# Patient Record
Sex: FEMALE | Race: WHITE | NOT HISPANIC OR LATINO | Employment: OTHER | URBAN - METROPOLITAN AREA
[De-identification: names, ages, dates, MRNs, and addresses within clinical notes are randomized per-mention and may not be internally consistent; named-entity substitution may affect disease eponyms.]

---

## 2018-03-26 ENCOUNTER — TRANSCRIBE ORDERS (OUTPATIENT)
Dept: DIABETES SERVICES | Facility: HOSPITAL | Age: 68
End: 2018-03-26

## 2018-03-26 DIAGNOSIS — E11.9 DIABETES MELLITUS WITHOUT COMPLICATION (HCC): Primary | ICD-10-CM

## 2018-04-03 ENCOUNTER — HOSPITAL ENCOUNTER (OUTPATIENT)
Dept: DIABETES SERVICES | Facility: HOSPITAL | Age: 68
Setting detail: RECURRING SERIES
Discharge: HOME | End: 2018-04-03

## 2018-04-03 PROCEDURE — G0109 DIAB MANAGE TRN IND/GROUP: HCPCS

## 2018-04-10 ENCOUNTER — HOSPITAL ENCOUNTER (OUTPATIENT)
Dept: DIABETES SERVICES | Facility: HOSPITAL | Age: 68
Setting detail: RECURRING SERIES
Discharge: HOME | End: 2018-04-10

## 2018-04-10 PROCEDURE — G0109 DIAB MANAGE TRN IND/GROUP: HCPCS

## 2018-04-17 ENCOUNTER — HOSPITAL ENCOUNTER (OUTPATIENT)
Dept: DIABETES SERVICES | Facility: HOSPITAL | Age: 68
Setting detail: RECURRING SERIES
Discharge: HOME | End: 2018-04-17

## 2018-04-17 PROCEDURE — G0109 DIAB MANAGE TRN IND/GROUP: HCPCS

## 2018-09-07 ENCOUNTER — PREP FOR SURGERY (OUTPATIENT)
Dept: OTHER | Facility: HOSPITAL | Age: 68
End: 2018-09-07

## 2018-09-07 DIAGNOSIS — Z80.0 FAMILY HISTORY OF COLON CANCER: Primary | ICD-10-CM

## 2018-09-07 DIAGNOSIS — D50.9 IRON DEFICIENCY ANEMIA, UNSPECIFIED IRON DEFICIENCY ANEMIA TYPE: ICD-10-CM

## 2018-09-10 PROBLEM — Z80.0 FAMILY HISTORY OF COLON CANCER: Status: ACTIVE | Noted: 2018-09-10

## 2018-09-10 PROBLEM — D50.9 IRON DEFICIENCY ANEMIA: Status: ACTIVE | Noted: 2018-09-10

## 2018-09-11 NOTE — H&P
Patient  Name ONEYDA BRANHAM (68yo, F) ID# [id number] Appt. Date/Time 2018 10:30AM   1950 [address]  Provider DENISSE DE JESUS MD  Insurance   Med Primary: [employer] (MEDICARE REPLACEMENT/ADVANTAGE - PPO)  Insurance # : [id number]  Prescription: DSTPSDIR - Member is eligible. details  Chief Complaint  EGD, colonoscopy  Patient's Care Team  Primary Care Provider: JUNI CORTEZ MD: [address], [place] [address], Ph [phone], Fax [phone] NPI: [id number]    Medications  Accu-Chek SmartView Test Strips  18   filled [facility]  alogliptin 12.5 mg tablet  18   filled [facility]  alogliptin 25 mg-pioglitazone 15 mg tablet  17   filled [facility]  estradiol-norethindrone acet 1 mg-0.5 mg tablet  17   filled [facility]  Fluzone High-Dose 7312-7644 (PF) 180 mcg/0.5 mL intramuscular syringe  17   filled [facility]  hydroCHLOROthiazide 12.5 mg tablet  18   filled [facility]  Januvia 50 mg tablet  08/15/18   filled [facility]  metFORMIN 1,000 mg tablet  08/15/18   filled [facility]  omeprazole 20 mg capsule,delayed release  08/15/18   filled [facility]  pioglitazone 45 mg tablet  18   filled [facility]  ramipril 10 mg capsule  08/15/18   filled [facility]  simvastatin 40 mg tablet  08/15/18   filled [facility]    Family History  Reviewed Family History  Brother - Malignant tumor of colon (onset age: 60)  Sister - Polyp of colon (onset age: 50)  Social History  Reviewed Social History  General Surgery  Alcohol intake: Occasional  Occupation: Retired  Smoking Status: Former smoker  Surgical History  Reviewed Surgical History  Colonoscopy - 2014  Past Medical History  Reviewed Past Medical History  Diabetes: Y  High Cholesterol: Y  Hypertension: Y  Reflux/GERD: Y  HPI  Patient referred by Juni Cortez MD comes in  to discuss iron deficiency anemia and a family history of colon cancer in her brother diagnosed in his 50s. Her sister also had colon polyps diagnosed in her 60s. Patient personally has not had a colonoscopy in 4 years and that one reported as was grossly normal. Patient denies any bleeding or black stool, weight loss, abdominal pain or subjective complaints of syncope, dizziness, lightheadedness or fatigue.  ROS  Patient reports GERD. She reports no fever and no night sweats. She reports no excessive bleeding. She reports not currently smoking.  Physical Exam  Patient is a 67-year-old female.    Constitutional: General Appearance: healthy-appearing.    Psychiatric: Orientation: to time, place, and person.    Skin: Inspection and palpation: no rash.    Eyes: Pupils: PERRLA.    Neck: Neck: supple.    Lungs: Auscultation: breath sounds normal.    Cardiovascular: Heart Auscultation: RRR.    Abdomen: Bowel Sounds: normal. Inspection and Palpation: no masses or tenderness (no guarding, no rebound) and non-distended.    Musculoskeletal:: Joints, Bones, and Muscles: normal movement of all extremities. Extremities: no edema.    Neurologic: Gait and Station: normal gait. Sensation: grossly intact.  Assessment / Plan  Patient presents with unexplained iron deficiency anemia (Hgb 10, Iron 30) and a family history of colon cancer in her brother. I recommended both upper and lower endoscopy since it is been at least 4 years since she has been checked. Both procedures were discussed including the associated risks and benefits. We will get those scheduled.    1. Iron deficiency anemia  D50.9: Iron deficiency anemia, unspecified  ESOPHAGOGASTRODUODENOSCOPY (SURG)  UPPER GI ENDOSCOPY: BEFORE YOUR PROCEDURE  COLONOSCOPY (SURG)    2. Family history of cancer of colon  Z80.0: Family history of malignant neoplasm of digestive organs

## 2018-09-12 ENCOUNTER — ANESTHESIA EVENT (OUTPATIENT)
Dept: GASTROENTEROLOGY | Facility: HOSPITAL | Age: 68
End: 2018-09-12

## 2018-09-12 ENCOUNTER — ANESTHESIA (OUTPATIENT)
Dept: GASTROENTEROLOGY | Facility: HOSPITAL | Age: 68
End: 2018-09-12

## 2018-09-12 ENCOUNTER — HOSPITAL ENCOUNTER (OUTPATIENT)
Facility: HOSPITAL | Age: 68
Setting detail: HOSPITAL OUTPATIENT SURGERY
Discharge: HOME | End: 2018-09-12
Attending: SURGERY | Admitting: SURGERY

## 2018-09-12 VITALS
OXYGEN SATURATION: 97 % | HEART RATE: 69 BPM | SYSTOLIC BLOOD PRESSURE: 164 MMHG | DIASTOLIC BLOOD PRESSURE: 91 MMHG | HEIGHT: 64 IN | RESPIRATION RATE: 12 BRPM | BODY MASS INDEX: 19.63 KG/M2 | TEMPERATURE: 97.9 F | WEIGHT: 115 LBS

## 2018-09-12 DIAGNOSIS — Z80.0 FAMILY HISTORY OF COLON CANCER: ICD-10-CM

## 2018-09-12 DIAGNOSIS — D50.9 IRON DEFICIENCY ANEMIA, UNSPECIFIED IRON DEFICIENCY ANEMIA TYPE: ICD-10-CM

## 2018-09-12 LAB — GLUCOSE BLDC GLUCOMTR-MCNC: 112 MG/DL (ref 70–130)

## 2018-09-12 PROCEDURE — 88305 TISSUE EXAM BY PATHOLOGIST: CPT | Performed by: SURGERY

## 2018-09-12 PROCEDURE — 88312 SPECIAL STAINS GROUP 1: CPT | Performed by: SURGERY

## 2018-09-12 PROCEDURE — 25010000002 PROPOFOL 10 MG/ML EMULSION: Performed by: ANESTHESIOLOGY

## 2018-09-12 PROCEDURE — 88342 IMHCHEM/IMCYTCHM 1ST ANTB: CPT | Performed by: SURGERY

## 2018-09-12 PROCEDURE — 82962 GLUCOSE BLOOD TEST: CPT

## 2018-09-12 RX ORDER — RAMIPRIL 10 MG/1
10 CAPSULE ORAL 2 TIMES DAILY
Status: ON HOLD | COMMUNITY
End: 2022-07-13 | Stop reason: SDUPTHER

## 2018-09-12 RX ORDER — ESTRADIOL 0.5 MG/1
0.5 TABLET ORAL DAILY
COMMUNITY
End: 2020-06-11 | Stop reason: DRUGHIGH

## 2018-09-12 RX ORDER — PROPOFOL 10 MG/ML
VIAL (ML) INTRAVENOUS AS NEEDED
Status: DISCONTINUED | OUTPATIENT
Start: 2018-09-12 | End: 2018-09-12 | Stop reason: SURG

## 2018-09-12 RX ORDER — SODIUM CHLORIDE, SODIUM LACTATE, POTASSIUM CHLORIDE, CALCIUM CHLORIDE 600; 310; 30; 20 MG/100ML; MG/100ML; MG/100ML; MG/100ML
1000 INJECTION, SOLUTION INTRAVENOUS CONTINUOUS
Status: DISCONTINUED | OUTPATIENT
Start: 2018-09-12 | End: 2018-09-12 | Stop reason: HOSPADM

## 2018-09-12 RX ORDER — PROPOFOL 10 MG/ML
VIAL (ML) INTRAVENOUS CONTINUOUS PRN
Status: DISCONTINUED | OUTPATIENT
Start: 2018-09-12 | End: 2018-09-12 | Stop reason: SURG

## 2018-09-12 RX ORDER — LIDOCAINE HYDROCHLORIDE 10 MG/ML
0.5 INJECTION, SOLUTION INFILTRATION; PERINEURAL ONCE AS NEEDED
Status: DISCONTINUED | OUTPATIENT
Start: 2018-09-12 | End: 2018-09-12 | Stop reason: HOSPADM

## 2018-09-12 RX ORDER — SODIUM CHLORIDE 0.9 % (FLUSH) 0.9 %
3 SYRINGE (ML) INJECTION AS NEEDED
Status: DISCONTINUED | OUTPATIENT
Start: 2018-09-12 | End: 2018-09-12 | Stop reason: HOSPADM

## 2018-09-12 RX ORDER — SIMVASTATIN 40 MG
40 TABLET ORAL NIGHTLY
COMMUNITY
End: 2022-07-13 | Stop reason: HOSPADM

## 2018-09-12 RX ORDER — OMEPRAZOLE 20 MG/1
20 CAPSULE, DELAYED RELEASE ORAL DAILY
COMMUNITY
End: 2018-09-19 | Stop reason: DRUGHIGH

## 2018-09-12 RX ORDER — HYDROCHLOROTHIAZIDE 12.5 MG/1
12.5 CAPSULE, GELATIN COATED ORAL DAILY
COMMUNITY

## 2018-09-12 RX ORDER — LIDOCAINE HYDROCHLORIDE 20 MG/ML
INJECTION, SOLUTION INFILTRATION; PERINEURAL AS NEEDED
Status: DISCONTINUED | OUTPATIENT
Start: 2018-09-12 | End: 2018-09-12 | Stop reason: SURG

## 2018-09-12 RX ADMIN — SODIUM CHLORIDE, POTASSIUM CHLORIDE, SODIUM LACTATE AND CALCIUM CHLORIDE 1000 ML: 600; 310; 30; 20 INJECTION, SOLUTION INTRAVENOUS at 13:33

## 2018-09-12 RX ADMIN — PROPOFOL 150 MG: 10 INJECTION, EMULSION INTRAVENOUS at 13:41

## 2018-09-12 RX ADMIN — PROPOFOL 140 MCG/KG/MIN: 10 INJECTION, EMULSION INTRAVENOUS at 13:41

## 2018-09-12 RX ADMIN — LIDOCAINE HYDROCHLORIDE 50 MG: 20 INJECTION, SOLUTION INFILTRATION; PERINEURAL at 13:41

## 2018-09-12 NOTE — ANESTHESIA POSTPROCEDURE EVALUATION
Patient: Maryellen Crump    Procedure Summary     Date:  09/12/18 Room / Location:  [facility] ENDOSCOPY 8 /  [facility]    Anesthesia Start:  1337 Anesthesia Stop:  1409    Procedures:       COLONOSCOPY TO CECUM (N/A )      ESOPHAGOGASTRODUODENOSCOPY WITH BIOPSIES (N/A Esophagus) Diagnosis:       Family history of colon cancer      Iron deficiency anemia, unspecified iron deficiency anemia type      (Family history of colon cancer [Z80.0])      (Iron deficiency anemia, unspecified iron deficiency anemia type [D50.9])    Surgeon:  Josh Muñoz MD Provider:  Tanmay Butt MD    Anesthesia Type:  MAC ASA Status:  2          Anesthesia Type: MAC  Last vitals  BP   126/76 (09/12/18 1412)   Temp   36.5 °C (97.7 °F) (09/12/18 1333)   Pulse   66 (09/12/18 1412)   Resp   12 (09/12/18 1333)     SpO2   97 % (09/12/18 1412)     Post Anesthesia Care and Evaluation    Patient location during evaluation: bedside  Patient participation: complete - patient participated  Level of consciousness: awake and alert  Pain score: 0  Pain management: adequate  Airway patency: patent  Anesthetic complications: No anesthetic complications  PONV Status: none  Cardiovascular status: acceptable  Respiratory status: acceptable  Hydration status: acceptable  Post Neuraxial Block status: Motor and sensory function returned to baseline

## 2018-09-12 NOTE — ANESTHESIA PREPROCEDURE EVALUATION
Anesthesia Evaluation     Patient summary reviewed                Airway   Mallampati: II  TM distance: >3 FB  Dental - normal exam     Pulmonary     breath sounds clear to auscultation  Cardiovascular   Exercise tolerance: good (4-7 METS)    Rhythm: regular  Rate: normal        Neuro/Psych  GI/Hepatic/Renal/Endo      Musculoskeletal     Abdominal    Substance History      OB/GYN          Other                        Anesthesia Plan    ASA 2     MAC     intravenous induction   Anesthetic plan, all risks, benefits, and alternatives have been provided, discussed and informed consent has been obtained with: patient.

## 2018-09-12 NOTE — OP NOTE
September 12, 2018    Maryellen Crump  [id number]    PROCEDURE: Esophagogastroduodenoscopy with biopsy and colonoscopy to cecum.    PREOPERATIVE DIAGNOSIS:  Family history of colon cancer [Z80.0]  Iron deficiency anemia, unspecified iron deficiency anemia type [D50.9]    POSTOPERATIVE DIAGNOSIS: Mild antral gastritis with scattered gastric arteriovenous malformations, mild distal esophagitis, normal colon to cecum.    SURGEON:  Josh Muñoz M.D.    ASSISTANT:  None.    ANESTHESIA:  MAC    ESTIMATED BLOOD LOSS:  Minimal    SPECIMENS:    Order Name Source Comment Collection Info Order Time   TISSUE PATHOLOGY EXAM Small Intestine, Duodenum  Collected By: Josh Muñoz MD 9/12/2018  1:45 PM       INDICATIONS:  Patient is a healthy appearing 67-year-old young lady 4 years out from her last colonoscopy who has iron deficiency anemia.  I was asked to see and evaluate her for the same.  Questioning revealed a family history of colon cancer or polyps I recommended upper and lower endoscopy.  She now presents..    PROCEDURE:    Patient was brought into the endoscopy room and confirmed. She was positioned in the left lateral decubitus position.  After administration of adequate IV sedation by anesthesia, an upper endoscope was inserted the posterior pharynx and advanced through the upper GI tract to the third portion of the duodenum. The scope was then pulled back into the stomach and retroflexed.  Picture documentation was performed.  Biopsies were taken. Findings included mild distal esophagitis, mild distal gastritis and scattered gastric AVMs without signs of bleeding.  Then, patient was turned around and repositioned for second portion of the procedure.  A digital rectal exam was performed and a colonoscope was inserted and advanced all the way to the cecum.  The scope was then removed with a slow reevaluation on the way out and the procedure was completed. Findings included a normal colon to cecum. Procedure was  then concluded and my recommendations will depend on final biopsy results and ultimate findings.  Patient will be sent home today with instructions to call for those results.      Josh Muñoz M.D.

## 2018-09-14 LAB
CYTO UR: NORMAL
LAB AP CASE REPORT: NORMAL
PATH REPORT.ADDENDUM SPEC: NORMAL
PATH REPORT.FINAL DX SPEC: NORMAL
PATH REPORT.GROSS SPEC: NORMAL

## 2018-09-19 RX ORDER — OMEPRAZOLE 40 MG/1
40 CAPSULE, DELAYED RELEASE ORAL DAILY
Qty: 90 CAPSULE | Refills: 3 | Status: SHIPPED | OUTPATIENT
Start: 2018-09-19 | End: 2019-09-19

## 2020-06-11 ENCOUNTER — OFFICE VISIT (OUTPATIENT)
Dept: GASTROENTEROLOGY | Facility: CLINIC | Age: 70
End: 2020-06-11

## 2020-06-11 VITALS — HEIGHT: 64 IN | BODY MASS INDEX: 20.67 KG/M2 | TEMPERATURE: 98.2 F | WEIGHT: 121.1 LBS

## 2020-06-11 DIAGNOSIS — R19.7 DIARRHEA OF PRESUMED INFECTIOUS ORIGIN: Primary | ICD-10-CM

## 2020-06-11 PROCEDURE — 99203 OFFICE O/P NEW LOW 30 MIN: CPT | Performed by: INTERNAL MEDICINE

## 2020-06-11 RX ORDER — ESTRADIOL 1 MG/1
1 TABLET ORAL DAILY
Status: ON HOLD | COMMUNITY
Start: 2020-05-14 | End: 2022-07-02

## 2020-06-11 NOTE — PROGRESS NOTES
Answers for HPI/ROS submitted by the patient on 6/4/2020   What is the primary reason for your visit?: Other  Please describe your symptoms.: Constant diarrhea  Have you had these symptoms before?: No  How long have you been having these symptoms?: Greater than 2 weeks  Please list any medications you are currently taking for this condition.: None  Please describe any probable cause for these symptoms. : Don't know  Chief Complaint   Patient presents with   • Diarrhea     Maryellen Crump is a 69 y.o. female who presents with a history of diarrhea  HPI     Patient 69-year-old female with history diabetes, hyperlipidemia and hypertension with family history of colon cancer status post negative colonoscopy in September 2018.  Patient reports approximately 2 months ago developed new onset watery diarrhea.  Patient's diarrhea was on and off presents to family doctor.  Dr. Cortez sent patient for stool testing which was negative now referred to GI.  Patient denies nausea vomiting no melena or bright red blood per rectum.  Patient denies weight loss no fever chills no blood or mucus seen.  Patient reports the last week or 2 has noted the stools more formed though thin appearing is they are no longer watery.  Patient still with significant amount of gas and flatulence noted with this change.    Past Medical History:   Diagnosis Date   • Diabetes mellitus (CMS/HCC)    • Hyperlipidemia    • Hypertension        Current Outpatient Medications:   •  estradiol (ESTRACE) 1 MG tablet, Take 1 mg by mouth Daily., Disp: , Rfl:   •  hydrochlorothiazide (MICROZIDE) 12.5 MG capsule, Take 12.5 mg by mouth Daily., Disp: , Rfl:   •  metFORMIN (GLUCOPHAGE) 1000 MG tablet, Take 1,000 mg by mouth 2 (Two) Times a Day With Meals., Disp: , Rfl:   •  Progesterone Micronized (PROGESTERONE PO), Take 2.5 mg by mouth Daily., Disp: , Rfl:   •  ramipril (ALTACE) 10 MG capsule, Take 10 mg by mouth 2 (Two) Times a Day., Disp: , Rfl:   •  simvastatin  (ZOCOR) 40 MG tablet, Take 40 mg by mouth Every Night., Disp: , Rfl:   •  SITagliptin (JANUVIA) 25 MG tablet, Take 100 mg by mouth Daily., Disp: , Rfl:   Allergies   Allergen Reactions   • Penicillins Hives     Social History     Socioeconomic History   • Marital status:      Spouse name: Not on file   • Number of children: Not on file   • Years of education: Not on file   • Highest education level: Not on file   Tobacco Use   • Smoking status: Never Smoker   • Smokeless tobacco: Never Used   Substance and Sexual Activity   • Alcohol use: Yes     Alcohol/week: 4.0 standard drinks     Types: 4 Glasses of wine per week   • Drug use: No   • Sexual activity: Defer     Family History   Problem Relation Age of Onset   • Stroke Father    • Colon polyps Sister    • Cancer Brother    • Colon cancer Brother      Review of Systems   Constitutional: Negative.    HENT: Negative.    Eyes: Negative.    Respiratory: Negative.    Cardiovascular: Negative.    Gastrointestinal: Positive for diarrhea. Negative for abdominal distention, abdominal pain, anal bleeding, blood in stool, constipation, nausea, rectal pain and vomiting.   Endocrine: Negative.    Musculoskeletal: Negative.    Skin: Negative.    Allergic/Immunologic: Negative.    Hematological: Negative.      Vitals:    06/11/20 1129   Temp: 98.2 °F (36.8 °C)     Physical Exam   Constitutional: She is oriented to person, place, and time. She appears well-developed and well-nourished.   HENT:   Head: Normocephalic and atraumatic.   Eyes: Pupils are equal, round, and reactive to light. No scleral icterus.   Neck: Normal range of motion.   Cardiovascular: Normal rate, regular rhythm and normal heart sounds. Exam reveals no gallop and no friction rub.   No murmur heard.  Pulmonary/Chest: Effort normal and breath sounds normal. She has no wheezes. She has no rales.   Abdominal: Soft. Bowel sounds are normal. She exhibits no shifting dullness, no distension, no pulsatile liver,  no fluid wave, no abdominal bruit, no ascites, no pulsatile midline mass and no mass. There is no hepatosplenomegaly. There is no tenderness. There is no rigidity and no guarding. No hernia.   Musculoskeletal: Normal range of motion. She exhibits no edema.   Lymphadenopathy:     She has no cervical adenopathy.   Neurological: She is alert and oriented to person, place, and time. No cranial nerve deficit.   Skin: Skin is warm and dry. No rash noted.   Psychiatric: She has a normal mood and affect. Her behavior is normal. Thought content normal.   Nursing note and vitals reviewed.    Diagnoses and all orders for this visit:    Diarrhea of presumed infectious origin    Other orders  -     estradiol (ESTRACE) 1 MG tablet; Take 1 mg by mouth Daily.    Patient 69-year-old female with history of anemia in the past, hypertension, hyperlipidemia and diabetes presenting with 2 months diarrhea.  Patient noted to have negative stool test for bacteria.  Patient reports labs were all normal with no anemia and normal liver enzymes.  Patient reports that while the diarrhea started watery now has more formed but thin stool.  Patient stools not quite back to baseline but is also having significant gas.  For now we will treat excess gas is likely bacterial imbalance related to viral gastroenteritis.  Will treat with Xifaxan 550 twice daily for 8 days, samples given.  We will also encourage patient to begin probiotics daily for 3 months with rotating sources, brand names.  We will follow clinical response no indication for endoscopic evaluation at this time.

## 2022-06-29 ENCOUNTER — APPOINTMENT (OUTPATIENT)
Dept: CT IMAGING | Facility: HOSPITAL | Age: 72
End: 2022-06-29

## 2022-06-29 ENCOUNTER — APPOINTMENT (OUTPATIENT)
Dept: MRI IMAGING | Facility: HOSPITAL | Age: 72
End: 2022-06-29

## 2022-06-29 ENCOUNTER — HOSPITAL ENCOUNTER (INPATIENT)
Facility: HOSPITAL | Age: 72
LOS: 3 days | Discharge: NURSING, REHAB OR LONG-TERM CARE | End: 2022-07-02
Attending: EMERGENCY MEDICINE | Admitting: HOSPITALIST

## 2022-06-29 DIAGNOSIS — I63.9 ACUTE CVA (CEREBROVASCULAR ACCIDENT): Primary | ICD-10-CM

## 2022-06-29 PROBLEM — E83.52 HYPERCALCEMIA: Status: ACTIVE | Noted: 2022-06-29

## 2022-06-29 LAB
ALBUMIN SERPL-MCNC: 4.1 G/DL (ref 3.5–5.2)
ALBUMIN/GLOB SERPL: 1.4 G/DL
ALP SERPL-CCNC: 92 U/L (ref 39–117)
ALT SERPL W P-5'-P-CCNC: 13 U/L (ref 1–33)
ANION GAP SERPL CALCULATED.3IONS-SCNC: 16.9 MMOL/L (ref 5–15)
AST SERPL-CCNC: 15 U/L (ref 1–32)
BASOPHILS # BLD AUTO: 0.06 10*3/MM3 (ref 0–0.2)
BASOPHILS NFR BLD AUTO: 0.8 % (ref 0–1.5)
BILIRUB SERPL-MCNC: 0.6 MG/DL (ref 0–1.2)
BUN SERPL-MCNC: 16 MG/DL (ref 8–23)
BUN/CREAT SERPL: 13.2 (ref 7–25)
CALCIUM SPEC-SCNC: 10.9 MG/DL (ref 8.6–10.5)
CHLORIDE SERPL-SCNC: 100 MMOL/L (ref 98–107)
CO2 SERPL-SCNC: 21.1 MMOL/L (ref 22–29)
CREAT SERPL-MCNC: 1.21 MG/DL (ref 0.57–1)
DEPRECATED RDW RBC AUTO: 40.2 FL (ref 37–54)
EGFRCR SERPLBLD CKD-EPI 2021: 47.7 ML/MIN/1.73
EOSINOPHIL # BLD AUTO: 0.08 10*3/MM3 (ref 0–0.4)
EOSINOPHIL NFR BLD AUTO: 1 % (ref 0.3–6.2)
ERYTHROCYTE [DISTWIDTH] IN BLOOD BY AUTOMATED COUNT: 11.8 % (ref 12.3–15.4)
GLOBULIN UR ELPH-MCNC: 2.9 GM/DL
GLUCOSE SERPL-MCNC: 113 MG/DL (ref 65–99)
HCT VFR BLD AUTO: 35.9 % (ref 34–46.6)
HGB BLD-MCNC: 11.8 G/DL (ref 12–15.9)
IMM GRANULOCYTES # BLD AUTO: 0.03 10*3/MM3 (ref 0–0.05)
IMM GRANULOCYTES NFR BLD AUTO: 0.4 % (ref 0–0.5)
LYMPHOCYTES # BLD AUTO: 2.03 10*3/MM3 (ref 0.7–3.1)
LYMPHOCYTES NFR BLD AUTO: 26.3 % (ref 19.6–45.3)
MCH RBC QN AUTO: 31.2 PG (ref 26.6–33)
MCHC RBC AUTO-ENTMCNC: 32.9 G/DL (ref 31.5–35.7)
MCV RBC AUTO: 95 FL (ref 79–97)
MONOCYTES # BLD AUTO: 0.43 10*3/MM3 (ref 0.1–0.9)
MONOCYTES NFR BLD AUTO: 5.6 % (ref 5–12)
NEUTROPHILS NFR BLD AUTO: 5.08 10*3/MM3 (ref 1.7–7)
NEUTROPHILS NFR BLD AUTO: 65.9 % (ref 42.7–76)
NRBC BLD AUTO-RTO: 0 /100 WBC (ref 0–0.2)
PLATELET # BLD AUTO: 303 10*3/MM3 (ref 140–450)
PMV BLD AUTO: 9.6 FL (ref 6–12)
POTASSIUM SERPL-SCNC: 4.1 MMOL/L (ref 3.5–5.2)
PROT SERPL-MCNC: 7 G/DL (ref 6–8.5)
QT INTERVAL: 382 MS
RBC # BLD AUTO: 3.78 10*6/MM3 (ref 3.77–5.28)
SARS-COV-2 RNA RESP QL NAA+PROBE: NOT DETECTED
SODIUM SERPL-SCNC: 138 MMOL/L (ref 136–145)
TROPONIN T SERPL-MCNC: <0.01 NG/ML (ref 0–0.03)
WBC NRBC COR # BLD: 7.71 10*3/MM3 (ref 3.4–10.8)

## 2022-06-29 PROCEDURE — U0003 INFECTIOUS AGENT DETECTION BY NUCLEIC ACID (DNA OR RNA); SEVERE ACUTE RESPIRATORY SYNDROME CORONAVIRUS 2 (SARS-COV-2) (CORONAVIRUS DISEASE [COVID-19]), AMPLIFIED PROBE TECHNIQUE, MAKING USE OF HIGH THROUGHPUT TECHNOLOGIES AS DESCRIBED BY CMS-2020-01-R: HCPCS | Performed by: EMERGENCY MEDICINE

## 2022-06-29 PROCEDURE — 70551 MRI BRAIN STEM W/O DYE: CPT

## 2022-06-29 PROCEDURE — 0 IOPAMIDOL PER 1 ML: Performed by: EMERGENCY MEDICINE

## 2022-06-29 PROCEDURE — 99284 EMERGENCY DEPT VISIT MOD MDM: CPT

## 2022-06-29 PROCEDURE — 80053 COMPREHEN METABOLIC PANEL: CPT | Performed by: EMERGENCY MEDICINE

## 2022-06-29 PROCEDURE — 84484 ASSAY OF TROPONIN QUANT: CPT | Performed by: EMERGENCY MEDICINE

## 2022-06-29 PROCEDURE — 99285 EMERGENCY DEPT VISIT HI MDM: CPT

## 2022-06-29 PROCEDURE — 72146 MRI CHEST SPINE W/O DYE: CPT

## 2022-06-29 PROCEDURE — 70496 CT ANGIOGRAPHY HEAD: CPT

## 2022-06-29 PROCEDURE — 93005 ELECTROCARDIOGRAM TRACING: CPT | Performed by: EMERGENCY MEDICINE

## 2022-06-29 PROCEDURE — 70498 CT ANGIOGRAPHY NECK: CPT

## 2022-06-29 PROCEDURE — 85025 COMPLETE CBC W/AUTO DIFF WBC: CPT | Performed by: EMERGENCY MEDICINE

## 2022-06-29 PROCEDURE — 93010 ELECTROCARDIOGRAM REPORT: CPT | Performed by: INTERNAL MEDICINE

## 2022-06-29 RX ORDER — SODIUM CHLORIDE 9 MG/ML
75 INJECTION, SOLUTION INTRAVENOUS CONTINUOUS
Status: DISCONTINUED | OUTPATIENT
Start: 2022-06-29 | End: 2022-06-30

## 2022-06-29 RX ORDER — ASPIRIN 325 MG
325 TABLET ORAL DAILY
Status: DISCONTINUED | OUTPATIENT
Start: 2022-06-30 | End: 2022-06-29

## 2022-06-29 RX ORDER — ONDANSETRON 2 MG/ML
4 INJECTION INTRAMUSCULAR; INTRAVENOUS EVERY 6 HOURS PRN
Status: DISCONTINUED | OUTPATIENT
Start: 2022-06-29 | End: 2022-07-02 | Stop reason: HOSPADM

## 2022-06-29 RX ORDER — ATORVASTATIN CALCIUM 80 MG/1
80 TABLET, FILM COATED ORAL NIGHTLY
Status: DISCONTINUED | OUTPATIENT
Start: 2022-06-29 | End: 2022-07-02 | Stop reason: HOSPADM

## 2022-06-29 RX ORDER — ACETAMINOPHEN 325 MG/1
650 TABLET ORAL EVERY 4 HOURS PRN
Status: DISCONTINUED | OUTPATIENT
Start: 2022-06-29 | End: 2022-07-02 | Stop reason: HOSPADM

## 2022-06-29 RX ORDER — ASPIRIN 81 MG/1
81 TABLET ORAL DAILY
Status: DISCONTINUED | OUTPATIENT
Start: 2022-06-30 | End: 2022-07-02 | Stop reason: HOSPADM

## 2022-06-29 RX ORDER — BISACODYL 10 MG
10 SUPPOSITORY, RECTAL RECTAL DAILY PRN
Status: DISCONTINUED | OUTPATIENT
Start: 2022-06-29 | End: 2022-07-02 | Stop reason: HOSPADM

## 2022-06-29 RX ORDER — ASPIRIN 300 MG/1
300 SUPPOSITORY RECTAL DAILY
Status: DISCONTINUED | OUTPATIENT
Start: 2022-06-30 | End: 2022-06-29

## 2022-06-29 RX ORDER — ASPIRIN 81 MG/1
81 TABLET, CHEWABLE ORAL ONCE
Status: DISCONTINUED | OUTPATIENT
Start: 2022-06-29 | End: 2022-06-29

## 2022-06-29 RX ORDER — ASPIRIN 325 MG
325 TABLET ORAL ONCE
Status: DISCONTINUED | OUTPATIENT
Start: 2022-06-29 | End: 2022-06-29

## 2022-06-29 RX ORDER — CLOPIDOGREL BISULFATE 75 MG/1
300 TABLET ORAL ONCE
Status: DISCONTINUED | OUTPATIENT
Start: 2022-06-29 | End: 2022-07-02 | Stop reason: HOSPADM

## 2022-06-29 RX ORDER — ACETAMINOPHEN 650 MG/1
650 SUPPOSITORY RECTAL EVERY 4 HOURS PRN
Status: DISCONTINUED | OUTPATIENT
Start: 2022-06-29 | End: 2022-07-02 | Stop reason: HOSPADM

## 2022-06-29 RX ORDER — CLOPIDOGREL BISULFATE 75 MG/1
75 TABLET ORAL DAILY
Status: DISCONTINUED | OUTPATIENT
Start: 2022-06-30 | End: 2022-07-02 | Stop reason: HOSPADM

## 2022-06-29 RX ADMIN — IOPAMIDOL 95 ML: 755 INJECTION, SOLUTION INTRAVENOUS at 17:45

## 2022-06-30 ENCOUNTER — APPOINTMENT (OUTPATIENT)
Dept: CARDIOLOGY | Facility: HOSPITAL | Age: 72
End: 2022-06-30

## 2022-06-30 PROBLEM — I63.81 ACUTE LACUNAR STROKE (HCC): Status: ACTIVE | Noted: 2022-06-30

## 2022-06-30 PROBLEM — E83.52 HYPERCALCEMIA: Status: RESOLVED | Noted: 2022-06-29 | Resolved: 2022-06-30

## 2022-06-30 LAB
ALBUMIN SERPL-MCNC: 3.6 G/DL (ref 3.5–5.2)
ALBUMIN/GLOB SERPL: 1.6 G/DL
ALP SERPL-CCNC: 72 U/L (ref 39–117)
ALT SERPL W P-5'-P-CCNC: 11 U/L (ref 1–33)
ANION GAP SERPL CALCULATED.3IONS-SCNC: 12 MMOL/L (ref 5–15)
AORTIC DIMENSIONLESS INDEX: 0.8 (DI)
AST SERPL-CCNC: 16 U/L (ref 1–32)
BH CV ECHO MEAS - ACS: 1.48 CM
BH CV ECHO MEAS - AO MAX PG: 8.4 MMHG
BH CV ECHO MEAS - AO MEAN PG: 4.2 MMHG
BH CV ECHO MEAS - AO V2 MAX: 144.6 CM/SEC
BH CV ECHO MEAS - AO V2 VTI: 30.4 CM
BH CV ECHO MEAS - AVA(I,D): 1.84 CM2
BH CV ECHO MEAS - EDV(CUBED): 44.6 ML
BH CV ECHO MEAS - EDV(MOD-SP2): 81 ML
BH CV ECHO MEAS - EDV(MOD-SP4): 72 ML
BH CV ECHO MEAS - EF(MOD-BP): 59.2 %
BH CV ECHO MEAS - EF(MOD-SP2): 61.7 %
BH CV ECHO MEAS - EF(MOD-SP4): 58.3 %
BH CV ECHO MEAS - ESV(CUBED): 15.3 ML
BH CV ECHO MEAS - ESV(MOD-SP2): 31 ML
BH CV ECHO MEAS - ESV(MOD-SP4): 30 ML
BH CV ECHO MEAS - FS: 29.9 %
BH CV ECHO MEAS - IVS/LVPW: 1.06 CM
BH CV ECHO MEAS - IVSD: 1.07 CM
BH CV ECHO MEAS - LAT PEAK E' VEL: 8.3 CM/SEC
BH CV ECHO MEAS - LV DIASTOLIC VOL/BSA (35-75): 45.8 CM2
BH CV ECHO MEAS - LV MASS(C)D: 112.3 GRAMS
BH CV ECHO MEAS - LV MAX PG: 4.5 MMHG
BH CV ECHO MEAS - LV MEAN PG: 2.5 MMHG
BH CV ECHO MEAS - LV SYSTOLIC VOL/BSA (12-30): 19.1 CM2
BH CV ECHO MEAS - LV V1 MAX: 106.6 CM/SEC
BH CV ECHO MEAS - LV V1 VTI: 24.1 CM
BH CV ECHO MEAS - LVIDD: 3.5 CM
BH CV ECHO MEAS - LVIDS: 2.48 CM
BH CV ECHO MEAS - LVOT AREA: 2.31 CM2
BH CV ECHO MEAS - LVOT DIAM: 1.72 CM
BH CV ECHO MEAS - LVPWD: 1.02 CM
BH CV ECHO MEAS - MED PEAK E' VEL: 6.1 CM/SEC
BH CV ECHO MEAS - MV A DUR: 0.12 SEC
BH CV ECHO MEAS - MV A MAX VEL: 121 CM/SEC
BH CV ECHO MEAS - MV DEC SLOPE: 276.6 CM/SEC2
BH CV ECHO MEAS - MV DEC TIME: 0.29 MSEC
BH CV ECHO MEAS - MV E MAX VEL: 83.2 CM/SEC
BH CV ECHO MEAS - MV E/A: 0.69
BH CV ECHO MEAS - MV MAX PG: 7.7 MMHG
BH CV ECHO MEAS - MV MEAN PG: 2.7 MMHG
BH CV ECHO MEAS - MV P1/2T: 99.9 MSEC
BH CV ECHO MEAS - MV V2 VTI: 28.1 CM
BH CV ECHO MEAS - MVA(P1/2T): 2.2 CM2
BH CV ECHO MEAS - MVA(VTI): 1.99 CM2
BH CV ECHO MEAS - PA ACC TIME: 0.1 SEC
BH CV ECHO MEAS - PA PR(ACCEL): 32.7 MMHG
BH CV ECHO MEAS - PA V2 MAX: 124.9 CM/SEC
BH CV ECHO MEAS - PULM A REVS DUR: 0.11 SEC
BH CV ECHO MEAS - PULM A REVS VEL: 45.4 CM/SEC
BH CV ECHO MEAS - PULM DIAS VEL: 33.9 CM/SEC
BH CV ECHO MEAS - PULM S/D: 1.65
BH CV ECHO MEAS - PULM SYS VEL: 55.8 CM/SEC
BH CV ECHO MEAS - RAP SYSTOLE: 3 MMHG
BH CV ECHO MEAS - RV MAX PG: 1.24 MMHG
BH CV ECHO MEAS - RV V1 MAX: 55.7 CM/SEC
BH CV ECHO MEAS - RV V1 VTI: 12.2 CM
BH CV ECHO MEAS - RVSP: 25.1 MMHG
BH CV ECHO MEAS - SI(MOD-SP2): 31.8 ML/M2
BH CV ECHO MEAS - SI(MOD-SP4): 26.7 ML/M2
BH CV ECHO MEAS - SV(LVOT): 55.8 ML
BH CV ECHO MEAS - SV(MOD-SP2): 50 ML
BH CV ECHO MEAS - SV(MOD-SP4): 42 ML
BH CV ECHO MEAS - TAPSE (>1.6): 1.5 CM
BH CV ECHO MEAS - TR MAX PG: 22.1 MMHG
BH CV ECHO MEAS - TR MAX VEL: 234.8 CM/SEC
BH CV ECHO MEASUREMENTS AVERAGE E/E' RATIO: 11.56
BH CV XLRA - RV BASE: 2.6 CM
BH CV XLRA - RV LENGTH: 6.2 CM
BH CV XLRA - RV MID: 1.88 CM
BH CV XLRA - TDI S': 11.7 CM/SEC
BILIRUB SERPL-MCNC: 0.5 MG/DL (ref 0–1.2)
BUN SERPL-MCNC: 14 MG/DL (ref 8–23)
BUN/CREAT SERPL: 12.6 (ref 7–25)
CALCIUM SPEC-SCNC: 9.7 MG/DL (ref 8.6–10.5)
CHLORIDE SERPL-SCNC: 101 MMOL/L (ref 98–107)
CHOLEST SERPL-MCNC: 130 MG/DL (ref 0–200)
CO2 SERPL-SCNC: 25 MMOL/L (ref 22–29)
CREAT SERPL-MCNC: 1.11 MG/DL (ref 0.57–1)
DEPRECATED RDW RBC AUTO: 41.9 FL (ref 37–54)
EGFRCR SERPLBLD CKD-EPI 2021: 52.9 ML/MIN/1.73
ERYTHROCYTE [DISTWIDTH] IN BLOOD BY AUTOMATED COUNT: 12.1 % (ref 12.3–15.4)
GLOBULIN UR ELPH-MCNC: 2.3 GM/DL
GLUCOSE BLDC GLUCOMTR-MCNC: 118 MG/DL (ref 70–130)
GLUCOSE BLDC GLUCOMTR-MCNC: 202 MG/DL (ref 70–130)
GLUCOSE SERPL-MCNC: 108 MG/DL (ref 65–99)
HBA1C MFR BLD: 6.9 % (ref 4.8–5.6)
HCT VFR BLD AUTO: 32.6 % (ref 34–46.6)
HDLC SERPL-MCNC: 54 MG/DL (ref 40–60)
HGB BLD-MCNC: 10.6 G/DL (ref 12–15.9)
LDLC SERPL CALC-MCNC: 51 MG/DL (ref 0–100)
LDLC/HDLC SERPL: 0.86 {RATIO}
LEFT ATRIUM VOLUME INDEX: 34.5 ML/M2
MAXIMAL PREDICTED HEART RATE: 148 BPM
MCH RBC QN AUTO: 31.3 PG (ref 26.6–33)
MCHC RBC AUTO-ENTMCNC: 32.5 G/DL (ref 31.5–35.7)
MCV RBC AUTO: 96.2 FL (ref 79–97)
PLATELET # BLD AUTO: 255 10*3/MM3 (ref 140–450)
PMV BLD AUTO: 10 FL (ref 6–12)
POTASSIUM SERPL-SCNC: 3.8 MMOL/L (ref 3.5–5.2)
PROT SERPL-MCNC: 5.9 G/DL (ref 6–8.5)
RBC # BLD AUTO: 3.39 10*6/MM3 (ref 3.77–5.28)
SINUS: 2.43 CM
SODIUM SERPL-SCNC: 138 MMOL/L (ref 136–145)
STRESS TARGET HR: 126 BPM
T4 FREE SERPL-MCNC: 1.4 NG/DL (ref 0.93–1.7)
TRIGL SERPL-MCNC: 148 MG/DL (ref 0–150)
TSH SERPL DL<=0.05 MIU/L-ACNC: 4.63 UIU/ML (ref 0.27–4.2)
VLDLC SERPL-MCNC: 25 MG/DL (ref 5–40)
WBC NRBC COR # BLD: 6.67 10*3/MM3 (ref 3.4–10.8)

## 2022-06-30 PROCEDURE — 97166 OT EVAL MOD COMPLEX 45 MIN: CPT | Performed by: OCCUPATIONAL THERAPIST

## 2022-06-30 PROCEDURE — 97112 NEUROMUSCULAR REEDUCATION: CPT | Performed by: OCCUPATIONAL THERAPIST

## 2022-06-30 PROCEDURE — 93306 TTE W/DOPPLER COMPLETE: CPT

## 2022-06-30 PROCEDURE — 84439 ASSAY OF FREE THYROXINE: CPT | Performed by: STUDENT IN AN ORGANIZED HEALTH CARE EDUCATION/TRAINING PROGRAM

## 2022-06-30 PROCEDURE — 36415 COLL VENOUS BLD VENIPUNCTURE: CPT

## 2022-06-30 PROCEDURE — 84443 ASSAY THYROID STIM HORMONE: CPT

## 2022-06-30 PROCEDURE — 85027 COMPLETE CBC AUTOMATED: CPT

## 2022-06-30 PROCEDURE — 97162 PT EVAL MOD COMPLEX 30 MIN: CPT

## 2022-06-30 PROCEDURE — 93306 TTE W/DOPPLER COMPLETE: CPT | Performed by: INTERNAL MEDICINE

## 2022-06-30 PROCEDURE — 80053 COMPREHEN METABOLIC PANEL: CPT

## 2022-06-30 PROCEDURE — 80061 LIPID PANEL: CPT

## 2022-06-30 PROCEDURE — 83036 HEMOGLOBIN GLYCOSYLATED A1C: CPT

## 2022-06-30 PROCEDURE — 99223 1ST HOSP IP/OBS HIGH 75: CPT | Performed by: PSYCHIATRY & NEUROLOGY

## 2022-06-30 PROCEDURE — 82962 GLUCOSE BLOOD TEST: CPT

## 2022-06-30 PROCEDURE — 97116 GAIT TRAINING THERAPY: CPT

## 2022-06-30 RX ORDER — NICOTINE POLACRILEX 4 MG
15 LOZENGE BUCCAL
Status: DISCONTINUED | OUTPATIENT
Start: 2022-06-30 | End: 2022-07-02 | Stop reason: HOSPADM

## 2022-06-30 RX ORDER — RAMIPRIL 10 MG/1
10 CAPSULE ORAL DAILY
Status: DISCONTINUED | OUTPATIENT
Start: 2022-06-30 | End: 2022-07-02 | Stop reason: HOSPADM

## 2022-06-30 RX ORDER — RAMIPRIL 10 MG/1
10 CAPSULE ORAL 2 TIMES DAILY
Status: DISCONTINUED | OUTPATIENT
Start: 2022-06-30 | End: 2022-06-30

## 2022-06-30 RX ORDER — INSULIN LISPRO 100 [IU]/ML
0-7 INJECTION, SOLUTION INTRAVENOUS; SUBCUTANEOUS
Status: DISCONTINUED | OUTPATIENT
Start: 2022-07-01 | End: 2022-07-02 | Stop reason: HOSPADM

## 2022-06-30 RX ORDER — DEXTROSE MONOHYDRATE 25 G/50ML
25 INJECTION, SOLUTION INTRAVENOUS
Status: DISCONTINUED | OUTPATIENT
Start: 2022-06-30 | End: 2022-07-02 | Stop reason: HOSPADM

## 2022-06-30 RX ADMIN — ASPIRIN 81 MG: 81 TABLET, COATED ORAL at 08:20

## 2022-06-30 RX ADMIN — ATORVASTATIN CALCIUM 80 MG: 80 TABLET, FILM COATED ORAL at 21:12

## 2022-06-30 RX ADMIN — SODIUM CHLORIDE 75 ML/HR: 9 INJECTION, SOLUTION INTRAVENOUS at 02:30

## 2022-06-30 RX ADMIN — ACETAMINOPHEN 650 MG: 325 TABLET, FILM COATED ORAL at 21:12

## 2022-06-30 RX ADMIN — CLOPIDOGREL 75 MG: 75 TABLET, FILM COATED ORAL at 08:17

## 2022-07-01 PROBLEM — E53.8 VITAMIN B12 DEFICIENCY: Status: ACTIVE | Noted: 2022-07-01

## 2022-07-01 LAB
ANION GAP SERPL CALCULATED.3IONS-SCNC: 12 MMOL/L (ref 5–15)
BUN SERPL-MCNC: 15 MG/DL (ref 8–23)
BUN/CREAT SERPL: 14 (ref 7–25)
CALCIUM SPEC-SCNC: 9.8 MG/DL (ref 8.6–10.5)
CHLORIDE SERPL-SCNC: 103 MMOL/L (ref 98–107)
CO2 SERPL-SCNC: 24 MMOL/L (ref 22–29)
CREAT SERPL-MCNC: 1.07 MG/DL (ref 0.57–1)
DEPRECATED RDW RBC AUTO: 39.5 FL (ref 37–54)
EGFRCR SERPLBLD CKD-EPI 2021: 55.3 ML/MIN/1.73
ERYTHROCYTE [DISTWIDTH] IN BLOOD BY AUTOMATED COUNT: 11.7 % (ref 12.3–15.4)
FERRITIN SERPL-MCNC: 29.1 NG/ML (ref 13–150)
FOLATE SERPL-MCNC: 11.8 NG/ML (ref 4.78–24.2)
GLUCOSE BLDC GLUCOMTR-MCNC: 127 MG/DL (ref 70–130)
GLUCOSE BLDC GLUCOMTR-MCNC: 144 MG/DL (ref 70–130)
GLUCOSE BLDC GLUCOMTR-MCNC: 219 MG/DL (ref 70–130)
GLUCOSE BLDC GLUCOMTR-MCNC: 320 MG/DL (ref 70–130)
GLUCOSE SERPL-MCNC: 142 MG/DL (ref 65–99)
HCT VFR BLD AUTO: 31.3 % (ref 34–46.6)
HGB BLD-MCNC: 10.4 G/DL (ref 12–15.9)
IRON 24H UR-MRATE: 58 MCG/DL (ref 37–145)
IRON SATN MFR SERPL: 14 % (ref 20–50)
MCH RBC QN AUTO: 31.3 PG (ref 26.6–33)
MCHC RBC AUTO-ENTMCNC: 33.2 G/DL (ref 31.5–35.7)
MCV RBC AUTO: 94.3 FL (ref 79–97)
PLATELET # BLD AUTO: 256 10*3/MM3 (ref 140–450)
PMV BLD AUTO: 9.7 FL (ref 6–12)
POTASSIUM SERPL-SCNC: 4 MMOL/L (ref 3.5–5.2)
RBC # BLD AUTO: 3.32 10*6/MM3 (ref 3.77–5.28)
SODIUM SERPL-SCNC: 139 MMOL/L (ref 136–145)
TIBC SERPL-MCNC: 407 MCG/DL (ref 298–536)
TRANSFERRIN SERPL-MCNC: 273 MG/DL (ref 200–360)
VIT B12 BLD-MCNC: 204 PG/ML (ref 211–946)
WBC NRBC COR # BLD: 6.09 10*3/MM3 (ref 3.4–10.8)

## 2022-07-01 PROCEDURE — 83540 ASSAY OF IRON: CPT | Performed by: STUDENT IN AN ORGANIZED HEALTH CARE EDUCATION/TRAINING PROGRAM

## 2022-07-01 PROCEDURE — 25010000002 ONDANSETRON PER 1 MG

## 2022-07-01 PROCEDURE — 85027 COMPLETE CBC AUTOMATED: CPT | Performed by: STUDENT IN AN ORGANIZED HEALTH CARE EDUCATION/TRAINING PROGRAM

## 2022-07-01 PROCEDURE — 63710000001 INSULIN LISPRO (HUMAN) PER 5 UNITS: Performed by: STUDENT IN AN ORGANIZED HEALTH CARE EDUCATION/TRAINING PROGRAM

## 2022-07-01 PROCEDURE — 97116 GAIT TRAINING THERAPY: CPT

## 2022-07-01 PROCEDURE — 25010000002 CYANOCOBALAMIN PER 1000 MCG: Performed by: STUDENT IN AN ORGANIZED HEALTH CARE EDUCATION/TRAINING PROGRAM

## 2022-07-01 PROCEDURE — 97112 NEUROMUSCULAR REEDUCATION: CPT | Performed by: OCCUPATIONAL THERAPIST

## 2022-07-01 PROCEDURE — 80048 BASIC METABOLIC PNL TOTAL CA: CPT | Performed by: STUDENT IN AN ORGANIZED HEALTH CARE EDUCATION/TRAINING PROGRAM

## 2022-07-01 PROCEDURE — 97530 THERAPEUTIC ACTIVITIES: CPT

## 2022-07-01 PROCEDURE — 82746 ASSAY OF FOLIC ACID SERUM: CPT | Performed by: STUDENT IN AN ORGANIZED HEALTH CARE EDUCATION/TRAINING PROGRAM

## 2022-07-01 PROCEDURE — 82607 VITAMIN B-12: CPT | Performed by: STUDENT IN AN ORGANIZED HEALTH CARE EDUCATION/TRAINING PROGRAM

## 2022-07-01 PROCEDURE — 82962 GLUCOSE BLOOD TEST: CPT

## 2022-07-01 PROCEDURE — 84466 ASSAY OF TRANSFERRIN: CPT | Performed by: STUDENT IN AN ORGANIZED HEALTH CARE EDUCATION/TRAINING PROGRAM

## 2022-07-01 PROCEDURE — 82728 ASSAY OF FERRITIN: CPT | Performed by: STUDENT IN AN ORGANIZED HEALTH CARE EDUCATION/TRAINING PROGRAM

## 2022-07-01 PROCEDURE — 99233 SBSQ HOSP IP/OBS HIGH 50: CPT | Performed by: NURSE PRACTITIONER

## 2022-07-01 PROCEDURE — 25010000002 KETOROLAC TROMETHAMINE PER 15 MG: Performed by: HOSPITALIST

## 2022-07-01 PROCEDURE — 97535 SELF CARE MNGMENT TRAINING: CPT | Performed by: OCCUPATIONAL THERAPIST

## 2022-07-01 RX ORDER — CYANOCOBALAMIN 1000 UG/ML
1000 INJECTION, SOLUTION INTRAMUSCULAR; SUBCUTANEOUS ONCE
Status: COMPLETED | OUTPATIENT
Start: 2022-07-01 | End: 2022-07-01

## 2022-07-01 RX ORDER — CHOLECALCIFEROL (VITAMIN D3) 125 MCG
1000 CAPSULE ORAL DAILY
Status: DISCONTINUED | OUTPATIENT
Start: 2022-07-02 | End: 2022-07-02 | Stop reason: HOSPADM

## 2022-07-01 RX ORDER — KETOROLAC TROMETHAMINE 30 MG/ML
15 INJECTION, SOLUTION INTRAMUSCULAR; INTRAVENOUS ONCE AS NEEDED
Status: COMPLETED | OUTPATIENT
Start: 2022-07-01 | End: 2022-07-01

## 2022-07-01 RX ORDER — OXYCODONE HYDROCHLORIDE AND ACETAMINOPHEN 5; 325 MG/1; MG/1
1 TABLET ORAL ONCE
Status: COMPLETED | OUTPATIENT
Start: 2022-07-01 | End: 2022-07-01

## 2022-07-01 RX ORDER — FERROUS SULFATE 325(65) MG
325 TABLET ORAL EVERY OTHER DAY
Status: DISCONTINUED | OUTPATIENT
Start: 2022-07-01 | End: 2022-07-02 | Stop reason: HOSPADM

## 2022-07-01 RX ADMIN — CLOPIDOGREL 75 MG: 75 TABLET, FILM COATED ORAL at 08:12

## 2022-07-01 RX ADMIN — KETOROLAC TROMETHAMINE 15 MG: 30 INJECTION, SOLUTION INTRAMUSCULAR; INTRAVENOUS at 07:05

## 2022-07-01 RX ADMIN — INSULIN LISPRO 3 UNITS: 100 INJECTION, SOLUTION INTRAVENOUS; SUBCUTANEOUS at 18:05

## 2022-07-01 RX ADMIN — ATORVASTATIN CALCIUM 80 MG: 80 TABLET, FILM COATED ORAL at 20:27

## 2022-07-01 RX ADMIN — ONDANSETRON 4 MG: 2 INJECTION INTRAMUSCULAR; INTRAVENOUS at 06:19

## 2022-07-01 RX ADMIN — RAMIPRIL 10 MG: 10 CAPSULE ORAL at 08:12

## 2022-07-01 RX ADMIN — INSULIN GLARGINE-YFGN 8 UNITS: 100 INJECTION, SOLUTION SUBCUTANEOUS at 09:58

## 2022-07-01 RX ADMIN — FERROUS SULFATE TAB 325 MG (65 MG ELEMENTAL FE) 325 MG: 325 (65 FE) TAB at 09:59

## 2022-07-01 RX ADMIN — INSULIN LISPRO 5 UNITS: 100 INJECTION, SOLUTION INTRAVENOUS; SUBCUTANEOUS at 07:05

## 2022-07-01 RX ADMIN — CYANOCOBALAMIN 1000 MCG: 1000 INJECTION, SOLUTION INTRAMUSCULAR; SUBCUTANEOUS at 09:58

## 2022-07-01 RX ADMIN — OXYCODONE AND ACETAMINOPHEN 1 TABLET: 5; 325 TABLET ORAL at 09:59

## 2022-07-01 RX ADMIN — ASPIRIN 81 MG: 81 TABLET, COATED ORAL at 08:12

## 2022-07-01 NOTE — PROGRESS NOTES
[facility]  Discussed with Dr. Espinoza. Accepted for Acute Rehab. Precert initiated.   BP noted this am 177/97. Will need it to be better controlled for pt to participate with 3 hrs tx/ day (noted permissive HTN right now per hospitalist and home meds being held)  CCP informed    Mari Lovelace RN  Acute Rehab Admission Nurse

## 2022-07-01 NOTE — PLAN OF CARE
Goal Outcome Evaluation:           Progress: improving   Blood sugars elevated today. Long acting insulin added to regimen. No other acute changes this shift. Will CTM.

## 2022-07-01 NOTE — CASE MANAGEMENT/SOCIAL WORK
Continued Stay Note  [facility]     Patient Name: Maryellen Crump  MRN: [id number]  Today's Date: 7/1/2022    Admit Date: 6/29/2022     Discharge Plan     Row Name 07/01/22 1159       Plan    Plan [facility] pending [facility] Pre-cert    Patient/Family in Agreement with Plan yes    Plan Comments Spoke with Mari/[facility] acute rehab, Dr. Espinoza has accepted pt. Mari will start pre-cert with [facility], in hopes to obtain prior to holiday weekend. She will notify CCP once obtained. que XIAO/CCP               Discharge Codes    No documentation.               Expected Discharge Date and Time     Expected Discharge Date Expected Discharge Time    Jul 4, 2022             Christie Estrada RN

## 2022-07-01 NOTE — PROGRESS NOTES
"DOS: 2022  NAME: Maryellen Crump   : 1950  PCP: Juni oCrtez MD  Chief Complaint   Patient presents with   • Numbness   • Stroke     Right sided     Stroke    Subjective: Patient worked with PT and OT this morning and feels her strength is improving, she was no longer dragging her right leg.  She did have a headache overnight which did not resolve with Tylenol but has since resolved with Percocet.  This was bifrontal and throbbing.  Patient's daughter is at the bedside. No vision change, difficulty speaking, or unilateral numbness. Pt seen in follow up today, however the problem is new to the examiner.      Objective:  Vital signs: /97 (BP Location: Right arm, Patient Position: Lying)   Pulse 72   Temp 98.7 °F (37.1 °C) (Oral)   Resp 20   Ht 160 cm (63\")   Wt 51.3 kg (113 lb)   SpO2 97%   BMI 20.02 kg/m²       General appearance: Well developed, well nourished, well groomed, alert and cooperative.   HEENT: Normocephalic.   Neck and spine: Normal range of motion. Normal alignment. No mass or tenderness.    Cardiac: Regular rate and rhythm. No murmurs.   Peripheral Vasculature: Radial pulses are equal and symmetric.  Chest Exam: Clear to auscultation bilaterally, no wheezes, no rhonchi.  Extremities: Normal, no edema.   Skin: No rashes or birthmarks.     Higher integrative function: Oriented to time, place, person, intact recent and remote memory, attention span, concentration and language. Spontaneous speech, fund of vocabulary are normal.   CN II: Normal visual fields.   CN III IV VI: Extraocular movements are full without nystagmus. Pupils are equal, round, and reactive to light.   CN V: Normal facial sensation.  CN VII: Facial movements are symmetric, no weakness.   CN VIII: Auditory acuity is normal.   CN IX & X: Symmetric palatal movement.   CN XI: Sternocleidomastoid and trapezius are normal. No weakness.   CN XII: The tongue is midline. No atrophy or fasciculations.   Motor: " RUE/RLE 4+/5 with RUE drift. LUE/LLE 5/5. No fasciculations, rigidity, spasticity or abnormal movements.   Sensation: Normal light touch.  Station and gait: Deferred.  Muscle stretch reflexes: Reflexes are normal and symmetric in the upper and lower extremities.   Plantar reflexes are flexor bilaterally.   Coordination: RUE dysmetria, normal on the LUE. Heel to shin normal.     Scheduled Meds:aspirin, 81 mg, Oral, Daily  atorvastatin, 80 mg, Oral, Nightly  clopidogrel, 300 mg, Oral, Once  clopidogrel, 75 mg, Oral, Daily  ferrous sulfate, 325 mg, Oral, Every Other Day  insulin glargine, 8 Units, Subcutaneous, Daily  insulin lispro, 0-7 Units, Subcutaneous, TID AC  ramipril, 10 mg, Oral, Daily  [START ON 7/2/2022] vitamin B-12, 1,000 mcg, Oral, Daily      Continuous Infusions:   PRN Meds:.•  acetaminophen **OR** acetaminophen  •  bisacodyl  •  dextrose  •  dextrose  •  glucagon (human recombinant)  •  ondansetron    Laboratory results:  Lab Results   Component Value Date    GLUCOSE 142 (H) 07/01/2022    CALCIUM 9.8 07/01/2022     07/01/2022    K 4.0 07/01/2022    CO2 24.0 07/01/2022     07/01/2022    BUN 15 07/01/2022    CREATININE 1.07 (H) 07/01/2022    BCR 14.0 07/01/2022    ANIONGAP 12.0 07/01/2022     Lab Results   Component Value Date    WBC 6.09 07/01/2022    HGB 10.4 (L) 07/01/2022    HCT 31.3 (L) 07/01/2022    MCV 94.3 07/01/2022     07/01/2022     Lab Results   Component Value Date    CHOL 130 06/30/2022     Lab Results   Component Value Date    HDL 54 06/30/2022     Lab Results   Component Value Date    LDL 51 06/30/2022     Lab Results   Component Value Date    TRIG 148 06/30/2022         Lab 06/30/22  0453   HEMOGLOBIN A1C 6.90*      Review and interpretation of imaging: MR brain images viewed by me,  shows small left subcortical stroke.  Adult transthoracic echo complete    Result Date: 6/30/2022  · Calculated left ventricular EF = 59.2% Estimated left ventricular EF was in agreement  with the calculated left ventricular EF. Left ventricular systolic function is normal. · Left ventricular diastolic function was normal. · Saline test results are positive with valsalva manuever for right to left atrial level shunt suggesting presence of a small PFO. · No valvular masses noted on transthoracic echocardiogram.      CT Angiogram Neck    Result Date: 6/30/2022  CT ANGIOGRAM NECK AND HEAD WITH CONTRAST  HISTORY: Right-sided weakness.  COMPARISON: None.  FINDINGS: The brain and ventricles are symmetrical. There is age-appropriate atrophy. Mild-to-moderate vascular calcification is noted. No focal area of decreased attenuation to suggest acute infarction is identified.  A CT angiogram of the neck and head was then performed. Multiplanar, as well as 3-dimensional reconstructions were generated.  The great vessels are arranged in a classic configuration. There is 0% stenosis involving the carotid bifurcations. Vascular calcification involving the carotid siphons are noted without high-grade stenosis. The proximal aspects of the anterior and middle cerebral arteries appear unremarkable.  Both vertebral arteries were opacified. The left vertebral artery is larger than that of the right. The basilar artery and the proximal aspects of the posterior cerebral arteries appear unremarkable. A standard postcontrast CT examination of the brain showed no evidence of abnormal enhancement.  There is moderate-to-severe loss of disc height at C5-C6.      No evidence of acute infarction or of intracranial hemorrhage. Further evaluation for infarction could be performed with MRI examination of the brain. There is 0% stenosis involving the carotid bifurcations. There is no evidence of proximal intracranial arterial high-grade stenosis or of aneurysm.   Radiation dose reduction techniques were utilized, including automated exposure control and exposure modulation based on body size.  This report was finalized on 6/30/2022  5:44 PM by Dr. Marek Schulte M.D.      MRI Brain Without Contrast    Result Date: 6/29/2022  BRAIN MRI WITHOUT CONTRAST  HISTORY: Stroke, follow up  COMPARISON: 06/29/2022.  FINDINGS:  Multiplanar images of the head were obtained without gadolinium. There is an area of restricted diffusion which is noted within the left frontoparietal coronal radiata, measuring up to 1.1 cm in size. An additional tiny focus is seen just superior to it within the subcortical white matter. This measures approximately 4 mm in size. No additional areas of restricted diffusion are seen. There is diffuse atrophy. There is periventricular and deep white matter microangiopathic change. Degree of ventricular dilatation may be somewhat out of proportion to the degree of atrophy, and correlation with any evidence of normal pressure hydrocephalus is recommended. There is no midline shift or mass effect. Intracranial flow voids appear intact. No abnormality is seen on gradient echo imaging There is mucosal thickening noted within the ethmoid sinuses.      1. Foci of restricted diffusion are noted within the left frontoparietal corona radiata and subcortical white matter.  No additional areas of acute infarction are identified.  This report was finalized on 6/29/2022 11:06 PM by Dr. Shaila Tilley M.D.      MRI Thoracic Spine Without Contrast    Result Date: 6/30/2022  THORACIC SPINE MRI WITHOUT GADOLINIUM  HISTORY: Ataxia, thoracic trauma; I63.9-Cerebral infarction, unspecified  COMPARISON:  None.  FINDINGS:  Multiplanar images of the thoracic spine obtained without gadolinium.  Axial images obtained from T1-L1.  No acute fracture or subluxation of the thoracic spine is seen. Marrow signal appears unremarkable. No cord signal abnormalities are seen. Conus terminates at L1. There is some intervertebral disc space narrowing noted at multiple levels. There is no evidence of canal stenosis or neural foraminal narrowing at any level.      1. As  above.  This report was finalized on 6/30/2022 1:12 AM by Dr. Shaila Tilley M.D.      CT Angiogram Head    Result Date: 6/30/2022  CT ANGIOGRAM NECK AND HEAD WITH CONTRAST  HISTORY: Right-sided weakness.  COMPARISON: None.  FINDINGS: The brain and ventricles are symmetrical. There is age-appropriate atrophy. Mild-to-moderate vascular calcification is noted. No focal area of decreased attenuation to suggest acute infarction is identified.  A CT angiogram of the neck and head was then performed. Multiplanar, as well as 3-dimensional reconstructions were generated.  The great vessels are arranged in a classic configuration. There is 0% stenosis involving the carotid bifurcations. Vascular calcification involving the carotid siphons are noted without high-grade stenosis. The proximal aspects of the anterior and middle cerebral arteries appear unremarkable.  Both vertebral arteries were opacified. The left vertebral artery is larger than that of the right. The basilar artery and the proximal aspects of the posterior cerebral arteries appear unremarkable. A standard postcontrast CT examination of the brain showed no evidence of abnormal enhancement.  There is moderate-to-severe loss of disc height at C5-C6.      No evidence of acute infarction or of intracranial hemorrhage. Further evaluation for infarction could be performed with MRI examination of the brain. There is 0% stenosis involving the carotid bifurcations. There is no evidence of proximal intracranial arterial high-grade stenosis or of aneurysm.   Radiation dose reduction techniques were utilized, including automated exposure control and exposure modulation based on body size.  This report was finalized on 6/30/2022 5:44 PM by Dr. Marek Schulte M.D.        Impression:  71-year-old right-handed female, non-smoker, with HTN, HLD, and DM, on Zocor 40 mg daily but no antiplatelet prior to arrival, who presented 6/29 with right-sided weakness.  On 6/28 the  patient noted some mild right hand weakness.  On 6/29 she woke up with worsening right-sided weakness for which she came to the hospital her symptoms have worsened since admitted to the hospital however she has improved overnight with holding antihypertensives and bed rest.  She was taking oral estrogen daily.    Work-up:  MRI brain 6/29: Foci of restricted diffusion in the left frontoparietal corona radiata and subcortical white matter.  MRI thoracic spine without contrast 6/29: Unremarkable.  No acute fracture or subluxation.  No abnormal cord signal.  2D echo 6/29: EF 59.2%, normal left atrial size and volume, small PFO noted with right to left atrial level shunt.  No valvular source of stroke.  Labs: B12 204, rgxign96.8, TSH 4.63, LDL 51, hemoglobin A1c 6.90%    Diagnoses:  Left corona radiata stroke, lacunar, most likely secondary to small vessel disease  HTN  DM2  HLD  B12 deficiency     Plan:  Asa 81 mg and plavix 75 mg daily started (after 300 mg load 6/30). Continue DAPT x3 months then stop plavix and continue low dose only.   Statin changed to lipitor 80 mg daily, goal LDL <70  Glucose goal less than 140  B12 replaced IM x1, now on PO, PCP should follow up in 3-6 mo. Metformin can decrease levels  Stop oral estrogen due to increased risk of stroke  Neurochecks  Graudally increase activity as tolerated. Can started to gradually lower BP monitoring for worsening stroke symptoms.   Stroke Education  IRVIN/SCDs  PT/OT/ST- likely BAR at d/c  D/W Dr Ibarra today. Will follow.

## 2022-07-01 NOTE — PLAN OF CARE
Goal Outcome Evaluation:  Plan of Care Reviewed With: patient        Progress: improving  Outcome Evaluation: pt worked w OT in tx session. pt completed sup to sit w SBA, sat EOB SBA. pt completed ROM ex R UE EOB AROM and AAROM. completed opposition act w R digits and improved compared to yesterday. Pt completed grooming skills w SBA. pt completed sit to stand w CGA and walked to chair w CGA w OT A her on her R side for support w walking and no device used. pt ed on rehab, foam for utensils and foam block. OT to bring pt back foam for utensils and foam block this pm for pt. ed pt to completed R UE ex over the weekend and into Monday w the holiday.  OT wore all PPE, washed hands before/after.

## 2022-07-01 NOTE — THERAPY TREATMENT NOTE
Patient Name: Maryellen Crump  : 1950    MRN: [id number]                              Today's Date: 2022       Admit Date: 2022    Visit Dx:     ICD-10-CM ICD-9-CM   1. Acute CVA (cerebrovascular accident) (HCC)  I63.9 434.91     Patient Active Problem List   Diagnosis   • Family history of colon cancer   • Iron deficiency anemia   • Diarrhea of presumed infectious origin   • Type 2 diabetes mellitus with kidney complication, without long-term current use of insulin (HCC)   • Hypertension   • Hyperlipidemia   • Stroke-like symptoms   • Acute CVA (cerebrovascular accident) (HCC)   • Acute lacunar stroke (HCC)     Past Medical History:   Diagnosis Date   • Diabetes mellitus (HCC)    • Hyperlipidemia    • Hypertension      Past Surgical History:   Procedure Laterality Date   • COLONOSCOPY N/A 2018    Procedure: COLONOSCOPY TO CECUM;  Surgeon: Josh Muñoz MD;  Location: [facility] ENDOSCOPY;  Service: General   • DILATION AND CURETTAGE, DIAGNOSTIC / THERAPEUTIC     • ENDOSCOPY N/A 2018    Procedure: ESOPHAGOGASTRODUODENOSCOPY WITH BIOPSIES;  Surgeon: Josh Muñoz MD;  Location: [facility] ENDOSCOPY;  Service: General   • ROTATOR CUFF REPAIR Right       General Information     Row Name 22 1032          Physical Therapy Time and Intention    Document Type therapy note (daily note)  -CF     Mode of Treatment physical therapy;individual therapy  -     Row Name 22 103          General Information    Patient Profile Reviewed yes  -CF     Existing Precautions/Restrictions fall  -CF     Row Name 22 1032          Cognition    Orientation Status (Cognition) oriented x 4  -CF     Row Name 22 1032          Safety Issues, Functional Mobility    Impairments Affecting Function (Mobility) balance;endurance/activity tolerance;coordination;strength;motor control  -CF           User Key  (r) = Recorded By, (t) = Taken By, (c) = Cosigned By    Initials Name Provider Type    CF Milly  TABITHA Peterson Physical Therapist               Mobility     Row Name 07/01/22 1032          Bed Mobility    Bed Mobility sit-supine  -CF     Sit-Supine Hawkins (Bed Mobility) standby assist  -CF     Assistive Device (Bed Mobility) head of bed elevated  -CF     Row Name 07/01/22 1032          Bed-Chair Transfer    Bed-Chair Hawkins (Transfers) minimum assist (75% patient effort);contact guard  -CF     Row Name 07/01/22 1032          Sit-Stand Transfer    Sit-Stand Hawkins (Transfers) contact guard;verbal cues  -CF     Assistive Device (Sit-Stand Transfers) walker, front-wheeled  -CF     Row Name 07/01/22 1032          Gait/Stairs (Locomotion)    Hawkins Level (Gait) contact guard;minimum assist (75% patient effort)  -CF     Assistive Device (Gait) walker, front-wheeled;other (see comments)  then HHA  -CF     Distance in Feet (Gait) 80' with walker then 50' with HHA on R  -CF     Deviations/Abnormal Patterns (Gait) ruby decreased;gait speed decreased  -CF     Comment, (Gait/Stairs) No knee buckling noted today, able to  walker better today with occasional cues to reposition RUE on walker. Cues for R heel strike. Pt more unsteady and cautious when mobilizing with HHA vs walker  -CF           User Key  (r) = Recorded By, (t) = Taken By, (c) = Cosigned By    Initials Name Provider Type    CF Kristen Atkins PT Physical Therapist               Obj/Interventions     Row Name 07/01/22 1034          Balance    Static Standing Balance contact guard  -CF     Dynamic Standing Balance minimal assist  -CF     Position/Device Used, Standing Balance walker, front-wheeled  -CF           User Key  (r) = Recorded By, (t) = Taken By, (c) = Cosigned By    Initials Name Provider Type    CF Kristen Atkins PT Physical Therapist               Goals/Plan    No documentation.                Clinical Impression     Row Name 07/01/22 1035          Pain    Pretreatment Pain Rating 0/10 - no pain  -CF      Posttreatment Pain Rating 0/10 - no pain  -CF     Row Name 07/01/22 1035          Plan of Care Review    Plan of Care Reviewed With patient  -CF     Outcome Evaluation Pt participated with PT this morning. Pt demo improved strength in RLE and more stability when ambulating with a walker. Trialed HHA for a portion of gait and pt demonstrated more unsteadiness and R sided deficits. Encouraged use of bathroom with nursing staff to increase mobility and to engage RUE as much as possible with tasks. She did have difficuty putting on her mask using R hand. PT continues to recommend acute rehab at d/c to address deficits.  -CF     Row Name 07/01/22 1035          Vital Signs    O2 Delivery Pre Treatment room air  -CF     Row Name 07/01/22 1035          Positioning and Restraints    Pre-Treatment Position sitting in chair/recliner  -CF     Post Treatment Position bed  -CF     In Bed notified nsg;call light within reach;encouraged to call for assist;exit alarm on;fowlers;with family/caregiver  -CF           User Key  (r) = Recorded By, (t) = Taken By, (c) = Cosigned By    Initials Name Provider Type    CF Kristen Atkins, PT Physical Therapist               Outcome Measures     Row Name 07/01/22 1037          How much help from another person do you currently need...    Turning from your back to your side while in flat bed without using bedrails? 3  -CF     Moving from lying on back to sitting on the side of a flat bed without bedrails? 3  -CF     Moving to and from a bed to a chair (including a wheelchair)? 3  -CF     Standing up from a chair using your arms (e.g., wheelchair, bedside chair)? 3  -CF     Climbing 3-5 steps with a railing? 2  -CF     To walk in hospital room? 3  -CF     AM-PAC 6 Clicks Score (PT) 17  -CF     Highest level of mobility 5 --> Static standing  -CF     Row Name 07/01/22 1037 07/01/22 0953       Functional Assessment    Outcome Measure Options AM-PAC 6 Clicks Basic Mobility (PT)  -CF AM-PAC 6  Clicks Daily Activity (OT)  -          User Key  (r) = Recorded By, (t) = Taken By, (c) = Cosigned By    Initials Name Provider Type    Miranda Robledo, OTR Occupational Therapist     Kristen Atkins, PT Physical Therapist                             Physical Therapy Education                 Title: PT OT SLP Therapies (Done)     Topic: Physical Therapy (Done)     Point: Mobility training (Done)     Learning Progress Summary           Patient Acceptance, E, VU by  at 7/1/2022 1037    Acceptance, E,TB,D, VU,NR by CB at 6/30/2022 1028                   Point: Home exercise program (Done)     Learning Progress Summary           Patient Acceptance, E, VU by  at 7/1/2022 1037    Acceptance, E,TB,D, VU,NR by CB at 6/30/2022 1028                   Point: Body mechanics (Done)     Learning Progress Summary           Patient Acceptance, E, VU by  at 7/1/2022 1037    Acceptance, E,TB,D, VU,NR by CB at 6/30/2022 1028                   Point: Precautions (Done)     Learning Progress Summary           Patient Acceptance, E, VU by  at 7/1/2022 1037    Acceptance, E,TB,D, VU,NR by CB at 6/30/2022 1028                               User Key     Initials Effective Dates Name Provider Type Discipline     06/16/21 -  Kristen Atkins, TABITHA Physical Therapist PT     10/22/21 -  Brittany Akhtar PT Physical Therapist PT              PT Recommendation and Plan     Plan of Care Reviewed With: patient  Outcome Evaluation: Pt participated with PT this morning. Pt demo improved strength in RLE and more stability when ambulating with a walker. Trialed HHA for a portion of gait and pt demonstrated more unsteadiness and R sided deficits. Encouraged use of bathroom with nursing staff to increase mobility and to engage RUE as much as possible with tasks. She did have difficuty putting on her mask using R hand. PT continues to recommend acute rehab at d/c to address deficits.     Time Calculation:    PT Charges     Row  Name 07/01/22 1031             Time Calculation    Start Time 0940  -CF      Stop Time 1003  -CF      Time Calculation (min) 23 min  -CF      PT Received On 07/01/22  -CF      PT - Next Appointment 07/02/22  -CF            User Key  (r) = Recorded By, (t) = Taken By, (c) = Cosigned By    Initials Name Provider Type    CF Kristen Atkins, PT Physical Therapist              Therapy Charges for Today     Code Description Service Date Service Provider Modifiers Qty    [id number] HC GAIT TRAINING EA 15 MIN 7/1/2022 Kristen Atkins, PT GP 1    [id number]  PT THERAPEUTIC ACT EA 15 MIN 7/1/2022 Kristen Atkins, PT GP 1          PT G-Codes  Outcome Measure Options: AM-PAC 6 Clicks Basic Mobility (PT)  AM-PAC 6 Clicks Score (PT): 17  AM-PAC 6 Clicks Score (OT): 15  Modified Nicolasa Scale: 4 - Moderately severe disability.  Unable to walk without assistance, and unable to attend to own bodily needs without assistance.    Kristen Atkins, TABITHA  7/1/2022

## 2022-07-01 NOTE — THERAPY TREATMENT NOTE
Patient Name: Maryellen Crump  : 1950    MRN: [id number]                              Today's Date: 2022       Admit Date: 2022    Visit Dx:     ICD-10-CM ICD-9-CM   1. Acute CVA (cerebrovascular accident) (HCC)  I63.9 434.91     Patient Active Problem List   Diagnosis   • Family history of colon cancer   • Iron deficiency anemia   • Diarrhea of presumed infectious origin   • Type 2 diabetes mellitus with kidney complication, without long-term current use of insulin (HCC)   • Hypertension   • Hyperlipidemia   • Stroke-like symptoms   • Acute CVA (cerebrovascular accident) (HCC)   • Acute lacunar stroke (HCC)     Past Medical History:   Diagnosis Date   • Diabetes mellitus (HCC)    • Hyperlipidemia    • Hypertension      Past Surgical History:   Procedure Laterality Date   • COLONOSCOPY N/A 2018    Procedure: COLONOSCOPY TO CECUM;  Surgeon: Josh Muñoz MD;  [place] [facility] [place];  Service: General   • DILATION AND CURETTAGE, DIAGNOSTIC / THERAPEUTIC     • ENDOSCOPY N/A 2018    Procedure: ESOPHAGOGASTRODUODENOSCOPY WITH BIOPSIES;  Surgeon: Josh Muñoz MD;  [place] [facility];  Service: General   • ROTATOR CUFF REPAIR Right       General Information     Row Name 22 0947          OT Time and Intention    Document Type therapy note (daily note)  -     Mode of Treatment individual therapy;occupational therapy  -     Row Name 2247          General Information    Existing Precautions/Restrictions fall  -     Row Name 2247          Cognition    Orientation Status (Cognition) oriented x 4  -     Row Name 2247          Safety Issues, Functional Mobility    Impairments Affecting Function (Mobility) balance;endurance/activity tolerance;grasp;motor control;strength;range of motion (ROM);coordination  -           User Key  (r) = Recorded By, (t) = Taken By, (c) = Cosigned By    Initials Name Provider Type    Miranda Robledo, OTR  Occupational Therapist                 Mobility/ADL's     Row Name 07/01/22 0947          Bed Mobility    Supine-Sit [place] (Bed Mobility) set up;standby assist  -     Row Name 07/01/22 0947          Transfers    Transfers sit-stand transfer;stand-sit transfer;bed-chair transfer  -     Bed-Chair [place] (Transfers) set up;minimum assist (75% patient effort)  -     Sit-Stand [place] (Transfers) set up;contact guard  -     Stand-Sit [place] (Transfers) set up;contact guard  -     Row Name 07/01/22 0947          Bed-Chair Transfer    Comment, (Bed-Chair Transfer) no device used, OT on pt L side A w R UE and providing support. pt walked slow and no LOB, no buckling.  -     Row Name 07/01/22 0947          Functional Mobility    Functional Mobility- Ind. Level contact guard assist;minimum assist (75% patient effort)  -     Functional Mobility- Comment around bed to chair w OT on pt R side supporting R UE to A in walking. no device used.  -     Row Name 07/01/22 0947          Grooming Assessment/Training    [place] Level (Grooming) grooming skills;wash face, hands;oral care regimen;set up;standby assist  -     Position (Grooming) edge of bed sitting  -           User Key  (r) = Recorded By, (t) = Taken By, (c) = Cosigned By    Initials Name Provider Type     Miranda Rahman, OTR Occupational Therapist               Obj/Interventions     Row Name 07/01/22 0949          Shoulder (Therapeutic Exercise)    Shoulder AAROM (Therapeutic Exercise) right;flexion;extension;aBduction;aDduction;sitting;10 repetitions  -     Row Name 07/01/22 0949          Elbow/Forearm (Therapeutic Exercise)    Elbow/Forearm (Therapeutic Exercise) AROM (active range of motion)  -     Elbow/Forearm AROM (Therapeutic Exercise) right;extension;supination;pronation;flexion;10 repetitions;sitting  -     Row Name 07/01/22 0949          Hand (Therapeutic Exercise)    Hand AROM/AAROM (Therapeutic  Exercise) right;AROM (active range of motion);finger flexion;finger extension;10 repetitions  -     Row Name 07/01/22 0949          Motor Skills    Coordination --  practiced opposition and can touch pinky to thumb today  -     Row Name 07/01/22 0949          Balance    Static Sitting Balance set-up;standby assist  -     Dynamic Sitting Balance set-up;standby assist  -     Static Standing Balance set-up;contact guard  -     Balance Interventions sitting;standing;supported;static;sit to stand;occupation based/functional task  -           User Key  (r) = Recorded By, (t) = Taken By, (c) = Cosigned By    Initials Name Provider Type     Miranda Rahman, OTR Occupational Therapist               Goals/Plan    No documentation.                Clinical Impression     Row Name 07/01/22 0950          Pain Assessment    Pretreatment Pain Rating 0/10 - no pain  -     Posttreatment Pain Rating 0/10 - no pain  -     Row Name 07/01/22 0950          Plan of Care Review    Plan of Care Reviewed With patient  -     Progress improving  -     Outcome Evaluation pt worked w OT in tx session. pt completed sup to sit w SBA, sat EOB SBA. pt completed ROM ex R UE EOB AROM and AAROM. completed opposition act w R digits and improved compared to yesterday. Pt completed grooming skills w SBA. pt completed sit to stand w CGA and walked to chair w CGA w OT A her on her R side for support w walking and no device used. pt ed on rehab, foam for utensils and foam block. OT to bring pt back foam for utensils and foam block this pm for pt. ed pt to completed R UE ex over the weekend and into Monday w the holiday.  -     Row Name 07/01/22 0950          Positioning and Restraints    Pre-Treatment Position in bed  -     Post Treatment Position chair  -KP     In Chair notified nsg;reclined;call light within reach;encouraged to call for assist;exit alarm on  -KP           User Key  (r) = Recorded By, (t) = Taken By, (c) =  Cosigned By    Initials Name Provider Type    Miranda Robledo OTR Occupational Therapist               Outcome Measures     Row Name 07/01/22 0953          How much help from another is currently needed...    Putting on and taking off regular lower body clothing? 2  -KP     Bathing (including washing, rinsing, and drying) 2  -KP     Toileting (which includes using toilet bed pan or urinal) 2  -KP     Putting on and taking off regular upper body clothing 3  -KP     Taking care of personal grooming (such as brushing teeth) 3  -KP     Eating meals 3  -KP     AM-PAC 6 Clicks Score (OT) 15  -KP     Row Name 07/01/22 0953          Functional Assessment    Outcome Measure Options AM-PAC 6 Clicks Daily Activity (OT)  -           User Key  (r) = Recorded By, (t) = Taken By, (c) = Cosigned By    Initials Name Provider Type    Miranda Robledo OTR Occupational Therapist                Occupational Therapy Education                 Title: PT OT SLP Therapies (Done)     Topic: Occupational Therapy (Done)     Point: ADL training (Done)     Description:   Instruct learner(s) on proper safety adaptation and remediation techniques during self care or transfers.   Instruct in proper use of assistive devices.              Learning Progress Summary           Patient Acceptance, E,TB,D, DU,VU by  at 6/30/2022 1341    Comment: ed pt on role of OT. benefit of therapy, POC w. OT. ed on safety w. ADL and tsf. pt demo w CGA and SBA for grooming skills.   Family Acceptance, E,TB,D, DU,VU by  at 6/30/2022 1341    Comment: ed pt on role of OT. benefit of therapy, POC w. OT. ed on safety w. ADL and tsf. pt demo w CGA and SBA for grooming skills.                   Point: Home exercise program (Done)     Description:   Instruct learner(s) on appropriate technique for monitoring, assisting and/or progressing therapeutic exercises/activities.              Learning Progress Summary           Patient Acceptance, E,TB,D,  DU,VU by  at 6/30/2022 1341    Comment: ed pt on role of OT. benefit of therapy, POC w. OT. ed on safety w. ADL and tsf. pt demo w CGA and SBA for grooming skills.   Family Acceptance, E,TB,D, DU,VU by  at 6/30/2022 1341    Comment: ed pt on role of OT. benefit of therapy, POC w. OT. ed on safety w. ADL and tsf. pt demo w CGA and SBA for grooming skills.                   Point: Precautions (Done)     Description:   Instruct learner(s) on prescribed precautions during self-care and functional transfers.              Learning Progress Summary           Patient Acceptance, E,TB,D, DU,VU by  at 6/30/2022 1341    Comment: ed pt on role of OT. benefit of therapy, POC w. OT. ed on safety w. ADL and tsf. pt demo w CGA and SBA for grooming skills.   Family Acceptance, E,TB,D, DU,VU by  at 6/30/2022 1341    Comment: ed pt on role of OT. benefit of therapy, POC w. OT. ed on safety w. ADL and tsf. pt demo w CGA and SBA for grooming skills.                   Point: Body mechanics (Done)     Description:   Instruct learner(s) on proper positioning and spine alignment during self-care, functional mobility activities and/or exercises.              Learning Progress Summary           Patient Acceptance, E,TB,D, DU,VU by  at 6/30/2022 1341    Comment: ed pt on role of OT. benefit of therapy, POC w. OT. ed on safety w. ADL and tsf. pt demo w CGA and SBA for grooming skills.   Family Acceptance, E,TB,D, DU,VU by  at 6/30/2022 1341    Comment: ed pt on role of OT. benefit of therapy, POC w. OT. ed on safety w. ADL and tsf. pt demo w CGA and SBA for grooming skills.                               User Key     Initials Effective Dates Name Provider Type Discipline     06/16/21 -  Miranda Rahman, OTR Occupational Therapist OT              OT Recommendation and Plan  Planned Therapy Interventions (OT): activity tolerance training, functional balance retraining, occupation/activity based interventions, strengthening  exercise, ROM/therapeutic exercise, transfer/mobility retraining, patient/caregiver education/training, neuromuscular control/coordination retraining, BADL retraining  Therapy Frequency (OT): 5 times/wk  Plan of Care Review  Plan of Care Reviewed With: patient  Progress: improving  Outcome Evaluation: pt worked w OT in tx session. pt completed sup to sit w SBA, sat EOB SBA. pt completed ROM ex R UE EOB AROM and AAROM. completed opposition act w R digits and improved compared to yesterday. Pt completed grooming skills w SBA. pt completed sit to stand w CGA and walked to chair w CGA w OT A her on her R side for support w walking and no device used. pt ed on rehab, foam for utensils and foam block. OT to bring pt back foam for utensils and foam block this pm for pt. ed pt to completed R UE ex over the weekend and into Monday w the holiday.     Time Calculation:    Time Calculation- OT     Row Name 07/01/22 0954             Time Calculation- OT    OT Start Time 0817  -      OT Stop Time 0844  -      OT Time Calculation (min) 27 min  -      Total Timed Code Minutes- OT 27 minute(s)  -      OT Received On 07/01/22  -      OT - Next Appointment 07/05/22  -              Timed Charges    59403 -  OT Neuromuscular Reeducation Minutes 19  -KP      61492 - OT Self Care/Mgmt Minutes 8  -              Total Minutes    Timed Charges Total Minutes 27  -       Total Minutes 27  -            User Key  (r) = Recorded By, (t) = Taken By, (c) = Cosigned By    Initials Name Provider Type     Miranda Rahman OTR Occupational Therapist              Therapy Charges for Today     Code Description Service Date Service Provider Modifiers Qty    50938244283 HC OT NEUROMUSC RE EDUCATION EA 15 MIN 6/30/2022 Miranda Rahman OTR GO 1    40769675501 HC OT EVAL MOD COMPLEXITY 3 6/30/2022 Miranda Rahman OTR GO 1    86347506617 HC OT NEUROMUSC RE EDUCATION EA 15 MIN 7/1/2022 Miranda Rahman OTR GO 1     [id number]  OT SELF CARE/MGMT/TRAIN EA 15 MIN 7/1/2022 Miranda Rahman, OTR GO 1               Miranda Rahman, OTMANNY  7/1/2022

## 2022-07-01 NOTE — PLAN OF CARE
Goal Outcome Evaluation:  Plan of Care Reviewed With: patient           Outcome Evaluation: Pt participated with PT this morning. Pt demo improved strength in RLE and more stability when ambulating with a walker. Trialed HHA for a portion of gait and pt demonstrated more unsteadiness and R sided deficits. Encouraged use of bathroom with nursing staff to increase mobility and to engage RUE as much as possible with tasks. She did have difficuty putting on her mask using R hand. PT continues to recommend acute rehab at d/c to address deficits.

## 2022-07-01 NOTE — PROGRESS NOTES
Name: Maryellen Crump ADMIT: 2022   : 1950  PCP: Juni Cortez MD    MRN: [id number] LOS: 2 days   AGE/SEX: 71 y.o. female  ROOM: [facility]     Subjective   Subjective   Patient seen this morning.  No acute events overnight.  No new neurological symptoms.  Patient feels like her right upper and lower extremity strength is improving.  Also, headache, received Tylenol however no improvement.  Ordered one-time Percocet and headache resolved.  No fevers, chills, nausea, vomiting.    Review of Systems   as above  Objective   Objective   Vital Signs  Temp:  [97.9 °F (36.6 °C)-98.7 °F (37.1 °C)] 97.9 °F (36.6 °C)  Heart Rate:  [56-86] 56  Resp:  [20] 20  BP: (153-177)/() 169/54  SpO2:  [95 %-97 %] 96 %  on   ;   Device (Oxygen Therapy): room air  Body mass index is 20.02 kg/m².  Physical Exam    General: Alert,no acute distress  HEENT: Normocephalic, atraumatic  CV: Regular rate and rhythm, no murmurs rubs or gallops  Lungs: Clear to auscultation bilaterally, no crackles or wheezes  Abdomen: Soft, nontender, nondistended  Extremities: No significant peripheral edema , no cyanosis   Neuro: Alert, oriented x4, right upper and lower extremity weakness, normal left upper and lower extremity    Results Review     I reviewed the patient's new clinical results.  Results from last 7 days   Lab Units 22  04522  0453 06/29/22  1603   WBC 10*3/mm3 6.09 6.67 7.71   HEMOGLOBIN g/dL 10.4* 10.6* 11.8*   PLATELETS 10*3/mm3 256 255 303     Results from last 7 days   Lab Units 22  0454 22  0453 22  1603   SODIUM mmol/L 139 138 138   POTASSIUM mmol/L 4.0 3.8 4.1   CHLORIDE mmol/L 103 101 100   CO2 mmol/L 24.0 25.0 21.1*   BUN mg/dL 15 14 16   CREATININE mg/dL 1.07* 1.11* 1.21*   GLUCOSE mg/dL 142* 108* 113*   Estimated Creatinine Clearance: 38.5 mL/min (A) (by C-G formula based on SCr of 1.07 mg/dL (H)).  Results from last 7 days   Lab Units 22  0453 22  1603   ALBUMIN g/dL  3.60 4.10   BILIRUBIN mg/dL 0.5 0.6   ALK PHOS U/L 72 92   AST (SGOT) U/L 16 15   ALT (SGPT) U/L 11 13     Results from last 7 days   Lab Units 07/01/22  0454 06/30/22  0453 06/29/22  1603   CALCIUM mg/dL 9.8 9.7 10.9*   ALBUMIN g/dL  --  3.60 4.10       COVID19   Date Value Ref Range Status   06/29/2022 Not Detected Not Detected - Ref. Range Final     Hemoglobin A1C   Date/Time Value Ref Range Status   06/30/2022 0453 6.90 (H) 4.80 - 5.60 % Final     Glucose   Date/Time Value Ref Range Status   07/01/2022 1105 127 70 - 130 mg/dL Final     Comment:     Meter: [id number] : [id number] Andres aPz PCA   07/01/2022 0617 320 (H) 70 - 130 mg/dL Final     Comment:     Meter: [id number] : [id number] Alexis Ayoub NA   06/30/2022 1204 202 (H) 70 - 130 mg/dL Final     Comment:     Meter: [id number] : [id number] Lawrence Holguin NA   06/30/2022 0623 118 70 - 130 mg/dL Final     Comment:     Meter: [id number] : [id number] Lizet Anderson NA       CT Angiogram Head, CT Angiogram Neck  Narrative: CT ANGIOGRAM NECK AND HEAD WITH CONTRAST     HISTORY: Right-sided weakness.     COMPARISON: None.     FINDINGS: The brain and ventricles are symmetrical. There is  age-appropriate atrophy. Mild-to-moderate vascular calcification is  noted. No focal area of decreased attenuation to suggest acute  infarction is identified.     A CT angiogram of the neck and head was then performed. Multiplanar, as  well as 3-dimensional reconstructions were generated.     The great vessels are arranged in a classic configuration. There is 0%  stenosis involving the carotid bifurcations. Vascular calcification  involving the carotid siphons are noted without high-grade stenosis. The  proximal aspects of the anterior and middle cerebral arteries appear  unremarkable.     Both vertebral arteries were opacified. The left vertebral artery is  larger than that of the right. The basilar artery and the proximal  aspects of the posterior cerebral  arteries appear unremarkable. A  standard postcontrast CT examination of the brain showed no evidence of  abnormal enhancement.     There is moderate-to-severe loss of disc height at C5-C6.     Impression: No evidence of acute infarction or of intracranial  hemorrhage. Further evaluation for infarction could be performed with  MRI examination of the brain. There is 0% stenosis involving the carotid  bifurcations. There is no evidence of proximal intracranial arterial  high-grade stenosis or of aneurysm.         Radiation dose reduction techniques were utilized, including automated  exposure control and exposure modulation based on body size.     This report was finalized on 6/30/2022 5:44 PM by Dr. Marek Schulte M.D.     Adult transthoracic echo complete  · Calculated left ventricular EF = 59.2% Estimated left ventricular EF was   in agreement with the calculated left ventricular EF. Left ventricular   systolic function is normal.  · Left ventricular diastolic function was normal.  · Saline test results are positive with valsalva manuever for right to   left atrial level shunt suggesting presence of a small PFO.  · No valvular masses noted on transthoracic echocardiogram.     MRI Thoracic Spine Without Contrast  Narrative: THORACIC SPINE MRI WITHOUT GADOLINIUM     HISTORY: Ataxia, thoracic trauma; I63.9-Cerebral infarction, unspecified     COMPARISON:  None.     FINDINGS:  Multiplanar images of the thoracic spine obtained without  gadolinium.  Axial images obtained from T1-L1.     No acute fracture or subluxation of the thoracic spine is seen. Marrow  signal appears unremarkable. No cord signal abnormalities are seen.  Conus terminates at L1. There is some intervertebral disc space  narrowing noted at multiple levels. There is no evidence of canal  stenosis or neural foraminal narrowing at any level.     Impression: 1. As above.     This report was finalized on 6/30/2022 1:12 AM by Dr. Shaila Tilley M.D.        Scheduled Medications  aspirin, 81 mg, Oral, Daily  atorvastatin, 80 mg, Oral, Nightly  clopidogrel, 300 mg, Oral, Once  clopidogrel, 75 mg, Oral, Daily  ferrous sulfate, 325 mg, Oral, Every Other Day  insulin glargine, 8 Units, Subcutaneous, Daily  insulin lispro, 0-7 Units, Subcutaneous, TID AC  ramipril, 10 mg, Oral, Daily  [START ON 7/2/2022] vitamin B-12, 1,000 mcg, Oral, Daily    Infusions   Diet  Diet Regular; Cardiac, Consistent Carbohydrate, Low Fat       Assessment/Plan     Active Hospital Problems    Diagnosis  POA   • **Stroke-like symptoms [R29.90]  Yes   • Vitamin B12 deficiency [E53.8]  Unknown   • Acute lacunar stroke (HCC) [I63.81]  Unknown   • Acute CVA (cerebrovascular accident) (HCC) [I63.9]  Yes   • Type 2 diabetes mellitus with kidney complication, without long-term current use of insulin (HCC) [E11.29]  Yes   • Hypertension [I10]  Yes   • Hyperlipidemia [E78.5]  Yes   • Iron deficiency anemia [D50.9]  Yes      Resolved Hospital Problems    Diagnosis Date Resolved POA   • Hypercalcemia [E83.52] 06/30/2022 Yes       Ms. Crump is a 71 y.o. female with a history of hypertension, hyperlipidemia and diabetes mellitus that presents to [facility] complaining of right arm and leg weakness.  MRI showed stroke in the left subcortical region and corona radiata.    Acute lacunar stroke: MRI showed stroke in the left subcortical region and corona radiata.  Associated symptoms include right upper and lower extremity weakness.  Neurology consulted.  Likely small vessel disease.  Continue aspirin, Plavix, and statin.  Home oral estrogen will have to be stopped due to increased risk of stroke.      Essential hypertension: Blood pressure 160s-180s, holding home BP meds to allow permissive hypertension in the setting of ischemic stroke.  At home patient is on ramipril 10 mg twice daily and HCTZ 12.5 mg daily.  Monitor BP.  -Started on ramipril 10 mg daily 07/01, gradually restarting home blood  pressure meds.    DM type II: Hemoglobin A1c 6.9.  Continue sign scale insulin.  Hypoglycemic protocol.  Added Lantus 8 units daily 07/01.      Abnormal thyroid levels: TSH slightly elevated 4.6, free T4 normal.  Will need repeat levels in 6 weeks to monitor.    Anemia: Iron studies continue iron deficiency.  Started on p.o. ferrous sulfate.  Will need outpatient work-up for iron deficiency anemia.  Patient thinks that she is due for colonoscopy 2.  Follow-up with PCP.    Vitamin B12 deficiency: Vitamin B 12 low at 204, ordered one-time IV B12 followed by daily p.o. supplement.  Needs repeat labs in about 3 months to monitor response.  Metformin can cause vitamin B12 deficiency.  If vitamin B12 still low we will oral supplements metformin will need to be discontinued.      · SCDs for DVT prophylaxis.  · Full code.  · Discussed with patient  Anticipate discharge acute rehab.  Referral pending.  Medically stable to be discharged to rehab whenever bed available.        I wore protective equipment throughout this patient encounter including a face mask, gloves and protective eyewear.  Hand hygiene was performed before donning protective equipment and after removal when leaving the room.      Dictated utilizing Dragon dictation        Steve Ricci MD  [facility]  07/01/22  15:33 EDT

## 2022-07-02 ENCOUNTER — HOSPITAL ENCOUNTER (INPATIENT)
Facility: HOSPITAL | Age: 72
LOS: 11 days | Discharge: HOME | End: 2022-07-13
Attending: PHYSICAL MEDICINE & REHABILITATION | Admitting: PHYSICAL MEDICINE & REHABILITATION

## 2022-07-02 VITALS
TEMPERATURE: 97.9 F | SYSTOLIC BLOOD PRESSURE: 171 MMHG | HEART RATE: 86 BPM | OXYGEN SATURATION: 97 % | DIASTOLIC BLOOD PRESSURE: 87 MMHG | WEIGHT: 113 LBS | RESPIRATION RATE: 18 BRPM | BODY MASS INDEX: 20.02 KG/M2 | HEIGHT: 63 IN

## 2022-07-02 DIAGNOSIS — Z74.09 IMPAIRED FUNCTIONAL MOBILITY, BALANCE, GAIT, AND ENDURANCE: Primary | ICD-10-CM

## 2022-07-02 DIAGNOSIS — I63.9 CEREBROVASCULAR ACCIDENT (CVA), UNSPECIFIED MECHANISM: ICD-10-CM

## 2022-07-02 LAB
ANION GAP SERPL CALCULATED.3IONS-SCNC: 10 MMOL/L (ref 5–15)
BUN SERPL-MCNC: 15 MG/DL (ref 8–23)
BUN/CREAT SERPL: 14.7 (ref 7–25)
CALCIUM SPEC-SCNC: 9.3 MG/DL (ref 8.6–10.5)
CHLORIDE SERPL-SCNC: 106 MMOL/L (ref 98–107)
CO2 SERPL-SCNC: 24 MMOL/L (ref 22–29)
CREAT SERPL-MCNC: 1.02 MG/DL (ref 0.57–1)
DEPRECATED RDW RBC AUTO: 40 FL (ref 37–54)
EGFRCR SERPLBLD CKD-EPI 2021: 58.6 ML/MIN/1.73
ERYTHROCYTE [DISTWIDTH] IN BLOOD BY AUTOMATED COUNT: 11.8 % (ref 12.3–15.4)
GLUCOSE BLDC GLUCOMTR-MCNC: 136 MG/DL (ref 70–130)
GLUCOSE BLDC GLUCOMTR-MCNC: 139 MG/DL (ref 70–130)
GLUCOSE BLDC GLUCOMTR-MCNC: 143 MG/DL (ref 70–130)
GLUCOSE BLDC GLUCOMTR-MCNC: 206 MG/DL (ref 70–130)
GLUCOSE SERPL-MCNC: 136 MG/DL (ref 65–99)
HCT VFR BLD AUTO: 32.3 % (ref 34–46.6)
HGB BLD-MCNC: 10.7 G/DL (ref 12–15.9)
MCH RBC QN AUTO: 31.4 PG (ref 26.6–33)
MCHC RBC AUTO-ENTMCNC: 33.1 G/DL (ref 31.5–35.7)
MCV RBC AUTO: 94.7 FL (ref 79–97)
PLATELET # BLD AUTO: 259 10*3/MM3 (ref 140–450)
PMV BLD AUTO: 9.7 FL (ref 6–12)
POTASSIUM SERPL-SCNC: 4.2 MMOL/L (ref 3.5–5.2)
RBC # BLD AUTO: 3.41 10*6/MM3 (ref 3.77–5.28)
SARS-COV-2 ORF1AB RESP QL NAA+PROBE: NOT DETECTED
SODIUM SERPL-SCNC: 140 MMOL/L (ref 136–145)
WBC NRBC COR # BLD: 8.96 10*3/MM3 (ref 3.4–10.8)

## 2022-07-02 PROCEDURE — 99233 SBSQ HOSP IP/OBS HIGH 50: CPT | Performed by: NURSE PRACTITIONER

## 2022-07-02 PROCEDURE — 80048 BASIC METABOLIC PNL TOTAL CA: CPT | Performed by: STUDENT IN AN ORGANIZED HEALTH CARE EDUCATION/TRAINING PROGRAM

## 2022-07-02 PROCEDURE — U0004 COV-19 TEST NON-CDC HGH THRU: HCPCS | Performed by: STUDENT IN AN ORGANIZED HEALTH CARE EDUCATION/TRAINING PROGRAM

## 2022-07-02 PROCEDURE — 85027 COMPLETE CBC AUTOMATED: CPT | Performed by: STUDENT IN AN ORGANIZED HEALTH CARE EDUCATION/TRAINING PROGRAM

## 2022-07-02 PROCEDURE — 63710000001 INSULIN LISPRO (HUMAN) PER 5 UNITS: Performed by: STUDENT IN AN ORGANIZED HEALTH CARE EDUCATION/TRAINING PROGRAM

## 2022-07-02 PROCEDURE — 97530 THERAPEUTIC ACTIVITIES: CPT

## 2022-07-02 PROCEDURE — 82962 GLUCOSE BLOOD TEST: CPT

## 2022-07-02 RX ORDER — CHOLECALCIFEROL (VITAMIN D3) 125 MCG
1000 CAPSULE ORAL DAILY
Status: DISCONTINUED | OUTPATIENT
Start: 2022-07-03 | End: 2022-07-13 | Stop reason: HOSPADM

## 2022-07-02 RX ORDER — CLOPIDOGREL BISULFATE 75 MG/1
75 TABLET ORAL DAILY
Status: DISCONTINUED | OUTPATIENT
Start: 2022-07-03 | End: 2022-07-13 | Stop reason: HOSPADM

## 2022-07-02 RX ORDER — HYDROCHLOROTHIAZIDE 12.5 MG/1
12.5 CAPSULE, GELATIN COATED ORAL DAILY
Status: DISCONTINUED | OUTPATIENT
Start: 2022-07-02 | End: 2022-07-02 | Stop reason: RX

## 2022-07-02 RX ORDER — BISACODYL 10 MG
10 SUPPOSITORY, RECTAL RECTAL DAILY PRN
Status: CANCELLED | OUTPATIENT
Start: 2022-07-02

## 2022-07-02 RX ORDER — HYDRALAZINE HYDROCHLORIDE 25 MG/1
25 TABLET, FILM COATED ORAL EVERY 6 HOURS PRN
Status: DISCONTINUED | OUTPATIENT
Start: 2022-07-02 | End: 2022-07-13

## 2022-07-02 RX ORDER — BISACODYL 10 MG
10 SUPPOSITORY, RECTAL RECTAL DAILY PRN
Status: DISCONTINUED | OUTPATIENT
Start: 2022-07-02 | End: 2022-07-13

## 2022-07-02 RX ORDER — CLOPIDOGREL BISULFATE 75 MG/1
75 TABLET ORAL DAILY
Status: CANCELLED | OUTPATIENT
Start: 2022-07-03

## 2022-07-02 RX ORDER — ONDANSETRON 4 MG/1
4 TABLET, FILM COATED ORAL EVERY 6 HOURS PRN
Status: DISCONTINUED | OUTPATIENT
Start: 2022-07-02 | End: 2022-07-13

## 2022-07-02 RX ORDER — CHOLECALCIFEROL (VITAMIN D3) 125 MCG
1000 CAPSULE ORAL DAILY
Status: CANCELLED | OUTPATIENT
Start: 2022-07-03

## 2022-07-02 RX ORDER — NICOTINE POLACRILEX 4 MG
15 LOZENGE BUCCAL
Status: DISCONTINUED | OUTPATIENT
Start: 2022-07-02 | End: 2022-07-13 | Stop reason: HOSPADM

## 2022-07-02 RX ORDER — ACETAMINOPHEN 650 MG/1
650 SUPPOSITORY RECTAL EVERY 4 HOURS PRN
Status: CANCELLED | OUTPATIENT
Start: 2022-07-02

## 2022-07-02 RX ORDER — FERROUS SULFATE 325(65) MG
325 TABLET ORAL EVERY OTHER DAY
Status: CANCELLED | OUTPATIENT
Start: 2022-07-03

## 2022-07-02 RX ORDER — HYDROCHLOROTHIAZIDE 25 MG/1
12.5 TABLET ORAL DAILY
Status: DISCONTINUED | OUTPATIENT
Start: 2022-07-03 | End: 2022-07-13 | Stop reason: HOSPADM

## 2022-07-02 RX ORDER — ASPIRIN 81 MG/1
81 TABLET ORAL DAILY
Status: DISCONTINUED | OUTPATIENT
Start: 2022-07-03 | End: 2022-07-13 | Stop reason: HOSPADM

## 2022-07-02 RX ORDER — OMEPRAZOLE 20 MG/1
20 CAPSULE, DELAYED RELEASE ORAL DAILY
COMMUNITY
End: 2022-07-13 | Stop reason: HOSPADM

## 2022-07-02 RX ORDER — HYDROCHLOROTHIAZIDE 12.5 MG/1
12.5 TABLET ORAL DAILY
Status: DISCONTINUED | OUTPATIENT
Start: 2022-07-02 | End: 2022-07-02 | Stop reason: HOSPADM

## 2022-07-02 RX ORDER — ATORVASTATIN CALCIUM 80 MG/1
80 TABLET, FILM COATED ORAL NIGHTLY
Status: DISCONTINUED | OUTPATIENT
Start: 2022-07-02 | End: 2022-07-13 | Stop reason: HOSPADM

## 2022-07-02 RX ORDER — ATORVASTATIN CALCIUM 80 MG/1
80 TABLET, FILM COATED ORAL NIGHTLY
Status: CANCELLED | OUTPATIENT
Start: 2022-07-02

## 2022-07-02 RX ORDER — NICOTINE POLACRILEX 4 MG
15 LOZENGE BUCCAL
Status: CANCELLED | OUTPATIENT
Start: 2022-07-02

## 2022-07-02 RX ORDER — ACETAMINOPHEN 325 MG/1
650 TABLET ORAL EVERY 4 HOURS PRN
Status: CANCELLED | OUTPATIENT
Start: 2022-07-02

## 2022-07-02 RX ORDER — HYDROCHLOROTHIAZIDE 12.5 MG/1
12.5 CAPSULE, GELATIN COATED ORAL DAILY
Status: CANCELLED | OUTPATIENT
Start: 2022-07-02 | Stop reason: RX

## 2022-07-02 RX ORDER — HYDROCHLOROTHIAZIDE 25 MG/1
12.5 TABLET ORAL DAILY
Status: CANCELLED | OUTPATIENT
Start: 2022-07-02

## 2022-07-02 RX ORDER — HYDROCHLOROTHIAZIDE 25 MG/1
12.5 TABLET ORAL DAILY
Status: DISCONTINUED | OUTPATIENT
Start: 2022-07-02 | End: 2022-07-02

## 2022-07-02 RX ORDER — RAMIPRIL 10 MG/1
10 CAPSULE ORAL DAILY
Status: CANCELLED | OUTPATIENT
Start: 2022-07-03

## 2022-07-02 RX ORDER — ASPIRIN 81 MG/1
81 TABLET ORAL DAILY
Status: CANCELLED | OUTPATIENT
Start: 2022-07-03

## 2022-07-02 RX ORDER — ONDANSETRON 2 MG/ML
4 INJECTION INTRAMUSCULAR; INTRAVENOUS EVERY 6 HOURS PRN
Status: CANCELLED | OUTPATIENT
Start: 2022-07-02

## 2022-07-02 RX ORDER — DEXTROSE MONOHYDRATE 25 G/50ML
25 INJECTION, SOLUTION INTRAVENOUS
Status: CANCELLED | OUTPATIENT
Start: 2022-07-02

## 2022-07-02 RX ORDER — RAMIPRIL 10 MG/1
10 CAPSULE ORAL DAILY
Status: DISCONTINUED | OUTPATIENT
Start: 2022-07-03 | End: 2022-07-13 | Stop reason: HOSPADM

## 2022-07-02 RX ORDER — ACETAMINOPHEN 650 MG/1
650 SUPPOSITORY RECTAL EVERY 4 HOURS PRN
Status: DISCONTINUED | OUTPATIENT
Start: 2022-07-02 | End: 2022-07-13

## 2022-07-02 RX ORDER — INSULIN LISPRO 100 [IU]/ML
0-7 INJECTION, SOLUTION INTRAVENOUS; SUBCUTANEOUS
Status: DISCONTINUED | OUTPATIENT
Start: 2022-07-03 | End: 2022-07-06

## 2022-07-02 RX ORDER — INSULIN LISPRO 100 [IU]/ML
0-7 INJECTION, SOLUTION INTRAVENOUS; SUBCUTANEOUS
Status: CANCELLED | OUTPATIENT
Start: 2022-07-02

## 2022-07-02 RX ORDER — ACETAMINOPHEN 325 MG/1
650 TABLET ORAL EVERY 4 HOURS PRN
Status: DISCONTINUED | OUTPATIENT
Start: 2022-07-02 | End: 2022-07-13

## 2022-07-02 RX ORDER — FERROUS SULFATE 325(65) MG
325 TABLET ORAL EVERY OTHER DAY
Status: DISCONTINUED | OUTPATIENT
Start: 2022-07-03 | End: 2022-07-13 | Stop reason: HOSPADM

## 2022-07-02 RX ORDER — HYDROCHLOROTHIAZIDE 12.5 MG/1
12.5 CAPSULE, GELATIN COATED ORAL DAILY
Status: DISCONTINUED | OUTPATIENT
Start: 2022-07-02 | End: 2022-07-02

## 2022-07-02 RX ORDER — DEXTROSE MONOHYDRATE 25 G/50ML
25 INJECTION, SOLUTION INTRAVENOUS
Status: DISCONTINUED | OUTPATIENT
Start: 2022-07-02 | End: 2022-07-13 | Stop reason: HOSPADM

## 2022-07-02 RX ADMIN — INSULIN LISPRO 3 UNITS: 100 INJECTION, SOLUTION INTRAVENOUS; SUBCUTANEOUS at 11:38

## 2022-07-02 RX ADMIN — CLOPIDOGREL 75 MG: 75 TABLET, FILM COATED ORAL at 08:41

## 2022-07-02 RX ADMIN — RAMIPRIL 10 MG: 10 CAPSULE ORAL at 08:41

## 2022-07-02 RX ADMIN — ATORVASTATIN CALCIUM 80 MG: 80 TABLET, FILM COATED ORAL at 20:15

## 2022-07-02 RX ADMIN — ASPIRIN 81 MG: 81 TABLET, COATED ORAL at 08:41

## 2022-07-02 RX ADMIN — Medication 1000 MCG: at 08:41

## 2022-07-02 RX ADMIN — INSULIN GLARGINE-YFGN 8 UNITS: 100 INJECTION, SOLUTION SUBCUTANEOUS at 08:41

## 2022-07-02 RX ADMIN — HYDROCHLOROTHIAZIDE 12.5 MG: 12.5 TABLET ORAL at 14:05

## 2022-07-02 NOTE — PLAN OF CARE
Goal Outcome Evaluation:           Progress: improving   No acute changes this shift. Ready to discharge to rehab this shift. Will CTM.

## 2022-07-02 NOTE — PLAN OF CARE
Goal Outcome Evaluation:            Pt ready for d/c to rehab soon. UP to eob with sba using rail and hob elevated. STS to rwx with cga. Pt amb 110'with cga with obvious RLE weakness: impaired DF, knee unstable during stance, decreased step length. Fall risk. Pt performed several ex sitting: seated laq with df, ankle circles cw and ccw prior to amb.

## 2022-07-02 NOTE — DISCHARGE SUMMARY
Patient Name: Maryellen Crump  : 1950  MRN: [id number]    Date of Admission: 2022  Date of Discharge:  2022  Primary Care Physician: Juni Cortez MD      Chief Complaint:   Numbness and Stroke (Right sided)      Discharge Diagnoses     Active Hospital Problems    Diagnosis  POA   • **Stroke-like symptoms [R29.90]  Yes   • Vitamin B12 deficiency [E53.8]  Unknown   • Acute lacunar stroke (HCC) [I63.81]  Unknown   • Acute CVA (cerebrovascular accident) (HCC) [I63.9]  Yes   • Type 2 diabetes mellitus with kidney complication, without long-term current use of insulin (HCC) [E11.29]  Yes   • Hypertension [I10]  Yes   • Hyperlipidemia [E78.5]  Yes   • Iron deficiency anemia [D50.9]  Yes      Resolved Hospital Problems    Diagnosis Date Resolved POA   • Hypercalcemia [E83.52] 2022 Yes        Hospital Course     Ms. Crump is a 71 y.o. female with a history of hypertension, hyperlipidemia and diabetes mellitus that presents to [facility] complaining of right arm and leg weakness.  MRI showed stroke in the left subcortical region and corona radiata.     Acute lacunar stroke: MRI showed stroke in the left subcortical region and corona radiata.  Associated symptoms include right upper and lower extremity weakness.  Neurology consulted.    Stroke likely secondary to small vessel disease.  Continue aspirin, Plavix, and statin.  Follow-up with outpatient neurology.  Home oral estrogen discontinued due to increased risk of stroke.        Essential hypertension.  At home patient is on ramipril 10 mg twice daily and HCTZ 12.5 mg daily.  Monitor BP.  -Initially allowed permissive hypertension due to stroke, gradually restarting home blood pressure meds.  -Started on ramipril 10 mg daily ,   -Restarted home HCTZ 12.5 milligrams daily on .  -If BP remains elevated can increase home ramipril to 10 mg twice daily     DM type II: Hemoglobin A1c 6.9.    Holding home p.o. meds.   Continue Lantus 10 units daily and sliding scale insulin.      Abnormal thyroid levels: TSH slightly elevated 4.6, free T4 normal.  Will need repeat levels in 6 weeks to monitor.     Anemia: Iron studies continue iron deficiency.  Started on p.o. ferrous sulfate.  Will need outpatient work-up for iron deficiency anemia.  Patient thinks that she is due for colonoscopy .  Follow-up with PCP.     Vitamin B12 deficiency: Vitamin B 12 low at 204, ordered one-time IV B12 followed by daily p.o. supplement.  Needs repeat labs in about 3 months to monitor response.  Metformin can cause vitamin B12 deficiency.  If vitamin B12 still low we will oral supplements metformin will need to be discontinued.    Day of Discharge     Subjective:  Patient seen this morning.  No acute events overnight.  Continues to feel improvement in right upper and lower extremity.  No new weakness.    Physical Exam:  Temp:  [97.9 °F (36.6 °C)-98.6 °F (37 °C)] 98 °F (36.7 °C)  Heart Rate:  [56-75] 71  Resp:  [18-20] 18  BP: (155-169)/(54-93) 166/81  Body mass index is 20.02 kg/m².  Physical Exam  neral: Alert,no acute distress  HEENT: Normocephalic, atraumatic  CV: Regular rate and rhythm, no murmurs rubs or gallops  Lungs: Clear to auscultation bilaterally, no crackles or wheezes  Abdomen: Soft, nontender, nondistended  Extremities: No significant peripheral edema , no cyanosis   Neuro: Alert, oriented x4, right upper and lower extremity weakness, normal left upper and lower extremity  Consultants     Consult Orders (all) (From admission, onward)     Start     Ordered    06/30/22 1037  Inpatient Rehab Admission Consult  Once        Provider:  (Not yet assigned)    06/30/22 1036    06/29/22 1847  Notify Stroke Coordinator  Once        Provider:  (Not yet assigned)    06/29/22 1848 06/29/22 1847  Inpatient Rehab Admission Consult  Once        Provider:  (Not yet assigned)    06/29/22 1848 06/29/22 1847  Consult to Case Management    Once        Provider:  (Not yet assigned)    06/29/22 1848 06/29/22 1847  Consult to Diabetes Educator  Once,   Status:  Canceled        Provider:  (Not yet assigned)    06/29/22 1848 06/29/22 1847  Inpatient Neurology Consult Stroke  Once        Specialty:  Neurology  Provider:  (Not yet assigned)    06/29/22 1848 06/29/22 1825  [facility] (on-call MD unless specified) Details  Once        Specialty:  Hospitalist  Provider:  (Not yet assigned)    06/29/22 1824              Procedures     * Surgery not found *      Imaging Results (All)     Procedure Component Value Units Date/Time    CT Angiogram Head [[id number]] Collected: 06/29/22 1823     Updated: 06/30/22 1747    Narrative:      CT ANGIOGRAM NECK AND HEAD WITH CONTRAST     HISTORY: Right-sided weakness.     COMPARISON: None.     FINDINGS: The brain and ventricles are symmetrical. There is  age-appropriate atrophy. Mild-to-moderate vascular calcification is  noted. No focal area of decreased attenuation to suggest acute  infarction is identified.     A CT angiogram of the neck and head was then performed. Multiplanar, as  well as 3-dimensional reconstructions were generated.     The great vessels are arranged in a classic configuration. There is 0%  stenosis involving the carotid bifurcations. Vascular calcification  involving the carotid siphons are noted without high-grade stenosis. The  proximal aspects of the anterior and middle cerebral arteries appear  unremarkable.     Both vertebral arteries were opacified. The left vertebral artery is  larger than that of the right. The basilar artery and the proximal  aspects of the posterior cerebral arteries appear unremarkable. A  standard postcontrast CT examination of the brain showed no evidence of  abnormal enhancement.     There is moderate-to-severe loss of disc height at C5-C6.       Impression:      No evidence of acute infarction or of intracranial  hemorrhage. Further evaluation for infarction could be  performed with  MRI examination of the brain. There is 0% stenosis involving the carotid  bifurcations. There is no evidence of proximal intracranial arterial  high-grade stenosis or of aneurysm.         Radiation dose reduction techniques were utilized, including automated  exposure control and exposure modulation based on body size.     This report was finalized on 6/30/2022 5:44 PM by Dr. Marek Schulte M.D.       CT Angiogram Neck [[id number]] Collected: 06/29/22 1823     Updated: 06/30/22 1747    Narrative:      CT ANGIOGRAM NECK AND HEAD WITH CONTRAST     HISTORY: Right-sided weakness.     COMPARISON: None.     FINDINGS: The brain and ventricles are symmetrical. There is  age-appropriate atrophy. Mild-to-moderate vascular calcification is  noted. No focal area of decreased attenuation to suggest acute  infarction is identified.     A CT angiogram of the neck and head was then performed. Multiplanar, as  well as 3-dimensional reconstructions were generated.     The great vessels are arranged in a classic configuration. There is 0%  stenosis involving the carotid bifurcations. Vascular calcification  involving the carotid siphons are noted without high-grade stenosis. The  proximal aspects of the anterior and middle cerebral arteries appear  unremarkable.     Both vertebral arteries were opacified. The left vertebral artery is  larger than that of the right. The basilar artery and the proximal  aspects of the posterior cerebral arteries appear unremarkable. A  standard postcontrast CT examination of the brain showed no evidence of  abnormal enhancement.     There is moderate-to-severe loss of disc height at C5-C6.       Impression:      No evidence of acute infarction or of intracranial  hemorrhage. Further evaluation for infarction could be performed with  MRI examination of the brain. There is 0% stenosis involving the carotid  bifurcations. There is no evidence of proximal intracranial arterial  high-grade  stenosis or of aneurysm.         Radiation dose reduction techniques were utilized, including automated  exposure control and exposure modulation based on body size.     This report was finalized on 6/30/2022 5:44 PM by Dr. Marek Schulte M.D.       MRI Thoracic Spine Without Contrast [[id number]] Collected: 06/30/22 0106     Updated: 06/30/22 0115    Narrative:      THORACIC SPINE MRI WITHOUT GADOLINIUM     HISTORY: Ataxia, thoracic trauma; I63.9-Cerebral infarction, unspecified     COMPARISON:  None.     FINDINGS:  Multiplanar images of the thoracic spine obtained without  gadolinium.  Axial images obtained from T1-L1.     No acute fracture or subluxation of the thoracic spine is seen. Marrow  signal appears unremarkable. No cord signal abnormalities are seen.  Conus terminates at L1. There is some intervertebral disc space  narrowing noted at multiple levels. There is no evidence of canal  stenosis or neural foraminal narrowing at any level.       Impression:      1. As above.     This report was finalized on 6/30/2022 1:12 AM by Dr. Shaila Tilley M.D.       MRI Brain Without Contrast [[id number]] Collected: 06/29/22 2300     Updated: 06/29/22 2309    Narrative:      BRAIN MRI WITHOUT CONTRAST     HISTORY: Stroke, follow up     COMPARISON: 06/29/2022.     FINDINGS:  Multiplanar images of the head were obtained without  gadolinium. There is an area of restricted diffusion which is noted  within the left frontoparietal coronal radiata, measuring up to 1.1 cm  in size. An additional tiny focus is seen just superior to it within the  subcortical white matter. This measures approximately 4 mm in size. No  additional areas of restricted diffusion are seen. There is diffuse  atrophy. There is periventricular and deep white matter microangiopathic  change. Degree of ventricular dilatation may be somewhat out of  proportion to the degree of atrophy, and correlation with any evidence  of normal pressure hydrocephalus  is recommended. There is no midline  shift or mass effect. Intracranial flow voids appear intact. No  abnormality is seen on gradient echo imaging There is mucosal thickening  noted within the ethmoid sinuses.       Impression:      1. Foci of restricted diffusion are noted within the left frontoparietal  corona radiata and subcortical white matter.  No additional areas of  acute infarction are identified.     This report was finalized on 6/29/2022 11:06 PM by Dr. Shaila Tilley M.D.             Results for orders placed during the hospital encounter of 06/29/22    Adult transthoracic echo complete    Interpretation Summary  · Calculated left ventricular EF = 59.2% Estimated left ventricular EF was in agreement with the calculated left ventricular EF. Left ventricular systolic function is normal.  · Left ventricular diastolic function was normal.  · Saline test results are positive with valsalva manuever for right to left atrial level shunt suggesting presence of a small PFO.  · No valvular masses noted on transthoracic echocardiogram.    Pertinent Labs     Results from last 7 days   Lab Units 07/02/22  0613 07/01/22  0454 06/30/22  0453 06/29/22  1603   WBC 10*3/mm3 8.96 6.09 6.67 7.71   HEMOGLOBIN g/dL 10.7* 10.4* 10.6* 11.8*   PLATELETS 10*3/mm3 259 256 255 303     Results from last 7 days   Lab Units 07/02/22  0613 07/01/22  0454 06/30/22  0453 06/29/22  1603   SODIUM mmol/L 140 139 138 138   POTASSIUM mmol/L 4.2 4.0 3.8 4.1   CHLORIDE mmol/L 106 103 101 100   CO2 mmol/L 24.0 24.0 25.0 21.1*   BUN mg/dL 15 15 14 16   CREATININE mg/dL 1.02* 1.07* 1.11* 1.21*   GLUCOSE mg/dL 136* 142* 108* 113*   Estimated Creatinine Clearance: 40.4 mL/min (A) (by C-G formula based on SCr of 1.02 mg/dL (H)).  Results from last 7 days   Lab Units 06/30/22  0453 06/29/22  1603   ALBUMIN g/dL 3.60 4.10   BILIRUBIN mg/dL 0.5 0.6   ALK PHOS U/L 72 92   AST (SGOT) U/L 16 15   ALT (SGPT) U/L 11 13     Results from last 7 days   Lab  "Units 07/02/22  0613 07/01/22  0454 06/30/22  0453 06/29/22  1603   CALCIUM mg/dL 9.3 9.8 9.7 10.9*   ALBUMIN g/dL  --   --  3.60 4.10       Results from last 7 days   Lab Units 06/29/22  1603   TROPONIN T ng/mL <0.010       Results from last 7 days   Lab Units 06/30/22  0453   CHOLESTEROL mg/dL 130   TRIGLYCERIDES mg/dL 148   HDL CHOL mg/dL 54   LDL CHOL mg/dL 51         Results from last 7 days   Lab Units 06/29/22  1922   COVID19  Not Detected       Test Results Pending at Discharge       Discharge Details            Inter-facility transfer medications (From admission, onward)        aspirin EC tablet 81 mg  Daily              atorvastatin (LIPITOR) tablet 80 mg  Nightly              clopidogrel (PLAVIX) tablet 75 mg  Daily              ferrous sulfate tablet 325 mg  Every Other Day              hydroCHLOROthiazide (MICROZIDE) capsule 12.5 mg  Daily,   Status:  Canceled              insulin glargine (LANTUS, SEMGLEE) injection 10 Units  Daily              insulin lispro (ADMELOG) injection 0-7 Units  (Correction Insulin - Low Dose (Total Insulin Dose Less Than 40 units/day, Lean Patients, Elderly Patients or Renal Patients))  3 Times Daily Before Meals              ramipril (ALTACE) capsule 10 mg  Daily              vitamin B-12 (CYANOCOBALAMIN) tablet 1,000 mcg  Daily              acetaminophen (TYLENOL) tablet 650 mg  (Pain/Fever)  Every 4 Hours PRN        \"Or\" Linked Group Details         acetaminophen (TYLENOL) suppository 650 mg  (Pain/Fever)  Every 4 Hours PRN        \"Or\" Linked Group Details         bisacodyl (DULCOLAX) suppository 10 mg  Daily PRN              dextrose (D50W) (25 g/50 mL) IV injection 25 g  Every 15 Minutes PRN              dextrose (GLUTOSE) oral gel 15 g  Every 15 Minutes PRN              glucagon (human recombinant) (GLUCAGEN DIAGNOSTIC) injection 1 mg  Every 15 Minutes PRN              ondansetron (ZOFRAN) injection 4 mg  Every 6 Hours PRN              hydroCHLOROthiazide " (HYDRODIURIL) tablet 12.5 mg  Daily                    Allergies   Allergen Reactions   • Penicillins Hives       Discharge Disposition:        Discharge Diet:  Diet Order   Procedures   • Diet Regular; Cardiac, Consistent Carbohydrate, Low Fat       Discharge Activity:       CODE STATUS:    Code Status and Medical Interventions:   Ordered at: 06/29/22 3418     Level Of Support Discussed With:    Patient     Code Status (Patient has no pulse and is not breathing):    CPR (Attempt to Resuscitate)     Medical Interventions (Patient has pulse or is breathing):    Full       No future appointments.   Follow-up Information     Juni Cortez MD. Schedule an appointment as soon as possible for a visit in 1 week(s).    Specialty: Family Medicine  Contact information:  [address] [facility] [address] [phone]             Nohemy Frey APRN. Schedule an appointment as soon as possible for a visit in 2 month(s).    Specialties: Nurse Practitioner, Neurology  Contact information:  [phone]  [address] [address] [phone]                         Time Spent on Discharge:  Greater than 30 minutes      Steve Ricci MD  [facility]  07/02/22  11:28 EDT

## 2022-07-02 NOTE — PROGRESS NOTES
DOS: 2022  NAME: Maryellen Crump   : 1950  PCP: Juni Cortez MD    Chief Complaint   Patient presents with   • Numbness   • Stroke     Right sided        Stroke    Subjective: Pt seen in follow up, however the problem is new to me.  Patient sitting up in her recliner.  States she has been walking around with no recurrence of symptoms or worsening of symptoms.  Denies any new weakness, numbness, speech or visual disturbances, or headaches.   at bedside.    Objective:  Vital signs:      Vitals:    22 1339 22 1946 22 2350 22   BP: 169/54 158/93 155/86 166/81   BP Location: Right arm Right arm Right arm Right arm   Patient Position: Lying Lying Lying Lying   Pulse: 56 70  Comment: 70 75 71   Resp:    Temp: 97.9 °F (36.6 °C) 98.6 °F (37 °C) 97.9 °F (36.6 °C) 98 °F (36.7 °C)   TempSrc: Oral Oral Oral Oral   SpO2: 96% 96% 97% 95%   Weight:       Height:           Current Facility-Administered Medications:   •  acetaminophen (TYLENOL) tablet 650 mg, 650 mg, Oral, Q4H PRN, 650 mg at 22 **OR** acetaminophen (TYLENOL) suppository 650 mg, 650 mg, Rectal, Q4H PRN, Kirill Keith APRN  •  aspirin EC tablet 81 mg, 81 mg, Oral, Daily, Tanmay Devries MD, 81 mg at 22 0841  •  atorvastatin (LIPITOR) tablet 80 mg, 80 mg, Oral, Nightly, Kirill Keith APRN, 80 mg at 22  •  bisacodyl (DULCOLAX) suppository 10 mg, 10 mg, Rectal, Daily PRN, Kirill Keith APRN  •  clopidogrel (PLAVIX) tablet 300 mg, 300 mg, Oral, Once, Tyson Bal MD  •  clopidogrel (PLAVIX) tablet 75 mg, 75 mg, Oral, Daily, Tanmay Devries MD, 75 mg at 22 0841  •  dextrose (D50W) (25 g/50 mL) IV injection 25 g, 25 g, Intravenous, Q15 Min PRN, Steve Ricci MD  •  dextrose (GLUTOSE) oral gel 15 g, 15 g, Oral, Q15 Min PRN, Steve Ricci MD  •  ferrous sulfate tablet 325 mg, 325 mg, Oral, Every Other Day, Steve Ricci MD, 325 mg at  07/01/22 0959  •  glucagon (human recombinant) (GLUCAGEN DIAGNOSTIC) injection 1 mg, 1 mg, Intramuscular, Q15 Min PRN, Steve Ricci MD  •  hydroCHLOROthiazide (HYDRODIURIL) tablet 12.5 mg, 12.5 mg, Oral, Daily, Steve Ricci MD  •  [START ON 7/3/2022] insulin glargine (LANTUS, SEMGLEE) injection 10 Units, 10 Units, Subcutaneous, Daily, Steve Ricci MD  •  insulin lispro (ADMELOG) injection 0-7 Units, 0-7 Units, Subcutaneous, TID AC, Steve Ricci MD, 3 Units at 07/02/22 1138  •  ondansetron (ZOFRAN) injection 4 mg, 4 mg, Intravenous, Q6H PRN, Kirill Keith, APRN, 4 mg at 07/01/22 0619  •  ramipril (ALTACE) capsule 10 mg, 10 mg, Oral, Daily, Steve Ricci MD, 10 mg at 07/02/22 0841  •  vitamin B-12 (CYANOCOBALAMIN) tablet 1,000 mcg, 1,000 mcg, Oral, Daily, Steve Rcici MD, 1,000 mcg at 07/02/22 0841    PRN meds  •  acetaminophen **OR** acetaminophen  •  bisacodyl  •  dextrose  •  dextrose  •  glucagon (human recombinant)  •  ondansetron    No current facility-administered medications on file prior to encounter.     Current Outpatient Medications on File Prior to Encounter   Medication Sig   • hydrochlorothiazide (MICROZIDE) 12.5 MG capsule Take 12.5 mg by mouth Daily.   • metFORMIN (GLUCOPHAGE) 1000 MG tablet Take 1,000 mg by mouth 2 (Two) Times a Day With Meals.   • Progesterone Micronized (PROGESTERONE PO) Take 2.5 mg by mouth Daily.   • ramipril (ALTACE) 10 MG capsule Take 10 mg by mouth 2 (Two) Times a Day.   • simvastatin (ZOCOR) 40 MG tablet Take 40 mg by mouth Every Night.   • SITagliptin (JANUVIA) 25 MG tablet Take 100 mg by mouth Daily.   • [DISCONTINUED] estradiol (ESTRACE) 1 MG tablet Take 1 mg by mouth Daily.   • omeprazole (priLOSEC) 20 MG capsule Take 20 mg by mouth Daily.       General appearance: Elderly female, NAD, alert and cooperative, well groomed  HEENT: Normocephalic, atraumatic, no masses or tenderness  Resp: Even and unlabored  Extremities: No  obvious edema  Skin: warm, dry    Neurological:   MS: oriented to self, place, month, date, year, able to correctly identify wall clock time, recent/remote memory intact, normal attention/concentration, language intact, no neglect, normal fund of knowledge  CN: visual acuity grossly normal, visual fields full, EOMI, facial sensation equal, no facial droop, hearing symmetric, palate elevates symmetrically, shoulder shrug equal, tongue midline  Motor: 4/5 RUE/RLE, 5/5 LUE/LLE, 3/5 with right hand , unable to open fingers on right hand completely  Sensory: light touch sensation intact in all 4 ext.  Coordination: Dysmetria with finger-to-nose testing on the right  Gait and station: Deferred  Rapid alternating movements:  Decreased finger to thumb tapping on the right    Laboratory results:  Lab Results   Component Value Date    TSH 4.630 (H) 06/30/2022     Lab Results   Component Value Date    HGBA1C 6.90 (H) 06/30/2022     Lab Results   Component Value Date    YAFOOIGE86 204 (L) 07/01/2022     Lab Results   Component Value Date    CHOL 130 06/30/2022     Lab Results   Component Value Date    TRIG 148 06/30/2022     Lab Results   Component Value Date    HDL 54 06/30/2022     Lab Results   Component Value Date    LDL 51 06/30/2022     Lab Results   Component Value Date    WBC 8.96 07/02/2022    HGB 10.7 (L) 07/02/2022    HCT 32.3 (L) 07/02/2022    MCV 94.7 07/02/2022     07/02/2022     Lab Results   Component Value Date    GLUCOSE 136 (H) 07/02/2022    BUN 15 07/02/2022    CREATININE 1.02 (H) 07/02/2022    BCR 14.7 07/02/2022    K 4.2 07/02/2022    CO2 24.0 07/02/2022    CALCIUM 9.3 07/02/2022    ALBUMIN 3.60 06/30/2022    AST 16 06/30/2022    ALT 11 06/30/2022     No results found for: PTT  No results found for: INR, PROTIME  Brief Urine Lab Results     None          Review and interpretation of imaging:  CT Angiogram Neck    Result Date: 6/30/2022  No evidence of acute infarction or of intracranial  hemorrhage. Further evaluation for infarction could be performed with MRI examination of the brain. There is 0% stenosis involving the carotid bifurcations. There is no evidence of proximal intracranial arterial high-grade stenosis or of aneurysm.   Radiation dose reduction techniques were utilized, including automated exposure control and exposure modulation based on body size.  This report was finalized on 6/30/2022 5:44 PM by Dr. Marek Schulte M.D.      MRI Brain Without Contrast    Result Date: 6/29/2022  1. Foci of restricted diffusion are noted within the left frontoparietal corona radiata and subcortical white matter.  No additional areas of acute infarction are identified.  This report was finalized on 6/29/2022 11:06 PM by Dr. Shaila Tilley M.D.      MRI Thoracic Spine Without Contrast    Result Date: 6/30/2022  1. As above.  This report was finalized on 6/30/2022 1:12 AM by Dr. Shaila Tilley M.D.      CT Angiogram Head    Result Date: 6/30/2022  No evidence of acute infarction or of intracranial hemorrhage. Further evaluation for infarction could be performed with MRI examination of the brain. There is 0% stenosis involving the carotid bifurcations. There is no evidence of proximal intracranial arterial high-grade stenosis or of aneurysm.   Radiation dose reduction techniques were utilized, including automated exposure control and exposure modulation based on body size.  This report was finalized on 6/30/2022 5:44 PM by Dr. Marek Schulte M.D.      Results for orders placed during the hospital encounter of 06/29/22    Adult transthoracic echo complete    Interpretation Summary  · Calculated left ventricular EF = 59.2% Estimated left ventricular EF was in agreement with the calculated left ventricular EF. Left ventricular systolic function is normal.  · Left ventricular diastolic function was normal.  · Saline test results are positive with valsalva manuever for right to left atrial level shunt  suggesting presence of a small PFO.  · No valvular masses noted on transthoracic echocardiogram.        Impression/Assessment:  This is a 71-year-old female with past medical history of hypertension, hyperlipidemia, diabetes who presented to the hospital on 6/29/2022 with complaints of right-sided weakness and numbness upon awakening.  She had stated that around 10 PM the night before she noticed that she was having trouble holding her wineglass in her right hand.    BP on arrival 163/83, HR 75.  EKG with predominantly NSR.  Temp 98.5.  .  Creatinine 1.21.  She underwent a CTA head and neck that revealed no evidence of high-grade stenosis or aneurysm.  MRI brain without obtained and did reveal an acute left frontoparietal corona radiata and subcortical white matter infarct.  There is also diffuse atrophy, moderate small vessel disease, and ventricular dilatation somewhat out of proportion to the degree of atrophy.    Symptoms were improving however right-sided weakness worsened the night of admission and she was made bed rest and permissive hypertension was ordered.    Diagnosis:  Acute left frontoparietal infarcts suspect related to small vessel disease  Hypertension  B12 deficiency  Type 2 diabetes  Dyslipidemia  Ventriculomegaly  Small PFO    Additional work up to date:  2D Echo: Normal LA size and volume, small PFO, saline test positive Valsalva maneuver from right to left atrial level shunt, EF 59.2%, no evidence of LV thrombus or mass, no evidence of AV or MV stenosis or regurg, no evidence of AV mass present.  MRI thoracic spine without: No acute fracture or subluxation.  No abnormal cord signal.  Labs: A1c 6.90, TSH 1.630, free T4 1.40, LDL 51, B12 204, folate 11.8    Plan:  Did well overnight, right-sided weakness has improved.  Continue with plans of DAPT ASA 81 mg and Plavix 75 mg x 3 months and then ASA 81 mg monotherapy; she was on no antiplatelet therapy PTA  Continue Lipitor 80 mg, LDL 51;  recommend continuing high-dose statins x3 months and then repeat lipid panel.  Continue B12 replacement with cyanocobalamin 1000 mcg oral daily, recommend she repeat this in 1 month with PCP.  Goal B12 level >500.  Of note, metformin can decrease levels.  Recommend no estrogen replacement ever due to increased risk of stroke.  PFO felt to be unrelated to stroke per Dr. Ibarra, f/u with Cardiology in outpatient setting.  Monitor for s/s of NPH, can consider LP in outpatient settings if symptoms appear.  Neurochecks per stroke protocol  Gradually normalize BP, goal BP long-term <130/80  Stroke Education  IRVIN/SCDs  PT/OT/ST  I will arrange follow-up with me in 3 months for stroke follow-up.  Therapies as written. CCP for discharge planning. Call RRT for any acute neurological changes. We will sign off, will see again per request.    Case discussed with patient,  at bedside, and Dr. Daley, and he agrees with plan above.   ANAIS Saenz

## 2022-07-02 NOTE — PROGRESS NOTES
[facility]  Insurance precert obtained. Bed available today pending medical stability. Please complete discharge - readmit option in Epic when discharging to rehab.  *Addendum - patient will also require a negative covid prior to admission to rehab.    Norma Dasilva RN  Rehab admissions Coordinator  [phone]

## 2022-07-02 NOTE — PLAN OF CARE
Goal Outcome Evaluation:  Plan of Care Reviewed With: patient        Progress: improving  Outcome Evaluation: VSS, NIH 3, no c/o of pain or nausea, standby assist to BR, plan to D/C to [facility], will CTM

## 2022-07-02 NOTE — THERAPY TREATMENT NOTE
Patient Name: Maryellen Crump  : 1950    MRN: [id number]                              Today's Date: 2022       Admit Date: 2022    Visit Dx:     ICD-10-CM ICD-9-CM   1. Acute CVA (cerebrovascular accident) (HCC)  I63.9 434.91     Patient Active Problem List   Diagnosis   • Family history of colon cancer   • Iron deficiency anemia   • Diarrhea of presumed infectious origin   • Type 2 diabetes mellitus with kidney complication, without long-term current use of insulin (HCC)   • Hypertension   • Hyperlipidemia   • Stroke-like symptoms   • Acute CVA (cerebrovascular accident) (HCC)   • Acute lacunar stroke (HCC)   • Vitamin B12 deficiency     Past Medical History:   Diagnosis Date   • Diabetes mellitus (HCC)    • Hyperlipidemia    • Hypertension      Past Surgical History:   Procedure Laterality Date   • COLONOSCOPY N/A 2018    Procedure: COLONOSCOPY TO CECUM;  Surgeon: Josh Muñoz MD;  Location: [facility] ENDOSCOPY;  Service: General   • DILATION AND CURETTAGE, DIAGNOSTIC / THERAPEUTIC     • ENDOSCOPY N/A 2018    Procedure: ESOPHAGOGASTRODUODENOSCOPY WITH BIOPSIES;  Surgeon: Josh Muñoz MD;  Location: [facility] ENDOSCOPY;  Service: General   • ROTATOR CUFF REPAIR Right       General Information     Row Name 22 1545          Physical Therapy Time and Intention    Document Type therapy note (daily note)  -SV     Mode of Treatment individual therapy;physical therapy  -     Row Name 22 1545          General Information    Patient Profile Reviewed yes  -SV           User Key  (r) = Recorded By, (t) = Taken By, (c) = Cosigned By    Initials Name Provider Type    SV Meggan Alcazar, PT Physical Therapist               Mobility     Row Name 22 1559          Bed Mobility    Supine-Sit [place] (Bed Mobility) standby assist  -SV     Sit-Supine [place] (Bed Mobility) standby assist  -SV     Assistive Device (Bed Mobility) bed rails;head of bed elevated  -SV     Row  Name 07/02/22 1559          Sit-Stand Transfer    Sit-Stand [place] (Transfers) contact guard  -SV     Assistive Device (Sit-Stand Transfers) walker, front-wheeled  -SV     Comment, (Sit-Stand Transfer) right hand  weak on rwx  -SV     Row Name 07/02/22 1559          Gait/Stairs (Locomotion)    [place] Level (Gait) contact guard  -SV     Assistive Device (Gait) walker, front-wheeled  -SV     Distance in Feet (Gait) 110' , decreased right step length, right knee unstable in stance,  weak on right, vc /demo for safe turning right , obvious fatigue RLE last 25' : impaired DF  -SV           User Key  (r) = Recorded By, (t) = Taken By, (c) = Cosigned By    Initials Name Provider Type    Meggan Olguin, PT Physical Therapist               Obj/Interventions     Row Name 07/02/22 1601          Motor Skills    Therapeutic Exercise --  seated posterior leg stretch: laq with DF 3 resp 15h, ankle circles cw and ccw 5 reps each  -SV           User Key  (r) = Recorded By, (t) = Taken By, (c) = Cosigned By    Initials Name Provider Type    Meggan Olguin, PT Physical Therapist               Goals/Plan    No documentation.                Clinical Impression     Row Name 07/02/22 1602          Pain    Pretreatment Pain Rating 0/10 - no pain  -SV     Posttreatment Pain Rating 0/10 - no pain  -SV     Row Name 07/02/22 1602          Vital Signs    O2 Delivery Pre Treatment room air  -SV     O2 Delivery Intra Treatment room air  -SV     Post SpO2 (%) 95  -SV     O2 Delivery Post Treatment room air  -SV     Pre Patient Position Supine  -SV     Intra Patient Position Standing  -SV     Post Patient Position Supine  -SV     Row Name 07/02/22 1602          Positioning and Restraints    Pre-Treatment Position in bed  -SV     Post Treatment Position bed  -SV     In Bed fowlers;call light within reach;encouraged to call for assist;exit alarm on;with family/caregiver  -SV           User Key  (r) = Recorded By, (t) =  Taken By, (c) = Cosigned By    Initials Name Provider Type    Meggan Olguin, PT Physical Therapist               Outcome Measures     Row Name 07/02/22 1602          How much help from another person do you currently need...    Turning from your back to your side while in flat bed without using bedrails? 4  -SV     Moving from lying on back to sitting on the side of a flat bed without bedrails? 4  -SV     Moving to and from a bed to a chair (including a wheelchair)? 3  -SV     Standing up from a chair using your arms (e.g., wheelchair, bedside chair)? 3  -SV     Climbing 3-5 steps with a railing? 2  -SV     To walk in hospital room? 3  -SV     AM-PAC 6 Clicks Score (PT) 19  -SV     Highest level of mobility 6 --> Walked 10 steps or more  -SV     Row Name 07/02/22 1602          Functional Assessment    Outcome Measure Options AM-PAC 6 Clicks Basic Mobility (PT)  -SV           User Key  (r) = Recorded By, (t) = Taken By, (c) = Cosigned By    Initials Name Provider Type    Meggan Olguin, PT Physical Therapist                             Physical Therapy Education                 Title: PT OT SLP Therapies (Resolved)     Topic: Physical Therapy (Resolved)     Point: Mobility training (Resolved)     Learning Progress Summary           Patient Acceptance, E,TB,D, VU,DU,NR by  at 7/1/2022 2322    Acceptance, E, VU by CF at 7/1/2022 1037    Acceptance, E,TB,D, VU,NR by CB at 6/30/2022 1028                   Point: Home exercise program (Resolved)     Learning Progress Summary           Patient Acceptance, E,TB,D, VU,DU,NR by  at 7/1/2022 2322    Acceptance, E, VU by CF at 7/1/2022 1037    Acceptance, E,TB,D, VU,NR by CB at 6/30/2022 1028                   Point: Body mechanics (Resolved)     Learning Progress Summary           Patient Acceptance, E,TB,D, VU,DU,NR by  at 7/1/2022 2322    Acceptance, E, VU by CF at 7/1/2022 1037    Acceptance, E,TB,D, VU,NR by CB at 6/30/2022 1028                   Point:  Precautions (Resolved)     Learning Progress Summary           Patient Acceptance, E,TB,D, VU,DU,NR by  at 7/1/2022 2322    Acceptance, E, VU by  at 7/1/2022 1037    Acceptance, E,TB,D, VU,NR by  at 6/30/2022 1028                               User Key     Initials Effective Dates Name Provider Type Discipline     06/16/21 -  Blanca Milian, RN Registered Nurse Nurse     06/16/21 -  Kristen Atkins, PT Physical Therapist PT    CB 10/22/21 -  Brittany Akhtar, PT Physical Therapist PT              PT Recommendation and Plan           Time Calculation:    PT Charges     Row Name 07/02/22 1605             Time Calculation    Start Time 1537  -SV      Stop Time 1550  -SV      Time Calculation (min) 13 min  -SV      PT Received On 07/02/22  -      PT - Next Appointment 07/03/22  -            User Key  (r) = Recorded By, (t) = Taken By, (c) = Cosigned By    Initials Name Provider Type    SV Meggan Alcazar, PT Physical Therapist              Therapy Charges for Today     Code Description Service Date Service Provider Modifiers Qty    [id number]  PT THERAPEUTIC ACT EA 15 MIN 7/2/2022 Meggan Alcazar, PT GP 1          PT G-Codes  Outcome Measure Options: AM-PAC 6 Clicks Basic Mobility (PT)  AM-PAC 6 Clicks Score (PT): 19  AM-PAC 6 Clicks Score (OT): 15  Modified Garrett Scale: 4 - Moderately severe disability.  Unable to walk without assistance, and unable to attend to own bodily needs without assistance.    Meggan Alcazar, PT  7/2/2022

## 2022-07-03 LAB
GLUCOSE BLDC GLUCOMTR-MCNC: 111 MG/DL (ref 70–130)
GLUCOSE BLDC GLUCOMTR-MCNC: 131 MG/DL (ref 70–130)
GLUCOSE BLDC GLUCOMTR-MCNC: 178 MG/DL (ref 70–130)
GLUCOSE BLDC GLUCOMTR-MCNC: 201 MG/DL (ref 70–130)

## 2022-07-03 PROCEDURE — 97162 PT EVAL MOD COMPLEX 30 MIN: CPT

## 2022-07-03 PROCEDURE — 63710000001 INSULIN LISPRO (HUMAN) PER 5 UNITS: Performed by: STUDENT IN AN ORGANIZED HEALTH CARE EDUCATION/TRAINING PROGRAM

## 2022-07-03 PROCEDURE — 82962 GLUCOSE BLOOD TEST: CPT

## 2022-07-03 RX ORDER — CALCIUM CARBONATE 200(500)MG
2 TABLET,CHEWABLE ORAL 3 TIMES DAILY PRN
Status: DISCONTINUED | OUTPATIENT
Start: 2022-07-03 | End: 2022-07-13

## 2022-07-03 RX ADMIN — RAMIPRIL 10 MG: 10 CAPSULE ORAL at 08:07

## 2022-07-03 RX ADMIN — ATORVASTATIN CALCIUM 80 MG: 80 TABLET, FILM COATED ORAL at 20:05

## 2022-07-03 RX ADMIN — ANTACID TABLETS 1 TABLET: 500 TABLET, CHEWABLE ORAL at 20:05

## 2022-07-03 RX ADMIN — RAMIPRIL 10 MG: 10 CAPSULE ORAL at 08:08

## 2022-07-03 RX ADMIN — CLOPIDOGREL 75 MG: 75 TABLET, FILM COATED ORAL at 08:07

## 2022-07-03 RX ADMIN — INSULIN LISPRO 2 UNITS: 100 INJECTION, SOLUTION INTRAVENOUS; SUBCUTANEOUS at 16:38

## 2022-07-03 RX ADMIN — HYDROCHLOROTHIAZIDE 12.5 MG: 25 TABLET ORAL at 08:08

## 2022-07-03 RX ADMIN — ASPIRIN 81 MG: 81 TABLET, COATED ORAL at 08:07

## 2022-07-03 RX ADMIN — INSULIN LISPRO 3 UNITS: 100 INJECTION, SOLUTION INTRAVENOUS; SUBCUTANEOUS at 11:29

## 2022-07-03 RX ADMIN — Medication 1000 MCG: at 08:08

## 2022-07-03 RX ADMIN — INSULIN GLARGINE-YFGN 10 UNITS: 100 INJECTION, SOLUTION SUBCUTANEOUS at 07:55

## 2022-07-03 RX ADMIN — FERROUS SULFATE TAB 325 MG (65 MG ELEMENTAL FE) 325 MG: 325 (65 FE) TAB at 08:08

## 2022-07-03 NOTE — PROGRESS NOTES
Inpatient Rehabilitation Plan of Care Note    Plan of Care  Updated Problems/Interventions  Field    Signed by: Tara Sanders, PT

## 2022-07-03 NOTE — PLAN OF CARE
Problem: Rehabilitation (IRF) Plan of Care  Goal: Plan of Care Review  7/3/2022 0556 by Chelsy Brody RN  Outcome: Ongoing, Progressing  Flowsheets (Taken 7/3/2022 0556)  Progress: improving  Plan of Care Reviewed With: patient  Outcome Evaluation: Patient slept well tonight. New to rehab. A&Ox4, pleasant and cooperative. Assist x1 to transfer. R hemiparesis.  visited. No c.o any pain. Continent of B&B. Admission completed.

## 2022-07-03 NOTE — PROGRESS NOTES
SECTION GG    Mobility Performance:     Roll Left and Right: [place] provides verbal cues and/or touching/steadying  and/or contact guard assistance as patient completes activity. Assistance may be  provided throughout the activity or intermittently.   Sit to Lying: [place] provides verbal cues and/or touching/steadying and/or  contact guard assistance as patient completes activity. Assistance may be  provided throughout the activity or intermittently.   Lying to Sitting on Side of Bed: [place] provides verbal cues and/or  touching/steadying and/or contact guard assistance as patient completes  activity. Assistance may be provided throughout the activity or intermittently.   Sit to Stand: [place] does less than half the effort. [place] lifts, holds or  supports trunk or limbs but provides less than half the effort.   Chair/Bed to Chair Transfer: [place] does less than half the effort. [place]  lifts, holds or supports trunk or limbs but provides less than half the effort.   Car Transfer: Not attempted due to environmental limitations   Walk 10 Feet:   [place] does less than half the effort. [place] lifts, holds or  supports trunk or limbs but provides less than half the effort.  Walk 50 Feet with 2 Turns:   [place] does less than half the effort. [place]  lifts, holds or supports trunk or limbs but provides less than half the effort.  Walk 150 Feet:   Not attempted due to medical or safety concerns.  Walking 10 Feet on Uneven Surfaces:   Not attempted due to environmental  limitations  1 Step Over Curb or Up/Down Stair:   Not attempted due to environmental  limitations  Picking up an Object:   Not attempted due to environmental limitations  Uses Wheelchair/Scooter: No    Mobility Discharge Goals:   Walk Discharge Goals:   Walk 150 Feet: [place] provides verbal cues or touching/steadying assistance as  patient completes activity.    Signed by: Tara Sanders, PT

## 2022-07-03 NOTE — PROGRESS NOTES
Inpatient Rehabilitation Plan of Care Note    Plan of Care  Care Plan Reviewed - No updates at this time.    Psychosocial    Performed Intervention(s)  Offer support  Encourage to express any concerns      Sphincter Control    Performed Intervention(s)  Monitor I&O  Encourage fluids      Safety    Performed Intervention(s)  Falls/safety precaution  Bed/Chair alarms  Call light/personal items within easy reach      Body Systems    Performed Intervention(s)  Monitor labs; hypo/hyperglycemia    Signed by: Elisabet Ross RN

## 2022-07-03 NOTE — PLAN OF CARE
Goal Outcome Evaluation:  Plan of Care Reviewed With: patient        Progress: improving  Outcome Evaluation: AAOx4. Cooperative with all care. NIH of 2 at this time. Ataxia in RUE, adaptive utensils used at mealtime. Up assist of 1, wheelchair. Ambulated with PT on walker today. No safety concerns noted. Used call light appropriately. VS stable. No skin concerns noted.

## 2022-07-03 NOTE — THERAPY TREATMENT NOTE
Inpatient Rehabilitation - Physical Therapy Treatment Note       [facility]     Patient Name: Maryellen Crump  : 1950  MRN: [id number]    Today's Date: 7/3/2022                    Admit Date: 2022      Visit Dx:     ICD-10-CM ICD-9-CM   1. Impaired functional mobility, balance, gait, and endurance  Z74.09 V49.89       Patient Active Problem List   Diagnosis   • Family history of colon cancer   • Iron deficiency anemia   • Diarrhea of presumed infectious origin   • Type 2 diabetes mellitus with kidney complication, without long-term current use of insulin (HCC)   • Hypertension   • Hyperlipidemia   • Stroke-like symptoms   • Acute CVA (cerebrovascular accident) (HCC)   • Acute lacunar stroke (HCC)   • Vitamin B12 deficiency   • CVA (cerebral vascular accident) (HCC)       Past Medical History:   Diagnosis Date   • Diabetes mellitus (HCC)    • Hyperlipidemia    • Hypertension        Past Surgical History:   Procedure Laterality Date   • COLONOSCOPY N/A 2018    Procedure: COLONOSCOPY TO CECUM;  Surgeon: Josh Muñoz MD;  Location: [facility] ENDOSCOPY;  Service: General   • DILATION AND CURETTAGE, DIAGNOSTIC / THERAPEUTIC     • ENDOSCOPY N/A 2018    Procedure: ESOPHAGOGASTRODUODENOSCOPY WITH BIOPSIES;  Surgeon: Josh Muñoz MD;  Location: [facility] ENDOSCOPY;  Service: General   • ROTATOR CUFF REPAIR Right        PT ASSESSMENT (last 12 hours)     IRF PT Evaluation and Treatment     Row Name 22 0945          PT Time and Intention    Document Type initial evaluation  -     Mode of Treatment physical therapy  -     Patient/Family/Caregiver Comments/Observations Pt up in room in w/c.  Anxious to begin PT.  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  Has walk and works out in gym.  -     Row Name 22 0973          General Information    Patient Profile Reviewed yes  -      General Observations of Patient Pt pleasant and agreeable.  Hoping to return home without an AD.  -     Existing Precautions/Restrictions fall  -     Row Name 07/03/22 0945          Previous Level of Function/Home Environm    Bed Mobility, Premorbid Functional Level independent  -     Transfers, Premorbid Functional Level independent  -     Household Ambulation, Premorbid Functional Level independent  -     Stairs, Premorbid Functional Level independent  -     Community Ambulation, Premorbid Functional Level independent  -     Activity/Exercise/Self-Care Comment Active lifestyle.  Retired, good family support  -     Row Name 07/03/22 0945          Living Environment    Current Living Arrangements home  -     Home Accessibility stairs to enter home  Has other levels in home but B/B on main level.  -     People in Home spouse  -     Primary Care Provided by self  -     Row Name 07/03/22 0945          Home Main Entrance    Number of Stairs, Main Entrance two  stoop and then second step into home.  -     Stair Railings, Main Entrance none  -     Row Name 07/03/22 0945          Home Use of Assistive/Adaptive Equipment    Equipment Currently Used at Home none  -     Row Name 07/03/22 0945          Pain Assessment    Pretreatment Pain Rating 0/10 - no pain  -     Posttreatment Pain Rating 0/10 - no pain  -     Row Name 07/03/22 0945          Cognition/Psychosocial    Affect/Mental Status (Cognition) WNL  -     Orientation Status (Cognition) oriented x 4  -     Follows Commands (Cognition) follows two-step commands;over 90% accuracy  -     Personal Safety Interventions fall prevention program maintained;gait belt;nonskid shoes/slippers when out of bed;supervised activity  -     Cognitive Function WFL  -     Comment, Cognition Pt noted that she does have some memory concerns.  -     Row Name 07/03/22 0945          Range of Motion Comprehensive    General Range of Motion  bilateral lower extremity ROM WNL  -     Row Name 07/03/22 0945          Strength (Manual Muscle Testing)    Strength (Manual Muscle Testing) left lower extremity;strength is WNL;right lower extremity strength  -     Right Lower Extremity Strength hip;knee;ankle  -     Hip, Right (Strength) flex - 3, ext - 3-, abd - 3-, ext - 3-  -     Knee, Right (Strength) Ext - 4, Flex - 4-  -     Ankle, Right (Strength) DF - 4, PF - 4+, Inv/Ev - 4-  -     Row Name 07/03/22 0945          Vision Assessment/Intervention    Visual Impairment/Limitations WFL  -[facility] Name 07/03/22 0945          Sensory Assessment (Somatosensory)    Sensory Assessment (Somatosensory) LE sensation intact  -[facility] Name 07/03/22 0945          Bed Mobility    Bed Mobility bed mobility (all) activities  -     All Activities, [place] (Bed Mobility) supervision;modified independence  -[facility] Name 07/03/22 0945          Transfer Assessment/Treatment    Transfers bed-chair transfer;chair-bed transfer;sit-stand transfer;stand-sit transfer;car transfer  -[facility] Name 07/03/22 0945          Transfers    Sit-Stand [place] (Transfers) minimum assist (75% patient effort);contact guard;verbal cues  -     Stand-Sit [place] (Transfers) contact guard;verbal cues  -[facility] Name 07/03/22 0945          Sit-Stand Transfer    Assistive Device (Sit-Stand Transfers) walker, front-wheeled  -     Comment, (Sit-Stand Transfer) VC for hand placement, forward WS.  Second stand was CG,  -[facility] Name 07/03/22 0945          Stand-Sit Transfer    Assistive Device (Stand-Sit Transfers) walker, front-wheeled  -[facility] Name 07/03/22 0945          Gait/Stairs (Locomotion)    [place] Level (Gait) minimum assist (75% patient effort);verbal cues  -     Assistive Device (Gait) walker, front-wheeled  HHA L  -     Distance in Feet (Gait) 120(rwx), 75( HHA L)  -KP     Pattern (Gait) step-through  -KP     Deviations/Abnormal  Patterns (Gait) base of support, narrow;gait speed decreased;stride length decreased  -     Left Sided Gait Deviations leans left  -     Right Sided Gait Deviations heel strike decreased;decreased knee extension;Trendelenburg sign  -     Gait Assessment/Intervention Without VC, gait was nilo by shorter steps, flexed posture, dec HS, dec push off.  Pt able to improve deviations with VC.  Gait distance is limited by fatigue in R LE which is visibly evident by dec control in R LE  -     Row Name 07/03/22 0945          Safety Issues, Functional Mobility    Safety Issues Affecting Function (Mobility) judgment;safety precautions follow-through/compliance  -     Impairments Affecting Function (Mobility) balance;endurance/activity tolerance;coordination;strength;motor control  -     Row Name 07/03/22 0945          Balance    Static Sitting Balance supervision;set-up  -     Dynamic Sitting Balance supervision;set-up  -     Position, Sitting Balance sitting edge of bed  -     Static Standing Balance minimal assist;contact guard;verbal cues  -     Dynamic Standing Balance minimal assist;verbal cues  -     Position/Device Used, Standing Balance walker, front-wheeled  -     Row Name 07/03/22 0945          Motor Skills    Therapeutic Exercise --  Reviewed basic HEP for supine to do indep - SLR, HS, ABD, bridging, IE/ER  -     Row Name 07/03/22 0945          Positioning and Restraints    Pre-Treatment Position sitting in chair/recliner  -     Post Treatment Position bed  -     In Bed supine;call light within reach;encouraged to call for assist;exit alarm on  -     Row Name 07/03/22 0945          Therapy Assessment/Plan (PT)    Patient's Goals For Discharge return to all previous roles/activities  -     Family Goals For Discharge patient able to return to all previous activities/roles  -     Row Name 07/03/22 0945          Therapy Assessment/Plan (PT)    Functional Level at Time of Evaluation  (PT) Min  -KP     PT Diagnosis (PT) Impaired functional mobility.  -     Rehab Potential/Prognosis (PT) good, to achieve stated therapy goals  -     Frequency of Treatment (PT) 60 minutes per session;5 times per week  -     Estimated Duration of Therapy (PT) 2 weeks  -     Problem List (PT) balance;hemiparesis/hemiplegia;motor control;strength  -     Activity Limitations Related to Problem List (PT) unable to ambulate safely;unable to transfer safely  -     Comment, Therapy Assessment/Plan (PT) Pt is a 72 yo female who present to PT for evaluation following a stroke with R hemiparesis.  Pt demonstrates dec balance, dec strength R LE, dec functional endurance R LE, debility from medical status change, and dec knowledge of condidtion management.  Pt status is evolving as she is reliant on staff to assist with transfers and mobility.  Pt has comorbidities of suspected Mortons Neuroma , HTN, DM, and chronic back pain which may impact pt POC.  Pt would benefit from skilled PT to addressimpairments and facilitate return to home with support of spouse.  -     Row Name 07/03/22 0945          IRF PT Goals    Bed Mobility Goal Selection (PT-IRF) bed mobility, PT goal 1  -     Transfer Goal Selection (PT-IRF) transfers, PT goal 1  -     Gait (Walking Locomotion) Goal Selection (PT-IRF) gait, PT goal 1  -     Stairs Goal Selection (PT-IRF) stairs, PT goal 1  -     Strength Goal Selection (PT-IRF) strength, PT goal 1 (free text)  -     Caregiver Training Goal Selection (PT-IRF) caregiver training, PT goal 1  -     Row Name 07/03/22 0945          Bed Mobility Goal 1 (PT-IRF)    Activity/Assistive Device (Bed Mobility Goal 1, PT-IRF) bed mobility activities, all  -     [place] Level (Bed Mobility Goal 1, PT-IRF) independent  -     Time Frame (Bed Mobility Goal 1, PT-IRF) 1 week;short-term goal (STG)  -     Progress/Outcomes (Bed Mobility Goal 1, PT-IRF) goal ongoing  -     Row Name 07/03/22  0945          Transfer Goal 1 (PT-IRF)    Activity/Assistive Device (Transfer Goal 1, PT-IRF) all transfers;cane, straight  or least resitrictive AD  -KP     [place] Level (Transfer Goal 1, PT-IRF) supervision required  -KP     Time Frame (Transfer Goal 1, PT-IRF) 2 weeks;long-term goal (LTG)  -KP     Progress/Outcomes (Transfer Goal 1, PT-IRF) goal ongoing  -KP     Row Name 07/03/22 0945          Gait/Walking Locomotion Goal 1 (PT-IRF)    Activity/Assistive Device (Gait/Walking Locomotion Goal 1, PT-IRF) gait (walking locomotion)  least resitricitive AD  -KP     Gait/Walking Locomotion Distance Goal 1 (PT-IRF) 250  -KP     [place] Level (Gait/Walking Locomotion Goal 1, PT-IRF) supervision required  -KP     Time Frame (Gait/Walking Locomotion Goal 1, PT-IRF) 2 weeks;long-term goal (LTG)  -KP     Progress/Outcomes (Gait/Walking Locomotion Goal 1, PT-IRF) goal ongoing  -KP     Row Name 07/03/22 0945          Stairs Goal 1 (PT-IRF)    Activity/Assistive Device (Stairs Goal 1, PT-IRF) stairs, all skills  least restrictive AD  -KP     Number of Stairs (Stairs Goal 1, PT-IRF) 2  platform  -KP     [place] Level (Stairs Goal 1, PT-IRF) standby assist  -KP     Time Frame (Stairs Goal 1, PT-IRF) long-term goal (LTG)  -KP     Row Name 07/03/22 0945          Strength Goal 1 (PT-IRF)    Strength Goal 1 (PT-IRF) Improve R LE stength by 1/2 grade to allow for imprved functional mobility and safety  -KP     Time Frame (Strength Goal 1, PT-IRF) long-term goal (LTG);2 weeks  -KP     Progress/Outcomes (Strength Goal 1, PT-IRF) goal ongoing  -KP           User Key  (r) = Recorded By, (t) = Taken By, (c) = Cosigned By    Initials Name Provider Type    Tara Mckeon, PT Physical Therapist                 Physical Therapy Education                 Title: PT OT SLP Therapies (Done)     Topic: Physical Therapy (Done)     Point: Mobility training (Done)     Learning Progress Summary           Patient ASHLEY Carson,THEA,D, GENE,KATHLEEN  by  at 7/3/2022 1108    Comment: POC, goals.                   Point: Home exercise program (Done)     Learning Progress Summary           Patient Eager, E,TB,D, VU,DU by  at 7/3/2022 1108    Comment: POC, goals.                               User Key     Initials Effective Dates Name Provider Type Discipline     06/16/21 -  Tara Sanders PT Physical Therapist PT                PT Recommendation and Plan    Planned Therapy Interventions (PT): balance training, gait training, home exercise program, neuromuscular re-education, patient/family education, stair training, strengthening, transfer training  Frequency of Treatment (PT): 60 minutes per session, 5 times per week                     Time Calculation:      PT Charges     Row Name 07/03/22 1109             Time Calculation    Start Time 0930  -      Stop Time 1010  -      Time Calculation (min) 40 min  -      PT Received On 07/03/22  -      PT - Next Appointment 07/04/22  -      PT Goal Re-Cert Due Date 07/08/22  -            User Key  (r) = Recorded By, (t) = Taken By, (c) = Cosigned By    Initials Name Provider Type     Tara Sanders PT Physical Therapist                Therapy Charges for Today     Code Description Service Date Service Provider Modifiers Qty    [id number] HC PT EVAL MOD COMPLEXITY 3 7/3/2022 Tara Sanders, PT GP 1              Patient was intermittently wearing a face mask during this therapy encounter. Therapist used appropriate personal protective equipment including mask and gloves.  Mask used was standard procedure mask. Appropriate PPE was worn during the entire therapy session. Hand hygiene was completed before and after therapy session. Patient is not in enhanced droplet precautions.         Tara Sanders PT  7/3/2022

## 2022-07-03 NOTE — PROGRESS NOTES
Inpatient Rehabilitation Plan of Care Note    Plan of Care  Care Plan Reviewed - Updates as Follows    Body Systems    [RN] Endocrine(Active)  Current Status(07/02/2022): Blood glucose uncontrolled  Weekly Goal(07/07/2022): Blood glucose WNL  Discharge Goal: Blood glucose controlled        Psychosocial    [RN] Coping/Adjustment(Active)  Current Status(07/02/2022): Pt A&Ox4, calm and looking forward to starting  rehab. Supportive family.  Weekly Goal(07/07/2022): Pt will demonstrate positive coping mechanism  Discharge Goal: Coping with current health status        Safety    [RN] Potential for Injury(Active)  Current Status(07/02/2022): Risk for fall r/t recent stroke with R hemiparesis  and unsteady gait  Weekly Goal(07/07/2022): Call light will be used for assistance  Discharge Goal: No falls/injuries        Sphincter Control    [RN] Bladder Management(Active)  Current Status(07/02/2022): Continent 100%  Weekly Goal(07/07/2022): Continent 100%  Discharge Goal: Continent 100%    [RN] Bowel Management(Active)  Current Status(07/02/2022): Continent 100%  Weekly Goal(07/07/2022): Continent 100%  Discharge Goal: Continent 100%    Signed by: Chelsy Brody RN

## 2022-07-03 NOTE — H&P
Admitting H&P.  (Dr. Colmenares )    Chief complaint immobilization syndrome secondary to new onset right-sided weakness secondary to CVA.    History of present illness.  Ms. Nicholson is a 71-year-old  white female [facility]  Patient had been camping and awakened on the morning of 629 with right-sided weakness and numbness.  Work-up at the acute hospital review to a lacunar stroke and left corona radiata region.    Laboratory studies in the acute setting includes a CBC with a white count of 8.96 hemoglobin of 10.7 hematocrit of 32.3 platelet count of 259,000.  Iron studies showed an iron of 58 iron saturation of 14.  Chemistry study shows glucose of 136 BUN of 15 creatinine 1.02 sodium 140 potassium 4.2 chloride of 106 CO2 of 24.    Ridging studies on on 630 patient underwent echocardiogram showed an ejection fraction of 59.2%.  Right to left atrial shunt suggestive is of small PFO.    Patient did participate in therapies on the acute care setting as of July 1 Meaux bed mobility with standby assistance transfers were contact-guard to min assistance with a rolling walker she was ambulating contact-guard with min assistance using a rolling walker 80 feet with right knee buckling.  In occupational therapy grooming with standby assistance with set up.    Past medical history. + allergy to PCN.  Patient's past history includes chronic back pain type 2 diabetes mellitus hyperlipidemia hypertension.  Family history.  Family history is pertinent for CVAs diabetes mellitus colon polyps and colon cancer.    Review of systems patient denies diplopia she does have frontal headache hearing acuity is good her vision acuity is good she denies any dysphagia she has no Esteban no wheezes cough shortness of breath she denies any cardiac symptoms including palpitations or chest pain.  Her weight is stable and she does have occasional constipation she  is continent of urine but with some urgency she has no open sores or rashes her sleep quality has been good.    Social history.  Patient lives in a two-story home locally with her healthy available  all necessary living quarters on the first level she has 1 step to access.  Patient was independent with mobility self-care prior to onset of neurologic symptoms.  She does not smoke she does drink alcohol socially.    Physical exam.  HEENT is unremarkable there is no gross facial symmetry neck is benign no JVD appreciated no bruits are auscultated.  Lungs are clear to auscultation.  Heart is regular rate and rhythm without murmur gallop.  Abdomen is soft nontender nondistended bowel sounds are apparent throughout.  Neuromusculoskeletal.  Patient is able to move all 4 extremities she has some weakness of the right upper extremity compared to left rated approximately 4 out of 5 throughout light touch sensation is somewhat blunted.  Right lower extremity shows good antigravity hip flexion knee extension dorsi and plantar flexion light touch sensation is intact the nerves no peripheral edema.    Problem list.    1.  Right-sided weakness secondary to CVA resolving.    2.  Type 2 diabetes mellitus.    Chronic back pain.    Hyperlipidemia.    Hypertension.    Plan.  Patient is admitted this time to a comprehensive acute level rehab program where she will receive at least 3 hours of therapy 5 days a week initially consisting of traditional PT and OT services.  Patient will also have her multiple medical issues as outlined above closely monitored and medication changes made as warranted.  Be assessed for and provided with necessary adaptive equipment for returning home.  She will be discussed in a team type setting once weekly were obstacles are identified and plans made for discharge home.  Her  and any other identified caregivers will be invited to participate in caregiver training.  Long-term therapy program  will also be established for her consisting of either outpatient therapies or home health agency provided services.  Would expect length of stay of approximately 2 weeks.  She is currently medically stable and ready to begin his rehabilitation effort.  In the time that she is an inpatient on this program I think she will attain independence to modified independence with all mobility and self-care which is consistent with the care that is available to her at home.

## 2022-07-03 NOTE — PROGRESS NOTES
Case Management  Inpatient Rehabilitation Plan of Care and Discharge Plan Note    Rehabilitation Diagnosis:  Stroke, Right Joss  Date of Onset:  06/29/2022    Medical Summary:  History of present illness.  Ms. Nicholson is a 71-year-old   white female admitted here in transfer from [facility] where she presented on 62/29.  Patient had been camping and awakened on  the morning of 629 with right-sided weakness and numbness.  Work-up at the acute  hospital review to a lacunar stroke and left corona radiata region.  ?  Laboratory studies in the acute setting includes a CBC with a white count of  8.96 hemoglobin of 10.7 hematocrit of 32.3 platelet count of 259,000.  Iron  studies showed an iron of 58 iron saturation of 14.  Chemistry study shows  glucose of 136 BUN of 15 creatinine 1.02 sodium 140 potassium 4.2 chloride of  106 CO2 of 24.  ?  Ridging studies on on 630 patient underwent echocardiogram showed an ejection  fraction of 59.2%.  Right to left atrial shunt suggestive is of small PFO.  ?  Patient did participate in therapies on the acute care setting as of July 1  Meaux bed mobility with standby assistance transfers were contact-guard to min  assistance with a rolling walker she was ambulating contact-guard with min  assistance using a rolling walker 80 feet with right knee buckling.  In  occupational therapy grooming with standby assistance with set up.  Past Medical History: Past medical history. + allergy to PCN.  Patient's past  history includes chronic back pain type 2 diabetes mellitus hyperlipidemia  hypertension.  Family history.  Family history is pertinent for CVAs diabetes  mellitus colon polyps and colon cancer.    Plan of Care  Updated Problems/Interventions  Field    Expected Intensity:  Average of 3 hours of therapy 5 days/week.  Interdisciplinary Team:  Interdisciplinary Team: Medical Supervision and 24 Hour Rehabilitation Nursing.,  Physical Therapy:, Occupational  Left message that I can be reached at [phone] until 5:00pm.   Therapy:, Speech and Language Therapy:, Social  Work, Therapeutic Recreation., Psychology., Registered Dietitian.  Physical Therapy Intensity/Duration: 1 hour / day, 5 days / week, for  approximately 2 weeks.  Occupational Therapy Intensity/Duration: 1 hour / day, 5 days / week, for  approximately 2 weeks.  Speech Language Pathology  Intensity/Duration: 1 hour / day, 5 days / week, for  approximately 2 weeks.  Estimated Length of Stay/Anticipated Discharge Date: 2 weeks  Anticipated Discharge Destination:  Anticipated discharge destination from inpatient rehabilitation is community  discharge with assistance. Return home with 24/7 assist of spouse and  intermittent assistance of daughter.      Based on the patient's medical and functional status, their prognosis and  expected level of functional improvement is:  Goals are to achieve a level of  Mod I with mobility and ADL's.  Rehab prognosis fair.  Medical prognosis fair.    Signed by: Renee Ribera RN

## 2022-07-04 LAB
GLUCOSE BLDC GLUCOMTR-MCNC: 119 MG/DL (ref 70–130)
GLUCOSE BLDC GLUCOMTR-MCNC: 122 MG/DL (ref 70–130)
GLUCOSE BLDC GLUCOMTR-MCNC: 221 MG/DL (ref 70–130)
GLUCOSE BLDC GLUCOMTR-MCNC: 255 MG/DL (ref 70–130)

## 2022-07-04 PROCEDURE — 63710000001 INSULIN LISPRO (HUMAN) PER 5 UNITS: Performed by: STUDENT IN AN ORGANIZED HEALTH CARE EDUCATION/TRAINING PROGRAM

## 2022-07-04 PROCEDURE — 96125 COGNITIVE TEST BY HC PRO: CPT

## 2022-07-04 PROCEDURE — 82962 GLUCOSE BLOOD TEST: CPT

## 2022-07-04 PROCEDURE — 97110 THERAPEUTIC EXERCISES: CPT

## 2022-07-04 PROCEDURE — 97530 THERAPEUTIC ACTIVITIES: CPT

## 2022-07-04 PROCEDURE — 97535 SELF CARE MNGMENT TRAINING: CPT

## 2022-07-04 PROCEDURE — 97166 OT EVAL MOD COMPLEX 45 MIN: CPT

## 2022-07-04 RX ORDER — POLYETHYLENE GLYCOL 3350 17 G/17G
17 POWDER, FOR SOLUTION ORAL DAILY
Status: DISCONTINUED | OUTPATIENT
Start: 2022-07-04 | End: 2022-07-13 | Stop reason: HOSPADM

## 2022-07-04 RX ORDER — SODIUM CHLORIDE 9 MG/ML
75 INJECTION, SOLUTION INTRAVENOUS CONTINUOUS
Status: DISCONTINUED | OUTPATIENT
Start: 2022-07-04 | End: 2022-07-04

## 2022-07-04 RX ORDER — PANTOPRAZOLE SODIUM 40 MG/1
40 TABLET, DELAYED RELEASE ORAL
Status: DISCONTINUED | OUTPATIENT
Start: 2022-07-04 | End: 2022-07-13 | Stop reason: HOSPADM

## 2022-07-04 RX ADMIN — ANTACID TABLETS 2 TABLET: 500 TABLET, CHEWABLE ORAL at 14:19

## 2022-07-04 RX ADMIN — RAMIPRIL 10 MG: 10 CAPSULE ORAL at 08:21

## 2022-07-04 RX ADMIN — INSULIN GLARGINE-YFGN 10 UNITS: 100 INJECTION, SOLUTION SUBCUTANEOUS at 08:20

## 2022-07-04 RX ADMIN — Medication 1000 MCG: at 08:21

## 2022-07-04 RX ADMIN — CLOPIDOGREL 75 MG: 75 TABLET, FILM COATED ORAL at 08:20

## 2022-07-04 RX ADMIN — ATORVASTATIN CALCIUM 80 MG: 80 TABLET, FILM COATED ORAL at 19:58

## 2022-07-04 RX ADMIN — PANTOPRAZOLE SODIUM 40 MG: 40 TABLET, DELAYED RELEASE ORAL at 12:33

## 2022-07-04 RX ADMIN — ASPIRIN 81 MG: 81 TABLET, COATED ORAL at 08:20

## 2022-07-04 RX ADMIN — HYDROCHLOROTHIAZIDE 12.5 MG: 25 TABLET ORAL at 08:20

## 2022-07-04 RX ADMIN — INSULIN LISPRO 4 UNITS: 100 INJECTION, SOLUTION INTRAVENOUS; SUBCUTANEOUS at 11:24

## 2022-07-04 RX ADMIN — POLYETHYLENE GLYCOL 3350 17 G: 17 POWDER, FOR SOLUTION ORAL at 10:21

## 2022-07-04 NOTE — PROGRESS NOTES
Inpatient Rehabilitation Plan of Care Note    Plan of Care  Care Plan Reviewed - No updates at this time.    Psychosocial    Performed Intervention(s)  Offer support  Encourage to express any concerns      Sphincter Control    Performed Intervention(s)  Monitor I&O  Encourage fluids      Safety    Performed Intervention(s)  Falls/safety precaution  Bed/Chair alarms  Call light/personal items within easy reach      Body Systems    Performed Intervention(s)  Monitor labs; hypo/hyperglycemia    Signed by: Catalina Dao RN

## 2022-07-04 NOTE — PROGRESS NOTES
"Section B. Hearing, Speech, Vision: Expression of Ideas and Wants: Expresses  complex messages without difficulty and with speech that is clear and easy to  understand.  Understanding Verbal and Non-Verbal Content: Understands: Clear comprehension  without cues or repetitions.    Section C. Cognitive Patterns: Brief Interview for Mental Status (BIMS) was  conducted.  Repetition of Three Words: Three words  Able to report correct year: Correct  Able to report correct month: Accurate within 5 days  Able to report correct day of the week: Correct  Able to recall \"sock\": Yes, no cue required  Able to recall \"blue\": Yes, no cue required  Able to recall \"bed\": No, could not recall    BIMS SUMMARY SCORE: 13 Cognitively intact Patient was able to complete the Brief  Interview for Mental Status    Signed by: Silva Callaway, SLP    "

## 2022-07-04 NOTE — PROGRESS NOTES
Inpatient Rehabilitation Functional Measures Assessment and Plan of Care    Plan of Care  Updated Problems/Interventions  Mobility    [OT] Toilet Transfers(Active)  Current Status(07/04/2022): MIN  Weekly Goal(07/11/2022): CGA  Discharge Goal: SUpervision    [OT] Tub/Shower Transfers(Active)  Current Status(07/04/2022): MIN  Weekly Goal(07/11/2022): CGA  Discharge Goal: Supervision/MOD I        Self Care    [OT] Bathing(Active)  Current Status(07/04/2022): MIN  Weekly Goal(07/11/2022): CGA  Discharge Goal: Supervision/MOD I    [OT] Dressing (Lower)(Active)  Current Status(07/04/2022): MIN  Weekly Goal(07/11/2022): CGA  Discharge Goal: Indep    [OT] Dressing (Upper)(Active)  Current Status(07/04/2022): CGA  Weekly Goal(07/11/2022): set up  Discharge Goal: Indep    [OT] Grooming(Active)  Current Status(07/04/2022): CGA  Weekly Goal(07/11/2022): Set up  Discharge Goal: Indep    [OT] Toileting(Active)  Current Status(07/04/2022): MIN  Weekly Goal(07/11/2022): CGA  Discharge Goal: SUp/MOD I    Functional Measures  LELA Eating:  Branch  LELA Grooming: Branch  LELA Bathing:  Branch  LELA Upper Body Dressing:  Branch  LELA Lower Body Dressing:  Branch  LELA Toileting:  Branch    LELA Bladder Management  Level of Assistance:  Branch  Frequency/Number of Accidents this Shift:  Branch    LELA Bowel Management  Level of Assistance: Branch  Frequency/Number of Accidents this Shift: Branch    LELA Bed/Chair/Wheelchair Transfer:  Branch  LELA Toilet Transfer:  Branch  LELA Tub/Shower Transfer:  Branch    Previously Documented Mode of Locomotion at Discharge: Field  LELA Expected Mode of Locomotion at Discharge: Branch  LELA Walk/Wheelchair:  Branch  LELA Stairs:  Branch    LELA Comprehension:  Branch  LELA Expression:  Branch  LELA Social Interaction:  Branch  LELA Problem Solving:  Branch  LELA Memory:  Branch    Therapy Mode Minutes  Occupational Therapy: Branch  Physical Therapy: Branch  Speech Language Pathology:  Branch    Signed by: Dolores  Lei, OT

## 2022-07-04 NOTE — PROGRESS NOTES
SECTION GG    Self Care Performance:   Oral Hygiene: [place] sets up or cleans up; patient completes activity. [place]  assists only prior to or following the activity.   Toileting Hygiene: [place] provides verbal cues and/or touching/steadying and/or  contact guard assistance as patient completes activity.   Shower/Bathe Self: [place] does less than half the effort. [place] lifts, holds  or supports trunk or limbs but provides less than half the effort.   Upper Body Dressing: [place] does less than half the effort. [place] lifts, holds  or supports trunk or limbs but provides less than half the effort.   Lower Body Dressing: [place] does less than half the effort. [place] lifts, holds  or supports trunk or limbs but provides less than half the effort.   Putting On/Taking Off Footwear: [place] does less than half the effort. [place]  lifts, holds or supports trunk or limbs but provides less than half the effort.    Self Care Discharge Goals:   Toileting Hygiene: Patient completed the activities by him/herself with no  assistance from a helper.    Mobility Toilet Transfer Performance: [place] does less than half the effort.  [place] lifts, holds or supports trunk or limbs but provides less than half the  effort.    Mobility Toilet Transfer Discharge Goal: Branch    Signed by: Dolores Odom, OT

## 2022-07-04 NOTE — PROGRESS NOTES
[facility]     Progress Note    Patient Name: Maryellen rCump  : 1950  MRN: [id number]  Primary Care Physician:  Juni Cortez MD  Date of admission: 2022    Subjective  Pt is awake and alert. Pt is pleased with her response to first day of therapy.   Subjective     Chief Complaint: same    History of Present Illness  Patient Reports     Review of Systems    Objective  exam unchanged. Calves soft and NT. Resp unlabored and regular  Objective     Vitals:   Temp:  [97 °F (36.1 °C)-98.1 °F (36.7 °C)] 98.1 °F (36.7 °C)  Heart Rate:  [63-75] 63  Resp:  [18] 18  BP: (125-147)/(71-87) 125/71    Physical Exam     Result Review    Result Review:  I have personally reviewed the results from the time of this admission to 2022 11:24 EDT and agree with these findings:  []  Laboratory list / accordion  []  Microbiology  []  Radiology  []  EKG/Telemetry   []  Cardiology/Vascular   []  Pathology  []  Old records  []  Other:  Most notable findings include:       Assessment & Plan Continue eval phase. Pt remains medically stable and well motivated.   Assessment / Plan     Brief Patient Summary:  Maryellen Crump is a 71 y.o. female who     Active Hospital Problems:  Active Hospital Problems    Diagnosis    • CVA (cerebral vascular accident) (HCC)      Plan:       DVT prophylaxis:  Mechanical DVT prophylaxis orders are present.    CODE STATUS:    Level Of Support Discussed With: Patient  Code Status (Patient has no pulse and is not breathing): CPR (Attempt to Resuscitate)  Medical Interventions (Patient has pulse or is breathing): Full    Disposition:  I expect patient to be discharged     Bryson Colmenares MD

## 2022-07-04 NOTE — THERAPY EVALUATION
Inpatient Rehabilitation - Speech Language Pathology Initial Evaluation    [facility]     Patient Name: Maryellen Crump  : 1950  MRN: [id number]    Today's Date: 2022                   Admit Date: 2022       Visit Dx:      ICD-10-CM ICD-9-CM   1. Impaired functional mobility, balance, gait, and endurance  Z74.09 V49.89       Patient Active Problem List   Diagnosis   • Family history of colon cancer   • Iron deficiency anemia   • Diarrhea of presumed infectious origin   • Type 2 diabetes mellitus with kidney complication, without long-term current use of insulin (HCC)   • Hypertension   • Hyperlipidemia   • Stroke-like symptoms   • Acute CVA (cerebrovascular accident) (HCC)   • Acute lacunar stroke (HCC)   • Vitamin B12 deficiency   • CVA (cerebral vascular accident) (HCC)       Past Medical History:   Diagnosis Date   • Diabetes mellitus (HCC)    • Hyperlipidemia    • Hypertension        Past Surgical History:   Procedure Laterality Date   • COLONOSCOPY N/A 2018    Procedure: COLONOSCOPY TO CECUM;  Surgeon: Josh Muñoz MD;  Location: [facility] ENDOSCOPY;  Service: General   • DILATION AND CURETTAGE, DIAGNOSTIC / THERAPEUTIC     • ENDOSCOPY N/A 2018    Procedure: ESOPHAGOGASTRODUODENOSCOPY WITH BIOPSIES;  Surgeon: Josh Muñoz MD;  Location: [facility] ENDOSCOPY;  Service: General   • ROTATOR CUFF REPAIR Right        SLP Recommendation and Plan                                     Outcome Evaluation: Cognitive-Linguistic Quick Test (CLQT) administered. Pt scored WNL for her age in the domains of attention, memory, executive functions, language and visuospatial skills. She exhibited mild difficulty with story retell, alternating attention and maze completion; however, scores were above the criterion cut-off for her age. No overt word finding difficulty observed; however, pt reports she has noticed occasional difficulty with word finding prior to the stroke. No dysarthria observed.  Recommend continued assessment of high-level cognitive-linguistic skills. (07/04/22 4159)          Patient was not wearing a face mask during this therapy encounter. Therapist used appropriate personal protective equipment including mask, eye protection and gloves.  Mask used was standard procedure mask. Appropriate PPE was worn during the entire therapy session. Hand hygiene was completed before and after therapy session. Patient is not in enhanced droplet precautions.               SLP EVALUATION (last 72 hours)     SLP SLC Evaluation     Row Name 07/04/22 9912       Communication Assessment/Intervention    Document Type evaluation  -AL    Patient/Family/Caregiver Comments/Observations Pt sitting upright in bed.  -AL    Patient Effort excellent  -AL            General Information    Patient Profile Reviewed yes  -AL    Pertinent History Of Current Problem Pt is a 71-year-old female admitted to [facility] inpatient rehab under diagnosis Stroke R Joss with onset date 6/29/22. MRI 6/29/22 showed foci of restricted diffusion within the left frontoparietal corona radiata and subcortical white matter. Pt report some difficulty with word finding; however, she reports feels it is age-related and she was experiencing it prior to stroke.  -AL    Precautions/Limitations, Vision WFL;for purposes of eval  -AL    Precautions/Limitations, Hearing WFL  -AL    Patient Level of Education High School. Retired. She worked as a  at the  level and assisting the visually impaired.  -AL    Prior Level of Function-Communication WFL  -AL    Plans/Goals Discussed with patient  -AL    Barriers to Rehab none identified  -AL    Patient's Goals for Discharge functional cognition;return to all previous roles/activities  -AL    Standardized Assessment Used CLQT  -AL            Pain Scale: Numbers Pre/Post-Treatment    Pretreatment Pain Rating 0/10 - no pain  -AL            Motor Speech Assessment/Intervention    Motor  Speech, Comment No overt dysarthria observed. Minimal breathy vocal quality noted; suspect baseline level.  -AL            Standardized Tests    Cognitive/Memory Tests CLQT: Cognitive Linguistic Quick Test  -AL            CLQT (The Cognitive Linguistic Quick Test)    Attention Domain Score 177  -AL    Attention Severity Rating 4: WNL  -AL    Memory Domain Score 148  -AL    Memory Severity Rating 4: WNL  -AL    Executive Function Domain Score 26  -AL    Executive Function Severity Rating 4: WNL  -AL    Language Domain Score 30  -AL    Language Severity Rating 4: WNL  -AL    Visuospatial Domain Score 80  -AL    Visuospatial Severity Rating 4: WNL  -AL    Clock Drawing Total Score 11  -AL    Clock Drawing Severity Rating WNL  -AL    Composite Severity Rating 4.0  -AL    Composite Severity Rating Range 4.0 - 3.5: WNL  -AL    CLQT Comments To date, pt exhibited cognitive-linguistic skills overall WNL for her age. She did exhibit mild difficulty with recall of a story, alternating attention for trail-making and planning for maze completion; however, scores are above the criterion cut-off scores for her age. Recommend further assessment of high level cognitive skills. Pt reports she handles finances and medicine management independently at home.  -AL            Communication Treatment Objective and Progress Goals (SLP)    Additional Goals Additional Goals (Group)  -AL            Additional Goals    Additional Goal Selection additional goal, SLP goal (free text)  -AL            Additional Goal (SLP)    Additional Goal, SLP Pt will participate in diagnostic treatment to further assess high-level cognitive skills in order to set appropriate treatment goals.  -AL    Time Frame (Additional Goal, SLP) by discharge  -AL          User Key  (r) = Recorded By, (t) = Taken By, (c) = Cosigned By    Initials Name Effective Dates    Silva Jeter MS CCC-SLP 06/16/21 -                    EDUCATION    The patient has been educated  in the following areas:       Cognitive Impairment.             SLP GOALS     Row Name 07/04/22 1330             Additional Goal (SLP)    Additional Goal, SLP Pt will participate in diagnostic treatment to further assess high-level cognitive skills in order to set appropriate treatment goals.  -AL      Time Frame (Additional Goal, SLP) by discharge  -AL            User Key  (r) = Recorded By, (t) = Taken By, (c) = Cosigned By    Initials Name Provider Type    Silva Jeter MS CCC-SLP Speech and Language Pathologist                            Time Calculation:        Time Calculation- SLP     Row Name 07/04/22 1509             Time Calculation- SLP    SLP Start Time 1330  -AL      SLP Stop Time 1405  -AL      SLP Time Calculation (min) 35 min  -AL      Total Timed Code Minutes- SLP 60 minute(s)  Scoring/documentation: [id number]  -AL      SLP Received On 07/04/22  -AL              Timed Charges    96125-Standardized cognitive performance testing 60  -AL              Total Minutes    Timed Charges Total Minutes 60  -AL       Total Minutes 60  -AL            User Key  (r) = Recorded By, (t) = Taken By, (c) = Cosigned By    Initials Name Provider Type    Silva Jeter MS CCC-SLP Speech and Language Pathologist                  Therapy Charges for Today     Code Description Service Date Service Provider Modifiers Qty    05783990493 HC ST STD COG PERF TEST PER HOUR 7/4/2022 Silva Callaway MS CCC-SLP GN 1                           MS MICAELA Rouse  7/4/2022

## 2022-07-04 NOTE — THERAPY TREATMENT NOTE
Inpatient Rehabilitation - Physical Therapy Treatment Note       [facility]     Patient Name: Maryellen Crump  : 1950  MRN: [id number]    Today's Date: 2022                    Admit Date: 2022      Visit Dx:     ICD-10-CM ICD-9-CM   1. Impaired functional mobility, balance, gait, and endurance  Z74.09 V49.89       Patient Active Problem List   Diagnosis   • Family history of colon cancer   • Iron deficiency anemia   • Diarrhea of presumed infectious origin   • Type 2 diabetes mellitus with kidney complication, without long-term current use of insulin (HCC)   • Hypertension   • Hyperlipidemia   • Stroke-like symptoms   • Acute CVA (cerebrovascular accident) (HCC)   • Acute lacunar stroke (HCC)   • Vitamin B12 deficiency   • CVA (cerebral vascular accident) (HCC)       Past Medical History:   Diagnosis Date   • Diabetes mellitus (HCC)    • Hyperlipidemia    • Hypertension        Past Surgical History:   Procedure Laterality Date   • COLONOSCOPY N/A 2018    Procedure: COLONOSCOPY TO CECUM;  Surgeon: Josh Muñoz MD;  Location: [facility] ENDOSCOPY;  Service: General   • DILATION AND CURETTAGE, DIAGNOSTIC / THERAPEUTIC     • ENDOSCOPY N/A 2018    Procedure: ESOPHAGOGASTRODUODENOSCOPY WITH BIOPSIES;  Surgeon: Josh Muñoz MD;  Location: [facility] ENDOSCOPY;  Service: General   • ROTATOR CUFF REPAIR Right        PT ASSESSMENT (last 12 hours)     IRF PT Evaluation and Treatment     Row Name 22          PT Time and Intention    Document Type progress note  -     Mode of Treatment physical therapy  -     Patient/Family/Caregiver Comments/Observations Pt up in room in recliner, dtr at bedside.  -     Row Name 22          General Information    General Observations of Patient Dtr present for rx,.  Pt motivated and pleasant.  -     Existing Precautions/Restrictions fall  -     Row Name 22          Pain Assessment    Pretreatment Pain Rating 0/10 - no pain   -     Posttreatment Pain Rating 0/10 - no pain  -     Row Name 07/04/22 0830          Cognition/Psychosocial    Affect/Mental Status (Cognition) WNL  -     Orientation Status (Cognition) oriented x 4  -     Follows Commands (Cognition) follows two-step commands;over 90% accuracy  -     Personal Safety Interventions fall prevention program maintained;gait belt;nonskid shoes/slippers when out of bed;supervised activity  -     Cognitive Function WFL  -     Row Name 07/04/22 0830          Mobility    Advanced Gait Activity curb negotiation  -     Additional Documentation Advanced Gait Activity (Row)  -     Row Name 07/04/22 0830          Transfer Assessment/Treatment    Transfers wheelchair transfer  -     Row Name 07/04/22 0830          Transfers    Sit-Stand [place] (Transfers) minimum assist (75% patient effort);contact guard;verbal cues  -     Stand-Sit [place] (Transfers) contact guard;verbal cues  -     Row Name 07/04/22 0830          Sit-Stand Transfer    Assistive Device (Sit-Stand Transfers) walker, front-wheeled  -     Comment, (Sit-Stand Transfer) VC for hand, position.  -     Row Name 07/04/22 0830          Stand-Sit Transfer    Assistive Device (Stand-Sit Transfers) walker, front-wheeled  -     Comment, (Stand-Sit Transfer) VC for hand  -KP     Row Name 07/04/22 0830          Wheelchair Transfer    Type (Wheelchair Transfer) sit-stand;stand-sit  -     [place] Level (Wheelchair Transfer) minimum assist (75% patient effort);verbal cues  -     Assistive Device (Wheelchair Transfer) --  B HHA  -     Row Name 07/04/22 0830          Car Transfer    Type (Car Transfer) sit-stand;stand-sit  -     [place] Level (Car Transfer) minimum assist (75% patient effort);verbal cues;set up  -     Assistive Device (Car Transfer) crutches, axillary  -     Row Name 07/04/22 0830          Gait/Stairs (Locomotion)    [place] Level (Gait) minimum assist (75%  patient effort);verbal cues  -KP     Assistive Device (Gait) walker, front-wheeled;cane, quad tip  HHA  -KP     Distance in Feet (Gait) 100 x 2(rwx), 80, 30(QTC), 50(HHA)  -KP     Pattern (Gait) step-through  -KP     Deviations/Abnormal Patterns (Gait) base of support, narrow;gait speed decreased;stride length decreased  -KP     Left Sided Gait Deviations leans left  -KP     Right Sided Gait Deviations heel strike decreased;decreased knee extension;Trendelenburg sign  mild trendelenberg, ADD in swing.  -     Gait Assessment/Intervention LE fatigue li miis gait.  Dec instability in R LE is noted in swing and stance when fatigued.  Pt struggled to sequence st cane.  DId better with rwx or HHA.  NOted to pt that she may need to stay on rwx until her R LE strength improves.  -     [place] Level (Stairs) minimum assist (75% patient effort);verbal cues  -     Handrail Location (Stairs) both sides  -     Number of Steps (Stairs) 4  -KP     Ascending Technique (Stairs) step-to-step  -KP     Descending Technique (Stairs) step-to-step  -KP     Stairs, Safety Issues balance decreased during turns  -KP     Stairs, Impairments strength decreased;impaired balance  -     Row Name 07/04/22 0830          Curb Negotiation (Mobility)    [place], Curb Negotiation minimal assist, 75% or more patient effort;verbal cues  -     Assistive Device (Curb Negotiation) walker, front-wheeled  -KP     Comment, Curb Negotiation (Mobility) VC for tech  -     Row Name 07/04/22 0830          Motor Skills    Therapeutic Exercise --  Pt given written HEP for supine ex in bed to do indep.  These ex were reviewed yesterday but written instr issued today.  -     Row Name 07/04/22 0830          Positioning and Restraints    Pre-Treatment Position sitting in chair/recliner  -KP     Post Treatment Position chair  -KP     In Chair sitting;call light within reach;encouraged to call for assist;notified nsg;with family/caregiver  -            User Key  (r) = Recorded By, (t) = Taken By, (c) = Cosigned By    Initials Name Provider Type     Tara Sanders PT Physical Therapist                 Physical Therapy Education                 Title: PT OT SLP Therapies (Done)     Topic: Physical Therapy (Done)     Point: Mobility training (Done)     Learning Progress Summary           Patient Eager, E,TB,H,D, VU,DU by  at 7/4/2022 1405    Comment: Written HEp for strength, need to remain on rwx for now.    Eager, E,TB,D, VU,DU by  at 7/3/2022 1108    Comment: POC, goals.                   Point: Home exercise program (Done)     Learning Progress Summary           Patient Eager, E,TB,H,D, VU,DU by  at 7/4/2022 1405    Comment: Written HEp for strength, need to remain on rwx for now.    Eager, E,TB,D, VU,DU by  at 7/3/2022 1108    Comment: POC, goals.                               User Key     Initials Effective Dates Name Provider Type [facility] 06/16/21 -  Tara Sanders PT Physical Therapist PT                PT Recommendation and Plan    Planned Therapy Interventions (PT): balance training, gait training, home exercise program, neuromuscular re-education, patient/family education, stair training, strengthening, transfer training  Frequency of Treatment (PT): 60 minutes per session, 5 times per week                     Time Calculation:      PT Charges     Row Name 07/04/22 1406             Time Calculation    Start Time 0830  -      Stop Time 0900  -      Time Calculation (min) 30 min  -      PT Received On 07/04/22  -      PT - Next Appointment 07/05/22  -            User Key  (r) = Recorded By, (t) = Taken By, (c) = Cosigned By    Initials Name Provider Type     Tara Sanders PT Physical Therapist                Therapy Charges for Today     Code Description Service Date Service Provider Modifiers Qty    91572926785  PT EVAL MOD COMPLEXITY 3 7/3/2022 Tara Sanders, PT GP 1    [id number]  PT THERAPEUTIC ACT  EA 15 MIN 7/4/2022 Tara Sanders, PT GP 1    [id number]  PT THER PROC EA 15 MIN 7/4/2022 Tara Sanders, PT GP 1                 Patient was intermittently wearing a face mask during this therapy encounter. Therapist used appropriate personal protective equipment including mask and gloves.  Mask used was standard procedure mask. Appropriate PPE was worn during the entire therapy session. Hand hygiene was completed before and after therapy session. Patient is not in enhanced droplet precautions.     Tara Sanders, PT  7/4/2022

## 2022-07-04 NOTE — PLAN OF CARE
Goal Outcome Evaluation:  Plan of Care Reviewed With: patient           Outcome Evaluation: Maryellen has been alert and oriented x4, continent of b/b, and takes medication with water. 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 She is assist x1 with wheelchair, right sided weakness, and accucheck AC/HS- insulin given at lunch.

## 2022-07-04 NOTE — PLAN OF CARE
Goal Outcome Evaluation:              Outcome Evaluation: Cognitive-Linguistic Quick Test (CLQT) administered. Pt scored WNL for her age in the domains of attention, memory, executive functions, language and visuospatial skills. She exhibited mild difficulty with story retell, alternating attention and maze completion; however, scores were above the criterion cut-off for her age. No overt word finding difficulty observed; however, pt reports she has noticed occasional difficulty with word finding prior to the stroke. No dysarthria observed. Recommend continued assessment of high-level cognitive-linguistic skills.

## 2022-07-04 NOTE — PLAN OF CARE
Goal Outcome Evaluation:  Plan of Care Reviewed With: patient        Progress: improving  Outcome Evaluation: Pt alert oriented, and pleasant.  No c/o pain.  NIH 2- r/t sensory and ataxia BOBO.  Cont of bladder, up to bathroom ass x1 w/wheelchair.  Pt c/o heartburn, tums ordered prn.  All meds whole w/thins.

## 2022-07-04 NOTE — PROGRESS NOTES
Inpatient Rehabilitation Functional Measures Assessment and Plan of Care    Plan of Care  Updated Problems/Interventions  Cognition    [ST] Memory(Active)  Current Status(07/04/2022): Pt scored WNL for her age on CLQT in domains of  attention, memory, executive functions, language and visuospatial skills;  however, she exhibited mild difficulty with story retell task, alternating  attention and maze completion.  Weekly Goal(07/12/2022): Complete additional assessment of high-level  cognitive-linguistic skills.  Discharge Goal: Pt will be able to complete household management tasks  independently.    Functional Measures  [facility] Eating:  Branch  [facility] Grooming: Branch  [facility] Bathing:  [facility] Upper Body Dressing:  [facility] Lower Body Dressing:  [facility] Toileting:  [facility] Bladder Management  Level of Assistance:  [place]  Frequency/Number of Accidents this Shift:  [facility] Bowel Management  Level of Assistance: [place]  Frequency/Number of Accidents this Shift: [facility] Bed/Chair/Wheelchair Transfer:  [facility] Toilet Transfer:  [facility] Tub/Shower Transfer:  [place]    Previously Documented Mode of Locomotion at Discharge: Field  LELA Expected Mode of Locomotion at Discharge: [facility] Walk/Wheelchair:  [facility] Stairs:  [facility] Comprehension:  [facility] Expression:  [facility] Social Interaction:  [facility] Problem Solving:  [facility] Memory:  [place]    Therapy Mode Minutes  Occupational Therapy: Branch  Physical Therapy: Branch  Speech Language Pathology:  Branch    Signed by: Silva Callaway, SLP

## 2022-07-04 NOTE — PROGRESS NOTES
Name: Maryellen Crump ADMIT: 2022   : 1950  PCP: Juni Cortez MD    MRN: [id number] LOS: 2 days   AGE/SEX: 71 y.o. female  [place]OM: [facility] [place]     Subjective   Subjective   Ms. Crump is a 71 y.o. female with a history of hypertension, hyperlipidemia and diabetes mellitus, who was recently diagnosed with acute lacunar stroke with right-sided weakness, and was transferred to acute rehab for rehabilitation.  Medicine was reconsulted for continuation of management of chronic medical conditions.  When seen in the room patient was eating a challenge.  Was complaining of reflux, stating that she is on omeprazole at home for reflux.  Feels like overall her weakness improving, states right lower extremity is better compared to right arm, but overall gaining strength.  No fevers no chills, no nausea no vomiting.    Review of Systems   as above  Objective   Objective   Vital Signs  Temp:  [97.7 °F (36.5 °C)-98.1 °F (36.7 °C)] 98.1 °F (36.7 °C)  Heart Rate:  [63-73] 63  Resp:  [18] 18  BP: (125-147)/(71-87) 125/71  SpO2:  [98 %] 98 %  on   ;   Device (Oxygen Therapy): room air  Body mass index is 20.02 kg/m².  Physical Exam    General: Alert,no acute distress  CV: Regular rate and rhythm, no murmurs rubs or gallops  Lungs: Clear to auscultation bilaterally, no crackles or wheezes  Abdomen: Soft, nontender, nondistended  Extremities: No significant peripheral edema , no cyanosis.  Right upper and lower extremity weakness.      Results Review     I reviewed the patient's new clinical results.  Results from last 7 days   Lab Units 22  0613 22  0454 22  1603   WBC 10*3/mm3 8.96 6.09 6.67 7.71   HEMOGLOBIN g/dL 10.7* 10.4* 10.6* 11.8*   PLATELETS 10*3/mm3 259 256 255 303     Results from last 7 days   Lab Units 22  0613 22  0454 22  0453 06/29/22  1603   SODIUM mmol/L 140 139 138 138   POTASSIUM mmol/L 4.2 4.0 3.8 4.1   CHLORIDE mmol/L 106 103 101 100   CO2 mmol/L  24.0 24.0 25.0 21.1*   BUN mg/dL 15 15 14 16   CREATININE mg/dL 1.02* 1.07* 1.11* 1.21*   GLUCOSE mg/dL 136* 142* 108* 113*   Estimated Creatinine Clearance: 40.4 mL/min (A) (by C-G formula based on SCr of 1.02 mg/dL (H)).  Results from last 7 days   Lab Units 06/30/22  0453 06/29/22  1603   ALBUMIN g/dL 3.60 4.10   BILIRUBIN mg/dL 0.5 0.6   ALK PHOS U/L 72 92   AST (SGOT) U/L 16 15   ALT (SGPT) U/L 11 13     Results from last 7 days   Lab Units 07/02/22  0613 07/01/22  0454 06/30/22  0453 06/29/22  1603   CALCIUM mg/dL 9.3 9.8 9.7 10.9*   ALBUMIN g/dL  --   --  3.60 4.10       COVID19   Date Value Ref Range Status   07/02/2022 Not Detected Not Detected - Ref. Range Final   06/29/2022 Not Detected Not Detected - Ref. Range Final     Glucose   Date/Time Value Ref Range Status   07/04/2022 1022 255 (H) 70 - 130 mg/dL Final     Comment:     Meter: [id number] : [id number] Tona Hewitt [facility]   07/04/2022 0631 119 70 - 130 mg/dL Final     Comment:     Meter: [id number] : [id number] [facility]   07/03/2022 2032 111 70 - 130 mg/dL Final     Comment:     Meter: [id number] : [id number] [facility]   07/03/2022 1614 178 (H) 70 - 130 mg/dL Final     Comment:     Meter: [id number] : [id number] [facility]   07/03/2022 1114 201 (H) 70 - 130 mg/dL Final     Comment:     Meter: [id number] : [id number] [facility]   07/03/2022 0631 131 (H) 70 - 130 mg/dL Final     Comment:     Meter: [id number] : [id number] Fran Mackey [facility]   07/02/2022 2059 139 (H) 70 - 130 mg/dL Final     Comment:     Meter: [id number] : [id number] Cara AMES       CT Angiogram Head, CT Angiogram Neck  Narrative: CT ANGIOGRAM NECK AND HEAD WITH CONTRAST     HISTORY: Right-sided weakness.     COMPARISON: None.     FINDINGS: The brain and ventricles are symmetrical. There is  age-appropriate atrophy. Mild-to-moderate vascular calcification is  noted. No focal area of decreased attenuation to suggest  acute  infarction is identified.     A CT angiogram of the neck and head was then performed. Multiplanar, as  well as 3-dimensional reconstructions were generated.     The great vessels are arranged in a classic configuration. There is 0%  stenosis involving the carotid bifurcations. Vascular calcification  involving the carotid siphons are noted without high-grade stenosis. The  proximal aspects of the anterior and middle cerebral arteries appear  unremarkable.     Both vertebral arteries were opacified. The left vertebral artery is  larger than that of the right. The basilar artery and the proximal  aspects of the posterior cerebral arteries appear unremarkable. A  standard postcontrast CT examination of the brain showed no evidence of  abnormal enhancement.     There is moderate-to-severe loss of disc height at C5-C6.     Impression: No evidence of acute infarction or of intracranial  hemorrhage. Further evaluation for infarction could be performed with  MRI examination of the brain. There is 0% stenosis involving the carotid  bifurcations. There is no evidence of proximal intracranial arterial  high-grade stenosis or of aneurysm.         Radiation dose reduction techniques were utilized, including automated  exposure control and exposure modulation based on body size.     This report was finalized on 6/30/2022 5:44 PM by Dr. Marek Schulte M.D.     Adult transthoracic echo complete  · Calculated left ventricular EF = 59.2% Estimated left ventricular EF was   in agreement with the calculated left ventricular EF. Left ventricular   systolic function is normal.  · Left ventricular diastolic function was normal.  · Saline test results are positive with valsalva manuever for right to   left atrial level shunt suggesting presence of a small PFO.  · No valvular masses noted on transthoracic echocardiogram.     MRI Thoracic Spine Without Contrast  Narrative: THORACIC SPINE MRI WITHOUT GADOLINIUM     HISTORY: Ataxia,  thoracic trauma; I63.9-Cerebral infarction, unspecified     COMPARISON:  None.     FINDINGS:  Multiplanar images of the thoracic spine obtained without  gadolinium.  Axial images obtained from T1-L1.     No acute fracture or subluxation of the thoracic spine is seen. Marrow  signal appears unremarkable. No cord signal abnormalities are seen.  Conus terminates at L1. There is some intervertebral disc space  narrowing noted at multiple levels. There is no evidence of canal  stenosis or neural foraminal narrowing at any level.     Impression: 1. As above.     This report was finalized on 6/30/2022 1:12 AM by Dr. Shaila Tilley M.D.       Scheduled Medications  aspirin, 81 mg, Oral, Daily  atorvastatin, 80 mg, Oral, Nightly  clopidogrel, 75 mg, Oral, Daily  ferrous sulfate, 325 mg, Oral, Every Other Day  hydroCHLOROthiazide, 12.5 mg, Oral, Daily  insulin glargine, 10 Units, Subcutaneous, Daily  insulin lispro, 0-7 Units, Subcutaneous, TID AC  pantoprazole, 40 mg, Oral, Q AM  polyethylene glycol, 17 g, Oral, Daily  ramipril, 10 mg, Oral, Daily  vitamin B-12, 1,000 mcg, Oral, Daily    Infusions   Diet  Diet Regular; Cardiac, Consistent Carbohydrate       Assessment/Plan     Active Hospital Problems    Diagnosis  POA   • CVA (cerebral vascular accident) (HCC) [I63.9]  Yes   • Vitamin B12 deficiency [E53.8]  Yes   • Acute lacunar stroke (HCC) [I63.81]  Yes   • Acute CVA (cerebrovascular accident) (HCC) [I63.9]  Yes   • Type 2 diabetes mellitus with kidney complication, without long-term current use of insulin (HCC) [E11.29]  Yes   • Hypertension [I10]  Yes   • Hyperlipidemia [E78.5]  Yes   • Iron deficiency anemia [D50.9]  Yes      Resolved Hospital Problems   No resolved problems to display.       Ms. Crump is a 71 y.o. female with a history of hypertension, hyperlipidemia and diabetes mellitus that presents to [facility] complaining of right arm and leg weakness.  MRI showed stroke in the left  subcortical region and corona radiata.    Acute lacunar stroke: Continue aspirin, Plavix, statin.  PT OT.  Follow-up with outpatient neurology after discharge.    Essential hypertension: Controlled on Ranflutin milligrams daily and HCTZ 12.5 mg daily.      DM type II: Hemoglobin A1c 6.9.  Continue sliding scale insulin, Lantus 10 units daily.  Holding home p.o. meds.  Resume at discharge.    Abnormal thyroid levels: TSH slightly elevated 4.6, free T4 normal.  Will need repeat levels in 6 weeks to monitor.    Anemia: Iron studies continue iron deficiency.  Continue p.o. iron.    Vitamin B12 deficiency: Vitamin B 12 low at 204, ordered one-time IV B12 followed by daily p.o. supplement.  Needs repeat labs in about 3 months to monitor response.  Metformin can cause vitamin B12 deficiency.  If vitamin B12 still low we will oral supplements metformin will need to be discontinued.    GERD: Start on pantoprazole 20 mg daily, resume home omeprazole at discharge.      · SCDs for DVT prophylaxis.  · Full code.          I wore protective equipment throughout this patient encounter including a face mask, gloves and protective eyewear.  Hand hygiene was performed before donning protective equipment and after removal when leaving the room.      Dictated utilizing Dragon dictation        Steve Ricci MD  [facility]  07/04/22  13:09 EDT

## 2022-07-04 NOTE — THERAPY EVALUATION
Inpatient Rehabilitation - Occupational Therapy Initial Evaluation    [facility]     Patient Name: Maryellen Crump  : 1950  MRN: [id number]    Today's Date: 2022                 Admit Date: 2022         ICD-10-CM ICD-9-CM   1. Impaired functional mobility, balance, gait, and endurance  Z74.09 V49.89       Patient Active Problem List   Diagnosis   • Family history of colon cancer   • Iron deficiency anemia   • Diarrhea of presumed infectious origin   • Type 2 diabetes mellitus with kidney complication, without long-term current use of insulin (HCC)   • Hypertension   • Hyperlipidemia   • Stroke-like symptoms   • Acute CVA (cerebrovascular accident) (HCC)   • Acute lacunar stroke (HCC)   • Vitamin B12 deficiency   • CVA (cerebral vascular accident) (HCC)       Past Medical History:   Diagnosis Date   • Diabetes mellitus (HCC)    • Hyperlipidemia    • Hypertension        Past Surgical History:   Procedure Laterality Date   • COLONOSCOPY N/A 2018    Procedure: COLONOSCOPY TO CECUM;  Surgeon: Josh Muñoz MD;  Location: [facility] ENDOSCOPY;  Service: General   • DILATION AND CURETTAGE, DIAGNOSTIC / THERAPEUTIC     • ENDOSCOPY N/A 2018    Procedure: ESOPHAGOGASTRODUODENOSCOPY WITH BIOPSIES;  Surgeon: Josh Muñoz MD;  Location: [facility] ENDOSCOPY;  Service: General   • ROTATOR CUFF REPAIR Right              IRF OT ASSESSMENT FLOWSHEET (last 12 hours)     IRF OT Evaluation and Treatment     Row Name 22 1505          OT Time and Intention    Document Type initial evaluation  -AF     Mode of Treatment occupational therapy  -AF     Patient Effort good  -AF     Row Name 22 1505          General Information    Patient/Family/Caregiver Comments/Observations pt sitting upin recliner chair agreeable to OT session  -AF     Existing Precautions/Restrictions fall  -AF     Row Name 22 1505          Pain Assessment    Pretreatment Pain Rating 0/10 - no pain  -AF     Posttreatment Pain  Rating 0/10 - no pain  -AF     Row Name 07/04/22 1505          Cognition/Psychosocial    Affect/Mental Status (Cognition) WNL  -AF     Orientation Status (Cognition) oriented x 4  -AF     Follows Commands (Cognition) follows two-step commands  -AF     Personal Safety Interventions fall prevention program maintained;gait belt;nonskid shoes/slippers when out of bed  -AF     Row Name 07/04/22 1505          Range of Motion Comprehensive    Comment, General Range of Motion RUE deficits present  -     Row Name 07/04/22 1505          Strength (Manual Muscle Testing)    Strength (Manual Muscle Testing) left upper extremity;strength is WNL;right upper extremity  -     Row Name 07/04/22 1505          Strength Comprehensive (MMT)    General Manual Muscle Testing (MMT) Assessment upper extremity strength deficits identified;hand strength deficits identified  -     Hand Strength Assessment hand strength (manual muscle testing);hand  strength  -[facility] Name 07/04/22 1505          Upper Extremity (Manual Muscle Testing)    Upper Extremity: Manual Muscle Testing (MMT) right shoulder strength deficit;right scapular strength deficit;right elbow/forearm strength deficit;right wrist strength deficit  -[facility] Name 07/04/22 1505          Vision Assessment/Intervention    Vision Assessment Comment per patient states WFL  -AF     [facility] Name 07/04/22 1505          Basic Activities of Daily Living (BADLs)    Basic Activities of Daily Living bathing;upper body dressing;lower body dressing;grooming;toileting  -[facility] Name 07/04/22 1505          Bathing    [place] Level (Bathing) bathing skills;upper body;lower body;minimum assist (75% patient effort);contact guard assist  -AF     Assistive Device (Bathing) grab bar/tub rail;hand held shower spray hose;tub bench  -AF     Position (Bathing) supported sitting;supported standing  -AF     Set-up Assistance (Bathing) obtain supplies  -AF     Row Name 07/04/22 1505           Upper Body Dressing    [place] Level (Upper Body Dressing) upper body dressing skills;contact guard  -AF     Position (Upper Body Dressing) supported sitting  -AF     Comment (Upper Body Dressing) wilma tech  -AF     Row Name 07/04/22 1505          Lower Body Dressing    [place] Level (Lower Body Dressing) don;doff;shoes/slippers;pants/bottoms;socks;underwear;minimum assist (75% patient effort);verbal cues  -AF     Position (Lower Body Dressing) supported sitting;supported standing  -AF     Set-up Assistance (Lower Body Dressing) obtain clothing  -AF     Comment (Lower Body Dressing) wilma tech  -AF     Row Name 07/04/22 1505          Grooming    [place] Level (Grooming) grooming skills;contact guard assist  -AF     Position (Grooming) supported sitting;sink side  -AF     Set-up Assistance (Grooming) obtain supplies  -AF     Row Name 07/04/22 1505          Bed Mobility    Sit-Supine [place] (Bed Mobility) standby assist  -AF     Assistive Device (Bed Mobility) bed rails;head of bed elevated  -AF     Row Name 07/04/22 1505          Functional Mobility    Functional Mobility- Comment walked to and from shower room and room with RWX CGA/MIN A  -AF     Row Name 07/04/22 1505          Transfer Assessment/Treatment    Transfers shower transfer;toilet transfer;stand-sit transfer;sit-stand transfer;chair-bed transfer;bed-chair transfer  -AF     Row Name 07/04/22 1505          Transfers    Chair-Bed [place] (Transfers) minimum assist (75% patient effort);verbal cues  -AF     Sit-Stand [place] (Transfers) minimum assist (75% patient effort)  -AF     Stand-Sit [place] (Transfers) minimum assist (75% patient effort)  -AF     [place] Level (Shower Transfer) minimum assist (75% patient effort)  -AF     Assistive Device (Shower Transfer) tub bench;walker, front-wheeled  -AF     Row Name 07/04/22 1505          Chair-Bed Transfer    Assistive Device (Chair-Bed Transfers) walker, front-wheeled   -AF     Row Name 07/04/22 1505          Sit-Stand Transfer    Assistive Device (Sit-Stand Transfers) walker, front-wheeled  -AF     Row Name 07/04/22 1505          Stand-Sit Transfer    Assistive Device (Stand-Sit Transfers) walker, front-wheeled  -AF     Row Name 07/04/22 1505          Shower Transfer    Type (Shower Transfer) stand pivot/stand step  -AF     Row Name 07/04/22 1505          Motor Skills    Motor Skills coordination;functional endurance;neuro-muscular function  -AF     Coordination 9 Hole Peg Test of Fine Motor Coordination Results  -AF     Functional Endurance fair with ADLs  -AF     Row Name 07/04/22 1505          Balance    Static Sitting Balance standby assist  -AF     Dynamic Sitting Balance standby assist  -AF     Static Standing Balance minimal assist;verbal cues  -AF     Row Name 07/04/22 1505          Positioning and Restraints    Pre-Treatment Position sitting in chair/recliner  -AF     Post Treatment Position bed  -AF     In Bed fowlers;call light within reach;encouraged to call for assist;exit alarm on  -AF     Row Name 07/04/22 1505          Therapy Assessment/Plan (OT)    OT Diagnosis Pt admitted to rehab with R sided weakness and numbness, pt demos decreased endurance, strength, coordination in RUE which limits her independence with ADLs at her PLOF. pt may benefit from skilled OT services prior to d/c home with  and outpatient OT services.  -AF     Rehab Potential/Prognosis (OT) good, to achieve stated therapy goals  -AF     Frequency of Treatment (OT) 5 times per week  -AF     Estimated Duration of Therapy (OT) 1 week;2 weeks  -AF     Planned Therapy Interventions (OT) BADL retraining;neuromuscular control/coordination retraining;occupation/activity based interventions;patient/caregiver education/training;ROM/therapeutic exercise;strengthening exercise;transfer/mobility retraining  -AF     Row Name 07/04/22 1505          IRF OT Goals    Transfer Goal Selection (OT-IRF)  transfers, OT goal 1;transfers, OT goal 2  -AF     Bathing Goal Selection (OT-IRF) bathing, OT goal 1;bathing, OT goal 2  -AF     UB Dressing Goal Selection (OT-IRF) UB dressing, OT goal 1;UB dressing, OT goal 2  -AF     LB Dressing Goal Selection (OT-IRF) LB dressing, OT goal 1;LB dressing, OT goal 2  -AF     Grooming Goal Selection (OT-IRF) grooming, OT goal 1  -AF     Toileting Goal Selection (OT-IRF) toileting, OT goal 1;toileting, OT goal 2  -AF     Balance Goal Selection (OT) balance, OT goal 1  -AF     Functional Mobility Goal Selection (OT) functional mobility, OT goal 1  -AF     Coordination Goal Selection (OT) coordination, OT goal 1  -AF     Caregiver Training Goal Selection (OT-IRF) caregiver training, OT goal 1  -AF     Row Name 07/04/22 1505          Transfer Goal 1 (OT-IRF)    Activity/Assistive Device (Transfer Goal 1, OT-IRF) toilet;walk-in shower;walker, rolling  -AF     [place] Level (Transfer Goal 1, OT-IRF) contact guard required  -AF     Time Frame (Transfer Goal 1, OT-IRF) short-term goal (STG)  -AF     Progress/Outcomes (Transfer Goal 1, OT-IRF) goal ongoing  -AF     Row Name 07/04/22 1505          Transfer Goal 2 (OT-IRF)    Activity/Assistive Device (Transfer Goal 2, OT-IRF) toilet;shower chair;walker, rolling  -AF     [place] Level (Transfer Goal 2, OT-IRF) modified independence;supervision required  -AF     Time Frame (Transfer Goal 2, OT-IRF) long-term goal (LTG)  -AF     Progress/Outcomes (Transfer Goal 2, OT-IRF) goal ongoing  -AF     Row Name 07/04/22 1505          Bathing Goal 1 (OT-IRF)    Activity/Device (Bathing Goal 1, OT-IRF) bathing skills, all;grab bar, tub/shower;hand-held shower spray hose;tub bench  -AF     [place] Level (Bathing Goal 1, OT-IRF) contact guard required  -AF     Time Frame (Bathing Goal 1, OT-IRF) short-term goal (STG)  -AF     Progress/Outcomes (Bathing Goal 1, OT-IRF) goal ongoing  -AF     Row Name 07/04/22 1505          Bathing Goal 2  (OT-IRF)    Activity/Device (Bathing Goal 2, OT-IRF) bathing skills, all;grab bar, tub/shower;hand-held shower spray hose;shower chair  -AF     [place] Level (Bathing Goal 2, OT-IRF) modified independence;supervision required  -AF     Time Frame (Bathing Goal 2, OT-IRF) long-term goal (LTG)  -AF     Progress/Outcomes (Bathing Goal 2, OT-IRF) goal ongoing  -AF     Row Name 07/04/22 1505          UB Dressing Goal 1 (OT-IRF)    Activity/Device (UB Dressing Goal 1, OT-IRF) upper body dressing  -AF     [place] (UB Dress Goal 1, OT-IRF) set-up required  -AF     Time Frame (UB Dressing Goal 1, OT-IRF) short-term goal (STG)  -AF     Progress/Outcomes (UB Dressing Goal 1, OT-IRF) goal ongoing  -AF     Row Name 07/04/22 1505          UB Dressing Goal 2 (OT-IRF)    Activity/Device (UB Dressing Goal 2, OT-IRF) upper body dressing  -AF     [place] (UB Dress Goal 2, OT-IRF) independent  -AF     Time Frame (UB Dressing Goal 2, OT-IRF) long-term goal (LTG)  -AF     Progress/Outcomes (UB Dressing Goal 2, OT-IRF) goal ongoing  -AF     Row Name 07/04/22 1505          LB Dressing Goal 1 (OT-IRF)    Activity/Device (LB Dressing Goal 1, OT-IRF) lower body dressing  -AF     [place] (LB Dressing Goal 1, OT-IRF) contact guard required  -AF     Time Frame (LB Dressing Goal 1, OT-IRF) short-term goal (STG)  -AF     Progress/Outcomes (LB Dressing Goal 1, OT-IRF) goal ongoing  -AF     Row Name 07/04/22 1505          LB Dressing Goal 2 (OT-IRF)    Activity/Device (LB Dressing Goal 2, OT-IRF) lower body dressing  -AF     [place] (LB Dressing Goal 2, OT-IRF) modified independence;supervision required  -AF     Time Frame (LB Dressing Goal 2, OT-IRF) long-term goal (LTG)  -AF     Progress/Outcomes (LB Dressing Goal 2, OT-IRF) goal ongoing  -AF     Row Name 07/04/22 1505          Grooming Goal 1 (OT-IRF)    Activity/Device (Grooming Goal 1, OT-IRF) grooming skills, all  -AF     [place] (Grooming Goal 1, OT-IRF) modified  independence  -AF     Time Frame (Grooming Goal 1, OT-IRF) long-term goal (LTG)  -AF     Progress/Outcomes (Grooming Goal 1, OT-IRF) goal ongoing  -AF     Row Name 07/04/22 1505          Toileting Goal 1 (OT-IRF)    Activity/Device (Toileting Goal 1, OT-IRF) toileting skills, all;grab bar/safety frame;raised toilet seat  -AF     [place] Level (Toileting Goal 1, OT-IRF) contact guard required  -AF     Progress/Outcomes (Toileting Goal 1, OT-IRF) goal ongoing  -AF     Time Frame (Toileting Goal 1, OT-IRF) short-term goal (STG)  -AF     Row Name 07/04/22 1505          Toileting Goal 2 (OT-IRF)    Activity/Device (Toileting Goal 2, OT-IRF) toileting skills, all;grab bar/safety frame;raised toilet seat  -AF     [place] Level (Toileting Goal 2, OT-IRF) modified independence;supervision required  -AF     Progress/Outcomes (Toileting Goal 2, OT-IRF) goal ongoing  -AF     Time Frame (Toileting Goal 2, OT-IRF) long-term goal (LTG)  -AF     Row Name 07/04/22 1505          Balance Goal 1 (OT)    Activity/Assistive Device (Balance Goal 1, OT) standing, dynamic;standing, static;walker, rolling  with ADl tasks  -AF     [place] Level/Cues Needed (Balance Goal 1, OT) supervision required;conditional independence  -AF     Time Frame (Balance Goal 1, OT) long term goal (LTG)  -AF     Progress/Outcomes (Balance Goal 1, OT) goal ongoing  -AF     Row Name 07/04/22 1505          Functional Mobility Goal 1 (OT)    Activity/Assistive Device (Functional Mobility Goal 1, OT) walker, rolling  -AF     [place] Level/Cues Needed (Functional Mobility Goal 1, OT) conditional independence  -AF     Distance Goal 1 (Functional Mobility, OT) to and from bathroom  -AF     Time Frame (Functional Mobility Goal 1, OT) long term goal (LTG)  -AF     Progress/Outcome (Functional Mobility Goal 1, OT) goal ongoing  -AF     Row Name 07/04/22 1505          Coordination Goal 1 (OT)    Activity/Assistive Device (Coordination Goal 1, OT) FM task   with R hand during grooming tasks  -AF     [place] Level/Cues Needed (Coordination Goal 1, OT) supervision required  -AF     Time Frame (Coordination Goal 1, OT) long term goal (LTG)  -AF     Progress/Outcomes (Coordination Goal 1, OT) goal ongoing  -AF     Row Name 07/04/22 1505          Caregiver Training Goal 1 (OT-IRF)    Caregiver Training Goal 1 (OT-IRF) Pt will demo safe techniques with ADLs, transfers, HEP and needed equipment  -AF     Time Frame (Caregiver Training Goal 1, OT-IRF) long-term goal (LTG)  -AF     Progress/Outcomes (Caregiver Training Goal 1, OT-IRF) goal ongoing  -AF           User Key  (r) = Recorded By, (t) = Taken By, (c) = Cosigned By    Initials Name Effective Dates    Dolores Vale, OTR 06/16/21 -                  Occupational Therapy Education                 Title: PT OT SLP Therapies (In Progress)     Topic: Occupational Therapy (In Progress)     Point: ADL training (Done)     Description:   Instruct learner(s) on proper safety adaptation and remediation techniques during self care or transfers.   Instruct in proper use of assistive devices.              Learning Progress Summary           Patient Acceptance, E, VU,NR by AF at 7/4/2022 1518    Comment: educated on safety with ADls and transfers                   Point: Home exercise program (Not Started)     Description:   Instruct learner(s) on appropriate technique for monitoring, assisting and/or progressing therapeutic exercises/activities.              Learner Progress:  Not documented in this visit.          Point: Precautions (Not Started)     Description:   Instruct learner(s) on prescribed precautions during self-care and functional transfers.              Learner Progress:  Not documented in this visit.          Point: Body mechanics (Not Started)     Description:   Instruct learner(s) on proper positioning and spine alignment during self-care, functional mobility activities and/or exercises.              Learner  Progress:  Not documented in this visit.                      User Key     Initials Effective Dates Name Provider Type Discipline    AF 06/16/21 -  Dolores Odom, OTR Occupational Therapist OT                    OT Recommendation and Plan    Planned Therapy Interventions (OT): BADL retraining, neuromuscular control/coordination retraining, occupation/activity based interventions, patient/caregiver education/training, ROM/therapeutic exercise, strengthening exercise, transfer/mobility retraining                    Time Calculation:      Time Calculation- OT     Row Name 07/04/22 1518             Time Calculation- OT    OT Start Time 1230  -AF      OT Stop Time 1315  -AF      OT Time Calculation (min) 45 min  -AF            User Key  (r) = Recorded By, (t) = Taken By, (c) = Cosigned By    Initials Name Provider Type    AF Dolores Odom, OTR Occupational Therapist              Therapy Charges for Today     Code Description Service Date Service Provider Modifiers Qty    [id number]  OT EVAL MOD COMPLEXITY 3 7/4/2022 Dolores Odom OTR GO 1    [id number]  OT SELF CARE/MGMT/TRAIN EA 15 MIN 7/4/2022 Dolores Odom OTR GO 1                   RICH Melendez  7/4/2022

## 2022-07-04 NOTE — CONSULTS
Nutrition Services    Patient Name:  Maryellen Crump  YOB: 1950  MRN: [id number]  Admit Date:  7/2/2022    Comments: Admitted following stroke - pt tolerating diet. Intake/appetite OK per pt. Skin intact. Labs reviewed (Aic 6.9%). Will continue to monitor.       CLINICAL NUTRITION ASSESSMENT      Reason for Assessment Acute Rehab Admission     H&P  Ms. Nicholson is a 71-year-old  white female admitted here in transfer from [facility] where she presented on 62/29.  Patient had been camping and awakened on the morning of 629 with right-sided weakness and numbness.  Work-up at the acute hospital review to a lacunar stroke and left corona radiata region.       Past Medical History:   Diagnosis Date   • Diabetes mellitus (HCC)    • Hyperlipidemia    • Hypertension        Past Surgical History:   Procedure Laterality Date   • COLONOSCOPY N/A 9/12/2018    Procedure: COLONOSCOPY TO CECUM;  Surgeon: Josh Muñoz MD;  Location: [facility] ENDOSCOPY;  Service: General   • DILATION AND CURETTAGE, DIAGNOSTIC / THERAPEUTIC     • ENDOSCOPY N/A 9/12/2018    Procedure: ESOPHAGOGASTRODUODENOSCOPY WITH BIOPSIES;  Surgeon: Josh Muñoz MD;  Location: [facility] ENDOSCOPY;  Service: General   • ROTATOR CUFF REPAIR Right           Nutritional Risk Screening       MST SCORE 0   Risk Screening Criteria No indicators     Encounter Information        Nutrition History/Narrative:    Good appetite. No requests or issues when I rounded at lunch.    Food Preferences: Bottled water, fruit plates, soups     Supplements: none     Functional Status/  Activity Pattern:      Factors Affecting Intake: weakness - R side weakness 2* to stroke       Current Nutrition Orders & Evaluation of Intake       Oral Nutrition     Food Allergies NKFA   Current PO Diet Diet Regular; Cardiac, Consistent Carbohydrate   Supplement n/a   PO Evaluation     Trending % PO Intake 50-75% x 4 meals       --  Anthropometrics       "  Current Height  Current Weight  BMI kg/m2 Height: 160 cm (62.99\")  Weight: 51.3 kg (113 lb) (07/02/22 1910)  Body mass index is 20.02 kg/m².       Admission Weight 113 lb   Ideal Body Weight (IBW) 115 lb   Usual Body Weight (UBW) 115-121 lb       Trending Weight Hx  Wt Readings from Last 15 Encounters:   07/02/22 1910 51.3 kg (113 lb)   06/30/22 1134 51.3 kg (113 lb)   06/30/22 0500 51.6 kg (113 lb 12.1 oz)   06/29/22 2019 51.9 kg (114 lb 8 oz)   06/29/22 1716 53.5 kg (118 lb)   06/11/20 1129 54.9 kg (121 lb 1.6 oz)   09/12/18 1333 52.2 kg (115 lb)      --  Physical Findings        Physical Appearance  alert, oriented     Edema  no edema   Gastrointestinal constipation, last bowel movement: 7/1   Tubes/Drains none   Oral/Mouth Cavity WNL   Skin skin intact     Tests/Procedures/Labs        Tests/Procedures No new tests/procedures       Pertinent Labs     Results from last 7 days   Lab Units 07/02/22  0613 07/01/22  0454 06/30/22  0453 06/29/22  1603   SODIUM mmol/L 140 139 138 138   POTASSIUM mmol/L 4.2 4.0 3.8 4.1   CHLORIDE mmol/L 106 103 101 100   CO2 mmol/L 24.0 24.0 25.0 21.1*   BUN mg/dL 15 15 14 16   CREATININE mg/dL 1.02* 1.07* 1.11* 1.21*   CALCIUM mg/dL 9.3 9.8 9.7 10.9*   BILIRUBIN mg/dL  --   --  0.5 0.6   ALK PHOS U/L  --   --  72 92   ALT (SGPT) U/L  --   --  11 13   AST (SGOT) U/L  --   --  16 15   GLUCOSE mg/dL 136* 142* 108* 113*     Results from last 7 days   Lab Units 07/02/22  0613 07/01/22  0454 06/30/22  0453   HEMOGLOBIN g/dL 10.7*   < > 10.6*   HEMATOCRIT % 32.3*   < > 32.6*   WBC 10*3/mm3 8.96   < > 6.67   TRIGLYCERIDES mg/dL  --   --  148    < > = values in this interval not displayed.     Results from last 7 days   Lab Units 07/02/22  0613 07/01/22  0454 06/30/22  0453 06/29/22  1603   PLATELETS 10*3/mm3 259 256 255 303     COVID19   Date Value Ref Range Status   07/02/2022 Not Detected Not Detected - Ref. Range Final     Lab Results   Component Value Date    HGBA1C 6.90 (H) 06/30/2022    "     Medications           Scheduled Medications aspirin, 81 mg, Oral, Daily  atorvastatin, 80 mg, Oral, Nightly  clopidogrel, 75 mg, Oral, Daily  ferrous sulfate, 325 mg, Oral, Every Other Day  hydroCHLOROthiazide, 12.5 mg, Oral, Daily  insulin glargine, 10 Units, Subcutaneous, Daily  insulin lispro, 0-7 Units, Subcutaneous, TID AC  polyethylene glycol, 17 g, Oral, Daily  ramipril, 10 mg, Oral, Daily  vitamin B-12, 1,000 mcg, Oral, Daily       Infusions     PRN Medications •  acetaminophen **OR** acetaminophen  •  bisacodyl  •  calcium carbonate  •  dextrose  •  dextrose  •  glucagon (human recombinant)  •  hydrALAZINE  •  ondansetron        Nutrition Diagnosis        Nutrition Dx Problem 1 Problem: Nutrition Appropriate for Condition at this Time    Etiology: Medical Diagnosis and MNT for Treatment/Condition    Signs/Symptoms: PO intake    Comment: 50-75% intake           Intervention Goal        Intervention Goal(s) Maintain nutrition status, Disease management/therapy, Tolerate PO , Maintain intake, Maintain weight and PO intake goal %: 75     Nutrition Intervention        RD Action Advise alternative selection, Menu provided, Encourage intake, Follow Tx Progress and Care plan reviewed     Nutrition Prescription         Diet Prescription    Supplement Prescription    EN Prescription    --  Monitor/Evaluation        Monitor Per protocol     RD to follow up per protocol.    Electronically signed by:  Reva Espinosa RD  07/04/22 10:21 EDT

## 2022-07-05 PROBLEM — N18.30 CKD (CHRONIC KIDNEY DISEASE) STAGE 3, GFR 30-59 ML/MIN (HCC): Status: ACTIVE | Noted: 2022-07-05

## 2022-07-05 LAB
25(OH)D3 SERPL-MCNC: 54.1 NG/ML (ref 30–100)
ANION GAP SERPL CALCULATED.3IONS-SCNC: 14 MMOL/L (ref 5–15)
BUN SERPL-MCNC: 14 MG/DL (ref 8–23)
BUN/CREAT SERPL: 12.6 (ref 7–25)
CALCIUM SPEC-SCNC: 10.2 MG/DL (ref 8.6–10.5)
CHLORIDE SERPL-SCNC: 97 MMOL/L (ref 98–107)
CO2 SERPL-SCNC: 25 MMOL/L (ref 22–29)
CREAT SERPL-MCNC: 1.11 MG/DL (ref 0.57–1)
DEPRECATED RDW RBC AUTO: 41.5 FL (ref 37–54)
EGFRCR SERPLBLD CKD-EPI 2021: 52.9 ML/MIN/1.73
ERYTHROCYTE [DISTWIDTH] IN BLOOD BY AUTOMATED COUNT: 12.1 % (ref 12.3–15.4)
GLUCOSE BLDC GLUCOMTR-MCNC: 124 MG/DL (ref 70–130)
GLUCOSE BLDC GLUCOMTR-MCNC: 177 MG/DL (ref 70–130)
GLUCOSE BLDC GLUCOMTR-MCNC: 188 MG/DL (ref 70–130)
GLUCOSE BLDC GLUCOMTR-MCNC: 200 MG/DL (ref 70–130)
GLUCOSE SERPL-MCNC: 272 MG/DL (ref 65–99)
HCT VFR BLD AUTO: 35.9 % (ref 34–46.6)
HGB BLD-MCNC: 11.8 G/DL (ref 12–15.9)
MCH RBC QN AUTO: 31.5 PG (ref 26.6–33)
MCHC RBC AUTO-ENTMCNC: 32.9 G/DL (ref 31.5–35.7)
MCV RBC AUTO: 95.7 FL (ref 79–97)
PLATELET # BLD AUTO: 281 10*3/MM3 (ref 140–450)
PMV BLD AUTO: 9.9 FL (ref 6–12)
POTASSIUM SERPL-SCNC: 4 MMOL/L (ref 3.5–5.2)
RBC # BLD AUTO: 3.75 10*6/MM3 (ref 3.77–5.28)
SODIUM SERPL-SCNC: 136 MMOL/L (ref 136–145)
WBC NRBC COR # BLD: 6.93 10*3/MM3 (ref 3.4–10.8)

## 2022-07-05 PROCEDURE — 82962 GLUCOSE BLOOD TEST: CPT

## 2022-07-05 PROCEDURE — 97130 THER IVNTJ EA ADDL 15 MIN: CPT

## 2022-07-05 PROCEDURE — 63710000001 INSULIN LISPRO (HUMAN) PER 5 UNITS: Performed by: STUDENT IN AN ORGANIZED HEALTH CARE EDUCATION/TRAINING PROGRAM

## 2022-07-05 PROCEDURE — 97110 THERAPEUTIC EXERCISES: CPT

## 2022-07-05 PROCEDURE — 97530 THERAPEUTIC ACTIVITIES: CPT

## 2022-07-05 PROCEDURE — 82306 VITAMIN D 25 HYDROXY: CPT | Performed by: PHYSICAL MEDICINE & REHABILITATION

## 2022-07-05 PROCEDURE — 97530 THERAPEUTIC ACTIVITIES: CPT | Performed by: OCCUPATIONAL THERAPIST

## 2022-07-05 PROCEDURE — 97129 THER IVNTJ 1ST 15 MIN: CPT

## 2022-07-05 PROCEDURE — 97535 SELF CARE MNGMENT TRAINING: CPT | Performed by: OCCUPATIONAL THERAPIST

## 2022-07-05 PROCEDURE — 97110 THERAPEUTIC EXERCISES: CPT | Performed by: OCCUPATIONAL THERAPIST

## 2022-07-05 PROCEDURE — 80048 BASIC METABOLIC PNL TOTAL CA: CPT | Performed by: PHYSICAL MEDICINE & REHABILITATION

## 2022-07-05 PROCEDURE — 85027 COMPLETE CBC AUTOMATED: CPT | Performed by: PHYSICAL MEDICINE & REHABILITATION

## 2022-07-05 RX ADMIN — ATORVASTATIN CALCIUM 80 MG: 80 TABLET, FILM COATED ORAL at 20:41

## 2022-07-05 RX ADMIN — CLOPIDOGREL 75 MG: 75 TABLET, FILM COATED ORAL at 08:16

## 2022-07-05 RX ADMIN — ASPIRIN 81 MG: 81 TABLET, COATED ORAL at 08:16

## 2022-07-05 RX ADMIN — INSULIN LISPRO 2 UNITS: 100 INJECTION, SOLUTION INTRAVENOUS; SUBCUTANEOUS at 12:18

## 2022-07-05 RX ADMIN — INSULIN GLARGINE-YFGN 10 UNITS: 100 INJECTION, SOLUTION SUBCUTANEOUS at 08:21

## 2022-07-05 RX ADMIN — FERROUS SULFATE TAB 325 MG (65 MG ELEMENTAL FE) 325 MG: 325 (65 FE) TAB at 08:17

## 2022-07-05 RX ADMIN — INSULIN LISPRO 3 UNITS: 100 INJECTION, SOLUTION INTRAVENOUS; SUBCUTANEOUS at 17:49

## 2022-07-05 RX ADMIN — HYDROCHLOROTHIAZIDE 12.5 MG: 25 TABLET ORAL at 08:17

## 2022-07-05 RX ADMIN — RAMIPRIL 10 MG: 10 CAPSULE ORAL at 08:16

## 2022-07-05 RX ADMIN — PANTOPRAZOLE SODIUM 40 MG: 40 TABLET, DELAYED RELEASE ORAL at 05:16

## 2022-07-05 RX ADMIN — Medication 1000 MCG: at 08:17

## 2022-07-05 RX ADMIN — POLYETHYLENE GLYCOL 3350 17 G: 17 POWDER, FOR SOLUTION ORAL at 08:16

## 2022-07-05 NOTE — PLAN OF CARE
Problem: Rehabilitation (IRF) Plan of Care  Goal: Plan of Care Review  Outcome: Ongoing, Progressing  Flowsheets (Taken 7/5/2022 0231)  Progress: improving  Plan of Care Reviewed With: patient  Outcome Evaluation: Patient A&Ox4 pleasant and cooperative. Assist x1 to transfer with walker. NIH at 2. R hemipareiss, with RUE ataxia and sensory issue to feet which was already present prior to stoke. Skin intact, minor abrasion to R shin. No c.o any pain. No unsafe behavior.

## 2022-07-05 NOTE — THERAPY TREATMENT NOTE
Inpatient Rehabilitation - Physical Therapy Treatment Note       [facility]     Patient Name: Maryellen Crump  : 1950  MRN: [id number]    Today's Date: 2022                    Admit Date: 2022      Visit Dx:     ICD-10-CM ICD-9-CM   1. Impaired functional mobility, balance, gait, and endurance  Z74.09 V49.89       Patient Active Problem List   Diagnosis   • Family history of colon cancer   • Iron deficiency anemia   • Diarrhea of presumed infectious origin   • Type 2 diabetes mellitus with kidney complication, without long-term current use of insulin (HCC)   • Hypertension   • Hyperlipidemia   • Stroke-like symptoms   • Acute CVA (cerebrovascular accident) (HCC)   • Acute lacunar stroke (HCC)   • Vitamin B12 deficiency   • CVA (cerebral vascular accident) (HCC)   • CKD (chronic kidney disease) stage 3, GFR 30-59 ml/min (HCC)       Past Medical History:   Diagnosis Date   • Diabetes mellitus (HCC)    • Hyperlipidemia    • Hypertension        Past Surgical History:   Procedure Laterality Date   • COLONOSCOPY N/A 2018    Procedure: COLONOSCOPY TO CECUM;  Surgeon: Josh Muñoz MD;  Location: [facility] ENDOSCOPY;  Service: General   • DILATION AND CURETTAGE, DIAGNOSTIC / THERAPEUTIC     • ENDOSCOPY N/A 2018    Procedure: ESOPHAGOGASTRODUODENOSCOPY WITH BIOPSIES;  Surgeon: Josh Muñoz MD;  Location: [facility] ENDOSCOPY;  Service: General   • ROTATOR CUFF REPAIR Right        PT ASSESSMENT (last 12 hours)     IRF PT Evaluation and Treatment     Row Name 22 1200          PT Time and Intention    Document Type daily treatment  -MS (r) KM (t) MS (c)     Mode of Treatment physical therapy  -MS (r) KM (t) MS (c)     Patient/Family/Caregiver Comments/Observations Pt sitting in chair in room with exit alarm on.  -MS (r) KM (t) MS (c)     Row Name 22 1200          General Information    Patient Profile Reviewed yes  -MS (r) KM (t) MS (c)     Existing Precautions/Restrictions fall  -MS  (r) KM (t) MS (c)     Row Name 07/05/22 1200          Pain Assessment    Pretreatment Pain Rating 0/10 - no pain  -MS (r) KM (t) MS (c)     Posttreatment Pain Rating 0/10 - no pain  -MS (r) KM (t) MS (c)     Pre/Posttreatment Pain Comment Pt does not complain of pain but does have complaints of stiffness and fatigue.  -MS (r) KM (t) MS (c)     Row Name 07/05/22 1200          Cognition/Psychosocial    Affect/Mental Status (Cognition) WNL  -MS (r) KM (t) MS (c)     Orientation Status (Cognition) oriented x 4  -MS (r) KM (t) MS (c)     Follows Commands (Cognition) follows two-step commands  -MS (r) KM (t) MS (c)     Personal Safety Interventions fall prevention program maintained;gait belt;muscle strengthening facilitated;nonskid shoes/slippers when out of bed  -MS (r) KM (t) MS (c)     Cognitive Function WFL  -MS (r) KM (t) MS (c)     Row Name 07/05/22 1200          Stand Pivot/Stand Step Transfer    Stand Pivot/Stand Step Blythe (Transfers) 1 person assist;minimum assist (75% patient effort)  -MS (r) KM (t) MS (c)     Comment, (Stand Pivot Transfer) Pt performed stand pivot-step transfer from chair in room to the . Pt had trouble initiating standing and had difficulty getting full knee extension on right side during the transfer. There was no signs of knee buckling but pt did show signs of mild instability.  -MS (r) KM (t) MS (c)     Row Name 07/05/22 1200          Gait/Stairs (Locomotion)    Blythe Level (Gait) contact guard;minimum assist (75% patient effort);1 person assist  -MS (r) KM (t) MS (c)     Assistive Device (Gait) walker, front-wheeled;parallel bars  -MS (r) KM (t) MS (c)     Distance in Feet (Gait) 8' in // bars, 240' RW  -MS (r) KM (t) MS (c)     Pattern (Gait) step-through  -MS (r) KM (t) MS (c)     Deviations/Abnormal Patterns (Gait) base of support, narrow;gait speed decreased;stride length decreased  -MS (r) KM (t) MS (c)     Left Sided Gait Deviations leans left  -MS (r) KM (t) MS  (c)     Right Sided Gait Deviations heel strike decreased;decreased knee extension;Trendelenburg sign  -MS (r) KM (t) MS (c)     Gait Assessment/Intervention Pt performed an 8' trial ambulation in // bars to ensure no knee buckling was occurring. Pt then performed ambulation for 240' with CGA-Yany x1 and RW. Pt presented with decreased heel strike on right side and a narrow CHEYANNE. Pt improved both with vc's. No signs of knee buckling during ambulation but pt did shows signs of fatigue and mild instability.  -MS (r) KM (t) MS (c)     Row Name 07/05/22 1200          Balance    Static Sitting Balance standby assist  -MS (r) KM (t) MS (c)     Dynamic Sitting Balance standby assist  -MS (r) KM (t) MS (c)     Position, Sitting Balance sitting in chair  -MS (r) KM (t) MS (c)     Static Standing Balance contact guard;minimal assist  -MS (r) KM (t) MS (c)     Dynamic Standing Balance verbal cues;contact guard;minimal assist  -MS (r) KM (t) MS (c)     Position/Device Used, Standing Balance walker, front-wheeled;wilma-bars;parallel bars  -MS (r) KM (t) MS (c)     Balance Interventions standing;foam;narrowed base of support;tandem standing  -MS (r) KM (t) MS (c)     Comment, Balance Pt performed balance interventions at wilma-bars on foam pad. Pt showed a significatn sway during tandem standing but did improved with practice.  -MS (r) KM (t) MS (c)     Row Name 07/05/22 1200          Motor Skills    Therapeutic Exercise aerobic  -MS (r) KM (t) MS (c)     Additional Documentation --  Pt performed side stepping at wilma-bars 10' x 3 trials. Pt presented with decreased step length and narrow CHEYANNE but improved with vc's. Pt required vc's for posture.  -MS (r) KM (t) MS (c)     Row Name 07/05/22 1200          Hip (Therapeutic Exercise)    Hip (Therapeutic Exercise) AROM (active range of motion)  -MS (r) KM (t) MS (c)     Hip Strengthening (Therapeutic Exercise) marching while seated;10 repetitions;2 sets  -MS (r) KM (t) MS (c)     Row  Name 07/05/22 1200          Positioning and Restraints    Pre-Treatment Position sitting in chair/recliner  -MS (r) KM (t) MS (c)     Post Treatment Position wheelchair  -MS (r) KM (t) MS (c)     In Wheelchair sitting;encouraged to call for assist;exit alarm on;with SLP  -MS (r) KM (t) MS (c)           User Key  (r) = Recorded By, (t) = Taken By, (c) = Cosigned By    Initials Name Provider Type    Myra Willard, PT Physical Therapist    Otis Mulligan, PT Student PT Student                 Physical Therapy Education                 Title: PT OT SLP Therapies (In Progress)     Topic: Physical Therapy (Done)     Point: Mobility training (Done)     Learning Progress Summary           Patient Acceptance, E, VU,NR by  at 7/5/2022 1230    Eager, E,TB,H,D, VU,DU by  at 7/4/2022 1405    Comment: Written HEp for strength, need to remain on rwx for now.    Eager, E,TB,D, VU,DU by  at 7/3/2022 1108    Comment: POC, goals.                   Point: Home exercise program (Done)     Learning Progress Summary           Patient Acceptance, E, VU,NR by  at 7/5/2022 1230    Eager, E,TB,H,D, VU,DU by  at 7/4/2022 1405    Comment: Written HEp for strength, need to remain on rwx for now.    Eager, E,TB,D, VU,DU by  at 7/3/2022 1108    Comment: POC, goals.                               User Key     Initials Effective Dates Name Provider Type Discipline     06/16/21 -  Tara Sanders, PT Physical Therapist PT     06/06/22 -  Otis Funes, PT Student PT Student PT                PT Recommendation and Plan                          Time Calculation:      PT Charges     Row Name 07/05/22 1229             Time Calculation    Start Time 0930  -MS (r) KM (t) MS (c)      Stop Time 1030  -MS (r) KM (t) MS (c)      Time Calculation (min) 60 min  -MS (r) KM (t)      PT Received On 07/05/22  -MS (r) KM (t) MS (c)      PT - Next Appointment 07/06/22  -MS (r) KM (t) MS (c)              Time Calculation- PT    Total Timed  Code Minutes- PT 60 minute(s)  -MS (r) KM (t) MS (c)            User Key  (r) = Recorded By, (t) = Taken By, (c) = Cosigned By    Initials Name Provider Type    Myra Willard, PT Physical Therapist    Otis Mulligan, PT Student PT Student                Therapy Charges for Today     Code Description Service Date Service Provider Modifiers Qty    93992273425 HC PT THERAPEUTIC ACT EA 15 MIN 7/5/2022 Otis Funes, PT Student GP 2    74556904243 HC PT THER PROC EA 15 MIN 7/5/2022 Otis Funes, PT Student GP 2    29391863820  PT THER SUPP EA 15 MIN 7/5/2022 Otis Funes, PT Student GP 1                   Otis Funes, PT Student  7/5/2022

## 2022-07-05 NOTE — PROGRESS NOTES
Recreational Therapy Note    Patient Name: Maryellen Crump   MRN: [id number]    Therapeutic Recreation Eval and Treat (last 12 hours)     Therapeutic Recreation Eval & Treat     Row Name 07/05/22 1455       Pertinent Diagnosis/Presenting Problems    Presenting Problems/Reason for Referral Stroke  -TW    Row Name 07/05/22 1455       Lifestyle/Recreational History/Interest    Profession/Job retired  -TW    Current Living Situation with family  -TW    Transportation Situation car, drives self  -TW    Row Name 07/05/22 1455       Recreational History and Interests    How Important is Recreation to You? high importance  -TW    Satisfied With Leisure Lifestyle? yes  -TW    Participate Regularly in Leisure Activity? yes  -TW    Are You a Social Person? yes  -TW    Current Hobbies/Interests outdoor activities;exercise/fitness;care of plants;games;travel  -TW    Outdoor Activities camping  -TW    Games board games;card games  -TW    Exercise/Fitness walking/running  -TW    Row Name 07/05/22 1455       Barriers To Leisure Activity    Barriers to Leisure Activity mobility limitations  -TW    Mobility Limitations (Barriers to Leisure) balance limitations;strength limitations  -TW    Row Name 07/05/22 1455       Coping/Wellbeing    Current State of Wellbeing calm;positive attitude  -TW    Factors Impacting Current State of Wellbeing no significant issues  -TW    Row Name 07/05/22 1455       Therapeutic Recreation Participation    Orientation to Therapeutic Recreation patient/family;received explanation of recreation therapy programs;completed therapeutic recreation assessment  -TW    Row Name 07/05/22 1455       Therapeutic Recreation Assessment/Plan    Recreation Therapy Goals/Objectives improve;functional leisure skills  -TW    Recreation Plan leisure exploration;structured leisure participation  -TW          User Key  (r) = Recorded By, (t) = Taken By, (c) = Cosigned By    Initials Name Provider Type    TW Shanita Villafuerte,  CTRS Recreational Therapist                  Shanita Villafuerte, CTRS  7/5/2022

## 2022-07-05 NOTE — CASE MANAGEMENT/SOCIAL WORK
Case Management Discharge Note      Final Note: [facility] Acute Rehab. que rn/ccp    Provided Post Acute Provider List?: Yes  Post Acute Provider List: Inpatient Rehab  Provided Post Acute Provider Quality & Resource List?: Yes  Post Acute Provider Quality and Resource List: Inpatient Rehab  Delivered To: Support Person  Method of Delivery: In person    Selected Continued Care - Discharged on 7/2/2022 Admission date: 6/29/2022 - Discharge disposition: Rehab Facility or Unit ([facility])    Destination Coordination complete.    Service Provider Selected Services Address Phone Fax Patient Preferred    [facility] ACUTE REHAB PROGRAM  [facility] [address] [phone] -- --          Durable Medical Equipment    No services have been selected for the patient.              Dialysis/Infusion    No services have been selected for the patient.              Home Medical Care    No services have been selected for the patient.              Therapy    No services have been selected for the patient.              Community Resources    No services have been selected for the patient.              Community & DME    No services have been selected for the patient.                       Final Discharge Disposition Code: 62 - inpatient rehab facility

## 2022-07-05 NOTE — PROGRESS NOTES
Inpatient Rehabilitation Plan of Care Note    Plan of Care  Care Plan Reviewed - No updates at this time.    Body Systems    [RN] Endocrine(Active)  Current Status(07/04/2022): Blood glucose uncontrolled  Weekly Goal(07/12/2022): Blood glucose WNL  Discharge Goal: Blood glucose controlled    Performed Intervention(s)  Monitor labs; hypo/hyperglycemia      Psychosocial    [RN] Coping/Adjustment(Active)  Current Status(07/04/2022): Pt A&Ox4, calm and looking forward to starting  rehab. Supportive family.  Weekly Goal(07/12/2022): Pt will demonstrate positive coping mechanism  Discharge Goal: Coping with current health status    Performed Intervention(s)  Offer support  Encourage to express any concerns      Safety    [RN] Potential for Injury(Active)  Current Status(07/04/2022): Risk for fall r/t recent stroke with R hemiparesis  and unsteady gait  Weekly Goal(07/12/2022): Call light will be used for assistance  Discharge Goal: No falls/injuries    Performed Intervention(s)  Falls/safety precaution  Bed/Chair alarms  Call light/personal items within easy reach      Sphincter Control    [RN] Bladder Management(Active)  Current Status(07/04/2022): Continent 100%  Weekly Goal(07/07/2022): Continent 100%  Discharge Goal: Continent 100%    [RN] Bowel Management(Active)  Current Status(07/04/2022): Continent 100%  Weekly Goal(07/07/2022): Continent 100%  Discharge Goal: Continent 100%    Performed Intervention(s)  Monitor I&O  Encourage fluids    Signed by: Chelsy Brody RN

## 2022-07-05 NOTE — PROGRESS NOTES
LOS: 3 days   Patient Care Team:  Juni Cortez MD as PCP - General (Family Medicine)      ONEYDA BRANHAM  1950    Diagnoses    1. IMPAIRED FUNCTIONAL MOBILITY, BALANCE, GAIT, AND ENDURANCE       ADMITTING DIAGNOSIS:    Stroke    Subjective   Patient reports tolerating activities.  No new weakness.  Continues with impaired dexterity in the right hand.      Objective     Vitals:    07/05/22 0816   BP: 139/69   Pulse: 72   Resp:    Temp:    SpO2: 96%       PHYSICAL EXAM:   MENTAL STATUS -  AWAKE / ALERT  HEENT-  SCLERAE ANICTERIC, CONJUNCTIVAE PINK, OP MOIST, NO JVD,    LUNGS - NORMAL RESPIRATIONS  HEART- RRR,   ABD -  SOFT, NONDISTENDED  EXT - NO EDEMA OR CYANOSIS  NEURO - AWAKE, ALERT  MOTOR EXAM -takes resistance bilaterally.  Impaired dexterity in the right upper extremity        MEDICATIONS  Scheduled Meds:aspirin, 81 mg, Oral, Daily  atorvastatin, 80 mg, Oral, Nightly  clopidogrel, 75 mg, Oral, Daily  ferrous sulfate, 325 mg, Oral, Every Other Day  hydroCHLOROthiazide, 12.5 mg, Oral, Daily  insulin glargine, 10 Units, Subcutaneous, Daily  insulin lispro, 0-7 Units, Subcutaneous, TID AC  pantoprazole, 40 mg, Oral, Q AM  polyethylene glycol, 17 g, Oral, Daily  ramipril, 10 mg, Oral, Daily  vitamin B-12, 1,000 mcg, Oral, Daily      Continuous Infusions:   PRN Meds:.•  acetaminophen **OR** acetaminophen  •  bisacodyl  •  calcium carbonate  •  dextrose  •  dextrose  •  glucagon (human recombinant)  •  hydrALAZINE  •  ondansetron      RESULTS  Glucose   Date/Time Value Ref Range Status   07/05/2022 1142 188 (H) 70 - 130 mg/dL Final     Comment:     Meter: [id number] : [id number] [facility]   07/05/2022 0618 124 70 - 130 mg/dL Final     Comment:     Meter: [id number] : [id number] [facility]   07/04/2022 2107 221 (H) 70 - 130 mg/dL Final     Comment:     Meter: [id number] : [id number] [facility]   07/04/2022 1539 122 70 - 130 mg/dL Final     Comment:     Meter: [id number]  : [id number] Bear Steiner NA   07/04/2022 1022 255 (H) 70 - 130 mg/dL Final     Comment:     Meter: [id number] : [id number] Tona Hewitt CNA   07/04/2022 0631 119 70 - 130 mg/dL Final     Comment:     Meter: [id number] : [id number] Laureano AMES   07/03/2022 2032 111 70 - 130 mg/dL Final     Comment:     Meter: [id number] : [id number] Laureano AMES   07/03/2022 1614 178 (H) 70 - 130 mg/dL Final     Comment:     Meter: [id number] : [id number] Keny AMES     Results from last 7 days   Lab Units 07/05/22  0942 07/02/22  0613 07/01/22  0454   WBC 10*3/mm3 6.93 8.96 6.09   HEMOGLOBIN g/dL 11.8* 10.7* 10.4*   HEMATOCRIT % 35.9 32.3* 31.3*   PLATELETS 10*3/mm3 281 259 256     Results from last 7 days   Lab Units 07/05/22  0942 07/02/22  0613 07/01/22  0454 06/30/22  0453 06/29/22  1603   SODIUM mmol/L 136 140 139 138 138   POTASSIUM mmol/L 4.0 4.2 4.0 3.8 4.1   CHLORIDE mmol/L 97* 106 103 101 100   CO2 mmol/L 25.0 24.0 24.0 25.0 21.1*   BUN mg/dL 14 15 15 14 16   CREATININE mg/dL 1.11* 1.02* 1.07* 1.11* 1.21*   CALCIUM mg/dL 10.2 9.3 9.8 9.7 10.9*   BILIRUBIN mg/dL  --   --   --  0.5 0.6   ALK PHOS U/L  --   --   --  72 92   ALT (SGPT) U/L  --   --   --  11 13   AST (SGOT) U/L  --   --   --  16 15   GLUCOSE mg/dL 272* 136* 142* 108* 113*      Latest Reference Range & Units 07/01/22 04:54 07/05/22 09:42   Vitamin B-12 211 - 946 pg/mL 204 (L)    25 Hydroxy, Vitamin D 30.0 - 100.0 ng/ml  54.1      BRAIN MRI WITHOUT CONTRAST-June 29, 2022     HISTORY: Stroke, follow up     COMPARISON: 06/29/2022.     FINDINGS:  Multiplanar images of the head were obtained without  gadolinium. There is an area of restricted diffusion which is noted  within the left frontoparietal coronal radiata, measuring up to 1.1 cm  in size. An additional tiny focus is seen just superior to it within the  subcortical white matter. This measures approximately 4 mm in size. No  additional areas of restricted diffusion  are seen. There is diffuse  atrophy. There is periventricular and deep white matter microangiopathic  change. Degree of ventricular dilatation may be somewhat out of  proportion to the degree of atrophy, and correlation with any evidence  of normal pressure hydrocephalus is recommended. There is no midline  shift or mass effect. Intracranial flow voids appear intact. No  abnormality is seen on gradient echo imaging There is mucosal thickening  noted within the ethmoid sinuses.     IMPRESSION:  1. Foci of restricted diffusion are noted within the left frontoparietal  corona radiata and subcortical white matter.  No additional areas of  acute infarction are identified.    ASSESSMENT and PLAN    Iron deficiency anemia    Type 2 diabetes mellitus with kidney complication, without long-term current use of insulin (HCC)    Hypertension    Hyperlipidemia    Acute CVA (cerebrovascular accident) ([facility])    Acute lacunar stroke ([facility])    Vitamin B12 deficiency    CVA (cerebral vascular accident) ([facility])    CVA 6/29/2022 with right-sided weakness-lacunar infarct left corona radiata region  Stroke prophylaxis-aspirin/Plavix/atorvastatin    DVT [id number] dual antiplatelet therapy    Diabetes mellitus-Lantus    Vitamin B12 replacement    Hypertension-hydrochlorothiazide/ramipril    Impaired mobility  Impaired self-care  Impaired cognition    Functional status-July 5-Pt scored WNL for her age on CLQT in domains of  attention, memory, executive functions, language and visuospatial skills;  however, she exhibited mild difficulty with story retell task, alternating  attention and maze completion.  Upper body dressing contact-guard.  Lower body dressing minimum assist.  Transfers minimal-contact-guard assist.  Gait rolling walker 100 feet minimum assist.  30 feet with quad tip cane.    Now admit for comprehensive acute inpatient rehabilitation .  This would be an interdisciplinary program with physical therapy 1 hour,   occupational therapy 1 hour, and speech therapy 1 hour, 5 days a week.  Rehabilitation nursing for carryover, monitoring of hypertension and neurologic   status, bowel and bladder, and skin  Ongoing physician follow-up.  Weekly team conferences.  Goals are to achieve a level of supervision with  mobility and self-care and improved balance.   Rehabilitation prognosis fair.  Medical prognosis fair.  Estimated length of stay is approximately 2 weeks, but is only an estimation.   The patient's functional status and clinical status is unchanged from preadmission assessment and the patient continues appropriate for acute inpatient rehabilitation.  Goal is for home with outpatient   therapies.  Barrier to discharge: Impaired mobility and self-care- work on balance, transfers, progressive ambulation, actives of daily living to overcome.         Tanmay Espinoza MD      During rounds, used appropriate personal protective equipment including mask and gloves.  Additional gown if indicated.  Mask used was standard procedure mask. Appropriate PPE was worn during the entire visit.  Hand hygiene was completed before and after.

## 2022-07-05 NOTE — THERAPY TREATMENT NOTE
Inpatient Rehabilitation - Occupational Therapy Treatment Note    [facility]     Patient Name: Maryellen Crump  : 1950  MRN: [id number]    Today's Date: 2022                 Admit Date: 2022         ICD-10-CM ICD-9-CM   1. Impaired functional mobility, balance, gait, and endurance  Z74.09 V49.89       Patient Active Problem List   Diagnosis   • Family history of colon cancer   • Iron deficiency anemia   • Diarrhea of presumed infectious origin   • Type 2 diabetes mellitus with kidney complication, without long-term current use of insulin (HCC)   • Hypertension   • Hyperlipidemia   • Stroke-like symptoms   • Acute CVA (cerebrovascular accident) (HCC)   • Acute lacunar stroke (HCC)   • Vitamin B12 deficiency   • CVA (cerebral vascular accident) (HCC)       Past Medical History:   Diagnosis Date   • Diabetes mellitus (HCC)    • Hyperlipidemia    • Hypertension        Past Surgical History:   Procedure Laterality Date   • COLONOSCOPY N/A 2018    Procedure: COLONOSCOPY TO CECUM;  Surgeon: Josh Muñoz MD;  Location: [facility] ENDOSCOPY;  Service: General   • DILATION AND CURETTAGE, DIAGNOSTIC / THERAPEUTIC     • ENDOSCOPY N/A 2018    Procedure: ESOPHAGOGASTRODUODENOSCOPY WITH BIOPSIES;  Surgeon: Josh Muñoz MD;  Location: [facility] ENDOSCOPY;  Service: General   • ROTATOR CUFF REPAIR Right              IRF OT ASSESSMENT FLOWSHEET (last 12 hours)     IRF OT Evaluation and Treatment     Row Name 22 1037          OT Time and Intention    Document Type daily treatment  -SG     Mode of Treatment individual therapy;occupational therapy  -SG     Patient Effort good  -SG     Symptoms Noted During/After Treatment fatigue  -SG     Row Name 22 1037          General Information    Patient/Family/Caregiver Comments/Observations Pt sitting up in chair  -SG     Existing Precautions/Restrictions fall  -SG     Row Name 22 1037          Pain Assessment    Pretreatment Pain Rating 0/10 -  no pain  -     Row Name 07/05/22 1037          Bathing    [place] Level (Bathing) bathing skills;upper body;lower body;contact guard assist  -     Assistive Device (Bathing) grab bar/tub rail;hand held shower spray hose;shower chair  -     Position (Bathing) supported sitting;supported standing  -     Set-up Assistance (Bathing) adjust water temperature;obtain supplies;open containers  -[facility] Name 07/05/22 1037          Upper Body Dressing    [place] Level (Upper Body Dressing) upper body dressing skills;set up assistance  -     Position (Upper Body Dressing) supported sitting  -     Row Name 07/05/22 1037          Lower Body Dressing    [place] Level (Lower Body Dressing) pants/bottoms;shoes/slippers;underwear;contact guard assist;socks;minimum assist (75% patient effort)  -     Position (Lower Body Dressing) supported sitting;supported standing  -     Row Name 07/05/22 1037          Grooming    [place] Level (Grooming) deodorant application;hair care, combing/brushing;oral care regimen;wash face, hands;standby assist  -     Position (Grooming) sink side;supported sitting  -     Row Name 07/05/22 1037          Toileting    [place] Level (Toileting) adjust/manage clothing;perform perineal hygiene;verbal cues;minimum assist (75% patient effort)  -     Assistive Device Use (Toileting) grab bar/safety frame  -     Position (Toileting) supported sitting;supported standing  -     Comment (Toileting) A to hike pants on R side  -[facility] Name 07/05/22 1037          Bed Mobility    Comment, (Bed Mobility) NT UIC  -[facility] Name 07/05/22 1037          Functional Mobility    Functional Mobility- Ind. Level minimum assist (75% patient effort);verbal cues required  -     Functional Mobility- Device walker, front-wheeled  -     Functional Mobility- Comment Walks from chair to hallway shower room and back to room  -     Row Name 07/05/22 1037          Transfer  Assessment/Treatment    Transfers sit-stand transfer;stand-sit transfer;toilet transfer;shower transfer  -     Row Name 07/05/22 1037          Transfers    Sit-Stand [place] (Transfers) minimum assist (75% patient effort);verbal cues  -SG     Stand-Sit [place] (Transfers) contact guard  -     [place] Level (Toilet Transfer) contact guard;verbal cues  -SG     Assistive Device (Toilet Transfer) grab bars/safety frame  -     [place] Level (Shower Transfer) minimum assist (75% patient effort);verbal cues  -SG     Assistive Device (Shower Transfer) walker, front-wheeled;tub bench  -     Row Name 07/05/22 1037          Sit-Stand Transfer    Assistive Device (Sit-Stand Transfers) walker, front-wheeled  -     Row Name 07/05/22 1037          Stand-Sit Transfer    Assistive Device (Stand-Sit Transfers) walker, front-wheeled  -     Row Name 07/05/22 1037          Toilet Transfer    Type (Toilet Transfer) stand pivot/stand step  -     Row Name 07/05/22 1037          Shower Transfer    Type (Shower Transfer) stand pivot/stand step  -     Row Name 07/05/22 1037          Positioning and Restraints    Pre-Treatment Position sitting in chair/recliner  -     Post Treatment Position chair  -SG     In Chair sitting;call light within reach;encouraged to call for assist;exit alarm on  -SG           User Key  (r) = Recorded By, (t) = Taken By, (c) = Cosigned By    Initials Name Effective Dates    SG Judith Shira, OTR 06/16/21 -                  Occupational Therapy Education                 Title: PT OT SLP Therapies (In Progress)     Topic: Occupational Therapy (In Progress)     Point: ADL training (Done)     Description:   Instruct learner(s) on proper safety adaptation and remediation techniques during self care or transfers.   Instruct in proper use of assistive devices.              Learning Progress Summary           Patient Acceptance, E, VU,NR by AF at 7/4/2022 9946    Comment: educated on  safety with ADls and transfers                   Point: Home exercise program (Not Started)     Description:   Instruct learner(s) on appropriate technique for monitoring, assisting and/or progressing therapeutic exercises/activities.              Learner Progress:  Not documented in this visit.          Point: Precautions (Not Started)     Description:   Instruct learner(s) on prescribed precautions during self-care and functional transfers.              Learner Progress:  Not documented in this visit.          Point: Body mechanics (Not Started)     Description:   Instruct learner(s) on proper positioning and spine alignment during self-care, functional mobility activities and/or exercises.              Learner Progress:  Not documented in this visit.                      User Key     Initials Effective Dates Name Provider Type Discipline    AF 06/16/21 -  Dolores Odom OTR Occupational Therapist OT                    OT Recommendation and Plan                         Time Calculation:      Time Calculation- OT     Row Name 07/05/22 1040             Time Calculation- OT    OT Start Time 0830  -SG      OT Stop Time 0900  -      OT Time Calculation (min) 30 min  -      OT Received On 07/05/22  -            User Key  (r) = Recorded By, (t) = Taken By, (c) = Cosigned By    Initials Name Provider Type     Shira Wong OTR Occupational Therapist              Therapy Charges for Today     Code Description Service Date Service Provider Modifiers Qty    42699081346 HC OT SELF CARE/MGMT/TRAIN EA 15 MIN 7/5/2022 Shira Wong OTR GO 2                   RICH Day  7/5/2022

## 2022-07-05 NOTE — THERAPY TREATMENT NOTE
Inpatient Rehabilitation - Speech Language Pathology Treatment Note    [facility]     Patient Name: Maryellen Crump  : 1950  MRN: [id number]    Today's Date: 2022                   Admit Date: 2022       Visit Dx:      ICD-10-CM ICD-9-CM   1. Impaired functional mobility, balance, gait, and endurance  Z74.09 V49.89       Patient Active Problem List   Diagnosis   • Family history of colon cancer   • Iron deficiency anemia   • Diarrhea of presumed infectious origin   • Type 2 diabetes mellitus with kidney complication, without long-term current use of insulin (HCC)   • Hypertension   • Hyperlipidemia   • Stroke-like symptoms   • Acute CVA (cerebrovascular accident) (HCC)   • Acute lacunar stroke (HCC)   • Vitamin B12 deficiency   • CVA (cerebral vascular accident) (HCC)   • CKD (chronic kidney disease) stage 3, GFR 30-59 ml/min (HCC)       Past Medical History:   Diagnosis Date   • Diabetes mellitus (HCC)    • Hyperlipidemia    • Hypertension        Past Surgical History:   Procedure Laterality Date   • COLONOSCOPY N/A 2018    Procedure: COLONOSCOPY TO CECUM;  Surgeon: Josh Muñoz MD;  Location: [facility] ENDOSCOPY;  Service: General   • DILATION AND CURETTAGE, DIAGNOSTIC / THERAPEUTIC     • ENDOSCOPY N/A 2018    Procedure: ESOPHAGOGASTRODUODENOSCOPY WITH BIOPSIES;  Surgeon: Josh Muñoz MD;  Location: [facility] ENDOSCOPY;  Service: General   • ROTATOR CUFF REPAIR Right        SLP Recommendation and Plan                                                   SLP EVALUATION (last 72 hours)     SLP SLC Evaluation     Row Name 22 1300 22 1229 22 1042 22 1330          Communication Assessment/Intervention    Document Type therapy note (daily note)  -SL therapy note (daily note)  -SL therapy note (daily note)  -SL evaluation  -AL     Subjective Information no complaints  -SL -- no complaints  -SL --     Patient Observations alert;cooperative;agree to therapy  -SL --  alert;cooperative;agree to therapy  -SL --     Patient/Family/Caregiver Comments/Observations -- -- -- Pt sitting upright in bed.  -AL     Patient Effort good  -SL -- good  -SL excellent  -AL     Symptoms Noted During/After Treatment none  -SL -- none  -SL --            General Information    Patient Profile Reviewed -- -- -- yes  -AL     Pertinent History Of Current Problem -- -- -- Pt is a 71-year-old female admitted to [facility] inpatient rehab under diagnosis Stroke R Joss with onset date 6/29/22. MRI 6/29/22 showed foci of restricted diffusion within the left frontoparietal corona radiata and subcortical white matter. Pt report some difficulty with word finding; however, she reports feels it is age-related and she was experiencing it prior to stroke.  -AL     Precautions/Limitations, Vision -- -- -- WFL;for purposes of eval  -AL     Precautions/Limitations, Hearing -- -- -- WFL  -AL     Patient Level of Education -- -- -- High School. Retired. She worked as a  at the  level and assisting the visually impaired.  -AL     Prior Level of Function-Communication -- -- -- WFL  -AL     Plans/Goals Discussed with -- -- -- patient  -AL     Barriers to Rehab -- -- -- none identified  -AL     Patient's Goals for Discharge -- -- -- functional cognition;return to all previous roles/activities  -AL     Standardized Assessment Used -- -- -- CLQT  -AL            Pain Scale: Numbers Pre/Post-Treatment    Pretreatment Pain Rating -- -- -- 0/10 - no pain  -AL            Motor Speech Assessment/Intervention    Motor Speech, Comment -- -- -- No overt dysarthria observed. Minimal breathy vocal quality noted; suspect baseline level.  -AL            Standardized Tests    Cognitive/Memory Tests -- -- -- CLQT: Cognitive Linguistic Quick Test  -AL            CLQT (The Cognitive Linguistic Quick Test)    Attention Domain Score -- -- -- 177  -AL     Attention Severity Rating -- -- -- 4: WNL  -AL     Memory Domain  Score -- -- -- 148  -AL     Memory Severity Rating -- -- -- 4: WNL  -AL     Executive Function Domain Score -- -- -- 26  -AL     Executive Function Severity Rating -- -- -- 4: WNL  -AL     Language Domain Score -- -- -- 30  -AL     Language Severity Rating -- -- -- 4: WNL  -AL     Visuospatial Domain Score -- -- -- 80  -AL     Visuospatial Severity Rating -- -- -- 4: WNL  -AL     Clock Drawing Total Score -- -- -- 11  -AL     Clock Drawing Severity Rating -- -- -- WNL  -AL     Composite Severity Rating -- -- -- 4.0  -AL     Composite Severity Rating Range -- -- -- 4.0 - 3.5: WNL  -AL     CLQT Comments -- -- -- Pt exhibited cognitive-linguistic skills overall WNL for her age. She did exhibit mild difficulty with recall of a story, alternating attention for trail-making and planning for maze completion; however, scores are above the criterion cut-off scores for her age. Recommend further assessment of high level cognitive skills. Pt reports she handles finances and medicine management independently at home.  -AL            Communication Treatment Objective and Progress Goals (SLP)    Additional Goals -- -- -- Additional Goals (Group)  -AL            Additional Goals    Additional Goal Selection -- -- -- additional goal, SLP goal (free text)  -AL            Additional Goal (SLP)    Additional Goal, SLP -- -- -- Pt will participate in diagnostic treatment to further assess high-level cognitive skills in order to set appropriate treatment goals.  -AL     Time Frame (Additional Goal, SLP) -- -- -- by discharge  -AL           User Key  (r) = Recorded By, (t) = Taken By, (c) = Cosigned By    Initials Name Effective Dates    Ember Alejandro, MS CCC-SLP 06/16/21 -     Silva Jeter, MS CCC-SLP 06/16/21 -                    EDUCATION    The patient has been educated in the following areas:       Cognitive Impairment.             SLP GOALS     Row Name 07/05/22 1300 07/05/22 1042 07/04/22 1330       Reasoning Goal 1 (SLP)  "   Improve Reasoning Through Goal 1 (SLP) -- complete basic reasoning task;complete high level reasoning task;complete deductive reasoning task;complete mental flexibility task;complete logic/creative thinking task;90%;independently (over 90% accuracy)  -SL --    Time Frame (Reasoning Goal 1, SLP) -- by discharge  - --    Barriers (Reasoning Goal 1, SLP) 60% indep for 12 item deductive puzzle  pt stated- \"I don't do things like this\"  -SL -- --    Progress/Outcomes (Reasoning Goal 1, SLP) goal ongoing  -SL goal ongoing  -SL --    Comment (Reasoning Goal 1, SLP) 70% for making inferences from short paragraphs  -SL written IF THEN directives-90% INDEP  -SL --       Functional Problem Solving Skills Goal 1 (SLP)    Improve Problem Solving Through Goal 1 (SLP) -- use organization aids;name items for a task;sequence steps in a task;complete organization/home management task;90%;independently (over 90% accuracy)  -SL --    Time Frame (Problem Solving Goal 1, SLP) -- by discharge  - --    Progress/Outcomes (Problem Solving Goal 1, SLP) -- continuing progress toward goal  -SL --    Comment (Problem Solving Goal 1, SLP) -- medication dosage amts and times- 100% indep  -SL --       Functional Math Skills Goal 1 (SLP)    Improve Functional Math Skills Through Goal 1 (SLP) -- complete word problems involving time;complete word problems involving money;complete checkbook task;complete functional math task;90%;independently (over 90% accuracy)  -SL --    Time Frame (Functional Math Skills Goal 1, SLP) -- by discharge  - --    Barriers (Functional Math Skills Goal 1, SLP) time related activity- answering ?'s re: bank hours/location chart- 83% indep; 100% with 1 x cue for detail  -SL -- --    Progress/Outcomes (Functional Math Skills Goal 1, SLP) goal ongoing  - -- --    Comment (Functional Math Skills Goal 1, SLP) 100% for determining denomination amounts  -SL -- --       Executive Functional Skills Goal 1 (SLP)    Improve " Executive Function Skills Goal 1 (SLP) -- perform self-evaluation;perform self-correction;exhibit cognitive flexibility;home management activity;complex organization/planning activity;time management activity;organization/planning activity;90%;independently (over 90% accuracy)  -SL --    Time Frame (Executive Function Skills Goal 1, SLP) -- by discharge  -SL --    Progress/Outcomes (Executive Function Skills Goal 1, SLP) -- goal ongoing  -SL --    Comment (Executive Function Skills Goal 1, SLP) -- 60% indep for scheduling and planning task based on paragraph ( bryan's schedule)  -SL --       Additional Goal (SLP)    Additional Goal, SLP -- -- Pt will participate in diagnostic treatment to further assess high-level cognitive skills in order to set appropriate treatment goals.  -AL    Time Frame (Additional Goal, SLP) -- -- by discharge  -AL          User Key  (r) = Recorded By, (t) = Taken By, (c) = Cosigned By    Initials Name Provider Type    Ember Alejandro MS CCC-SLP Speech and Language Pathologist    Silva Jeter MS CCC-SLP Speech and Language Pathologist                            Time Calculation:        Time Calculation- SLP     Row Name 07/05/22 1443 07/05/22 1103          Time Calculation- SLP    SLP Start Time 1230  -SL 1030  -     SLP Stop Time 1300  -SL 1100  -     SLP Time Calculation (min) 30 min  -SL 30 min  -           User Key  (r) = Recorded By, (t) = Taken By, (c) = Cosigned By    Initials Name Provider Type    Ember Alejandro, MS CCC-SLP Speech and Language Pathologist                  Therapy Charges for Today     Code Description Service Date Service Provider Modifiers Qty    45861924482 HC ST DEV OF COGN SKILLS INITIAL 15 MIN 7/5/2022 Ember Werner MS CCC-SLP  1    06510976003 HC ST DEV OF COGN SKILLS EACH ADDT'L 15 MIN 7/5/2022 Ember Werner MS CCC-SLP  1    18823350718 HC ST DEV OF COGN SKILLS EACH ADDT'L 15 MIN 7/5/2022 Ember Werner, MS CCC-SLP  2                           Ember Werner,  MS CCC-SLP  7/5/2022

## 2022-07-05 NOTE — PROGRESS NOTES
Physical Medicine and Rehabilitation  Inpatient Rehabilitation Interdisciplinary Plan of Care    Demographics            Age: 71Y            Gender: Female    Admission Date: 7/2/2022 6:47:00 PM  Rehabilitation Diagnosis:  Stroke, Right Joss  Right-sided weakness secondary to CVA    Type 2 diabetes mellitus.    Chronic back pain.    Hyperlipidemia.    Hypertension.      Plan of Care  Anticipated Discharge Date/Estimated Length of Stay: 2 weeks  Anticipated Discharge Destination: Community discharge with assistance  Discharge Plan : Return home with 24/7 assist of spouse and intermittent  assistance of daughter.  Medical Necessity Expected Level Rationale: Goals are to achieve a level of Mod  I with mobility and ADL's.  Rehab prognosis fair.  Medical prognosis fair.  Intensity and Duration: an average of 3 hours/5 days per week  Medical Supervision and 24 Hour Rehab Nursing: x  Physical Therapy: x  PT Intensity/Duration: 1 hour / day, 5 days / week, for approximately 2 weeks.  Occupational Therapy: x  OT Intensity/Duration: 1 hour / day, 5 days / week, for approximately 2 weeks.  Speech and Language Therapy: x  SLP Intensity/Duration: 1 hour / day, 5 days / week, for approximately 2 weeks.  Social Work: x  Therapeutic Recreation: x  Psychology: x  Registered Dietician: x  Updated (if changes indicated)  No changes to plan.    Based on the patient's medical and functional status, their prognosis and  expected level of functional improvement is: Goals are to achieve a level of Mod  I with mobility and ADL's.  Rehab prognosis fair.  Medical prognosis fair.    Interdisciplinary Problem/Goals/Status  Body Systems    [RN] Endocrine(Active)  Current Status(07/04/2022): Blood glucose uncontrolled  Weekly Goal(07/12/2022): Blood glucose WNL  Discharge Goal: Blood glucose controlled        Cognition    [ST] Memory(Active)  Current Status(07/04/2022): Pt scored WNL for her age on CLQT in domains of  attention, memory, executive  functions, language and visuospatial skills;  however, she exhibited mild difficulty with story retell task, alternating  attention and maze completion.  Weekly Goal(07/12/2022): Complete additional assessment of high-level  cognitive-linguistic skills.  Discharge Goal: Pt will be able to complete household management tasks  independently.        Mobility    [PT] Stairs(Active)  Current Status(07/03/2022): NT  Weekly Goal(07/08/2022): 2 platform steps, AAD, Min  Discharge Goal: 2 platform steps, AAD, CG    [PT] Walk(Active)  Current Status(07/03/2022): 120 ft, Min, rwx  Weekly Goal(07/08/2022): 150 ft, AAD, CG  Discharge Goal: 250 ft, AAD, sup    [PT] Bed/Chair/Wheelchair(Active)  Current Status(07/03/2022): Min, rwx  Weekly Goal(07/08/2022): CG, AAD  Discharge Goal: SUP, AAD    [OT] Toilet Transfers(Active)  Current Status(07/04/2022): MIN  Weekly Goal(07/11/2022): CGA  Discharge Goal: SUpervision    [OT] Tub/Shower Transfers(Active)  Current Status(07/04/2022): MIN  Weekly Goal(07/11/2022): CGA  Discharge Goal: Supervision/MOD I        Psychosocial    [RN] Coping/Adjustment(Active)  Current Status(07/04/2022): Pt A&Ox4, calm and looking forward to starting  rehab. Supportive family.  Weekly Goal(07/12/2022): Pt will demonstrate positive coping mechanism  Discharge Goal: Coping with current health status        Safety    [RN] Potential for Injury(Active)  Current Status(07/04/2022): Risk for fall r/t recent stroke with R hemiparesis  and unsteady gait  Weekly Goal(07/12/2022): Call light will be used for assistance  Discharge Goal: No falls/injuries        Self Care    [OT] Bathing(Active)  Current Status(07/04/2022): MIN  Weekly Goal(07/11/2022): CGA  Discharge Goal: Supervision/MOD I    [OT] Dressing (Lower)(Active)  Current Status(07/04/2022): MIN  Weekly Goal(07/11/2022): CGA  Discharge Goal: Indep    [OT] Dressing (Upper)(Active)  Current Status(07/04/2022): CGA  Weekly Goal(07/11/2022): set up  Discharge  Goal: Indep    [OT] Grooming(Active)  Current Status(07/04/2022): CGA  Weekly Goal(07/11/2022): Set up  Discharge Goal: Indep    [OT] Toileting(Active)  Current Status(07/04/2022): MIN  Weekly Goal(07/11/2022): CGA  Discharge Goal: SUp/MOD I        Sphincter Control    [RN] Bladder Management(Active)  Current Status(07/04/2022): Continent 100%  Weekly Goal(07/07/2022): Continent 100%  Discharge Goal: Continent 100%    [RN] Bowel Management(Active)  Current Status(07/04/2022): Continent 100%  Weekly Goal(07/07/2022): Continent 100%  Discharge Goal: Continent 100%      Comments:    Signed by: Tanmay Espinoza MD

## 2022-07-05 NOTE — PROGRESS NOTES
Discharge Planning Assessment  [facility]     Patient Name: Maryellen Crump  MRN: [id number]  Today's Date: 7/5/2022    Admit Date: 7/2/2022     Discharge Needs Assessment    No documentation.                Discharge Plan     Row Name 07/05/22 1623       Plan    Plan To d/c home with patient's  who can provide 24/7 care.    Patient/Family in Agreement with Plan yes    Provided Post Acute Provider List? --  SW will give patient closer to d/c    Provided Post Acute Provider Quality & Resource List? --  SW will give patient closer to d/c    Plan Comments SW completed assessment with patient and her  and discussed the rehab program. Patient lives in a 2 story home with her . There is 1 step to enter the home with 14 steps to the basement. Patient's bed and bath are on the 1st floor. Patient was independent with all ADL and ambulation before hospitalization. Patient was able to drive, manage her medication and finances independently. Patient is retired from being a . Patient is very active and enjoys running, walking, and excercising at the gym. Patient did not use any assistive devices before hospitalization.              Continued Care and Services - Admitted Since 7/2/2022    Coordination has not been started for this encounter.     Selected Continued Care - Prior Encounters Includes selections from prior encounters from 4/3/2022 to 7/5/2022    Discharged on 7/2/2022 Admission date: 6/29/2022 - Discharge disposition: Rehab Facility or Unit ([facility])    Destination     Service Provider Selected Services Address Phone Fax Patient Preferred    [facility] ACUTE REHAB PROGRAM  Inpatient Rehabilitation [address] [place], [place] [id number] [phone] -- --                       Demographic Summary    No documentation.                Functional Status     Row Name 07/05/22 1554       Functional Status    Current Activity Tolerance fair     Functional Status Comments Patient was independent in the community and worked full time before hospitalization.       Functional Status, IADL    Medications independent    Meal Preparation independent    Housekeeping independent    Laundry independent    Shopping independent    IADL Comments Patient was independent with managing all ADLs before hospitalization.       Mental Status    General Appearance WDL appearance    General Appearance dress appropriate for weather/appropriate for setting;well-kept, clean       Mental Status Summary    Recent Changes in Mental Status/Cognitive Functioning no changes    Mental Status Comments Patient is motivated to get better and states she is not depressed or anxious.       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               Psychosocial     Row Name 07/05/22 1600       Values/Beliefs    Spiritual, Cultural Beliefs, Church Practices, Values that Affect Care no       Behavior WDL    Behavior WDL motor movement;interactions    Interactions appropriate to situation;cooperative;eye contact appropriate    Motor Movement no unusual gestures/mannerisms       Emotion Mood WDL    Emotion/Mood/Affect WDL emotion mood;affect    Affect affect consistent with mood    Emotion/Mood appropriate to situation       Speech WDL    Speech WDL speech    Speech clear, coherent;articulate;effective       Perceptual State WDL    Perceptual State WDL perceptual state    Perceptual State consistent with reality       Thought Process WDL    Thought Process WDL thought content;judgment and insight;thought process    Judgment and Insight judgment appropriate to situation;insight appropriate to situation    Thought Content logical;relevant    Thought Process linear thought process;relevant;logical       Intellectual Performance WDL    Intellectual Performance WDL level  of consciousness;intellectual performance    Intellectual Performance able to comprehend    Level of Consciousness Alert       Coping/Stress    Major Change/Loss/Stressor loss of independence    Patient Personal Strengths able to adapt;courageous;expressive of emotions;expressive of needs;javi/spirituality;strong support system;resilient;positive attitude;motivated    Sources of Support adult child(david);spouse    Techniques to [place] with Loss/Stress/Change exercise;diversional activities;spiritual practice(s)    Reaction to Health Status accepting;motivated;hopeful;realistic    Understanding of Condition and Treatment adequate understanding of treatment;adequate understanding of medical condition    Coping/Stress Comments Patient understands her medical condition and treatment for d/c.       Developmental Stage (Eriksson's)    Developmental Stage Stage 8 (65 years-death/Late Adulthood) Integrity vs. Despair       Violence Risk    Feels Like Hurting Others no    Previous Attempt to Harm Others no               Abuse/Neglect     Row Name 07/05/22 1604       Personal Safety    Feels Unsafe at Home or Work/School no    Feels Threatened by Someone no    Does Anyone Try to Keep You From Having Contact with Others or Doing Things Outside Your Home? no    Physical Signs of Abuse Present no               Legal     Row Name 07/05/22 1604       Financial/Legal    Source of Income pension/MC[employer]    Financial/Environmental Concerns insurance, none    Who Manages Finances if Patient Unable Patient's , Neal.       Legal    Criminal Activity/Legal Involvement none               Substance Abuse     Row Name 07/05/22 1610       Substance Use    Substance Use Status other (see comments)  Patient uses alcohol recreationally.    Last Alcohol Use 06/28/22    Environment Typically Uses Alcohol with group of people    Reported Characteristics of Alcohol Use other (see comments)  Patient uses alcohol recreationally.     Readiness to Change Alcohol Use other (see comments)  Patient does not have an alcohol problem.    Attempts to Quit Alcohol none    Alcohol Withdrawal Pattern other (see comments)  N/A    Previous Substance Use Treatment none       AUDIT-C (Alcohol Use Disorders ID Test)    Q1: How often do you have a drink containing alcohol? 2-3 per wk    Q2: How many drinks containing alcohol do you have on a typical day when you are drinking? 1 or 2    Q3: How often do you have six or more drinks on one occasion? Never    Audit-C Score 3       Family Member Substance Use (#4)    Reported Type of Substances alcohol    Alcohol Type wine    Reported Level of Alcohol Use social    Previous Substance Use Treatment none    Substance Use Comments Patient only drinks recreationally.               Patient Forms    No documentation.                   JAMES Rivera

## 2022-07-05 NOTE — PROGRESS NOTES
Name: Maryellen Crump ADMIT: 2022   : 1950  PCP: Juni Cortez MD    MRN: [id number] LOS: 3 days   AGE/SEX: 71 y.o. female  ROOM: [facility]     Subjective   Subjective   Seen this morning.  No acute events overnight.  Denies any complaints.  Weakness about the same.    Review of Systems   as above  Objective   Objective   Vital Signs  Temp:  [97.3 °F (36.3 °C)-98.4 °F (36.9 °C)] 97.3 °F (36.3 °C)  Heart Rate:  [66-80] 72  Resp:  [17-18] 18  BP: (122-139)/(69-78) 139/69  SpO2:  [96 %-98 %] 96 %  on   ;   Device (Oxygen Therapy): room air  Body mass index is 20.02 kg/m².  Physical Exam    General: Alert,no acute distress  CV: Regular rate and rhythm, no murmurs rubs or gallops  Lungs: Clear to auscultation bilaterally, no crackles or wheezes  Abdomen: Soft, nontender, nondistended  Extremities: No significant peripheral edema , no cyanosis.  Right upper and lower extremity weakness.      Results Review     I reviewed the patient's new clinical results.  Results from last 7 days   Lab Units 22  0942 22  0453   WBC 10*3/mm3 6.93 8.96 6.09 6.67   HEMOGLOBIN g/dL 11.8* 10.7* 10.4* 10.6*   PLATELETS 10*3/mm3 281 259 256 255     Results from last 7 days   Lab Units 22  0942 22  0453   SODIUM mmol/L 136 140 139 138   POTASSIUM mmol/L 4.0 4.2 4.0 3.8   CHLORIDE mmol/L 97* 106 103 101   CO2 mmol/L 25.0 24.0 24.0 25.0   BUN mg/dL 14 15 15 14   CREATININE mg/dL 1.11* 1.02* 1.07* 1.11*   GLUCOSE mg/dL 272* 136* 142* 108*   Estimated Creatinine Clearance: 37.1 mL/min (A) (by C-G formula based on SCr of 1.11 mg/dL (H)).  Results from last 7 days   Lab Units 22  0453 22  1603   ALBUMIN g/dL 3.60 4.10   BILIRUBIN mg/dL 0.5 0.6   ALK PHOS U/L 72 92   AST (SGOT) U/L 16 15   ALT (SGPT) U/L 11 13     Results from last 7 days   Lab Units 22  0942 22  0613 22  0454 22  0453 22  1603   CALCIUM mg/dL  10.2 9.3 9.8 9.7 10.9*   ALBUMIN g/dL  --   --   --  3.60 4.10       COVID19   Date Value Ref Range Status   07/02/2022 Not Detected Not Detected - Ref. Range Final   06/29/2022 Not Detected Not Detected - Ref. Range Final     Glucose   Date/Time Value Ref Range Status   07/05/2022 1142 188 (H) 70 - 130 mg/dL Final     Comment:     Meter: [id number] : [id number] Daniel Russo NA   07/05/2022 0618 124 70 - 130 mg/dL Final     Comment:     Meter: [id number] : [id number] Laureano Irvin    07/04/2022 2107 221 (H) 70 - 130 mg/dL Final     Comment:     Meter: [id number] : [id number] Laureano Irvin    07/04/2022 1539 122 70 - 130 mg/dL Final     Comment:     Meter: [id number] : [id number] [facility]   07/04/2022 1022 255 (H) 70 - 130 mg/dL Final     Comment:     Meter: [id number] : [id number] Tona Hewitt A   07/04/2022 0631 119 70 - 130 mg/dL Final     Comment:     Meter: [id number] : [id number] Laureano Irvin    07/03/2022 2032 111 70 - 130 mg/dL Final     Comment:     Meter: [id number] : [id number] Laureano Irvin        CT Angiogram Head, CT Angiogram Neck  Narrative: CT ANGIOGRAM NECK AND HEAD WITH CONTRAST     HISTORY: Right-sided weakness.     COMPARISON: None.     FINDINGS: The brain and ventricles are symmetrical. There is  age-appropriate atrophy. Mild-to-moderate vascular calcification is  noted. No focal area of decreased attenuation to suggest acute  infarction is identified.     A CT angiogram of the neck and head was then performed. Multiplanar, as  well as 3-dimensional reconstructions were generated.     The great vessels are arranged in a classic configuration. There is 0%  stenosis involving the carotid bifurcations. Vascular calcification  involving the carotid siphons are noted without high-grade stenosis. The  proximal aspects of the anterior and middle cerebral arteries appear  unremarkable.     Both vertebral arteries were opacified. The left vertebral  artery is  larger than that of the right. The basilar artery and the proximal  aspects of the posterior cerebral arteries appear unremarkable. A  standard postcontrast CT examination of the brain showed no evidence of  abnormal enhancement.     There is moderate-to-severe loss of disc height at C5-C6.     Impression: No evidence of acute infarction or of intracranial  hemorrhage. Further evaluation for infarction could be performed with  MRI examination of the brain. There is 0% stenosis involving the carotid  bifurcations. There is no evidence of proximal intracranial arterial  high-grade stenosis or of aneurysm.         Radiation dose reduction techniques were utilized, including automated  exposure control and exposure modulation based on body size.     This report was finalized on 6/30/2022 5:44 PM by Dr. Marek Schulte M.D.     Adult transthoracic echo complete  · Calculated left ventricular EF = 59.2% Estimated left ventricular EF was   in agreement with the calculated left ventricular EF. Left ventricular   systolic function is normal.  · Left ventricular diastolic function was normal.  · Saline test results are positive with valsalva manuever for right to   left atrial level shunt suggesting presence of a small PFO.  · No valvular masses noted on transthoracic echocardiogram.     MRI Thoracic Spine Without Contrast  Narrative: THORACIC SPINE MRI WITHOUT GADOLINIUM     HISTORY: Ataxia, thoracic trauma; I63.9-Cerebral infarction, unspecified     COMPARISON:  None.     FINDINGS:  Multiplanar images of the thoracic spine obtained without  gadolinium.  Axial images obtained from T1-L1.     No acute fracture or subluxation of the thoracic spine is seen. Marrow  signal appears unremarkable. No cord signal abnormalities are seen.  Conus terminates at L1. There is some intervertebral disc space  narrowing noted at multiple levels. There is no evidence of canal  stenosis or neural foraminal narrowing at any level.      Impression: 1. As above.     This report was finalized on 6/30/2022 1:12 AM by Dr. Shaila Tilley M.D.       Scheduled Medications  aspirin, 81 mg, Oral, Daily  atorvastatin, 80 mg, Oral, Nightly  clopidogrel, 75 mg, Oral, Daily  ferrous sulfate, 325 mg, Oral, Every Other Day  hydroCHLOROthiazide, 12.5 mg, Oral, Daily  insulin glargine, 10 Units, Subcutaneous, Daily  insulin lispro, 0-7 Units, Subcutaneous, TID AC  pantoprazole, 40 mg, Oral, Q AM  polyethylene glycol, 17 g, Oral, Daily  ramipril, 10 mg, Oral, Daily  vitamin B-12, 1,000 mcg, Oral, Daily    Infusions   Diet  Diet Regular; Cardiac, Consistent Carbohydrate       Assessment/Plan     Active Hospital Problems    Diagnosis  POA   • CKD (chronic kidney disease) stage 3, GFR 30-59 ml/min ([facility]) [N18.30]  Unknown   • CVA (cerebral vascular accident) ([facility]) [I63.9]  Yes   • Vitamin B12 deficiency [E53.8]  Yes   • Acute lacunar stroke ([facility]) [I63.81]  Yes   • Acute CVA (cerebrovascular accident) ([facility]) [I63.9]  Yes   • Type 2 diabetes mellitus with kidney complication, without long-term current use of insulin ([facility]) [E11.29]  Yes   • Hypertension [I10]  Yes   • Hyperlipidemia [E78.5]  Yes   • Iron deficiency anemia [D50.9]  Yes      Resolved Hospital Problems   No resolved problems to display.       Ms. Crump is a 71 y.o. female with a history of hypertension, hyperlipidemia and diabetes mellitus that presents to [facility] complaining of right arm and leg weakness.  MRI showed stroke in the left subcortical region and corona radiata.    Acute lacunar stroke: Continue aspirin, Plavix, statin.  PT OT.  Follow-up with outpatient neurology after discharge.    Essential hypertension: Controlled on ramipril 10 mg milligrams daily and HCTZ 12.5 mg daily.  Patient is on ramipril 10 units twice daily at home, discussed with patient to take 10 mg daily at home only after BP stable on current regimen.      DM type II: Hemoglobin A1c 6.9.  Continue sliding  scale insulin, Lantus 10 units daily.  Holding home p.o. meds.  Resume at discharge.    Abnormal thyroid levels: TSH slightly elevated 4.6, free T4 normal.  Will need repeat levels in 6-8 weeks to monitor, around mid July.  08/15    Anemia: Iron studies continue iron deficiency.  Continue p.o. iron.  Hemoglobin stable.    Vitamin B12 deficiency: Vitamin B 12 low at 204, ordered one-time IV B12 followed by daily p.o. supplement.  Needs repeat labs in about 3 months to monitor response.  Metformin can cause vitamin B12 deficiency.  If vitamin B12 still low we will oral supplements metformin will need to be discontinued.    GERD:  on pantoprazole 20 mg daily, resume home omeprazole at discharge.      CKD stage III: Creatinine stable.  Avoid nephrotoxic drugs.    · SCDs for DVT prophylaxis.  · Full code.          I wore protective equipment throughout this patient encounter including a face mask, gloves and protective eyewear.  Hand hygiene was performed before donning protective equipment and after removal when leaving the room.      Dictated utilizing Dragon dictation        Steve Ricci MD  [facility]  07/05/22  13:01 EDT

## 2022-07-05 NOTE — THERAPY TREATMENT NOTE
Inpatient Rehabilitation - Occupational Therapy Treatment Note    [facility]     Patient Name: Maryellen Crump  : 1950  MRN: [id number]    Today's Date: 2022                 Admit Date: 2022         ICD-10-CM ICD-9-CM   1. Impaired functional mobility, balance, gait, and endurance  Z74.09 V49.89       Patient Active Problem List   Diagnosis   • Family history of colon cancer   • Iron deficiency anemia   • Diarrhea of presumed infectious origin   • Type 2 diabetes mellitus with kidney complication, without long-term current use of insulin (HCC)   • Hypertension   • Hyperlipidemia   • Stroke-like symptoms   • Acute CVA (cerebrovascular accident) (HCC)   • Acute lacunar stroke (HCC)   • Vitamin B12 deficiency   • CVA (cerebral vascular accident) (HCC)   • CKD (chronic kidney disease) stage 3, GFR 30-59 ml/min (HCC)       Past Medical History:   Diagnosis Date   • Diabetes mellitus (HCC)    • Hyperlipidemia    • Hypertension        Past Surgical History:   Procedure Laterality Date   • COLONOSCOPY N/A 2018    Procedure: COLONOSCOPY TO CECUM;  Surgeon: Josh Muñoz MD;  Location: [facility] ENDOSCOPY;  Service: General   • DILATION AND CURETTAGE, DIAGNOSTIC / THERAPEUTIC     • ENDOSCOPY N/A 2018    Procedure: ESOPHAGOGASTRODUODENOSCOPY WITH BIOPSIES;  Surgeon: Josh Muñoz MD;  Location: [facility] ENDOSCOPY;  Service: General   • ROTATOR CUFF REPAIR Right              IRF OT ASSESSMENT FLOWSHEET (last 12 hours)     IRF OT Evaluation and Treatment     Row Name 22 1309 22 1037       OT Time and Intention    Document Type daily treatment  -SG daily treatment  -SG    Mode of Treatment occupational therapy  -SG individual therapy;occupational therapy  -SG    Patient Effort good  -SG good  -SG    Symptoms Noted During/After Treatment none  -SG fatigue  -SG    Row Name 22 1309 22 1037       General Information    Patient/Family/Caregiver Comments/Observations Pt up in chair  in room  - Pt sitting up in chair  -    Existing Precautions/Restrictions fall  -SG fall  -    Row Name 07/05/22 1309 07/05/22 1037       Pain Assessment    Pretreatment Pain Rating 0/10 - no pain  - 0/10 - no pain  -    Row Name 07/05/22 1309          Cognition/Psychosocial    Affect/Mental Status (Cognition) WNL  -     Orientation Status (Cognition) oriented x 4  -SG     Follows Commands (Cognition) follows two-step commands  -     Personal Safety Interventions gait belt;fall prevention program maintained  -     Row Name 07/05/22 1309          Strength (Manual Muscle Testing)    Left Hand, Setting 2 (Dynamometer Testing) 36  -SG     Right Hand, Setting 2 (Dynamometer Testing) 29  -SG     Left Hand: Tip (Pincer) Pinch Strength (Pinch Dynamometer Testing) 13  -SG     Left Hand: Lateral (Key) Pinch Strength (Pinch Dynamometer Testing) 10  -SG     Right Hand: Tip (Pincer) Pinch Strength (Pinch Dynamometer Testing) 2  -SG     Right Hand: Lateral (Key) Pinch Strength (Pinch Dynamometer Testing) 15  -     Row Name 07/05/22 1309          Strength Comprehensive (MMT)    General Manual Muscle Testing (MMT) Assessment hand strength deficits identified  -     Hand Strength Assessment hand strength (manual muscle testing)  -     Row Name 07/05/22 1037          Bathing    [place] Level (Bathing) bathing skills;upper body;lower body;contact guard assist  -     Assistive Device (Bathing) grab bar/tub rail;hand held shower spray hose;shower chair  -     Position (Bathing) supported sitting;supported standing  -     Set-up Assistance (Bathing) adjust water temperature;obtain supplies;open containers  -     Row Name 07/05/22 1037          Upper Body Dressing    [place] Level (Upper Body Dressing) upper body dressing skills;set up assistance  -     Position (Upper Body Dressing) supported sitting  -     Row Name 07/05/22 1037          Lower Body Dressing    [place] Level (Lower Body  Dressing) pants/bottoms;shoes/slippers;underwear;contact guard assist;socks;minimum assist (75% patient effort)  -     Position (Lower Body Dressing) supported sitting;supported standing  -     Row Name 07/05/22 1037          Grooming    [place] Level (Grooming) deodorant application;hair care, combing/brushing;oral care regimen;wash face, hands;standby assist  -     Position (Grooming) sink side;supported sitting  -     Row Name 07/05/22 1037          Toileting    [place] Level (Toileting) adjust/manage clothing;perform perineal hygiene;verbal cues;minimum assist (75% patient effort)  -     Assistive Device Use (Toileting) grab bar/safety frame  -     Position (Toileting) supported sitting;supported standing  -     Comment (Toileting) A to hike pants on R side  -[facility] Name 07/05/22 1037          Bed Mobility    Comment, (Bed Mobility) NT UIC  -[facility] Name 07/05/22 1037          Functional Mobility    Functional Mobility- Ind. Level minimum assist (75% patient effort);verbal cues required  -     Functional Mobility- Device walker, front-wheeled  -     Functional Mobility- Comment Walks from chair to hallway shower room and back to room  -     Row Name 07/05/22 1037          Transfer Assessment/Treatment    Transfers sit-stand transfer;stand-sit transfer;toilet transfer;shower transfer  -[facility] Name 07/05/22 1037          Transfers    Sit-Stand [place] (Transfers) minimum assist (75% patient effort);verbal cues  -     Stand-Sit [place] (Transfers) contact guard  -     [place] Level (Toilet Transfer) contact guard;verbal cues  -     Assistive Device (Toilet Transfer) grab bars/safety frame  -     [place] Level (Shower Transfer) minimum assist (75% patient effort);verbal cues  -     Assistive Device (Shower Transfer) walker, front-wheeled;tub bench  -     Row Name 07/05/22 1037          Sit-Stand Transfer    Assistive Device (Sit-Stand  Transfers) walker, front-wheeled  -     Row Name 07/05/22 1037          Stand-Sit Transfer    Assistive Device (Stand-Sit Transfers) walker, front-wheeled  -     Row Name 07/05/22 1037          Toilet Transfer    Type (Toilet Transfer) stand pivot/stand step  -     Row Name 07/05/22 1037          Shower Transfer    Type (Shower Transfer) stand pivot/stand step  -     Row Name 07/05/22 1309          Motor Skills    Results, 9 Hole Peg Test of Fine Motor Coordination BOX AND BLOCKS R34 L52; 9 HOLE PEG R 2:34 L 31  -     Therapeutic Exercise hand  -     Row Name 07/05/22 1309          Hand (Therapeutic Exercise)    Hand (Therapeutic Exercise) AROM (active range of motion)  -     Hand AROM/AAROM (Therapeutic Exercise) right;AROM (active range of motion);finger flexion;finger aBduction;finger aDduction;thumb extension;thumb opposition;5 repetitions  -     Row Name 07/05/22 1309 07/05/22 1037       Positioning and Restraints    Pre-Treatment Position sitting in chair/recliner  - sitting in chair/recliner  -SG    Post Treatment Position chair  - chair  -SG    In Chair sitting;call light within reach;encouraged to call for assist;exit alarm on  -SG sitting;call light within reach;encouraged to call for assist;exit alarm on  -SG          User Key  (r) = Recorded By, (t) = Taken By, (c) = Cosigned By    Initials Name Effective Dates     Shira Wong OTR 06/16/21 -                  Occupational Therapy Education                 Title: PT OT SLP Therapies (In Progress)     Topic: Occupational Therapy (In Progress)     Point: ADL training (Done)     Description:   Instruct learner(s) on proper safety adaptation and remediation techniques during self care or transfers.   Instruct in proper use of assistive devices.              Learning Progress Summary           Patient Acceptance, E, VU,NR by AF at 7/4/2022 8746    Comment: educated on safety with ADls and transfers                   Point: Home  exercise program (Not Started)     Description:   Instruct learner(s) on appropriate technique for monitoring, assisting and/or progressing therapeutic exercises/activities.              Learner Progress:  Not documented in this visit.          Point: Precautions (Not Started)     Description:   Instruct learner(s) on prescribed precautions during self-care and functional transfers.              Learner Progress:  Not documented in this visit.          Point: Body mechanics (Not Started)     Description:   Instruct learner(s) on proper positioning and spine alignment during self-care, functional mobility activities and/or exercises.              Learner Progress:  Not documented in this visit.                      User Key     Initials Effective Dates Name Provider Type Discipline    AF 06/16/21 -  Dolores Odom OTR Occupational Therapist OT                    OT Recommendation and Plan                         Time Calculation:      Time Calculation- OT     Row Name 07/05/22 1343 07/05/22 1040          Time Calculation- OT    OT Start Time 1300  -SG 0830  -SG     OT Stop Time 1330  -SG 0900  -     OT Time Calculation (min) 30 min  - 30 min  -     OT Received On 07/05/22  -SG 07/05/22  -           User Key  (r) = Recorded By, (t) = Taken By, (c) = Cosigned By    Initials Name Provider Type    SG Shira Wong OTMANNY Occupational Therapist              Therapy Charges for Today     Code Description Service Date Service Provider Modifiers Qty    26533352934 HC OT SELF CARE/MGMT/TRAIN EA 15 MIN 7/5/2022 Shira Wong OTR GO 2    23705926191 HC OT THERAPEUTIC ACT EA 15 MIN 7/5/2022 Shira Wong OTR GO 1    80411676660 HC OT THER PROC EA 15 MIN 7/5/2022 Shira Wong OTR GO 1                   RICH Day  7/5/2022

## 2022-07-05 NOTE — PLAN OF CARE
Goal Outcome Evaluation:  Plan of Care Reviewed With: patient           Outcome Evaluation: Maryellen has been alert and oriented x4, continent of b/b, and takes medication with water. 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 She is assist x1 with wheelchair, right sided weakness, and accucheck AC/HS- insulin given at lunch. No complaints of pain or indigestion today, tolerated all therapies and no safety issues.

## 2022-07-06 LAB
GLUCOSE BLDC GLUCOMTR-MCNC: 118 MG/DL (ref 70–130)
GLUCOSE BLDC GLUCOMTR-MCNC: 187 MG/DL (ref 70–130)
GLUCOSE BLDC GLUCOMTR-MCNC: 222 MG/DL (ref 70–130)
GLUCOSE BLDC GLUCOMTR-MCNC: 237 MG/DL (ref 70–130)

## 2022-07-06 PROCEDURE — 63710000001 INSULIN LISPRO (HUMAN) PER 5 UNITS: Performed by: STUDENT IN AN ORGANIZED HEALTH CARE EDUCATION/TRAINING PROGRAM

## 2022-07-06 PROCEDURE — 97110 THERAPEUTIC EXERCISES: CPT

## 2022-07-06 PROCEDURE — 97530 THERAPEUTIC ACTIVITIES: CPT

## 2022-07-06 PROCEDURE — 82962 GLUCOSE BLOOD TEST: CPT

## 2022-07-06 PROCEDURE — 97129 THER IVNTJ 1ST 15 MIN: CPT

## 2022-07-06 PROCEDURE — 97530 THERAPEUTIC ACTIVITIES: CPT | Performed by: OCCUPATIONAL THERAPIST

## 2022-07-06 PROCEDURE — 97130 THER IVNTJ EA ADDL 15 MIN: CPT

## 2022-07-06 RX ORDER — INSULIN LISPRO 100 [IU]/ML
0-9 INJECTION, SOLUTION INTRAVENOUS; SUBCUTANEOUS
Status: DISCONTINUED | OUTPATIENT
Start: 2022-07-07 | End: 2022-07-13 | Stop reason: HOSPADM

## 2022-07-06 RX ADMIN — RAMIPRIL 10 MG: 10 CAPSULE ORAL at 08:30

## 2022-07-06 RX ADMIN — INSULIN GLARGINE-YFGN 12 UNITS: 100 INJECTION, SOLUTION SUBCUTANEOUS at 08:16

## 2022-07-06 RX ADMIN — INSULIN LISPRO 2 UNITS: 100 INJECTION, SOLUTION INTRAVENOUS; SUBCUTANEOUS at 11:50

## 2022-07-06 RX ADMIN — Medication 1000 MCG: at 08:17

## 2022-07-06 RX ADMIN — INSULIN LISPRO 3 UNITS: 100 INJECTION, SOLUTION INTRAVENOUS; SUBCUTANEOUS at 17:04

## 2022-07-06 RX ADMIN — HYDROCHLOROTHIAZIDE 12.5 MG: 25 TABLET ORAL at 08:17

## 2022-07-06 RX ADMIN — ASPIRIN 81 MG: 81 TABLET, COATED ORAL at 08:17

## 2022-07-06 RX ADMIN — CLOPIDOGREL 75 MG: 75 TABLET, FILM COATED ORAL at 08:17

## 2022-07-06 RX ADMIN — POLYETHYLENE GLYCOL 3350 17 G: 17 POWDER, FOR SOLUTION ORAL at 08:17

## 2022-07-06 RX ADMIN — PANTOPRAZOLE SODIUM 40 MG: 40 TABLET, DELAYED RELEASE ORAL at 05:14

## 2022-07-06 RX ADMIN — ATORVASTATIN CALCIUM 80 MG: 80 TABLET, FILM COATED ORAL at 19:39

## 2022-07-06 NOTE — THERAPY TREATMENT NOTE
Inpatient Rehabilitation - Speech Language Pathology Treatment Note    [facility]     Patient Name: Maryellen Crump  : 1950  MRN: [id number]    Today's Date: 2022                   Admit Date: 2022       Visit Dx:      ICD-10-CM ICD-9-CM   1. Impaired functional mobility, balance, gait, and endurance  Z74.09 V49.89       Patient Active Problem List   Diagnosis   • Family history of colon cancer   • Iron deficiency anemia   • Diarrhea of presumed infectious origin   • Type 2 diabetes mellitus with kidney complication, without long-term current use of insulin (HCC)   • Hypertension   • Hyperlipidemia   • Stroke-like symptoms   • Acute CVA (cerebrovascular accident) (HCC)   • Acute lacunar stroke (HCC)   • Vitamin B12 deficiency   • CVA (cerebral vascular accident) (HCC)   • CKD (chronic kidney disease) stage 3, GFR 30-59 ml/min (HCC)       Past Medical History:   Diagnosis Date   • Diabetes mellitus (HCC)    • Hyperlipidemia    • Hypertension        Past Surgical History:   Procedure Laterality Date   • COLONOSCOPY N/A 2018    Procedure: COLONOSCOPY TO CECUM;  Surgeon: Josh Muñoz MD;  Location: [facility] ENDOSCOPY;  Service: General   • DILATION AND CURETTAGE, DIAGNOSTIC / THERAPEUTIC     • ENDOSCOPY N/A 2018    Procedure: ESOPHAGOGASTRODUODENOSCOPY WITH BIOPSIES;  Surgeon: Josh Muñoz MD;  Location: [facility] ENDOSCOPY;  Service: General   • ROTATOR CUFF REPAIR Right        SLP Recommendation and Plan                                                   SLP EVALUATION (last 72 hours)     SLP SLC Evaluation     Row Name 22 1300 22 1037 22 1300 22 1229 22 1042       Communication Assessment/Intervention    Document Type therapy note (daily note)  -SL therapy note (daily note)  -SL therapy note (daily note)  -SL therapy note (daily note)  -SL therapy note (daily note)  -SL    Subjective Information no complaints  -SL no complaints  -SL no complaints  -SL --  no complaints  -SL    Patient Observations alert;cooperative  -SL alert;cooperative;agree to therapy  -SL alert;cooperative;agree to therapy  -SL -- alert;cooperative;agree to therapy  -SL    Patient Effort good  -SL good  -SL good  -SL -- good  -SL    Symptoms Noted During/After Treatment none  -SL none  -SL none  -SL -- none  -SL    Row Name 07/04/22 7356                   Communication Assessment/Intervention    Document Type evaluation  -AL        Patient/Family/Caregiver Comments/Observations Pt sitting upright in bed.  -AL        Patient Effort excellent  -AL                  General Information    Patient Profile Reviewed yes  -AL        Pertinent History Of Current Problem Pt is a 71-year-old female admitted to [facility] inpatient rehab under diagnosis Stroke R Joss with onset date 6/29/22. MRI 6/29/22 showed foci of restricted diffusion within the left frontoparietal corona radiata and subcortical white matter. Pt report some difficulty with word finding; however, she reports feels it is age-related and she was experiencing it prior to stroke.  -AL        Precautions/Limitations, Vision WFL;for purposes of eval  -AL        Precautions/Limitations, Hearing WFL  -AL        Patient Level of Education High School. Retired. She worked as a  at the  level and assisting the visually impaired.  -AL        Prior Level of Function-Communication WFL  -AL        Plans/Goals Discussed with patient  -AL        Barriers to Rehab none identified  -AL        Patient's Goals for Discharge functional cognition;return to all previous roles/activities  -AL        Standardized Assessment Used CLQT  -AL                  Pain Scale: Numbers Pre/Post-Treatment    Pretreatment Pain Rating 0/10 - no pain  -AL                  Motor Speech Assessment/Intervention    Motor Speech, Comment No overt dysarthria observed. Minimal breathy vocal quality noted; suspect baseline level.  -AL                   Standardized Tests    Cognitive/Memory Tests CLQT: Cognitive Linguistic Quick Test  -AL                  CLQT (The Cognitive Linguistic Quick Test)    Attention Domain Score 177  -AL        Attention Severity Rating 4: WNL  -AL        Memory Domain Score 148  -AL        Memory Severity Rating 4: WNL  -AL        Executive Function Domain Score 26  -AL        Executive Function Severity Rating 4: WNL  -AL        Language Domain Score 30  -AL        Language Severity Rating 4: WNL  -AL        Visuospatial Domain Score 80  -AL        Visuospatial Severity Rating 4: WNL  -AL        Clock Drawing Total Score 11  -AL        Clock Drawing Severity Rating WNL  -AL        Composite Severity Rating 4.0  -AL        Composite Severity Rating Range 4.0 - 3.5: WNL  -AL        CLQT Comments Pt exhibited cognitive-linguistic skills overall WNL for her age. She did exhibit mild difficulty with recall of a story, alternating attention for trail-making and planning for maze completion; however, scores are above the criterion cut-off scores for her age. Recommend further assessment of high level cognitive skills. Pt reports she handles finances and medicine management independently at home.  -AL                  Communication Treatment Objective and Progress Goals (SLP)    Additional Goals Additional Goals (Group)  -AL                  Additional Goals    Additional Goal Selection additional goal, SLP goal (free text)  -AL                  Additional Goal (SLP)    Additional Goal, SLP Pt will participate in diagnostic treatment to further assess high-level cognitive skills in order to set appropriate treatment goals.  -AL        Time Frame (Additional Goal, SLP) by discharge  -AL              User Key  (r) = Recorded By, (t) = Taken By, (c) = Cosigned By    Initials Name Effective Dates    Ember Alejandro, MS CCC-SLP 06/16/21 -     Silva Jeter, MS CCC-SLP 06/16/21 -                    EDUCATION    The patient has been educated in  "the following areas:       Cognitive Impairment.             [facility] GOALS     Row Name 07/06/22 1300 07/06/22 1038 07/05/22 1300       Memory Skills Goal 1 (SLP)    Progress/Outcomes (Memory Skills Goal 1, SLP) continuing progress toward goal;goal ongoing  - continuing progress toward goal  -SL --    Comment (Memory Skills Goal 1, SLP) 70% for immediate recall of details from short paragraph;  100% for visual recall- details from picture scene  -% for 4 word category inclusion task; 90% for 5 word stimulus  -SL --       Reasoning Goal 1 (SLP)    Barriers (Reasoning Goal 1, SLP) -- -- 60% indep for 12 item deductive puzzle  pt stated- \"I don't do things like this\"  -SL    Progress/Outcomes (Reasoning Goal 1, SLP) -- -- goal ongoing  -    Comment (Reasoning Goal 1, SLP) -- -- 70% for making inferences from short paragraphs  -SL       Functional Problem Solving Skills Goal 1 (SLP)    Progress/Outcomes (Problem Solving Goal 1, SLP) continuing progress toward goal;goal ongoing  - continuing progress toward goal  -SL --    Comment (Problem Solving Goal 1, SLP) 80% without cues for multistep directives and use of hospital map  -% without cues  for sequencing of 6 steps/senteces for stories-  -SL --       Functional Math Skills Goal 1 (SLP)    Barriers (Functional Math Skills Goal 1, SLP) -- -- time related activity- answering ?'s re: bank hours/location chart- 83% indep; 100% with 1 x cue for detail  -SL    Progress/Outcomes (Functional Math Skills Goal 1, SLP) -- goal ongoing  -SL goal ongoing  -SL    Comment (Functional Math Skills Goal 1, SLP) -- measurement conversions/calculations-75% indep; 100% with min cues for task analysis and breakdown at [facility]  -% for determining denomination amounts  -SL       Executive Functional Skills Goal 1 (SLP)    Comment (Executive Function Skills Goal 1, SLP) thought flexibility task- word altering- 4 different restrictions/configurations- 80% indep; 100% with min " cues;  repetition of task requirements needed intially- impaired working memory noted  -SL -- --    Row Name 07/05/22 1042 07/04/22 1330          Reasoning Goal 1 (SLP)    Improve Reasoning Through Goal 1 (SLP) complete basic reasoning task;complete high level reasoning task;complete deductive reasoning task;complete mental flexibility task;complete logic/creative thinking task;90%;independently (over 90% accuracy)  -SL --     Time Frame (Reasoning Goal 1, SLP) by discharge  -SL --     Progress/Outcomes (Reasoning Goal 1, SLP) goal ongoing  -SL --     Comment (Reasoning Goal 1, SLP) written IF THEN directives-90% INDEP  -SL --            Functional Problem Solving Skills Goal 1 (SLP)    Improve Problem Solving Through Goal 1 (SLP) use organization aids;name items for a task;sequence steps in a task;complete organization/home management task;90%;independently (over 90% accuracy)  -SL --     Time Frame (Problem Solving Goal 1, SLP) by discharge  -SL --     Progress/Outcomes (Problem Solving Goal 1, SLP) continuing progress toward goal  -SL --     Comment (Problem Solving Goal 1, SLP) medication dosage amts and times- 100% indep  -SL --            Functional Math Skills Goal 1 (SLP)    Improve Functional Math Skills Through Goal 1 (SLP) complete word problems involving time;complete word problems involving money;complete checkbook task;complete functional math task;90%;independently (over 90% accuracy)  -SL --     Time Frame (Functional Math Skills Goal 1, SLP) by discharge  -SL --            Executive Functional Skills Goal 1 (SLP)    Improve Executive Function Skills Goal 1 (SLP) perform self-evaluation;perform self-correction;exhibit cognitive flexibility;home management activity;complex organization/planning activity;time management activity;organization/planning activity;90%;independently (over 90% accuracy)  -SL --     Time Frame (Executive Function Skills Goal 1, SLP) by discharge  -SL --     Progress/Outcomes  (Executive Function Skills Goal 1, SLP) goal ongoing  -SL --     Comment (Executive Function Skills Goal 1, SLP) 60% indep for scheduling and planning task based on paragraph ( bryan's schedule)  -SL --            Additional Goal (SLP)    Additional Goal, SLP -- Pt will participate in diagnostic treatment to further assess high-level cognitive skills in order to set appropriate treatment goals.  -AL     Time Frame (Additional Goal, SLP) -- by discharge  -AL           User Key  (r) = Recorded By, (t) = Taken By, (c) = Cosigned By    Initials Name Provider Type    Ember Alejandro MS CCC-SLP Speech and Language Pathologist    Silva Jeter, MS CCC-SLP Speech and Language Pathologist                            Time Calculation:        Time Calculation- SLP     Row Name 07/06/22 1344 07/06/22 1111          Time Calculation- SLP    SLP Start Time 1230  -SL 1030  -SL     SLP Stop Time 1300  -SL 1100  -SL     SLP Time Calculation (min) 30 min  -SL 30 min  -SL           User Key  (r) = Recorded By, (t) = Taken By, (c) = Cosigned By    Initials Name Provider Type    Ember Alejandro MS CCC-SLP Speech and Language Pathologist                  Therapy Charges for Today     Code Description Service Date Service Provider Modifiers Qty    75081250040 HC ST DEV OF COGN SKILLS INITIAL 15 MIN 7/5/2022 Ember Werner MS CCC-SLP  1    28634926543 HC ST DEV OF COGN SKILLS EACH ADDT'L 15 MIN 7/5/2022 Ember Werner MS CCC-SLP  1    95989915512 HC ST DEV OF COGN SKILLS EACH ADDT'L 15 MIN 7/5/2022 Ember Werner MS CCC-SLP  2    71909807367 HC ST DEV OF COGN SKILLS INITIAL 15 MIN 7/6/2022 Ember Werner MS CCC-SLP  1    12040369469 HC ST DEV OF COGN SKILLS EACH ADDT'L 15 MIN 7/6/2022 Ember Werner MS CCC-SLP  1    11679285633 HC ST DEV OF COGN SKILLS EACH ADDT'L 15 MIN 7/6/2022 Ember Werner MS CCC-SLP  2                           Ember Werner MS CCC-SLP  7/6/2022

## 2022-07-06 NOTE — THERAPY TREATMENT NOTE
Inpatient Rehabilitation - Occupational Therapy Treatment Note    [facility]     Patient Name: Maryellen Crump  : 1950  MRN: [id number]    Today's Date: 2022                 Admit Date: 2022         ICD-10-CM ICD-9-CM   1. Impaired functional mobility, balance, gait, and endurance  Z74.09 V49.89       Patient Active Problem List   Diagnosis   • Family history of colon cancer   • Iron deficiency anemia   • Diarrhea of presumed infectious origin   • Type 2 diabetes mellitus with kidney complication, without long-term current use of insulin (HCC)   • Hypertension   • Hyperlipidemia   • Stroke-like symptoms   • Acute CVA (cerebrovascular accident) (HCC)   • Acute lacunar stroke (HCC)   • Vitamin B12 deficiency   • CVA (cerebral vascular accident) (HCC)   • CKD (chronic kidney disease) stage 3, GFR 30-59 ml/min (HCC)       Past Medical History:   Diagnosis Date   • Diabetes mellitus (HCC)    • Hyperlipidemia    • Hypertension        Past Surgical History:   Procedure Laterality Date   • COLONOSCOPY N/A 2018    Procedure: COLONOSCOPY TO CECUM;  Surgeon: Josh Muñoz MD;  Location: [facility] ENDOSCOPY;  Service: General   • DILATION AND CURETTAGE, DIAGNOSTIC / THERAPEUTIC     • ENDOSCOPY N/A 2018    Procedure: ESOPHAGOGASTRODUODENOSCOPY WITH BIOPSIES;  Surgeon: Josh Muñoz MD;  Location: [facility] ENDOSCOPY;  Service: General   • ROTATOR CUFF REPAIR Right              IRF OT ASSESSMENT FLOWSHEET (last 12 hours)     IRF OT Evaluation and Treatment     Row Name 22 1521          OT Time and Intention    Document Type daily treatment  -SG     Mode of Treatment individual therapy;occupational therapy  -SG     Patient Effort good  -SG     Symptoms Noted During/After Treatment none  -SG     Row Name 22 1521          General Information    Patient/Family/Caregiver Comments/Observations Pt sitting up in chair  -SG     Row Name 22 1521          Pain Assessment    Pretreatment Pain  Rating 0/10 - no pain  -[facility] Name 07/06/22 1521          Cognition/Psychosocial    Affect/Mental Status (Cognition) WNL  -     Orientation Status (Cognition) oriented x 4  -SG     Follows Commands (Cognition) follows two-step commands  -     Personal Safety Interventions gait belt;fall prevention program maintained  -[facility] Name 07/06/22 1521          Grooming    [place] Level (Grooming) wash face, hands;set up  -     Position (Grooming) sink side;supported standing  -[facility] Name 07/06/22 1521          Toileting    [place] Level (Toileting) toileting skills;adjust/manage clothing;perform perineal hygiene;contact guard assist  -     Assistive Device Use (Toileting) grab bar/safety frame  -     Position (Toileting) supported sitting;supported standing  -     Set-up Assistance (Toileting) change pad/brief;obtain supplies;orthosis application  -[facility] Name 07/06/22 1521          Functional Mobility    Functional Mobility- Ind. Level contact guard assist  -     Functional Mobility- Device walker, front-wheeled  -     Functional Mobility- Comment Walks from chair to bathroom  -[facility] Name 07/06/22 1521          Transfers    Sit-Stand [place] (Transfers) contact guard;verbal cues  -     Stand-Sit [place] (Transfers) contact guard  -     [place] Level (Toilet Transfer) contact guard  -     Assistive Device (Toilet Transfer) grab bars/safety frame  -[facility] Name 07/06/22 1521          Sit-Stand Transfer    Assistive Device (Sit-Stand Transfers) walker, front-wheeled  -[facility] Name 07/06/22 1521          Stand-Sit Transfer    Assistive Device (Stand-Sit Transfers) walker, front-wheeled  -[facility] Name 07/06/22 1521          Toilet Transfer    Type (Toilet Transfer) stand pivot/stand step  -[facility] Name 07/06/22 1521          Motor Skills    Results, 9 Hole Peg Test of Fine Motor Coordination 2pt pinch tasks rotating dice in R hand placing into  slots and pinching clothespins to place around open form  -     Comments, Motor Control/Coordination Pt performs handwriting activity using large built up marker to trace large shapes  -     Therapeutic Exercise hand  -     Additional Documentation Comments, Motor Control/Coordination (Row)  -     Row Name 07/06/22 1521          Hand (Therapeutic Exercise)    Hand (Therapeutic Exercise) strengthening exercise  -     Hand Strengthening (Therapeutic Exercise) finger flexion;yellow  Locating large buttons in putty with 2pt pinch  -     Row Name 07/06/22 1521          Positioning and Restraints    Pre-Treatment Position sitting in chair/recliner  -     Post Treatment Position chair  -SG     In Chair sitting;call light within reach;encouraged to call for assist;exit alarm on  -           User Key  (r) = Recorded By, (t) = Taken By, (c) = Cosigned By    Initials Name Effective Dates    SG Judith Shira, OTR 06/16/21 -                  Occupational Therapy Education                 Title: PT OT SLP Therapies (In Progress)     Topic: Occupational Therapy (In Progress)     Point: ADL training (Done)     Description:   Instruct learner(s) on proper safety adaptation and remediation techniques during self care or transfers.   Instruct in proper use of assistive devices.              Learning Progress Summary           Patient Acceptance, E, VU,NR by AF at 7/4/2022 6873    Comment: educated on safety with ADls and transfers                   Point: Home exercise program (Not Started)     Description:   Instruct learner(s) on appropriate technique for monitoring, assisting and/or progressing therapeutic exercises/activities.              Learner Progress:  Not documented in this visit.          Point: Precautions (Not Started)     Description:   Instruct learner(s) on prescribed precautions during self-care and functional transfers.              Learner Progress:  Not documented in this visit.          Point:  Body mechanics (Not Started)     Description:   Instruct learner(s) on proper positioning and spine alignment during self-care, functional mobility activities and/or exercises.              Learner Progress:  Not documented in this visit.                      User Key     Initials Effective Dates Name Provider Type Discipline    AF 06/16/21 -  Dolores Odom, OTR Occupational Therapist OT                    OT Recommendation and Plan                         Time Calculation:      Time Calculation- OT     Row Name 07/06/22 1534 07/06/22 1533          Time Calculation- OT    OT Start Time 1400  -SG 1100  -SG     OT Stop Time 1430  -SG 1130  -SG     OT Time Calculation (min) 30 min  -SG 30 min  -SG     OT Received On -- 07/06/22  -           User Key  (r) = Recorded By, (t) = Taken By, (c) = Cosigned By    Initials Name Provider Type     Shira Wong OTR Occupational Therapist              Therapy Charges for Today     Code Description Service Date Service Provider Modifiers Qty    16972215482 HC OT SELF CARE/MGMT/TRAIN EA 15 MIN 7/5/2022 Shira Wong OTR GO 2    67967255576 HC OT THERAPEUTIC ACT EA 15 MIN 7/5/2022 Shira Wong OTR GO 1    83733398516 HC OT THER PROC EA 15 MIN 7/5/2022 Shira Wong OTR GO 1    61723217736 HC OT THERAPEUTIC ACT EA 15 MIN 7/6/2022 Shira Wong OTR GO 4                   RICH Day  7/6/2022

## 2022-07-06 NOTE — PROGRESS NOTES
Case Management  Inpatient Rehabilitation Plan of Care and Discharge Plan Note    Rehabilitation Diagnosis:  Branch  Date of Onset:  Alma    Medical Summary:  Branch  Past Medical History: Branch    Plan of Care  Updated Problems/Interventions  Field    Expected Intensity:  Branch  Interdisciplinary Team:  Alma  Estimated Length of Stay/Anticipated Discharge Date: Branch  Anticipated Discharge Destination:  Anticipated discharge destination from inpatient rehabilitation is community  discharge with assistance. Patient lives in a 2 story home with 1 step to enter.  Patient's bed and bath are on the main floor. Patient to d/c home with spouse  and intermittent assistance of daughter.      Based on the patient's medical and functional status, their prognosis and  expected level of functional improvement is:  Alma    Signed by: PRASAD Rivera

## 2022-07-06 NOTE — PROGRESS NOTES
Inpatient Rehabilitation Plan of Care Note    Plan of Care  Updated Problems/Interventions  Mobility    [OT] Toilet Transfers(Active)  Current Status(07/06/2022): MIN  Weekly Goal(07/14/2022): CGA  Discharge Goal: SUpervision    [OT] Tub/Shower Transfers(Active)  Current Status(07/06/2022): MIN  Weekly Goal(07/14/2022): CGA  Discharge Goal: Supervision/MOD I        Self Care    [OT] Bathing(Active)  Current Status(07/06/2022): MIN  Weekly Goal(07/14/2022): CGA  Discharge Goal: Supervision/MOD I    [OT] Dressing (Lower)(Active)  Current Status(07/06/2022): MIN  Weekly Goal(07/14/2022): CGA  Discharge Goal: Indep    [OT] Dressing (Upper)(Active)  Current Status(07/06/2022): CGA  Weekly Goal(07/14/2022): set up  Discharge Goal: Indep    [OT] Grooming(Active)  Current Status(07/06/2022): CGA  Weekly Goal(07/14/2022): Set up  Discharge Goal: Indep    [OT] Toileting(Active)  Current Status(07/06/2022): MIN  Weekly Goal(07/14/2022): CGA  Discharge Goal: SUp/MOD I    Signed by: RICH Day/EMMA

## 2022-07-06 NOTE — PLAN OF CARE
Problem: Rehabilitation (IRF) Plan of Care  Goal: Plan of Care Review  Outcome: Ongoing, Progressing  Flowsheets (Taken 7/6/2022 0451)  Progress: improving  Plan of Care Reviewed With: patient  Outcome Evaluation: Pt is A&O x4, no c/o pain, numbness/tingling in feet at baseline, NIH 0, meds whole w/ water, up w/ asst x1, accucheck ACHS, resting well tonight, will continue to monitor.   Goal Outcome Evaluation:  Plan of Care Reviewed With: patient        Progress: improving  Outcome Evaluation: Pt is A&O x4, no c/o pain, numbness/tingling in feet at baseline, NIH 0, meds whole w/ water, up w/ asst x1, accucheck ACHS, resting well tonight, will continue to monitor.

## 2022-07-06 NOTE — PROGRESS NOTES
Inpatient Rehabilitation Plan of Care Note    Plan of Care  Updated Problems/Interventions  Medical Problem(s)    BNE (Active)  Att'n. - WNL  Exec. Fx. - Min Imp.  Rsng/Jgmnt - WNL  Arith - WNL  Visuospatial Skills - WNL  Visual Mem. - WNL  Verbal Mem. - Mod.Imp. for immediate recall, Min-Mildly Imp. for delayed recall  Emot - Pt denied dep/anx    Signed by: Jerrod Rahman Psy.d

## 2022-07-06 NOTE — PROGRESS NOTES
Inpatient Rehabilitation Plan of Care Note    Plan of Care  Care Plan Reviewed - Updates as Follows    Body Systems    [RN] Endocrine(Active)  Current Status(07/06/2022): Blood glucose uncontrolled  Weekly Goal(07/12/2022): Blood glucose WNL  Discharge Goal: Blood glucose controlled    Performed Intervention(s)  Monitor labs; hypo/hyperglycemia      Psychosocial    [RN] Coping/Adjustment(Active)  Current Status(07/06/2022): Pt A&Ox4, calm and looking forward to starting  rehab. Supportive family.  Weekly Goal(07/12/2022): Pt will demonstrate positive coping mechanism  Discharge Goal: Coping with current health status    Performed Intervention(s)  Offer support  Encourage to express any concerns      Safety    [RN] Potential for Injury(Active)  Current Status(07/06/2022): Risk for fall r/t recent stroke with R hemiparesis  and unsteady gait  Weekly Goal(07/12/2022): Call light will be used for assistance  Discharge Goal: No falls/injuries    Performed Intervention(s)  Falls/safety precaution  Bed/Chair alarms  Call light/personal items within easy reach      Sphincter Control    [RN] Bladder Management(Active)  Current Status(07/06/2022): Continent 100%  Weekly Goal(07/07/2022): Continent 100%  Discharge Goal: Continent 100%    [RN] Bowel Management(Active)  Current Status(07/06/2022): Continent 100%  Weekly Goal(07/07/2022): Continent 100%  Discharge Goal: Continent 100%    Performed Intervention(s)  Monitor I&O  Encourage fluids    Signed by: Catalina Dao RN

## 2022-07-06 NOTE — PROGRESS NOTES
Inpatient Rehabilitation Plan of Care Note    Plan of Care  Care Plan Reviewed - No updates at this time.    Psychosocial    Performed Intervention(s)  Offer support  Encourage to express any concerns      Sphincter Control    Performed Intervention(s)  Monitor I&O  Encourage fluids      Safety    Performed Intervention(s)  Falls/safety precaution  Bed/Chair alarms  Call light/personal items within easy reach      Body Systems    Performed Intervention(s)  Monitor labs; hypo/hyperglycemia    Signed by: Alan Vega RN

## 2022-07-06 NOTE — PROGRESS NOTES
LOS: 4 days   Patient Care Team:  Juni Cortez MD as PCP - General (Family Medicine)      ONEYDA RAMIREZ CARROL  1950    Diagnoses    1. IMPAIRED FUNCTIONAL MOBILITY, BALANCE, GAIT, AND ENDURANCE       ADMITTING DIAGNOSIS:    Stroke    Subjective   Patient reports tolerating activities.  No new weakness.  Continues with impaired dexterity in the right hand.  She feels she is making progress.      Objective     Vitals:    07/06/22 1205   BP: 151/85   Pulse: 69   Resp: 18   Temp: 97.8 °F (36.6 °C)   SpO2: 99%       PHYSICAL EXAM:   MENTAL STATUS -  AWAKE / ALERT  HEENT-  SCLERAE ANICTERIC, CONJUNCTIVAE PINK, OP MOIST, NO JVD,    LUNGS - NORMAL RESPIRATIONS  HEART- RRR,   ABD -  SOFT, NONDISTENDED  EXT - NO EDEMA OR CYANOSIS  NEURO - AWAKE, ALERT  MOTOR EXAM -takes resistance bilaterally.  Impaired dexterity in the right upper extremity        MEDICATIONS  Scheduled Meds:aspirin, 81 mg, Oral, Daily  atorvastatin, 80 mg, Oral, Nightly  clopidogrel, 75 mg, Oral, Daily  ferrous sulfate, 325 mg, Oral, Every Other Day  hydroCHLOROthiazide, 12.5 mg, Oral, Daily  [START ON 7/7/2022] insulin glargine, 14 Units, Subcutaneous, Daily  insulin lispro, 0-7 Units, Subcutaneous, TID AC  pantoprazole, 40 mg, Oral, Q AM  polyethylene glycol, 17 g, Oral, Daily  ramipril, 10 mg, Oral, Daily  vitamin B-12, 1,000 mcg, Oral, Daily      Continuous Infusions:   PRN Meds:.•  acetaminophen **OR** acetaminophen  •  bisacodyl  •  calcium carbonate  •  dextrose  •  dextrose  •  glucagon (human recombinant)  •  hydrALAZINE  •  ondansetron      RESULTS  Glucose   Date/Time Value Ref Range Status   07/06/2022 1610 222 (H) 70 - 130 mg/dL Final     Comment:     Meter: [id number] : [id number] Eric AMES   07/06/2022 1136 187 (H) 70 - 130 mg/dL Final     Comment:     Meter: [id number] : [id number] Eric AMES   07/06/2022 0624 118 70 - 130 mg/dL Final     Comment:     Meter: [id number] : [id number] Laureano Irvin  [place]   07/05/2022 2033 177 (H) 70 - 130 mg/dL Final     Comment:     Meter: [id number] : [id number] Laureano AMES   07/05/2022 1603 200 (H) 70 - 130 mg/dL Final     Comment:     Meter: [id number] : [id number] Daniel AMES   07/05/2022 1142 188 (H) 70 - 130 mg/dL Final     Comment:     Meter: [id number] : [id number] Daniel AMES   07/05/2022 0618 124 70 - 130 mg/dL Final     Comment:     Meter: [id number] : [id number] Laureano AMES   07/04/2022 2107 221 (H) 70 - 130 mg/dL Final     Comment:     Meter: [id number] : [id number] Laureano AMES     Results from last 7 days   Lab Units 07/05/22  0942 07/02/22  0613 07/01/22  0454   WBC 10*3/mm3 6.93 8.96 6.09   HEMOGLOBIN g/dL 11.8* 10.7* 10.4*   HEMATOCRIT % 35.9 32.3* 31.3*   PLATELETS 10*3/mm3 281 259 256     Results from last 7 days   Lab Units 07/05/22  0942 07/02/22  0613 07/01/22  0454 06/30/22  0453   SODIUM mmol/L 136 140 139 138   POTASSIUM mmol/L 4.0 4.2 4.0 3.8   CHLORIDE mmol/L 97* 106 103 101   CO2 mmol/L 25.0 24.0 24.0 25.0   BUN mg/dL 14 15 15 14   CREATININE mg/dL 1.11* 1.02* 1.07* 1.11*   CALCIUM mg/dL 10.2 9.3 9.8 9.7   BILIRUBIN mg/dL  --   --   --  0.5   ALK PHOS U/L  --   --   --  72   ALT (SGPT) U/L  --   --   --  11   AST (SGOT) U/L  --   --   --  16   GLUCOSE mg/dL 272* 136* 142* 108*      Latest Reference Range & Units 07/01/22 04:54 07/05/22 09:42   Vitamin B-12 211 - 946 pg/mL 204 (L)    25 Hydroxy, Vitamin D 30.0 - 100.0 ng/ml  54.1      BRAIN MRI WITHOUT CONTRAST-June 29, 2022     HISTORY: Stroke, follow up     COMPARISON: 06/29/2022.     FINDINGS:  Multiplanar images of the head were obtained without  gadolinium. There is an area of restricted diffusion which is noted  within the left frontoparietal coronal radiata, measuring up to 1.1 cm  in size. An additional tiny focus is seen just superior to it within the  subcortical white matter. This measures approximately 4 mm in size. No  additional areas of  restricted diffusion are seen. There is diffuse  atrophy. There is periventricular and deep white matter microangiopathic  change. Degree of ventricular dilatation may be somewhat out of  proportion to the degree of atrophy, and correlation with any evidence  of normal pressure hydrocephalus is recommended. There is no midline  shift or mass effect. Intracranial flow voids appear intact. No  abnormality is seen on gradient echo imaging There is mucosal thickening  noted within the ethmoid sinuses.     IMPRESSION:  1. Foci of restricted diffusion are noted within the left frontoparietal  corona radiata and subcortical white matter.  No additional areas of  acute infarction are identified.    ASSESSMENT and PLAN    Iron deficiency anemia    Type 2 diabetes mellitus with kidney complication, without long-term current use of insulin ([facility])    Hypertension    Hyperlipidemia    Acute CVA (cerebrovascular accident) ([facility])    Acute lacunar stroke ([facility])    Vitamin B12 deficiency    CVA (cerebral vascular accident) ([facility])    CKD (chronic kidney disease) stage 3, GFR 30-59 ml/min ([facility])    CVA 6/29/2022 with right-sided weakness-lacunar infarct left corona radiata region  Stroke prophylaxis-aspirin/Plavix/atorvastatin    DVT [id number] dual antiplatelet therapy    Diabetes mellitus-on metformin/Starlix at home.  Lantus  July 6-titrate up on Lantus to 14 units every morning.  Look at transitioning back to home regimen soon    Vitamin B12 replacement    Hypertension-hydrochlorothiazide/ramipril    Impaired mobility  Impaired self-care  Impaired cognition    Functional status-July 5-Pt scored WNL for her age on CLQT in domains of  attention, memory, executive functions, language and visuospatial skills;  however, she exhibited mild difficulty with story retell task, alternating  attention and maze completion.  Upper body dressing contact-guard.  Lower body dressing minimum assist.  Transfers minimal-contact-guard assist.   Gait rolling walker 100 feet minimum assist.  30 feet with quad tip cane.    Now admit for comprehensive acute inpatient rehabilitation .  This would be an interdisciplinary program with physical therapy 1 hour,  occupational therapy 1 hour, and speech therapy 1 hour, 5 days a week.  Rehabilitation nursing for carryover, monitoring of hypertension and neurologic   status, bowel and bladder, and skin  Ongoing physician follow-up.  Weekly team conferences.  Goals are to achieve a level of supervision with  mobility and self-care and improved balance.   Rehabilitation prognosis fair.  Medical prognosis fair.  Estimated length of stay is approximately 2 weeks, but is only an estimation.   The patient's functional status and clinical status is unchanged from preadmission assessment and the patient continues appropriate for acute inpatient rehabilitation.  Goal is for home with outpatient   therapies.  Barrier to discharge: Impaired mobility and self-care- work on balance, transfers, progressive ambulation, actives of daily living to overcome.         Tanmay Espinoza MD      During rounds, used appropriate personal protective equipment including mask and gloves.  Additional gown if indicated.  Mask used was standard procedure mask. Appropriate PPE was worn during the entire visit.  Hand hygiene was completed before and after.

## 2022-07-06 NOTE — PROGRESS NOTES
Name: Maryellen Crump ADMIT: 2022   : 1950  PCP: Juni Cortez MD    MRN: [id number] LOS: 4 days   AGE/SEX: 71 y.o. female  ROOM: [facility]     Subjective   Subjective   Seen this morning.  No acute events overnight.  Daughter is present bedside.  Denies any complaints.    Weakness continues to improve per patient.  No specific complaints.    Review of Systems   as above  Objective   Objective   Vital Signs  Temp:  [97.7 °F (36.5 °C)-98 °F (36.7 °C)] 97.8 °F (36.6 °C)  Heart Rate:  [64-76] 69  Resp:  [16-18] 18  BP: (132-151)/(75-85) 151/85  SpO2:  [96 %-99 %] 99 %  on   ;   Device (Oxygen Therapy): room air  Body mass index is 20.02 kg/m².  Physical Exam    General: Alert,no acute distress  CV: Regular rate and rhythm, no murmurs rubs or gallops  Lungs: Clear to auscultation bilaterally, no crackles or wheezes  Abdomen: Soft, nontender, nondistended  Extremities: No significant peripheral edema , no cyanosis.       Results Review     I reviewed the patient's new clinical results.  Results from last 7 days   Lab Units 22  0942 22  0453   WBC 10*3/mm3 6.93 8.96 6.09 6.67   HEMOGLOBIN g/dL 11.8* 10.7* 10.4* 10.6*   PLATELETS 10*3/mm3 281 259 256 255     Results from last 7 days   Lab Units 22  0942 22  0453   SODIUM mmol/L 136 140 139 138   POTASSIUM mmol/L 4.0 4.2 4.0 3.8   CHLORIDE mmol/L 97* 106 103 101   CO2 mmol/L 25.0 24.0 24.0 25.0   BUN mg/dL 14 15 15 14   CREATININE mg/dL 1.11* 1.02* 1.07* 1.11*   GLUCOSE mg/dL 272* 136* 142* 108*   Estimated Creatinine Clearance: 37.1 mL/min (A) (by C-G formula based on SCr of 1.11 mg/dL (H)).  Results from last 7 days   Lab Units 22  0453 22  1603   ALBUMIN g/dL 3.60 4.10   BILIRUBIN mg/dL 0.5 0.6   ALK PHOS U/L 72 92   AST (SGOT) U/L 16 15   ALT (SGPT) U/L 11 13     Results from last 7 days   Lab Units 22  0942 22  0613 22  0454 22  0453  06/29/22  1603   CALCIUM mg/dL 10.2 9.3 9.8 9.7 10.9*   ALBUMIN g/dL  --   --   --  3.60 4.10       COVID19   Date Value Ref Range Status   07/02/2022 Not Detected Not Detected - Ref. Range Final   06/29/2022 Not Detected Not Detected - Ref. Range Final     Glucose   Date/Time Value Ref Range Status   07/06/2022 1136 187 (H) 70 - 130 mg/dL Final     Comment:     Meter: [id number] : [id number] Eric FERNANDAJaylonDeanne Eric    07/06/2022 0624 118 70 - 130 mg/dL Final     Comment:     Meter: [id number] : [id number] Laureano Irvin    07/05/2022 2033 177 (H) 70 - 130 mg/dL Final     Comment:     Meter: [id number] : [id number] Laureano Irvin    07/05/2022 1603 200 (H) 70 - 130 mg/dL Final     Comment:     Meter: [id number] : [id number] Daniel Russo    07/05/2022 1142 188 (H) 70 - 130 mg/dL Final     Comment:     Meter: [id number] : [id number] Daniel Russo    07/05/2022 0618 124 70 - 130 mg/dL Final     Comment:     Meter: [id number] : [id number] Laureano Irvin    07/04/2022 2107 221 (H) 70 - 130 mg/dL Final     Comment:     Meter: [id number] : [id number] [facility]       CT Angiogram Head, CT Angiogram Neck  Narrative: CT ANGIOGRAM NECK AND HEAD WITH CONTRAST     HISTORY: Right-sided weakness.     COMPARISON: None.     FINDINGS: The brain and ventricles are symmetrical. There is  age-appropriate atrophy. Mild-to-moderate vascular calcification is  noted. No focal area of decreased attenuation to suggest acute  infarction is identified.     A CT angiogram of the neck and head was then performed. Multiplanar, as  well as 3-dimensional reconstructions were generated.     The great vessels are arranged in a classic configuration. There is 0%  stenosis involving the carotid bifurcations. Vascular calcification  involving the carotid siphons are noted without high-grade stenosis. The  proximal aspects of the anterior and middle cerebral arteries appear  unremarkable.     Both vertebral  arteries were opacified. The left vertebral artery is  larger than that of the right. The basilar artery and the proximal  aspects of the posterior cerebral arteries appear unremarkable. A  standard postcontrast CT examination of the brain showed no evidence of  abnormal enhancement.     There is moderate-to-severe loss of disc height at C5-C6.     Impression: No evidence of acute infarction or of intracranial  hemorrhage. Further evaluation for infarction could be performed with  MRI examination of the brain. There is 0% stenosis involving the carotid  bifurcations. There is no evidence of proximal intracranial arterial  high-grade stenosis or of aneurysm.         Radiation dose reduction techniques were utilized, including automated  exposure control and exposure modulation based on body size.     This report was finalized on 6/30/2022 5:44 PM by Dr. Marek Schulte M.D.     Adult transthoracic echo complete  · Calculated left ventricular EF = 59.2% Estimated left ventricular EF was   in agreement with the calculated left ventricular EF. Left ventricular   systolic function is normal.  · Left ventricular diastolic function was normal.  · Saline test results are positive with valsalva manuever for right to   left atrial level shunt suggesting presence of a small PFO.  · No valvular masses noted on transthoracic echocardiogram.     MRI Thoracic Spine Without Contrast  Narrative: THORACIC SPINE MRI WITHOUT GADOLINIUM     HISTORY: Ataxia, thoracic trauma; I63.9-Cerebral infarction, unspecified     COMPARISON:  None.     FINDINGS:  Multiplanar images of the thoracic spine obtained without  gadolinium.  Axial images obtained from T1-L1.     No acute fracture or subluxation of the thoracic spine is seen. Marrow  signal appears unremarkable. No cord signal abnormalities are seen.  Conus terminates at L1. There is some intervertebral disc space  narrowing noted at multiple levels. There is no evidence of canal  stenosis or  neural foraminal narrowing at any level.     Impression: 1. As above.     This report was finalized on 6/30/2022 1:12 AM by Dr. Shaila Tilley M.D.       Scheduled Medications  aspirin, 81 mg, Oral, Daily  atorvastatin, 80 mg, Oral, Nightly  clopidogrel, 75 mg, Oral, Daily  ferrous sulfate, 325 mg, Oral, Every Other Day  hydroCHLOROthiazide, 12.5 mg, Oral, Daily  insulin glargine, 12 Units, Subcutaneous, Daily  insulin lispro, 0-7 Units, Subcutaneous, TID AC  pantoprazole, 40 mg, Oral, Q AM  polyethylene glycol, 17 g, Oral, Daily  ramipril, 10 mg, Oral, Daily  vitamin B-12, 1,000 mcg, Oral, Daily    Infusions   Diet  Diet Regular; Cardiac, Consistent Carbohydrate       Assessment/Plan     Active Hospital Problems    Diagnosis  POA   • CKD (chronic kidney disease) stage 3, GFR 30-59 ml/min ([facility]) [N18.30]  Unknown   • CVA (cerebral vascular accident) ([facility]) [I63.9]  Yes   • Vitamin B12 deficiency [E53.8]  Yes   • Acute lacunar stroke ([facility]) [I63.81]  Yes   • Acute CVA (cerebrovascular accident) ([facility]) [I63.9]  Yes   • Type 2 diabetes mellitus with kidney complication, without long-term current use of insulin ([facility]) [E11.29]  Yes   • Hypertension [I10]  Yes   • Hyperlipidemia [E78.5]  Yes   • Iron deficiency anemia [D50.9]  Yes      Resolved Hospital Problems   No resolved problems to display.       Ms. Crump is a 71 y.o. female with a history of hypertension, hyperlipidemia and diabetes mellitus that presents to [facility] complaining of right arm and leg weakness.  MRI showed stroke in the left subcortical region and corona radiata.    Acute lacunar stroke: Continue aspirin, Plavix, statin.  PT OT.  Follow-up with outpatient neurology after discharge.    Essential hypertension: Controlled on ramipril 10 mg milligrams daily and HCTZ 12.5 mg daily.  Patient is on ramipril 10 units twice daily at home, discussed with patient to take 10 mg daily at home only after BP stable on current regimen.  If BP  persistently elevated above 140s, might restart evening dose ramipril.      DM type II: Hemoglobin A1c 6.9.  Continue sliding scale insulin, Lantus 12 units daily.  Holding home p.o. meds.  Resume at discharge.    Abnormal thyroid levels: TSH slightly elevated 4.6, free T4 normal.  Will need repeat levels in 6-8 weeks to monitor, around mid July.  08/15    Anemia: Iron studies continue iron deficiency.  Continue p.o. iron.  Hemoglobin stable.    Vitamin B12 deficiency: Vitamin B 12 low at 204, ordered one-time IV B12 followed by daily p.o. supplement.  Needs repeat labs in about 3 months to monitor response.  Metformin can cause vitamin B12 deficiency.  If vitamin B12 still low we will oral supplements metformin will need to be discontinued.    GERD:  on pantoprazole 20 mg daily, resume home omeprazole at discharge.      CKD stage III: Creatinine stable.  Avoid nephrotoxic drugs.    · SCDs for DVT prophylaxis.  · Full code.          I wore protective equipment throughout this patient encounter including a face mask, gloves and protective eyewear.  Hand hygiene was performed before donning protective equipment and after removal when leaving the room.      Dictated utilizing Dragon dictation        Steve Ricci MD  [facility]  07/06/22  12:48 EDT

## 2022-07-06 NOTE — PROGRESS NOTES
Recreational Therapy Note    Patient Name: Maryellen Crump   MRN: [id number]    Therapeutic Recreation Eval and Treat (last 12 hours)     Therapeutic Recreation Eval & Treat     Row Name 07/06/22 1400       Therapeutic Recreation Participation    Recreation Therapy Participation games  -TW    Games other (see comments)  [facility]  -TW    Objectives of Recreation Participation increase;motivation and activity level through successful participation;sense of autonomy by choosing level of participation  -TW    Comment, Recreation Participation using RUE to / roll dice; occ assist scorekeeping  -TW    Recreation Therapy Summary of Participation active participation  -TW          User Key  (r) = Recorded By, (t) = Taken By, (c) = Cosigned By    Initials Name Provider Type    TW Shanita Villafuerte CTRS Recreational Therapist                  RASHAD Oakes  7/6/2022

## 2022-07-06 NOTE — THERAPY TREATMENT NOTE
Inpatient Rehabilitation - Physical Therapy Treatment Note       [facility]     Patient Name: Maryellen Crump  : 1950  MRN: [id number]    Today's Date: 2022                    Admit Date: 2022      Visit Dx:     ICD-10-CM ICD-9-CM   1. Impaired functional mobility, balance, gait, and endurance  Z74.09 V49.89       Patient Active Problem List   Diagnosis   • Family history of colon cancer   • Iron deficiency anemia   • Diarrhea of presumed infectious origin   • Type 2 diabetes mellitus with kidney complication, without long-term current use of insulin (HCC)   • Hypertension   • Hyperlipidemia   • Stroke-like symptoms   • Acute CVA (cerebrovascular accident) (HCC)   • Acute lacunar stroke (HCC)   • Vitamin B12 deficiency   • CVA (cerebral vascular accident) (HCC)   • CKD (chronic kidney disease) stage 3, GFR 30-59 ml/min (HCC)       Past Medical History:   Diagnosis Date   • Diabetes mellitus (HCC)    • Hyperlipidemia    • Hypertension        Past Surgical History:   Procedure Laterality Date   • COLONOSCOPY N/A 2018    Procedure: COLONOSCOPY TO CECUM;  Surgeon: Josh Muñoz MD;  Location: [facility] ENDOSCOPY;  Service: General   • DILATION AND CURETTAGE, DIAGNOSTIC / THERAPEUTIC     • ENDOSCOPY N/A 2018    Procedure: ESOPHAGOGASTRODUODENOSCOPY WITH BIOPSIES;  Surgeon: Josh Muñoz MD;  Location: [facility] ENDOSCOPY;  Service: General   • ROTATOR CUFF REPAIR Right        PT ASSESSMENT (last 12 hours)     IRF PT Evaluation and Treatment     Row Name 22 09          PT Time and Intention    Document Type daily treatment  -LB (r) KM (t) LB (c)     Mode of Treatment physical therapy  -LB (r) KM (t) LB (c)     Patient/Family/Caregiver Comments/Observations AM: Pt in chair in room with exit alarm on.  -LB (r) KM (t) LB (c)     Total Minutes, Physical Therapy 60  -LB (r) KM (t) LB (c)     Row Name 22 09          General Information    Patient Profile Reviewed yes  -LB (r) KM (t)  LB (c)     Existing Precautions/Restrictions fall  -LB (r) KM (t) LB (c)     Row Name 07/06/22 0900          Pain Assessment    Pretreatment Pain Rating 0/10 - no pain  -LB (r) KM (t) LB (c)     Posttreatment Pain Rating 0/10 - no pain  -LB (r) KM (t) LB (c)     Row Name 07/06/22 0900          Cognition/Psychosocial    Affect/Mental Status (Cognition) WNL  -LB (r) KM (t) LB (c)     Orientation Status (Cognition) oriented x 4  -LB (r) KM (t) LB (c)     Follows Commands (Cognition) follows two-step commands  -LB (r) KM (t) LB (c)     Personal Safety Interventions fall prevention program maintained;gait belt;muscle strengthening facilitated;nonskid shoes/slippers when out of bed  -LB (r) KM (t) LB (c)     Cognitive Function WFL  -LB (r) KM (t) LB (c)     Row Name 07/06/22 0900          Transfers    Sit-Stand [place] (Transfers) contact guard;verbal cues;1 person assist  -LB (r) KM (t) LB (c)     Stand-Sit [place] (Transfers) contact guard  -LB (r) KM (t) LB (c)     Row Name 07/06/22 0900          Sit-Stand Transfer    Comment, (Sit-Stand Transfer) Pt performed STS 5x with no AD and CGA x 1 at mat in gym. Pt required vc's for sequencing and improved with practice.  -LB (r) KM (t) LB (c)     Row Name 07/06/22 0900          Stand-Sit Transfer    Assistive Device (Stand-Sit Transfers) walker, front-wheeled  -LB (r) KM (t) LB (c)     Comment, (Stand-Sit Transfer) Vc's for hand placement.  -LB (r) KM (t) LB (c)     Row Name 07/06/22 0900          Stand Pivot/Stand Step Transfer    Stand Pivot/Stand Step [place] (Transfers) 1 person assist;minimum assist (75% patient effort)  -LB (r) KM (t) LB (c)     Comment, (Stand Pivot Transfer) Pt performed stand-pivot-step transfer from chair in room to wc with Yany x 1. Pt showed improved initiation and sequencing to stand.  -LB (r) KM (t) LB (c)     Row Name 07/06/22 0900          Gait/Stairs (Locomotion)    [place] Level (Gait) contact guard;minimum assist (75%  patient effort);1 person assist  -LB (r) KM (t) LB (c)     Assistive Device (Gait) walker, front-wheeled;parallel bars  -LB (r) KM (t) LB (c)     Distance in Feet (Gait) 160' x 2 trials with RW  -LB (r) KM (t) LB (c)     Pattern (Gait) step-through  -LB (r) KM (t) LB (c)     Deviations/Abnormal Patterns (Gait) base of support, narrow;gait speed decreased;stride length decreased  -LB (r) KM (t) LB (c)     Left Sided Gait Deviations leans left  -LB (r) KM (t) LB (c)     Right Sided Gait Deviations heel strike decreased;decreased knee extension;Trendelenburg sign  -LB (r) KM (t) LB (c)     Gait Assessment/Intervention Pt performed ambulation for 160' x 2 trials with RW and CGA-Yany x1. Pt has decreased step length but did improve with posture. Pt showed fatigue in RLE with decreased heel strike. No apparent loss of balance.  -LB (r) KM (t) LB (c)     Row Name 07/06/22 0900          Safety Issues, Functional Mobility    Safety Issues Affecting Function (Mobility) judgment;safety precaution awareness  -LB (r) KM (t) LB (c)     Impairments Affecting Function (Mobility) balance;endurance/activity tolerance;coordination;strength;motor control  -LB (r) KM (t) LB (c)     Row Name 07/06/22 0900          Step Over Obstacle (Mobility)    [place], Stepping Over Obstacles (Mobility) contact guard;minimal assist, 75% or more patient effort;1 person assist  -LB (r) KM (t) LB (c)     Assistive Device (Step Over Obstacle) wilma-bars  -LB (r) KM (t) LB (c)     Comment, Stepping Over Obstacles (Mobility) Pt performed step over foam boasters at wilma bars both side-step and forward stepping. Pt had decreased step length but improved with vc's and practice. Pt also required vc's for posture. Pt had no loss of balance.  -LB (r) KM (t) LB (c)     Row Name 07/06/22 0900          Balance    Static Sitting Balance standby assist  -LB (r) KM (t) LB (c)     Dynamic Sitting Balance standby assist  -LB (r) KM (t) LB (c)     Position, Sitting  Balance sitting in chair  -LB (r) KM (t) LB (c)     Static Standing Balance contact guard  -LB (r) KM (t) LB (c)     Dynamic Standing Balance verbal cues  -LB (r) KM (t) LB (c)     Position/Device Used, Standing Balance walker, front-wheeled  -LB (r) KM (t) LB (c)     Balance Interventions standing;foam;narrowed base of support;tandem standing;single limb stance  -LB (r) KM (t) LB (c)     Comment, Balance Pt performed balance interventions on foam pad at wilma-bars. Pt showed a left lean but had no apparent loss of balance. Pt balance improved with practice.P also performed intervention at wilma bars that involved dual tasking of stepping on colored dots. Pt maintained good balance throughout while using wilma bars to hold onto.  -LB (r) KM (t) LB (c)     Row Name 07/06/22 0900          Motor Skills    Therapeutic Exercise aerobic  -LB (r) KM (t) LB (c)     Additional Documentation --  Pt performed step ups onto aerobic step 10 x for 2 sets at wilma bars with CGA x 1. Pt showed good balance but did express LE fatigue and required rest break in between sets. Pt required minor cues for sequencing but improved with practice.  -LB (r) KM (t) LB (c)     Row Name 07/06/22 0900          Positioning and Restraints    Pre-Treatment Position sitting in chair/recliner  -LB (r) KM (t) LB (c)     Post Treatment Position wheelchair  -LB (r) KM (t) LB (c)     In Wheelchair sitting;call light within reach;encouraged to call for assist;exit alarm on  -LB (r) KM (t) LB (c)           User Key  (r) = Recorded By, (t) = Taken By, (c) = Cosigned By    Initials Name Provider Type    LB Nora Guillory, PT Physical Therapist    Otis Mulligan PT Student PT Student                 Physical Therapy Education                 Title: PT OT SLP Therapies (In Progress)     Topic: Physical Therapy (Done)     Point: Mobility training (Done)     Learning Progress Summary           Patient Acceptance, E, VU,NR by KEN at 7/6/2022 1118    Acceptance, E,  VU,NR by  at 7/5/2022 1230    Eager, ASHLEY,TB,H,D, VU,DU by  at 7/4/2022 1405    Comment: Written HEp for strength, need to remain on rwx for now.    Eager, E,TB,D, VU,DU by  at 7/3/2022 1108    Comment: POC, goals.                   Point: Home exercise program (Done)     Learning Progress Summary           Patient Acceptance, E, VU,NR by  at 7/6/2022 1118    Acceptance, E, VU,NR by  at 7/5/2022 1230    Eager, E,TB,H,D, VU,DU by  at 7/4/2022 1405    Comment: Written HEp for strength, need to remain on rwx for now.    [place], E,TB,D, VU,DU by  at 7/3/2022 1108    Comment: POC, goals.                               User Key     Initials Effective Dates Name Provider Type Discipline     06/16/21 -  Tara Sanders, PT Physical Therapist PT     06/06/22 -  Otis Funes, PT Student PT Student PT                PT Recommendation and Plan                          Time Calculation:      PT Charges     Row Name 07/06/22 1117             Time Calculation    Start Time 0900  -LB (r) KM (t) LB (c)      Stop Time 1000  -LB (r) KM (t) LB (c)      Time Calculation (min) 60 min  -LB (r) KM (t)      PT Received On 07/06/22  -LB (r) KM (t) LB (c)      PT - Next Appointment 07/07/22  -LB (r) KM (t) LB (c)              Time Calculation- PT    Total Timed Code Minutes- PT 60 minute(s)  -LB (r) KM (t) LB (c)            User Key  (r) = Recorded By, (t) = Taken By, (c) = Cosigned By    Initials Name Provider Type    LB Nora Guillory, PT Physical Therapist     Otis Funes, PT Student PT Student                Therapy Charges for Today     Code Description Service Date Service Provider Modifiers Qty    10752112787 HC PT THERAPEUTIC ACT EA 15 MIN 7/5/2022 Otis Funes, PT Student GP 2    66455503603 HC PT THER PROC EA 15 MIN 7/5/2022 Otis Funes, PT Student GP 2    29515870712 HC PT THER SUPP EA 15 MIN 7/5/2022 Otsi Funes, PT Student GP 1    72303937051 HC PT THERAPEUTIC ACT EA 15 MIN 7/6/2022 Otis Funes, PT  Student GP 2    56101702163 HC PT THER PROC EA 15 MIN 7/6/2022 Otis Funes, PT Student GP 2                   Otis Funes, PT Student  7/6/2022

## 2022-07-06 NOTE — PLAN OF CARE
Goal Outcome Evaluation:  Plan of Care Reviewed With: patient        Progress: improving  Outcome Evaluation: Offered a suppository to pt and pt refused. Abd soft round and non-distended. No pain.

## 2022-07-06 NOTE — PROGRESS NOTES
Inpatient Rehabilitation Functional Measures Assessment and Plan of Care    Plan of Care  Updated Problems/Interventions  Cognition    [ST] Memory(Active)  Current Status(07/06/2022): Pt scored WNL for her age on CLQT in domains of  attention, memory, executive functions, language and visuospatial skills;  however, she exhibited mild difficulty with story retell task, alternating  attention, deductive reasoning  Weekly Goal(07/14/2022): follow schedule indep; recall daily activities indep  Discharge Goal: Pt will be able to complete household management tasks  independently.    Functional Measures  [facility] Eating:  [facility] [place] Grooming: [facility] Bathing:  [facility] Upper Body Dressing:  [facility] Lower Body Dressing:  [facility] Toileting:  [facility] Bladder Management  Level of Assistance:  [place]  Frequency/Number of Accidents this Shift:  [facility] Bowel Management  Level of Assistance: [place]  Frequency/Number of Accidents this Shift: [facility] Bed/Chair/Wheelchair Transfer:  [facility] Toilet Transfer:  [facility] Tub/Shower Transfer:  [place]    Previously Documented Mode of Locomotion at Discharge: Field  LELA Expected Mode of Locomotion at Discharge: [facility] Walk/Wheelchair:  [facility] Stairs:  [facility] Comprehension:  [facility] Expression:  [facility] Social Interaction:  [facility] Problem Solving:  [facility] Memory:  [place]    Therapy Mode Minutes  Occupational Therapy: Branch  Physical Therapy: Branch  Speech Language Pathology:  Branch    Signed by: mEber Werner, SLP

## 2022-07-06 NOTE — PROGRESS NOTES
Inpatient Rehabilitation Plan of Care Note    Plan of Care  Updated Problems/Interventions  Mobility    [PT] Stairs(Active)  Current Status(07/06/2022): NT  Weekly Goal(07/14/2022): 2 platform steps, AAD, Min  Discharge Goal: 2 platform steps, AAD, CG    [PT] Walk(Active)  Current Status(07/06/2022): 160 ft, CGA-Min, rwx  Weekly Goal(07/14/2022): 240 ft, AAD, CG  Discharge Goal: 250 ft, AAD, sup    [PT] Bed/Chair/Wheelchair(Active)  Current Status(07/06/2022): CGA-Min, rwx  Weekly Goal(07/14/2022): CG, AAD  Discharge Goal: SUP, AAD    Signed by: Otis Funes PT Student     - CoSigned By: Nora Guillory PT 7/6/2022 3:53:35 PM

## 2022-07-07 LAB
GLUCOSE BLDC GLUCOMTR-MCNC: 129 MG/DL (ref 70–130)
GLUCOSE BLDC GLUCOMTR-MCNC: 182 MG/DL (ref 70–130)
GLUCOSE BLDC GLUCOMTR-MCNC: 231 MG/DL (ref 70–130)
GLUCOSE BLDC GLUCOMTR-MCNC: 279 MG/DL (ref 70–130)

## 2022-07-07 PROCEDURE — 97530 THERAPEUTIC ACTIVITIES: CPT

## 2022-07-07 PROCEDURE — 63710000001 INSULIN LISPRO (HUMAN) PER 5 UNITS: Performed by: STUDENT IN AN ORGANIZED HEALTH CARE EDUCATION/TRAINING PROGRAM

## 2022-07-07 PROCEDURE — 97535 SELF CARE MNGMENT TRAINING: CPT | Performed by: OCCUPATIONAL THERAPIST

## 2022-07-07 PROCEDURE — 97129 THER IVNTJ 1ST 15 MIN: CPT

## 2022-07-07 PROCEDURE — 97130 THER IVNTJ EA ADDL 15 MIN: CPT

## 2022-07-07 PROCEDURE — 82962 GLUCOSE BLOOD TEST: CPT

## 2022-07-07 PROCEDURE — 97110 THERAPEUTIC EXERCISES: CPT

## 2022-07-07 PROCEDURE — 97530 THERAPEUTIC ACTIVITIES: CPT | Performed by: OCCUPATIONAL THERAPIST

## 2022-07-07 RX ADMIN — POLYETHYLENE GLYCOL 3350 17 G: 17 POWDER, FOR SOLUTION ORAL at 07:47

## 2022-07-07 RX ADMIN — ASPIRIN 81 MG: 81 TABLET, COATED ORAL at 07:48

## 2022-07-07 RX ADMIN — HYDROCHLOROTHIAZIDE 12.5 MG: 25 TABLET ORAL at 07:48

## 2022-07-07 RX ADMIN — Medication 1000 MCG: at 07:48

## 2022-07-07 RX ADMIN — PANTOPRAZOLE SODIUM 40 MG: 40 TABLET, DELAYED RELEASE ORAL at 05:17

## 2022-07-07 RX ADMIN — FERROUS SULFATE TAB 325 MG (65 MG ELEMENTAL FE) 325 MG: 325 (65 FE) TAB at 07:49

## 2022-07-07 RX ADMIN — INSULIN LISPRO 2 UNITS: 100 INJECTION, SOLUTION INTRAVENOUS; SUBCUTANEOUS at 16:43

## 2022-07-07 RX ADMIN — ATORVASTATIN CALCIUM 80 MG: 80 TABLET, FILM COATED ORAL at 20:04

## 2022-07-07 RX ADMIN — RAMIPRIL 10 MG: 10 CAPSULE ORAL at 07:48

## 2022-07-07 RX ADMIN — INSULIN GLARGINE-YFGN 14 UNITS: 100 INJECTION, SOLUTION SUBCUTANEOUS at 07:47

## 2022-07-07 RX ADMIN — CLOPIDOGREL 75 MG: 75 TABLET, FILM COATED ORAL at 07:49

## 2022-07-07 RX ADMIN — INSULIN LISPRO 4 UNITS: 100 INJECTION, SOLUTION INTRAVENOUS; SUBCUTANEOUS at 11:45

## 2022-07-07 NOTE — THERAPY TREATMENT NOTE
Inpatient Rehabilitation - Speech Language Pathology Treatment Note    [facility]     Patient Name: Maryellen Crump  : 1950  MRN: [id number]    Today's Date: 2022                   Admit Date: 2022       Visit Dx:      ICD-10-CM ICD-9-CM   1. Impaired functional mobility, balance, gait, and endurance  Z74.09 V49.89       Patient Active Problem List   Diagnosis   • Family history of colon cancer   • Iron deficiency anemia   • Diarrhea of presumed infectious origin   • Type 2 diabetes mellitus with kidney complication, without long-term current use of insulin (HCC)   • Hypertension   • Hyperlipidemia   • Stroke-like symptoms   • Acute CVA (cerebrovascular accident) (HCC)   • Acute lacunar stroke (HCC)   • Vitamin B12 deficiency   • CVA (cerebral vascular accident) (HCC)   • CKD (chronic kidney disease) stage 3, GFR 30-59 ml/min (HCC)       Past Medical History:   Diagnosis Date   • Diabetes mellitus (HCC)    • Hyperlipidemia    • Hypertension        Past Surgical History:   Procedure Laterality Date   • COLONOSCOPY N/A 2018    Procedure: COLONOSCOPY TO CECUM;  Surgeon: Josh Muñoz MD;  Location: [facility] ENDOSCOPY;  Service: General   • DILATION AND CURETTAGE, DIAGNOSTIC / THERAPEUTIC     • ENDOSCOPY N/A 2018    Procedure: ESOPHAGOGASTRODUODENOSCOPY WITH BIOPSIES;  Surgeon: Josh Muñoz MD;  Location: [facility] ENDOSCOPY;  Service: General   • ROTATOR CUFF REPAIR Right        SLP Recommendation and Plan                                                   SLP EVALUATION (last 72 hours)     SLP SLC Evaluation     Row Name 22 1253 22 1035 22 1300 22 1037 22 1300       Communication Assessment/Intervention    Document Type therapy note (daily note)  -SL therapy note (daily note)  -SL therapy note (daily note)  -SL therapy note (daily note)  -SL therapy note (daily note)  -SL    Subjective Information no complaints  -SL no complaints  -SL no complaints  -SL no  complaints  -SL no complaints  -SL    Patient Observations alert;cooperative;agree to therapy  -SL alert;cooperative;agree to therapy  -SL alert;cooperative  -SL alert;cooperative;agree to therapy  -SL alert;cooperative;agree to therapy  -SL    Patient Effort good  -SL good  -SL good  -SL good  -SL good  -SL    Symptoms Noted During/After Treatment none  -SL none  -SL none  -SL none  -SL none  -SL    Row Name 07/05/22 1229 07/05/22 1042 07/04/22 1330             Communication Assessment/Intervention    Document Type therapy note (daily note)  -SL therapy note (daily note)  -SL evaluation  -AL      Subjective Information -- no complaints  -SL --      Patient Observations -- alert;cooperative;agree to therapy  -SL --      Patient/Family/Caregiver Comments/Observations -- -- Pt sitting upright in bed.  -AL      Patient Effort -- good  -SL excellent  -AL      Symptoms Noted During/After Treatment -- none  -SL --              General Information    Patient Profile Reviewed -- -- yes  -AL      Pertinent History Of Current Problem -- -- Pt is a 71-year-old female admitted to [facility] inpatient rehab under diagnosis Stroke R Joss with onset date 6/29/22. MRI 6/29/22 showed foci of restricted diffusion within the left frontoparietal corona radiata and subcortical white matter. Pt report some difficulty with word finding; however, she reports feels it is age-related and she was experiencing it prior to stroke.  -AL      Precautions/Limitations, Vision -- -- WFL;for purposes of eval  -AL      Precautions/Limitations, Hearing -- -- WFL  -AL      Patient Level of Education -- -- High School. Retired. She worked as a  at the  level and assisting the visually impaired.  -AL      Prior Level of Function-Communication -- -- WFL  -AL      Plans/Goals Discussed with -- -- patient  -AL      Barriers to Rehab -- -- none identified  -AL      Patient's Goals for Discharge -- -- functional cognition;return to all  previous roles/activities  -AL      Standardized Assessment Used -- -- CLQT  -AL              Pain Scale: Numbers Pre/Post-Treatment    Pretreatment Pain Rating -- -- 0/10 - no pain  -AL              Motor Speech Assessment/Intervention    Motor Speech, Comment -- -- No overt dysarthria observed. Minimal breathy vocal quality noted; suspect baseline level.  -AL              Standardized Tests    Cognitive/Memory Tests -- -- CLQT: Cognitive Linguistic Quick Test  -AL              CLQT (The Cognitive Linguistic Quick Test)    Attention Domain Score -- -- 177  -AL      Attention Severity Rating -- -- 4: WNL  -AL      Memory Domain Score -- -- 148  -AL      Memory Severity Rating -- -- 4: WNL  -AL      Executive Function Domain Score -- -- 26  -AL      Executive Function Severity Rating -- -- 4: WNL  -AL      Language Domain Score -- -- 30  -AL      Language Severity Rating -- -- 4: WNL  -AL      Visuospatial Domain Score -- -- 80  -AL      Visuospatial Severity Rating -- -- 4: WNL  -AL      Clock Drawing Total Score -- -- 11  -AL      Clock Drawing Severity Rating -- -- WNL  -AL      Composite Severity Rating -- -- 4.0  -AL      Composite Severity Rating Range -- -- 4.0 - 3.5: WNL  -AL      CLQT Comments -- -- Pt exhibited cognitive-linguistic skills overall WNL for her age. She did exhibit mild difficulty with recall of a story, alternating attention for trail-making and planning for maze completion; however, scores are above the criterion cut-off scores for her age. Recommend further assessment of high level cognitive skills. Pt reports she handles finances and medicine management independently at home.  -AL              Communication Treatment Objective and Progress Goals (SLP)    Additional Goals -- -- Additional Goals (Group)  -AL              Additional Goals    Additional Goal Selection -- -- additional goal, SLP goal (free text)  -AL              Additional Goal (SLP)    Additional Goal, SLP -- -- Pt will  participate in diagnostic treatment to further assess high-level cognitive skills in order to set appropriate treatment goals.  -AL      Time Frame (Additional Goal, SLP) -- -- by discharge  -AL            User Key  (r) = Recorded By, (t) = Taken By, (c) = Cosigned By    Initials Name Effective Dates    SL Ember Werner, MS CCC-SLP 06/16/21 -     FREDDY Callaway Silva Guerra, MS CCC-SLP 06/16/21 -                    EDUCATION    The patient has been educated in the following areas:       Cognitive Impairment.             SLP GOALS     Row Name 07/07/22 1253 07/07/22 1035 07/06/22 1300       Memory Skills Goal 1 (SLP)    Barriers (Memory Skills Goal 1, SLP) recall of recipe info- 90% indep  -SL -- --    Progress/Outcomes (Memory Skills Goal 1, SLP) goal ongoing  -SL -- continuing progress toward goal;goal ongoing  -SL    Comment (Memory Skills Goal 1, SLP) mental manipulation task- 4 words - reverse ordering- 60% indep  -SL -- 70% for immediate recall of details from short paragraph;  100% for visual recall- details from picture scene  -SL       Reasoning Goal 1 (SLP)    Comment (Reasoning Goal 1, SLP) -- 10 item grid deductive puzzles ( x2)-  90% on first puzzle and 70% on second puzzle without cues  -SL --       Functional Problem Solving Skills Goal 1 (SLP)    Progress/Outcomes (Problem Solving Goal 1, SLP) continuing progress toward goal;goal ongoing  -SL continuing progress toward goal;goal ongoing  -SL continuing progress toward goal;goal ongoing  -SL    Comment (Problem Solving Goal 1, SLP) tv channel listings guide- 100%  without cues for use and understanding with good attn to details  -SL answering ?'s re: catalog ordering page/info- 90% without cues; 100% with 1 x min cue  -SL 80% without cues for multistep directives and use of hospital map  -SL       Functional Math Skills Goal 1 (SLP)    Progress/Outcomes (Functional Math Skills Goal 1, SLP) continuing progress toward goal;goal ongoing  -SL continuing progress  "toward goal;goal ongoing  - --    Comment (Functional Math Skills Goal 1, SLP) 80%without cues and 100% with 1 x min cues for calculations relating to donation tree  -SL answering ?'s re: bills/statements- 100% without cues  - --       Executive Functional Skills Goal 1 (SLP)    Progress/Outcomes (Executive Function Skills Goal 1, SLP) continuing progress toward goal;goal ongoing  - -- --    Comment (Executive Function Skills Goal 1, SLP) planning/scheduling of furniture deliveries based on paragraph- 100% with min cues and extra time  - -- thought flexibility task- word altering- 4 different restrictions/configurations- 80% indep; 100% with min cues;  repetition of task requirements needed intially- impaired working memory noted  -    Row Name 07/06/22 1038 07/05/22 1300 07/05/22 1042       Memory Skills Goal 1 (SLP)    Progress/Outcomes (Memory Skills Goal 1, SLP) continuing progress toward goal  - -- --    Comment (Memory Skills Goal 1, SLP) 100% for 4 word category inclusion task; 90% for 5 word stimulus  - -- --       Reasoning Goal 1 (SLP)    Improve Reasoning Through Goal 1 (SLP) -- -- complete basic reasoning task;complete high level reasoning task;complete deductive reasoning task;complete mental flexibility task;complete logic/creative thinking task;90%;independently (over 90% accuracy)  -    Time Frame (Reasoning Goal 1, SLP) -- -- by discharge  -    Barriers (Reasoning Goal 1, SLP) -- 60% indep for 12 item deductive puzzle  pt stated- \"I don't do things like this\"  - --    Progress/Outcomes (Reasoning Goal 1, SLP) -- goal ongoing  - goal ongoing  -    Comment (Reasoning Goal 1, SLP) -- 70% for making inferences from short paragraphs  - written IF THEN directives-90% INDEP  -       Functional Problem Solving Skills Goal 1 (SLP)    Improve Problem Solving Through Goal 1 (SLP) -- -- use organization aids;name items for a task;sequence steps in a task;complete organization/home " management task;90%;independently (over 90% accuracy)  -    Time Frame (Problem Solving Goal 1, SLP) -- -- by discharge  -    Progress/Outcomes (Problem Solving Goal 1, SLP) continuing progress toward goal  -SL -- continuing progress toward goal  -SL    Comment (Problem Solving Goal 1, SLP) 100% without cues  for sequencing of 6 steps/senteces for stories-  -SL -- medication dosage amts and times- 100% indep  -       Functional Math Skills Goal 1 (SLP)    Improve Functional Math Skills Through Goal 1 (SLP) -- -- complete word problems involving time;complete word problems involving money;complete checkbook task;complete functional math task;90%;independently (over 90% accuracy)  -    Time Frame (Functional Math Skills Goal 1, SLP) -- -- by discharge  -    Barriers (Functional Math Skills Goal 1, SLP) -- time related activity- answering ?'s re: bank hours/location chart- 83% indep; 100% with 1 x cue for detail  - --    Progress/Outcomes (Functional Math Skills Goal 1, SLP) goal ongoing  - goal ongoing  - --    Comment (Functional Math Skills Goal 1, SLP) measurement conversions/calculations-75% indep; 100% with min cues for task analysis and breakdown at [facility]  - 100% for determining denomination amounts  - --       Executive Functional Skills Goal 1 (SLP)    Improve Executive Function Skills Goal 1 (SLP) -- -- perform self-evaluation;perform self-correction;exhibit cognitive flexibility;home management activity;complex organization/planning activity;time management activity;organization/planning activity;90%;independently (over 90% accuracy)  -    Time Frame (Executive Function Skills Goal 1, SLP) -- -- by discharge  -    Progress/Outcomes (Executive Function Skills Goal 1, SLP) -- -- goal ongoing  -    Comment (Executive Function Skills Goal 1, SLP) -- -- 60% indep for scheduling and planning task based on paragraph ( bryan's schedule)  -    Row Name 07/04/22 4925             Additional  Goal (SLP)    Additional Goal, SLP Pt will participate in diagnostic treatment to further assess high-level cognitive skills in order to set appropriate treatment goals.  -AL      Time Frame (Additional Goal, SLP) by discharge  -AL            User Key  (r) = Recorded By, (t) = Taken By, (c) = Cosigned By    Initials Name Provider Type    Ember Alejandro MS CCC-SLP Speech and Language Pathologist    Silva Jeter MS CCC-SLP Speech and Language Pathologist                            Time Calculation:        Time Calculation- SLP     Row Name 07/07/22 1328 07/07/22 1057          Time Calculation- SLP    SLP Start Time 1250  -SL 1030  -SL     SLP Stop Time 1320  -SL 1100  -SL     SLP Time Calculation (min) 30 min  -SL 30 min  -SL           User Key  (r) = Recorded By, (t) = Taken By, (c) = Cosigned By    Initials Name Provider Type    Ember Alejandro MS CCC-SLP Speech and Language Pathologist                  Therapy Charges for Today     Code Description Service Date Service Provider Modifiers Qty    58703948364 HC ST DEV OF COGN SKILLS INITIAL 15 MIN 7/6/2022 Ember Werner MS CCC-SLP  1    84193703403 HC ST DEV OF COGN SKILLS EACH ADDT'L 15 MIN 7/6/2022 Ember Werner MS CCC-SLP  1    97168821180 HC ST DEV OF COGN SKILLS EACH ADDT'L 15 MIN 7/6/2022 Ember Werner MS CCC-SLP  2    39444656360 HC ST DEV OF COGN SKILLS INITIAL 15 MIN 7/7/2022 Ember Werner MS CCC-SLP  1    92754140648 HC ST DEV OF COGN SKILLS EACH ADDT'L 15 MIN 7/7/2022 Ember Werner MS CCC-SLP  1    25460308729 HC ST DEV OF COGN SKILLS EACH ADDT'L 15 MIN 7/7/2022 Ember Werner MS CCC-SLP  2                           Ember Werner MS CCC-SLP  7/7/2022

## 2022-07-07 NOTE — PROGRESS NOTES
Inpatient Rehabilitation Plan of Care Note    Plan of Care  Care Plan Reviewed - No updates at this time.    Psychosocial    Performed Intervention(s)  Offer support  Encourage to express any concerns      Sphincter Control    Performed Intervention(s)  Monitor I&O  Encourage fluids      Safety    Performed Intervention(s)  Falls/safety precaution  Bed/Chair alarms  Call light/personal items within easy reach      Body Systems    Performed Intervention(s)  Monitor labs; hypo/hyperglycemia    Signed by: Tamar Berry RN

## 2022-07-07 NOTE — PROGRESS NOTES
LOS: 5 days   Patient Care Team:  Juni Cortez MD as PCP - General (Family Medicine)      ONEYDA MARIEE  1950    Diagnoses    1. IMPAIRED FUNCTIONAL MOBILITY, BALANCE, GAIT, AND ENDURANCE       ADMITTING DIAGNOSIS:    Stroke    Subjective     Patient reports tolerating therapies.  She feels that she is making some improvement in her strength.  Working on coordination with the right upper extremity.      Objective     Vitals:    07/07/22 1221   BP: 118/68   Pulse: 77   Resp: 18   Temp: 96.1 °F (35.6 °C)   SpO2: 99%       PHYSICAL EXAM:   MENTAL STATUS -  AWAKE / ALERT  HEENT-  SCLERAE ANICTERIC, CONJUNCTIVAE PINK, OP MOIST, NO JVD,    LUNGS - NORMAL RESPIRATIONS  HEART- RRR,   ABD -  SOFT, NONDISTENDED  EXT - NO EDEMA OR CYANOSIS  NEURO - AWAKE, ALERT  MOTOR EXAM -takes resistance bilaterally.  Impaired dexterity in the right upper extremity        MEDICATIONS  Scheduled Meds:aspirin, 81 mg, Oral, Daily  atorvastatin, 80 mg, Oral, Nightly  clopidogrel, 75 mg, Oral, Daily  ferrous sulfate, 325 mg, Oral, Every Other Day  hydroCHLOROthiazide, 12.5 mg, Oral, Daily  [START ON 7/8/2022] insulin glargine, 14 Units, Subcutaneous, Daily  insulin lispro, 0-9 Units, Subcutaneous, TID AC  pantoprazole, 40 mg, Oral, Q AM  polyethylene glycol, 17 g, Oral, Daily  ramipril, 10 mg, Oral, Daily  vitamin B-12, 1,000 mcg, Oral, Daily      Continuous Infusions:   PRN Meds:.•  acetaminophen **OR** acetaminophen  •  bisacodyl  •  calcium carbonate  •  dextrose  •  dextrose  •  glucagon (human recombinant)  •  hydrALAZINE  •  ondansetron      RESULTS  Glucose   Date/Time Value Ref Range Status   07/07/2022 1108 231 (H) 70 - 130 mg/dL Final     Comment:     Meter: [id number] : [id number] Eric AMES   07/07/2022 0634 129 70 - 130 mg/dL Final     Comment:     Meter: [id number] : [id number] Rolan Lucero NA   07/06/2022 2101 237 (H) 70 - 130 mg/dL Final     Comment:     Meter: [id number] : [id number]  Daniel AMES   07/06/2022 1610 222 (H) 70 - 130 mg/dL Final     Comment:     Meter: [id number] : [id number] Eric AMES   07/06/2022 1136 187 (H) 70 - 130 mg/dL Final     Comment:     Meter: [id number] : [id number] Eric AMES   07/06/2022 0624 118 70 - 130 mg/dL Final     Comment:     Meter: [id number] : [id number] Laureano AMES   07/05/2022 2033 177 (H) 70 - 130 mg/dL Final     Comment:     Meter: [id number] : [id number] Laureano AMES   07/05/2022 1603 200 (H) 70 - 130 mg/dL Final     Comment:     Meter: [id number] : [id number] Daniel AMES     Results from last 7 days   Lab Units 07/05/22  0942 07/02/22  0613 07/01/22  0454   WBC 10*3/mm3 6.93 8.96 6.09   HEMOGLOBIN g/dL 11.8* 10.7* 10.4*   HEMATOCRIT % 35.9 32.3* 31.3*   PLATELETS 10*3/mm3 281 259 256     Results from last 7 days   Lab Units 07/05/22  0942 07/02/22  0613 07/01/22  0454   SODIUM mmol/L 136 140 139   POTASSIUM mmol/L 4.0 4.2 4.0   CHLORIDE mmol/L 97* 106 103   CO2 mmol/L 25.0 24.0 24.0   BUN mg/dL 14 15 15   CREATININE mg/dL 1.11* 1.02* 1.07*   CALCIUM mg/dL 10.2 9.3 9.8   GLUCOSE mg/dL 272* 136* 142*      Latest Reference Range & Units 07/01/22 04:54 07/05/22 09:42   Vitamin B-12 211 - 946 pg/mL 204 (L)    25 Hydroxy, Vitamin D 30.0 - 100.0 ng/ml  54.1      BRAIN MRI WITHOUT CONTRAST-June 29, 2022     HISTORY: Stroke, follow up     COMPARISON: 06/29/2022.     FINDINGS:  Multiplanar images of the head were obtained without  gadolinium. There is an area of restricted diffusion which is noted  within the left frontoparietal coronal radiata, measuring up to 1.1 cm  in size. An additional tiny focus is seen just superior to it within the  subcortical white matter. This measures approximately 4 mm in size. No  additional areas of restricted diffusion are seen. There is diffuse  atrophy. There is periventricular and deep white matter microangiopathic  change. Degree of ventricular dilatation  may be somewhat out of  proportion to the degree of atrophy, and correlation with any evidence  of normal pressure hydrocephalus is recommended. There is no midline  shift or mass effect. Intracranial flow voids appear intact. No  abnormality is seen on gradient echo imaging There is mucosal thickening  noted within the ethmoid sinuses.     IMPRESSION:  1. Foci of restricted diffusion are noted within the left frontoparietal  corona radiata and subcortical white matter.  No additional areas of  acute infarction are identified.    ASSESSMENT and PLAN    Iron deficiency anemia    Type 2 diabetes mellitus with kidney complication, without long-term current use of insulin ([facility])    Hypertension    Hyperlipidemia    Acute CVA (cerebrovascular accident) ([facility])    Acute lacunar stroke ([facility])    Vitamin B12 deficiency    CVA (cerebral vascular accident) ([facility])    CKD (chronic kidney disease) stage 3, GFR 30-59 ml/min ([facility])    CVA 6/29/2022 with right-sided weakness-lacunar infarct left corona radiata region  Stroke prophylaxis-aspirin/Plavix/atorvastatin    DVT [id number] dual antiplatelet therapy    Diabetes mellitus-on metformin/Starlix at home.  Lantus  July 6-titrate up on Lantus to 14 units every morning.  Look at transitioning back to home regimen soon  July 7-plan to review with internal medicine timeframe of transitioning back to home regimen with metformin and Januvia    Vitamin B12 replacement    Hypertension-hydrochlorothiazide/ramipril    Impaired mobility  Impaired self-care  Impaired cognition    Functional status-July 5-Pt scored WNL for her age on CLQT in domains of  attention, memory, executive functions, language and visuospatial skills;  however, she exhibited mild difficulty with story retell task, alternating  attention and maze completion.  Upper body dressing contact-guard.  Lower body dressing minimum assist.  Transfers minimal-contact-guard assist.  Gait rolling walker 100 feet minimum assist.   30 feet with quad tip cane.    TEAM CONF - JULY 7 - POOR HAND WRITING, IMPAIRED MOTOR CONTROL. TRANSFERS CTG MIN. GAIT 160 FEET CTG -MIN RW UBD CTG. LBD  MIN. BATH MIN. TOILETING MIN. DEFICITS WITH DIVIDED ATTENTION AND WORKING MEMORY, DEDUCTIVE REASONING.   BNE (Active)  Att'n. - WNL  Exec. Fx. - Min Imp.  Rsng/Jgmnt - WNL  Arith - WNL  Visuospatial Skills - WNL  Visual Mem. - WNL  Verbal Mem. - Mod.Imp. for immediate recall, Min-Mildly Imp. for delayed recall  Emot - Pt denied dep/anx  CONTINENT BOWEL AND BLADDER. SKIN INTACT  ELOS - WED    Now admit for comprehensive acute inpatient rehabilitation .  This would be an interdisciplinary program with physical therapy 1 hour,  occupational therapy 1 hour, and speech therapy 1 hour, 5 days a week.  Rehabilitation nursing for carryover, monitoring of hypertension and neurologic   status, bowel and bladder, and skin  Ongoing physician follow-up.  Weekly team conferences.  Goals are to achieve a level of supervision with  mobility and self-care and improved balance.   Rehabilitation prognosis fair.  Medical prognosis fair.  Estimated length of stay is approximately 2 weeks, but is only an estimation.   The patient's functional status and clinical status is unchanged from preadmission assessment and the patient continues appropriate for acute inpatient rehabilitation.  Goal is for home with outpatient   therapies.  Barrier to discharge: Impaired mobility and self-care- work on balance, transfers, progressive ambulation, actives of daily living to overcome.         Tanmay Espinoza MD      During rounds, used appropriate personal protective equipment including mask and gloves.  Additional gown if indicated.  Mask used was standard procedure mask. Appropriate PPE was worn during the entire visit.  Hand hygiene was completed before and after.

## 2022-07-07 NOTE — PROGRESS NOTES
TEAM CONF - JULY 7 - POOR HAND WRITING, IMPAIRED MOTOR CONTROL. TRANSFERS CTG MIN. GAIT 160 FEET CTG -MIN RW UBD CTG. LBD  MIN. BATH MIN. TOILETING MIN. DEFICITS WITH DIVIDED ATTENTION AND WORKING MEMORY, DEDUCTIVE REASONING.   BNE (Active)  Att'n. - WNL  Exec. Fx. - Min Imp.  Rsng/[place] - WNL  Arith - WNL  Visuospatial Skills - WNL  Visual Mem. - WNL  Verbal Mem. - Mod.Imp. for immediate recall, Min-Mildly Imp. for delayed recall  Emot - Pt denied dep/anx  CONTINENT BOWEL AND BLADDER. SKIN INTACT  ELOS - WED

## 2022-07-07 NOTE — PROGRESS NOTES
Name: Maryellen Crump ADMIT: 2022   : 1950  PCP: Juni Cortez MD    MRN: [id number] LOS: 5 days   AGE/SEX: 71 y.o. female  ROOM: [facility]     Subjective   Subjective   Seen this morning.  No new events overnight.  Feels about the same.  States she is able to write better with her right hand now.    Review of Systems   as above  Objective   Objective   Vital Signs  Temp:  [96.1 °F (35.6 °C)-98.4 °F (36.9 °C)] 96.1 °F (35.6 °C)  Heart Rate:  [68-77] 77  Resp:  [16-18] 18  BP: (118-150)/(68-85) 118/68  SpO2:  [98 %-99 %] 99 %  on   ;   Device (Oxygen Therapy): room air  Body mass index is 20.02 kg/m².  Physical Exam    General: Alert,no acute distress  CV: Regular rate and rhythm, no murmurs rubs or gallops  Lungs: Clear to auscultation bilaterally, no crackles or wheezes  Abdomen: Soft, nontender, nondistended  Extremities: No significant peripheral edema , no cyanosis.       Results Review     I reviewed the patient's new clinical results.  Results from last 7 days   Lab Units 22  0454   WBC 10*3/mm3 6.93 8.96 6.09   HEMOGLOBIN g/dL 11.8* 10.7* 10.4*   PLATELETS 10*3/mm3 281 259 256     Results from last 7 days   Lab Units 22  0613 22  0454   SODIUM mmol/L 136 140 139   POTASSIUM mmol/L 4.0 4.2 4.0   CHLORIDE mmol/L 97* 106 103   CO2 mmol/L 25.0 24.0 24.0   BUN mg/dL 14 15 15   CREATININE mg/dL 1.11* 1.02* 1.07*   GLUCOSE mg/dL 272* 136* 142*   Estimated Creatinine Clearance: 37.1 mL/min (A) (by C-G formula based on SCr of 1.11 mg/dL (H)).      Results from last 7 days   Lab Units 2222  0454   CALCIUM mg/dL 10.2 9.3 9.8       COVID19   Date Value Ref Range Status   2022 Not Detected Not Detected - Ref. Range Final   2022 Not Detected Not Detected - Ref. Range Final     Glucose   Date/Time Value Ref Range Status   2022 1559 182 (H) 70 - 130 mg/dL Final     Comment:     Meter:  [id number] : [id number] Eric AMES   07/07/2022 1108 231 (H) 70 - 130 mg/dL Final     Comment:     Meter: [id number] : [id number] Eric AMES   07/07/2022 0634 129 70 - 130 mg/dL Final     Comment:     Meter: [id number] : [id number] Rolan Lucero NA   07/06/2022 2101 237 (H) 70 - 130 mg/dL Final     Comment:     Meter: [id number] : [id number] Daniel Russo NA   07/06/2022 1610 222 (H) 70 - 130 mg/dL Final     Comment:     Meter: [id number] : [id number] Eric AMES   07/06/2022 1136 187 (H) 70 - 130 mg/dL Final     Comment:     Meter: [id number] : [id number] Eric AMES   07/06/2022 0624 118 70 - 130 mg/dL Final     Comment:     Meter: [id number] : [id number] [facility]       CT Angiogram Head, CT Angiogram Neck  Narrative: CT ANGIOGRAM NECK AND HEAD WITH CONTRAST     HISTORY: Right-sided weakness.     COMPARISON: None.     FINDINGS: The brain and ventricles are symmetrical. There is  age-appropriate atrophy. Mild-to-moderate vascular calcification is  noted. No focal area of decreased attenuation to suggest acute  infarction is identified.     A CT angiogram of the neck and head was then performed. Multiplanar, as  well as 3-dimensional reconstructions were generated.     The great vessels are arranged in a classic configuration. There is 0%  stenosis involving the carotid bifurcations. Vascular calcification  involving the carotid siphons are noted without high-grade stenosis. The  proximal aspects of the anterior and middle cerebral arteries appear  unremarkable.     Both vertebral arteries were opacified. The left vertebral artery is  larger than that of the right. The basilar artery and the proximal  aspects of the posterior cerebral arteries appear unremarkable. A  standard postcontrast CT examination of the brain showed no evidence of  abnormal enhancement.     There is moderate-to-severe loss of disc height at C5-C6.      Impression: No evidence of acute infarction or of intracranial  hemorrhage. Further evaluation for infarction could be performed with  MRI examination of the brain. There is 0% stenosis involving the carotid  bifurcations. There is no evidence of proximal intracranial arterial  high-grade stenosis or of aneurysm.         Radiation dose reduction techniques were utilized, including automated  exposure control and exposure modulation based on body size.     This report was finalized on 6/30/2022 5:44 PM by Dr. Marek Schulte M.D.     Adult transthoracic echo complete  · Calculated left ventricular EF = 59.2% Estimated left ventricular EF was   in agreement with the calculated left ventricular EF. Left ventricular   systolic function is normal.  · Left ventricular diastolic function was normal.  · Saline test results are positive with valsalva manuever for right to   left atrial level shunt suggesting presence of a small PFO.  · No valvular masses noted on transthoracic echocardiogram.     MRI Thoracic Spine Without Contrast  Narrative: THORACIC SPINE MRI WITHOUT GADOLINIUM     HISTORY: Ataxia, thoracic trauma; I63.9-Cerebral infarction, unspecified     COMPARISON:  None.     FINDINGS:  Multiplanar images of the thoracic spine obtained without  gadolinium.  Axial images obtained from T1-L1.     No acute fracture or subluxation of the thoracic spine is seen. Marrow  signal appears unremarkable. No cord signal abnormalities are seen.  Conus terminates at L1. There is some intervertebral disc space  narrowing noted at multiple levels. There is no evidence of canal  stenosis or neural foraminal narrowing at any level.     Impression: 1. As above.     This report was finalized on 6/30/2022 1:12 AM by Dr. Shaila Tilley M.D.       Scheduled Medications  aspirin, 81 mg, Oral, Daily  atorvastatin, 80 mg, Oral, Nightly  clopidogrel, 75 mg, Oral, Daily  ferrous sulfate, 325 mg, Oral, Every Other Day  hydroCHLOROthiazide,  12.5 mg, Oral, Daily  insulin lispro, 0-9 Units, Subcutaneous, TID AC  [START ON 7/9/2022] metFORMIN, 500 mg, Oral, BID With Meals  pantoprazole, 40 mg, Oral, Q AM  polyethylene glycol, 17 g, Oral, Daily  ramipril, 10 mg, Oral, Daily  [START ON 7/8/2022] SITagliptin, 25 mg, Oral, Daily  vitamin B-12, 1,000 mcg, Oral, Daily    Infusions   Diet  Diet Regular; Cardiac, Consistent Carbohydrate       Assessment/Plan     Active Hospital Problems    Diagnosis  POA   • CKD (chronic kidney disease) stage 3, GFR 30-59 ml/min ([facility]) [N18.30]  Unknown   • CVA (cerebral vascular accident) ([facility]) [I63.9]  Yes   • Vitamin B12 deficiency [E53.8]  Yes   • Acute lacunar stroke ([facility]) [I63.81]  Yes   • Acute CVA (cerebrovascular accident) ([facility]) [I63.9]  Yes   • Type 2 diabetes mellitus with kidney complication, without long-term current use of insulin ([facility]) [E11.29]  Yes   • Hypertension [I10]  Yes   • Hyperlipidemia [E78.5]  Yes   • Iron deficiency anemia [D50.9]  Yes      Resolved Hospital Problems   No resolved problems to display.       Ms. Crump is a 71 y.o. female with a history of hypertension, hyperlipidemia and diabetes mellitus that presents to [facility] complaining of right arm and leg weakness.  MRI showed stroke in the left subcortical region and corona radiata.    Acute lacunar stroke: Continue aspirin, Plavix, statin.  PT OT.  Follow-up with outpatient neurology after discharge.    Essential hypertension: Controlled on ramipril 10 mg milligrams daily and HCTZ 12.5 mg daily.  Patient is on ramipril 10 units twice daily at home, discussed with patient to take 10 mg daily at home only after BP stable on current regimen.  If BP persistently elevated above 140s, might restart evening dose ramipril 10 mg every afternoon.      DM type II: Hemoglobin A1c 6.9.Blood glucose labile.  -Discontinue Lantus.  Resume home metformin and Januvia starting  07/07  Restart home  metformin at lower dose 500 mg twice daily dose  per kidney function, 500 mg twice daily with need to be continued at discharge    Abnormal thyroid levels: TSH slightly elevated 4.6, free T4 normal.  Will need repeat levels in 6-8 weeks to monitor, around mid July.  08/15    Anemia: Iron studies continue iron deficiency.  Continue p.o. iron.  Hemoglobin stable.  Outpatient follow-up with PCP for iron deficiency anemia.      Vitamin B12 deficiency: Vitamin B 12 low at 204, ordered one-time IV B12 followed by daily p.o. supplement.  Needs repeat labs in about 3 months to monitor response.  Metformin can cause vitamin B12 deficiency.  If vitamin B12 still low we will oral supplements metformin will need to be discontinued.    GERD:  on pantoprazole 20 mg daily, resume home omeprazole at discharge.      CKD stage III: Creatinine stable.  Avoid nephrotoxic drugs.    · SCDs for DVT prophylaxis.  · Full code.          I wore protective equipment throughout this patient encounter including a face mask, gloves and protective eyewear.  Hand hygiene was performed before donning protective equipment and after removal when leaving the room.      Dictated utilizing Dragon dictation        Steve Ricci MD  [facility]  07/07/22  17:37 EDT

## 2022-07-07 NOTE — PLAN OF CARE
Problem: Rehabilitation (IRF) Plan of Care  Goal: Plan of Care Review  Outcome: Ongoing, Progressing  Flowsheets (Taken 7/7/2022 0225)  Progress: improving  Plan of Care Reviewed With: patient  Outcome Evaluation: Patient appears to be sleeping well tonight, A&Ox4 pleasant and cooperative. R hemiparesis, RUE weakness and ataxia. Ambulatory with walker. No c.o any pain. No unsafe behavior.      atrovastat

## 2022-07-07 NOTE — PLAN OF CARE
Goal Outcome Evaluation:  Plan of Care Reviewed With: patient        Progress: improving  Outcome Evaluation: Ms. Crump attended therapies and is cooperative with staff.  Pt. is A&Ox4.  Cont B&B.  Sat up in wc or recliner throughout this shift.  Right side is weak.  Ambulates with walker.  No safety issues noted.  Uses call light for assistance.

## 2022-07-07 NOTE — THERAPY TREATMENT NOTE
Inpatient Rehabilitation - Occupational Therapy Treatment Note    [facility]     Patient Name: Maryellen Crump  : 1950  MRN: [id number]    Today's Date: 2022                 Admit Date: 2022         ICD-10-CM ICD-9-CM   1. Impaired functional mobility, balance, gait, and endurance  Z74.09 V49.89       Patient Active Problem List   Diagnosis   • Family history of colon cancer   • Iron deficiency anemia   • Diarrhea of presumed infectious origin   • Type 2 diabetes mellitus with kidney complication, without long-term current use of insulin (HCC)   • Hypertension   • Hyperlipidemia   • Stroke-like symptoms   • Acute CVA (cerebrovascular accident) (HCC)   • Acute lacunar stroke (HCC)   • Vitamin B12 deficiency   • CVA (cerebral vascular accident) (HCC)   • CKD (chronic kidney disease) stage 3, GFR 30-59 ml/min (HCC)       Past Medical History:   Diagnosis Date   • Diabetes mellitus (HCC)    • Hyperlipidemia    • Hypertension        Past Surgical History:   Procedure Laterality Date   • COLONOSCOPY N/A 2018    Procedure: COLONOSCOPY TO CECUM;  Surgeon: Josh Muñoz MD;  Location: [facility] ENDOSCOPY;  Service: General   • DILATION AND CURETTAGE, DIAGNOSTIC / THERAPEUTIC     • ENDOSCOPY N/A 2018    Procedure: ESOPHAGOGASTRODUODENOSCOPY WITH BIOPSIES;  Surgeon: Josh Muñoz MD;  Location: [facility] ENDOSCOPY;  Service: General   • ROTATOR CUFF REPAIR Right              IRF OT ASSESSMENT FLOWSHEET (last 12 hours)     IRF OT Evaluation and Treatment     Row Name 22 0925          OT Time and Intention    Document Type daily treatment  -SG     Mode of Treatment individual therapy;occupational therapy  -SG     Patient Effort good  -SG     Symptoms Noted During/After Treatment none  -SG     Row Name 22 0925          General Information    Patient/Family/Caregiver Comments/Observations Pt sitting in chair in room  -SG     Row Name 22 0925          Pain Assessment    Pretreatment  Pain Rating 0/10 - no pain  -[facility] Name 07/07/22 0925          Cognition/Psychosocial    Affect/Mental Status (Cognition) WNL  -     Orientation Status (Cognition) oriented x 4  -SG     Follows Commands (Cognition) follows two-step commands  -     Personal Safety Interventions gait belt;fall prevention program maintained  -     Row Name 07/07/22 0925          Bathing    [place] Level (Bathing) bathing skills;standby assist  -     Assistive Device (Bathing) grab bar/tub rail;hand held shower spray hose;shower chair  -     Position (Bathing) supported sitting;supported standing  -[facility] Name 07/07/22 0925          Upper Body Dressing    [place] Level (Upper Body Dressing) upper body dressing skills;set up assistance  -     Position (Upper Body Dressing) supported sitting;supported standing  -[facility] Name 07/07/22 0925          Lower Body Dressing    [place] Level (Lower Body Dressing) pants/bottoms;shoes/slippers;underwear;socks;minimum assist (75% patient effort)  -     Position (Lower Body Dressing) supported sitting;supported standing  -     Set-up Assistance (Lower Body Dressing) obtain clothing  -[facility] Name 07/07/22 0925          Grooming    [place] Level (Grooming) grooming skills;supervision  -     Position (Grooming) sink side;supported sitting  -[facility] Name 07/07/22 0925          Functional Mobility    Functional Mobility- Ind. Level contact guard assist  -     Functional Mobility- Device walker, front-wheeled  -     Functional Mobility- Comment Walks from room to shower room and back with rwx, no LOB  -[facility] Name 07/07/22 0925          Transfer Assessment/Treatment    Transfers sit-stand transfer;stand-sit transfer;shower transfer  -[facility] Name 07/07/22 0925          Transfers    Sit-Stand [place] (Transfers) contact guard;verbal cues  -     Stand-Sit [place] (Transfers) contact guard  -     [place] Level (Shower  Transfer) contact guard;verbal cues  -     Assistive Device (Shower Transfer) walker, front-wheeled;tub bench  -     Row Name 07/07/22 0925          Sit-Stand Transfer    Assistive Device (Sit-Stand Transfers) walker, front-wheeled  -SG     Row Name 07/07/22 0925          Stand-Sit Transfer    Assistive Device (Stand-Sit Transfers) walker, front-wheeled  -SG     Row Name 07/07/22 0925          Shower Transfer    Type (Shower Transfer) stand pivot/stand step  -     Row Name 07/07/22 0925          Positioning and Restraints    Pre-Treatment Position sitting in chair/recliner  -SG     Post Treatment Position chair  -SG     In Chair sitting;call light within reach;encouraged to call for assist;exit alarm on  -SG           User Key  (r) = Recorded By, (t) = Taken By, (c) = Cosigned By    Initials Name Effective Dates    SG Shira Wong, OTR 06/16/21 -                  Occupational Therapy Education                 Title: PT OT SLP Therapies (In Progress)     Topic: Occupational Therapy (In Progress)     Point: ADL training (Done)     Description:   Instruct learner(s) on proper safety adaptation and remediation techniques during self care or transfers.   Instruct in proper use of assistive devices.              Learning Progress Summary           Patient Acceptance, E, VU,NR by AF at 7/4/2022 5587    Comment: educated on safety with ADls and transfers                   Point: Home exercise program (Not Started)     Description:   Instruct learner(s) on appropriate technique for monitoring, assisting and/or progressing therapeutic exercises/activities.              Learner Progress:  Not documented in this visit.          Point: Precautions (Not Started)     Description:   Instruct learner(s) on prescribed precautions during self-care and functional transfers.              Learner Progress:  Not documented in this visit.          Point: Body mechanics (Not Started)     Description:   Instruct learner(s) on  proper positioning and spine alignment during self-care, functional mobility activities and/or exercises.              Learner Progress:  Not documented in this visit.                      User Key     Initials Effective Dates Name Provider Type Discipline    AF 06/16/21 -  Dolores Odom OTMANNY Occupational Therapist OT                    OT Recommendation and Plan                         Time Calculation:      Time Calculation- OT     Row Name 07/07/22 1218             Time Calculation- OT    OT Start Time 0800  -SG      OT Stop Time 0830  -SG      OT Time Calculation (min) 30 min  -      OT Received On 07/07/22  -            User Key  (r) = Recorded By, (t) = Taken By, (c) = Cosigned By    Initials Name Provider Type     Shira Wong OTR Occupational Therapist              Therapy Charges for Today     Code Description Service Date Service Provider Modifiers Qty    93259632087 HC OT THERAPEUTIC ACT EA 15 MIN 7/6/2022 Shira Wong OTR GO 4    01093446809 HC OT SELF CARE/MGMT/TRAIN EA 15 MIN 7/7/2022 Shira Wong OTR GO 2                   RICH Day  7/7/2022

## 2022-07-07 NOTE — PROGRESS NOTES
Inpatient Rehabilitation Plan of Care Note    Plan of Care  Care Plan Reviewed - No updates at this time.    Body Systems    [RN] Endocrine(Active)  Current Status(07/07/2022): Blood glucose uncontrolled  Weekly Goal(07/12/2022): Blood glucose WNL  Discharge Goal: Blood glucose controlled    Performed Intervention(s)  Monitor labs; hypo/hyperglycemia      Psychosocial    [RN] Coping/Adjustment(Active)  Current Status(07/06/2022): Pt A&Ox4, calm and looking forward to starting  rehab. Supportive family.  Weekly Goal(07/12/2022): Pt will demonstrate positive coping mechanism  Discharge Goal: Coping with current health status    Performed Intervention(s)  Offer support  Encourage to express any concerns      Safety    [RN] Potential for Injury(Active)  Current Status(07/06/2022): Risk for fall r/t recent stroke with R hemiparesis  and unsteady gait  Weekly Goal(07/12/2022): Call light will be used for assistance  Discharge Goal: No falls/injuries    Performed Intervention(s)  Falls/safety precaution  Bed/Chair alarms  Call light/personal items within easy reach      Sphincter Control    [RN] Bladder Management(Active)  Current Status(07/06/2022): Continent 100%  Weekly Goal(07/12/2022): Continent 100%  Discharge Goal: Continent 100%    [RN] Bowel Management(Active)  Current Status(07/06/2022): Continent 100%  Weekly Goal(07/07/2022): Continent 100%  Discharge Goal: Continent 100%    Performed Intervention(s)  Monitor I&O  Encourage fluids    Signed by: Chelsy Brody RN

## 2022-07-07 NOTE — THERAPY TREATMENT NOTE
Inpatient Rehabilitation - Occupational Therapy Treatment Note    [facility]     Patient Name: Maryellen Crump  : 1950  MRN: [id number]    Today's Date: 2022                 Admit Date: 2022         ICD-10-CM ICD-9-CM   1. Impaired functional mobility, balance, gait, and endurance  Z74.09 V49.89       Patient Active Problem List   Diagnosis   • Family history of colon cancer   • Iron deficiency anemia   • Diarrhea of presumed infectious origin   • Type 2 diabetes mellitus with kidney complication, without long-term current use of insulin (HCC)   • Hypertension   • Hyperlipidemia   • Stroke-like symptoms   • Acute CVA (cerebrovascular accident) (HCC)   • Acute lacunar stroke (HCC)   • Vitamin B12 deficiency   • CVA (cerebral vascular accident) (HCC)   • CKD (chronic kidney disease) stage 3, GFR 30-59 ml/min (HCC)       Past Medical History:   Diagnosis Date   • Diabetes mellitus (HCC)    • Hyperlipidemia    • Hypertension        Past Surgical History:   Procedure Laterality Date   • COLONOSCOPY N/A 2018    Procedure: COLONOSCOPY TO CECUM;  Surgeon: Josh Muñoz MD;  Location: [facility] ENDOSCOPY;  Service: General   • DILATION AND CURETTAGE, DIAGNOSTIC / THERAPEUTIC     • ENDOSCOPY N/A 2018    Procedure: ESOPHAGOGASTRODUODENOSCOPY WITH BIOPSIES;  Surgeon: Josh Muñoz MD;  Location: [facility] ENDOSCOPY;  Service: General   • ROTATOR CUFF REPAIR Right              IRF OT ASSESSMENT FLOWSHEET (last 12 hours)     IRF OT Evaluation and Treatment     Row Name 22 1428 22 0925       OT Time and Intention    Document Type daily treatment  -SG daily treatment  -SG    Mode of Treatment individual therapy;occupational therapy  -SG individual therapy;occupational therapy  -SG    Patient Effort good  -SG good  -SG    Symptoms Noted During/After Treatment none  -SG none  -SG    Row Name 22 1428 22 0988       General Information    Patient/Family/Caregiver  Comments/Observations Pt sitting up in chair  - Pt sitting in chair in room  -    Existing Precautions/Restrictions fall  -SG --    Row Name 07/07/22 1428 07/07/22 0925       Pain Assessment    Pretreatment Pain Rating 0/10 - no pain  - 0/10 - no pain  -    Row Name 07/07/22 1428 07/07/22 0925       Cognition/Psychosocial    Affect/Mental Status (Cognition) WNL  -SG WNL  -SG    Orientation Status (Cognition) -- oriented x 4  -SG    Follows Commands (Cognition) -- follows two-step commands  -    Personal Safety Interventions -- gait belt;fall prevention program maintained  -    Row Name 07/07/22 0925          Bathing    [place] Level (Bathing) bathing skills;standby assist  -     Assistive Device (Bathing) grab bar/tub rail;hand held shower spray hose;shower chair  -     Position (Bathing) supported sitting;supported standing  -[facility] Name 07/07/22 0925          Upper Body Dressing    [place] Level (Upper Body Dressing) upper body dressing skills;set up assistance  -     Position (Upper Body Dressing) supported sitting;supported standing  -[facility] Name 07/07/22 0925          Lower Body Dressing    [place] Level (Lower Body Dressing) pants/bottoms;shoes/slippers;underwear;socks;minimum assist (75% patient effort)  -     Position (Lower Body Dressing) supported sitting;supported standing  -     Set-up Assistance (Lower Body Dressing) obtain clothing  -[facility] Name 07/07/22 0925          Grooming    [place] Level (Grooming) grooming skills;supervision  -     Position (Grooming) sink side;supported sitting  -     Row Name 07/07/22 1428          Bed Mobility    Supine-Sit [place] (Bed Mobility) not tested  -     Row Name 07/07/22 0925          Functional Mobility    Functional Mobility- Ind. Level contact guard assist  -     Functional Mobility- Device walker, front-wheeled  -     Functional Mobility- Comment Walks from room to shower room and back with  "rwx, no LOB  -     Row Name 07/07/22 1428 07/07/22 0925       Transfer Assessment/Treatment    Transfers sit-stand transfer;stand-sit transfer  -SG sit-stand transfer;stand-sit transfer;shower transfer  -    Comment, (Transfers) W/c<>chair CGA  -SG --    Row Name 07/07/22 1428 07/07/22 0925       Transfers    Sit-Stand [place] (Transfers) contact guard;verbal cues  -SG contact guard;verbal cues  -SG    Stand-Sit [place] (Transfers) contact guard  -SG contact guard  -    [place] Level (Shower Transfer) -- contact guard;verbal cues  -    Assistive Device (Shower Transfer) -- walker, front-wheeled;tub bench  -    Row Name 07/07/22 0925          Sit-Stand Transfer    Assistive Device (Sit-Stand Transfers) walker, front-wheeled  -     Row Name 07/07/22 0925          Stand-Sit Transfer    Assistive Device (Stand-Sit Transfers) walker, front-wheeled  -     Row Name 07/07/22 0925          Shower Transfer    Type (Shower Transfer) stand pivot/stand step  -     Row Name 07/07/22 1428          Motor Skills    Comments, Motor Control/Coordination R hand manipulation of 1/4\" pegs into pegboard, improved manipulation  -     Row Name 07/07/22 1428          Hand (Therapeutic Exercise)    Hand Strengthening (Therapeutic Exercise) yellow  Pushing quarters into putty, using lateral pinch to twist and turn; rolling putty out and using 2pt pinch  -     Row Name 07/07/22 1428 07/07/22 0925       Positioning and Restraints    Pre-Treatment Position sitting in chair/recliner  -SG sitting in chair/recliner  -SG    Post Treatment Position chair  -SG chair  -SG    In Chair sitting;call light within reach;encouraged to call for assist;exit alarm on  -SG sitting;call light within reach;encouraged to call for assist;exit alarm on  -SG          User Key  (r) = Recorded By, (t) = Taken By, (c) = Cosigned By    Initials Name Effective Dates    Shira Link, OTR 06/16/21 -                  Occupational " Therapy Education                 Title: PT OT SLP Therapies (In Progress)     Topic: Occupational Therapy (In Progress)     Point: ADL training (Done)     Description:   Instruct learner(s) on proper safety adaptation and remediation techniques during self care or transfers.   Instruct in proper use of assistive devices.              Learning Progress Summary           Patient Acceptance, E, VU,NR by  at 7/4/2022 7070    Comment: educated on safety with ADls and transfers                   Point: Home exercise program (Not Started)     Description:   Instruct learner(s) on appropriate technique for monitoring, assisting and/or progressing therapeutic exercises/activities.              Learner Progress:  Not documented in this visit.          Point: Precautions (Not Started)     Description:   Instruct learner(s) on prescribed precautions during self-care and functional transfers.              Learner Progress:  Not documented in this visit.          Point: Body mechanics (Not Started)     Description:   Instruct learner(s) on proper positioning and spine alignment during self-care, functional mobility activities and/or exercises.              Learner Progress:  Not documented in this visit.                      User Key     Initials Effective Dates Name Provider Type [facility] 06/16/21 -  Dolores Odom OTR Occupational Therapist OT                    OT Recommendation and Plan                         Time Calculation:      Time Calculation- OT     Row Name 07/07/22 1526 07/07/22 1218          Time Calculation- OT    OT Start Time 1330  - 0800  -     OT Stop Time 1400  - 0830  -     OT Time Calculation (min) 30 min  - 30 min  -     OT Received On 07/07/22  -SG 07/07/22  -           User Key  (r) = Recorded By, (t) = Taken By, (c) = Cosigned By    Initials Name Provider Type    Shira Link OTMANNY Occupational Therapist              Therapy Charges for Today     Code Description Service  Date Service Provider Modifiers Qty    71613995266 HC OT THERAPEUTIC ACT EA 15 MIN 7/6/2022 Shira Wong OTR GO 4    68753309417 HC OT SELF CARE/MGMT/TRAIN EA 15 MIN 7/7/2022 Shira Wong OTR GO 2    96251067954 HC OT THERAPEUTIC ACT EA 15 MIN 7/7/2022 Shira Wong OTR GO 2                   RICH Day  7/7/2022

## 2022-07-07 NOTE — THERAPY TREATMENT NOTE
Inpatient Rehabilitation - Physical Therapy Treatment Note       [facility]     Patient Name: Maryellen Crump  : 1950  MRN: [id number]    Today's Date: 2022                    Admit Date: 2022      Visit Dx:     ICD-10-CM ICD-9-CM   1. Impaired functional mobility, balance, gait, and endurance  Z74.09 V49.89       Patient Active Problem List   Diagnosis   • Family history of colon cancer   • Iron deficiency anemia   • Diarrhea of presumed infectious origin   • Type 2 diabetes mellitus with kidney complication, without long-term current use of insulin (HCC)   • Hypertension   • Hyperlipidemia   • Stroke-like symptoms   • Acute CVA (cerebrovascular accident) (HCC)   • Acute lacunar stroke (HCC)   • Vitamin B12 deficiency   • CVA (cerebral vascular accident) (HCC)   • CKD (chronic kidney disease) stage 3, GFR 30-59 ml/min (HCC)       Past Medical History:   Diagnosis Date   • Diabetes mellitus (HCC)    • Hyperlipidemia    • Hypertension        Past Surgical History:   Procedure Laterality Date   • COLONOSCOPY N/A 2018    Procedure: COLONOSCOPY TO CECUM;  Surgeon: Josh Muñoz MD;  Location: [facility] ENDOSCOPY;  Service: General   • DILATION AND CURETTAGE, DIAGNOSTIC / THERAPEUTIC     • ENDOSCOPY N/A 2018    Procedure: ESOPHAGOGASTRODUODENOSCOPY WITH BIOPSIES;  Surgeon: Josh Muñoz MD;  Location: [facility] ENDOSCOPY;  Service: General   • ROTATOR CUFF REPAIR Right        PT ASSESSMENT (last 12 hours)     IRF PT Evaluation and Treatment     Row Name 22 1100          PT Time and Intention    Document Type daily treatment  -MS (r) KM (t) MS (c)     Mode of Treatment physical therapy  -MS (r) KM (t) MS (c)     Patient/Family/Caregiver Comments/Observations Pt sitting in chair in room with family present and exit alarm on.  -MS (r) KM (t) MS (c)     Total Minutes, Physical Therapy 60  -MS (r) KM (t) MS (c)     Row Name 22 1100          General Information    Patient Profile  Reviewed yes  -MS (r) KM (t) MS (c)     Existing Precautions/Restrictions fall  -MS (r) KM (t) MS (c)     Row Name 07/07/22 1100          Pain Assessment    Pretreatment Pain Rating 0/10 - no pain  -MS (r) KM (t) MS (c)     Posttreatment Pain Rating 0/10 - no pain  -MS (r) KM (t) MS (c)     Pre/Posttreatment Pain Comment Pt does not complain of pain but states that her legs are very tired today.  -MS (r) KM (t) MS (c)     Row Name 07/07/22 1100          Cognition/Psychosocial    Affect/Mental Status (Cognition) WNL  -MS (r) KM (t) MS (c)     Orientation Status (Cognition) oriented x 4  -MS (r) KM (t) MS (c)     Follows Commands (Cognition) follows two-step commands  -MS (r) KM (t) MS (c)     Personal Safety Interventions fall prevention program maintained;gait belt;muscle strengthening facilitated;nonskid shoes/slippers when out of bed  -MS (r) KM (t) MS (c)     Cognitive Function WFL  -MS (r) KM (t) MS (c)     Row Name 07/07/22 1100          Bed Mobility    Rolling Left [place] (Bed Mobility) standby assist  -MS (r) KM (t) MS (c)     Rolling Right [place] (Bed Mobility) standby assist  -MS (r) KM (t) MS (c)     Supine-Sit [place] (Bed Mobility) standby assist  -MS (r) KM (t) MS (c)     Sit-Supine [place] (Bed Mobility) standby assist  -MS (r) KM (t) MS (c)     Comment, (Bed Mobility) Pt performed bed mobility at mat in gym.  -MS (r) KM (t) MS (c)     Row Name 07/07/22 1100          Transfers    Bed-Chair [place] (Transfers) minimum assist (75% patient effort);contact guard  -MS (r) KM (t) MS (c)     Chair-Bed [place] (Transfers) contact guard;minimum assist (75% patient effort);verbal cues  -MS (r) KM (t) MS (c)     Sit-Stand [place] (Transfers) contact guard;verbal cues  -MS (r) KM (t) MS (c)     Row Name 07/07/22 1100          Bed-Chair Transfer    Comment, (Bed-Chair Transfer) No device used, PT at min-CGA x 1 to assist.  -MS (r) KM (t) MS (c)     Row Name 07/07/22 1100           Chair-Bed Transfer    Assistive Device (Chair-Bed Transfers) walker, front-wheeled  -MS (r) KM (t) MS (c)     Row Name 07/07/22 1100          Sit-Stand Transfer    Assistive Device (Sit-Stand Transfers) walker, front-wheeled  -MS (r) KM (t) MS (c)     Row Name 07/07/22 1100          Gait/Stairs (Locomotion)    [place] Level (Gait) contact guard;minimum assist (75% patient effort);1 person assist  -MS (r) KM (t) MS (c)     Assistive Device (Gait) walker, front-wheeled;parallel bars  -MS (r) KM (t) MS (c)     Distance in Feet (Gait) 80', 160'  -MS (r) KM (t) MS (c)     Pattern (Gait) step-through  -MS (r) KM (t) MS (c)     Deviations/Abnormal Patterns (Gait) base of support, narrow;gait speed decreased;stride length decreased  -MS (r) KM (t) MS (c)     Left Sided Gait Deviations leans left  -MS (r) KM (t) MS (c)     Right Sided Gait Deviations heel strike decreased;decreased knee extension;Trendelenburg sign  -MS (r) KM (t) MS (c)     Gait Assessment/Intervention Pt performed ambulation with CGA-Yany x 1 and RW. Pt felt fatigue during first ambulation attempt and requested rest break after 80' but felt better as session went on and was able to ambulate 160' at once the next trial. Pt showed no signs of buckling on right side but does show decreased heel strike on right side but improves with vc's.  -MS (r) KM (t) MS (c)     Stairs Assessment/Intervention Step ups on 4'' aerobic step in // bars. 10x.  -MS (r) KM (t) MS (c)     Row Name 07/07/22 1100          Safety Issues, Functional Mobility    Safety Issues Affecting Function (Mobility) judgment;safety precaution awareness  -MS (r) KM (t) MS (c)     Impairments Affecting Function (Mobility) balance;endurance/activity tolerance;coordination;strength;motor control  -MS (r) KM (t) MS (c)     Row Name 07/07/22 1100          Balance    Static Sitting Balance standby assist  -MS (r) KM (t) MS (c)     Dynamic Sitting Balance standby assist  -MS (r) KM (t) MS (c)      Position, Sitting Balance unsupported;sitting edge of bed  -MS (r) KM (t) MS (c)     Static Standing Balance contact guard  -MS (r) KM (t) MS (c)     Dynamic Standing Balance verbal cues  -MS (r) KM (t) MS (c)     Position/Device Used, Standing Balance walker, front-wheeled  -MS (r) KM (t) MS (c)     Balance Interventions standing;foam;narrowed base of support;weight shifting activity  -MS (r) KM (t) MS (c)     Comment, Balance Pt performed balance intervnetions in // bars with foam pad. Pt showed improved weight shifting and had no signs of loss of balance.  -MS (r) KM (t) MS (c)     Row Name 07/07/22 1100          Hip (Therapeutic Exercise)    Hip (Therapeutic Exercise) strengthening exercise  -MS (r) KM (t) MS (c)     Hip Strengthening (Therapeutic Exercise) bilateral;aBduction;10 repetitions;2 sets;sidelying  -MS (r) KM (t) MS (c)     Row Name 07/07/22 1100          Knee (Therapeutic Exercise)    Knee (Therapeutic Exercise) strengthening exercise  -MS (r) KM (t) MS (c)     Knee Strengthening (Therapeutic Exercise) SAQ (short arc quad);supine;2 lb free weight;10 repetitions;2 sets;SLR (straight leg raise)  -MS (r) KM (t) MS (c)     Row Name 07/07/22 1100          Core Strength (Therapeutic Exercise)    Core Strength (Therapeutic Exercise) bridging, bilateral lower extremities;supine;10 repetitions;2 sets  -MS (r) KM (t) MS (c)     Row Name 07/07/22 1100          Positioning and Restraints    Pre-Treatment Position sitting in chair/recliner  -MS (r) KM (t) MS (c)     Post Treatment Position wheelchair  -MS (r) KM (t) MS (c)     In Wheelchair sitting;call light within reach;encouraged to call for assist;exit alarm on;with family/caregiver  -MS (r) KM (t) MS (c)           User Key  (r) = Recorded By, (t) = Taken By, (c) = Cosigned By    Initials Name Provider Type    Myra Willard, PT Physical Therapist    Otis Mulligan, PT Student PT Student                 Physical Therapy Education                  Title: PT OT SLP Therapies (In Progress)     Topic: Physical Therapy (Done)     Point: Mobility training (Done)     Learning Progress Summary           Patient Acceptance, E, VU,NR by  at 7/7/2022 1217    Acceptance, E, VU,NR by  at 7/6/2022 1118    Acceptance, E, VU,NR by  at 7/5/2022 1230    Eager, E,TB,H,D, VU,DU by  at 7/4/2022 1405    Comment: Written HEp for strength, need to remain on rwx for now.    Eager, E,TB,D, VU,DU by  at 7/3/2022 1108    Comment: POC, goals.                   Point: Home exercise program (Done)     Learning Progress Summary           Patient Acceptance, E, VU,NR by KM at 7/7/2022 1217    Acceptance, E, VU,NR by  at 7/6/2022 1118    Acceptance, E, VU,NR by  at 7/5/2022 1230    Eager, E,TB,H,D, VU,DU by  at 7/4/2022 1405    Comment: Written HEp for strength, need to remain on rwx for now.    Eager, E,TB,D, VU,DU by  at 7/3/2022 1108    Comment: POC, goals.                               User Key     Initials Effective Dates Name Provider Type Discipline     06/16/21 -  Tara Sanders, PT Physical Therapist PT     06/06/22 -  Otis Funes, TABITHA Student PT Student PT                PT Recommendation and Plan                          Time Calculation:      PT Charges     Row Name 07/07/22 1216             Time Calculation    Start Time 0900  -MS (r) KM (t) MS (c)      Stop Time 1000  -MS (r) KM (t) MS (c)      Time Calculation (min) 60 min  -MS (r) KM (t)      PT Received On 07/07/22  -MS (r) KM (t) MS (c)      PT - Next Appointment 07/08/22  -MS (r) KM (t) MS (c)              Time Calculation- PT    Total Timed Code Minutes- PT 60 minute(s)  -MS (r) KM (t) MS (c)            User Key  (r) = Recorded By, (t) = Taken By, (c) = Cosigned By    Initials Name Provider Type    Myra Willard, PT Physical Therapist    Otis Mulligan, PT Student PT Student                Therapy Charges for Today     Code Description Service Date Service Provider Modifiers Qty     28380781115  PT THERAPEUTIC ACT EA 15 MIN 7/6/2022 Otis Funes, PT Student GP 2    06367128540 HC PT THER PROC EA 15 MIN 7/6/2022 Otis Funes, PT Student GP 2    [id number]  PT THERAPEUTIC ACT EA 15 MIN 7/7/2022 Otis Funes, PT Student GP 2    [id number]  PT THER PROC EA 15 MIN 7/7/2022 Otis Funes, PT Student GP 2                   Otis Funes, PT Student  7/7/2022

## 2022-07-07 NOTE — PROGRESS NOTES
Recreational Therapy Note    Patient Name: Maryellen Crump   MRN: [id number]    Therapeutic Recreation Eval and Treat (last 12 hours)     Therapeutic Recreation Eval & Treat     Row Name 07/07/22 1400       Therapeutic Recreation Participation    Recreation Therapy Participation games  -TW    Games board games  Scrabble  -TW    Objectives of Recreation Participation increase;motivation and activity level through successful participation;sense of autonomy by choosing level of participation  -TW    Comment, Recreation Participation using RUE to place tiles; scorekeeping WFL  -TW    Recreation Therapy Summary of Participation active participation  -TW          User Key  (r) = Recorded By, (t) = Taken By, (c) = Cosigned By    Initials Name Provider Type    TW Shanita Villafuerte CTRS Recreational Therapist                  RASHAD Oakes  7/7/2022

## 2022-07-07 NOTE — PROGRESS NOTES
SW discussed with patient her weekly goals and expected length of stay. SW called patient's  to update him on patient's progress and schedule the family conference, but there was no answer. PRASAD was able to leave a VM asking for a call back.

## 2022-07-08 LAB
GLUCOSE BLDC GLUCOMTR-MCNC: 130 MG/DL (ref 70–130)
GLUCOSE BLDC GLUCOMTR-MCNC: 217 MG/DL (ref 70–130)
GLUCOSE BLDC GLUCOMTR-MCNC: 269 MG/DL (ref 70–130)
GLUCOSE BLDC GLUCOMTR-MCNC: 340 MG/DL (ref 70–130)

## 2022-07-08 PROCEDURE — 97530 THERAPEUTIC ACTIVITIES: CPT

## 2022-07-08 PROCEDURE — 82962 GLUCOSE BLOOD TEST: CPT

## 2022-07-08 PROCEDURE — 97112 NEUROMUSCULAR REEDUCATION: CPT

## 2022-07-08 PROCEDURE — 63710000001 INSULIN LISPRO (HUMAN) PER 5 UNITS: Performed by: STUDENT IN AN ORGANIZED HEALTH CARE EDUCATION/TRAINING PROGRAM

## 2022-07-08 PROCEDURE — 97110 THERAPEUTIC EXERCISES: CPT

## 2022-07-08 PROCEDURE — 97130 THER IVNTJ EA ADDL 15 MIN: CPT

## 2022-07-08 PROCEDURE — 97535 SELF CARE MNGMENT TRAINING: CPT | Performed by: OCCUPATIONAL THERAPIST

## 2022-07-08 PROCEDURE — 97129 THER IVNTJ 1ST 15 MIN: CPT

## 2022-07-08 PROCEDURE — 97112 NEUROMUSCULAR REEDUCATION: CPT | Performed by: OCCUPATIONAL THERAPIST

## 2022-07-08 RX ADMIN — HYDROCHLOROTHIAZIDE 12.5 MG: 25 TABLET ORAL at 07:29

## 2022-07-08 RX ADMIN — POLYETHYLENE GLYCOL 3350 17 G: 17 POWDER, FOR SOLUTION ORAL at 07:28

## 2022-07-08 RX ADMIN — Medication 1000 MCG: at 07:31

## 2022-07-08 RX ADMIN — METFORMIN HYDROCHLORIDE 500 MG: 500 TABLET ORAL at 17:20

## 2022-07-08 RX ADMIN — RAMIPRIL 10 MG: 10 CAPSULE ORAL at 07:30

## 2022-07-08 RX ADMIN — PANTOPRAZOLE SODIUM 40 MG: 40 TABLET, DELAYED RELEASE ORAL at 05:52

## 2022-07-08 RX ADMIN — METFORMIN HYDROCHLORIDE 500 MG: 500 TABLET ORAL at 11:59

## 2022-07-08 RX ADMIN — CLOPIDOGREL 75 MG: 75 TABLET, FILM COATED ORAL at 07:30

## 2022-07-08 RX ADMIN — ATORVASTATIN CALCIUM 80 MG: 80 TABLET, FILM COATED ORAL at 19:47

## 2022-07-08 RX ADMIN — INSULIN LISPRO 4 UNITS: 100 INJECTION, SOLUTION INTRAVENOUS; SUBCUTANEOUS at 17:20

## 2022-07-08 RX ADMIN — ASPIRIN 81 MG: 81 TABLET, COATED ORAL at 07:28

## 2022-07-08 RX ADMIN — INSULIN LISPRO 7 UNITS: 100 INJECTION, SOLUTION INTRAVENOUS; SUBCUTANEOUS at 11:59

## 2022-07-08 NOTE — PROGRESS NOTES
Name: Maryellen Crump ADMIT: 2022   : 1950  PCP: Juni Cortez MD    MRN: [id number] LOS: 6 days   AGE/SEX: 71 y.o. female  ROOM: [facility]     Subjective   Subjective   Patient seen at bedside.       Objective   Objective   Vital Signs  Temp:  [97.6 °F (36.4 °C)-97.9 °F (36.6 °C)] 97.6 °F (36.4 °C)  Heart Rate:  [71-79] 76  Resp:  [16-18] 18  BP: (101-139)/(61-78) 139/76  SpO2:  [97 %] 97 %  on   ;   Device (Oxygen Therapy): room air  Body mass index is 20.02 kg/m².  Physical Exam   General, awake and alert.  Head and ENT, normocephalic and atraumatic.  Lungs, symmetric expansion, equal air entry bilaterally.  Heart, regular rate and rhythm.  Abdomen, soft and nontender.  Extremities, no clubbing or cyanosis.  Neuro, no focal deficits.  Skin: Warm and no rash.  Psych, normal mood and affect.  Musculoskeletal, joint examination is grossly normal.      Results Review     I reviewed the patient's new clinical results.  Results from last 7 days   Lab Units 22  0942 22  0613   WBC 10*3/mm3 6.93 8.96   HEMOGLOBIN g/dL 11.8* 10.7*   PLATELETS 10*3/mm3 281 259     Results from last 7 days   Lab Units 22  0942 22  0613   SODIUM mmol/L 136 140   POTASSIUM mmol/L 4.0 4.2   CHLORIDE mmol/L 97* 106   CO2 mmol/L 25.0 24.0   BUN mg/dL 14 15   CREATININE mg/dL 1.11* 1.02*   GLUCOSE mg/dL 272* 136*   EGFR mL/min/1.73 52.9* 58.6*       Results from last 7 days   Lab Units 22  0942 22  0613   CALCIUM mg/dL 10.2 9.3       Glucose   Date/Time Value Ref Range Status   2022 1128 340 (H) 70 - 130 mg/dL Final     Comment:     Meter: [id number] : [id number] Eric AMES   2022 0608 130 70 - 130 mg/dL Final     Comment:     Meter: [id number] : [id number] Petersens Reaganneel [facility]   2022 2045 279 (H) 70 - 130 mg/dL Final     Comment:     Meter: [id number] : [id number] Fran Mackey [facility]   2022 1559 182 (H) 70 - 130 mg/dL Final     Comment:     Meter:  [id number] : [id number] Eric Reyes NA   07/07/2022 1108 231 (H) 70 - 130 mg/dL Final     Comment:     Meter: [id number] : [id number] Eric Reyes NA   07/07/2022 0634 129 70 - 130 mg/dL Final     Comment:     Meter: [id number] : [id number] Rolan Lucero NA   07/06/2022 2101 237 (H) 70 - 130 mg/dL Final     Comment:     Meter: [id number] : [id number] Daniel Russo NA       No radiology results for the last day  Scheduled Medications  aspirin, 81 mg, Oral, Daily  atorvastatin, 80 mg, Oral, Nightly  clopidogrel, 75 mg, Oral, Daily  ferrous sulfate, 325 mg, Oral, Every Other Day  hydroCHLOROthiazide, 12.5 mg, Oral, Daily  insulin lispro, 0-9 Units, Subcutaneous, TID AC  metFORMIN, 500 mg, Oral, BID With Meals  pantoprazole, 40 mg, Oral, Q AM  polyethylene glycol, 17 g, Oral, Daily  ramipril, 10 mg, Oral, Daily  SITagliptin, 25 mg, Oral, Daily  vitamin B-12, 1,000 mcg, Oral, Daily    Infusions   Diet  Diet Regular; Cardiac, Consistent Carbohydrate       Assessment/Plan     Active Hospital Problems    Diagnosis  POA   • CKD (chronic kidney disease) stage 3, GFR 30-59 ml/min ([facility]) [N18.30]  Unknown   • CVA (cerebral vascular accident) ([facility]) [I63.9]  Yes   • Vitamin B12 deficiency [E53.8]  Yes   • Acute lacunar stroke ([facility]) [I63.81]  Yes   • Acute CVA (cerebrovascular accident) ([facility]) [I63.9]  Yes   • Type 2 diabetes mellitus with kidney complication, without long-term current use of insulin ([facility]) [E11.29]  Yes   • Hypertension [I10]  Yes   • Hyperlipidemia [E78.5]  Yes   • Iron deficiency anemia [D50.9]  Yes      Resolved Hospital Problems   No resolved problems to display.     Assessment and plan:  1.  Acute lacunar stroke.  Continue aspirin, statin and physical therapy to be continued.  Outpatient follow-up with neurology upon discharge.    2.  Essential hypertension, BP currently stable, continue current regimen.    3.  Type 2 diabetes mellitus, A1c noted to be 6.9, blood glucose is  labile.  Continue current Accu-Cheks and sliding scale insulin coverage along with Januvia and metformin.    4.  Anemia, hemoglobin noted to be stable.    5.  Chronic kidney disease stage III, renal function appears to be close to baseline upon last lab review.    6.  Medicine will sign off, please call us back with questions.      Pelon Sepulveda MD  [facility]  07/08/22  15:50 EDT

## 2022-07-08 NOTE — PROGRESS NOTES
Inpatient Rehabilitation Plan of Care Note    Plan of Care  Care Plan Reviewed - No updates at this time.    Psychosocial    Performed Intervention(s)  Offer support  Encourage to express any concerns      Sphincter Control    Performed Intervention(s)  Monitor I&O  Encourage fluids      Safety    Performed Intervention(s)  Falls/safety precaution  Bed/Chair alarms  Call light/personal items within easy reach      Body Systems    Performed Intervention(s)  Monitor labs; hypo/hyperglycemia    Signed by: Tamar Aguilera RN

## 2022-07-08 NOTE — THERAPY TREATMENT NOTE
Inpatient Rehabilitation - Occupational Therapy Treatment Note    [facility]     Patient Name: Maryellen Crump  : 1950  MRN: [id number]    Today's Date: 2022                 Admit Date: 2022         ICD-10-CM ICD-9-CM   1. Impaired functional mobility, balance, gait, and endurance  Z74.09 V49.89       Patient Active Problem List   Diagnosis   • Family history of colon cancer   • Iron deficiency anemia   • Diarrhea of presumed infectious origin   • Type 2 diabetes mellitus with kidney complication, without long-term current use of insulin (HCC)   • Hypertension   • Hyperlipidemia   • Stroke-like symptoms   • Acute CVA (cerebrovascular accident) (HCC)   • Acute lacunar stroke (HCC)   • Vitamin B12 deficiency   • CVA (cerebral vascular accident) (HCC)   • CKD (chronic kidney disease) stage 3, GFR 30-59 ml/min (HCC)       Past Medical History:   Diagnosis Date   • Diabetes mellitus (HCC)    • Hyperlipidemia    • Hypertension        Past Surgical History:   Procedure Laterality Date   • COLONOSCOPY N/A 2018    Procedure: COLONOSCOPY TO CECUM;  Surgeon: Josh Muñoz MD;  Location: [facility] ENDOSCOPY;  Service: General   • DILATION AND CURETTAGE, DIAGNOSTIC / THERAPEUTIC     • ENDOSCOPY N/A 2018    Procedure: ESOPHAGOGASTRODUODENOSCOPY WITH BIOPSIES;  Surgeon: Josh Muñoz MD;  Location: [facility] ENDOSCOPY;  Service: General   • ROTATOR CUFF REPAIR Right              IRF OT ASSESSMENT FLOWSHEET (last 12 hours)     IRF OT Evaluation and Treatment     Row Name 22 1225          OT Time and Intention    Document Type daily treatment  -KP     Mode of Treatment individual therapy;occupational therapy  -KP     Patient Effort excellent  -KP     Symptoms Noted During/After Treatment none  -KP     Row Name 22 1225          General Information    Patient/Family/Caregiver Comments/Observations pt sitting in BR intially then in chair, after OT A passing out trays  -KP     Existing  Precautions/Restrictions fall  -[facility] Name 07/08/22 1225          Pain Assessment    Pretreatment Pain Rating 0/10 - no pain  -     Posttreatment Pain Rating 0/10 - no pain  -[facility] Name 07/08/22 1225          Cognition/Psychosocial    Affect/Mental Status (Cognition) [facility]     Orientation Status (Cognition) oriented x 4  -KP     Follows Commands (Cognition) follows one-step commands;over 90% accuracy  -     Personal Safety Interventions gait belt;fall prevention program maintained;muscle strengthening facilitated;nonskid shoes/slippers when out of bed  -     Cognitive Function WFL  -[facility] Name 07/08/22 1225          Bathing    [place] Level (Bathing) bathing skills;set up;contact guard assist  -     Assistive Device (Bathing) hand held shower spray hose;grab bar/tub rail;shower chair  -     Position (Bathing) supported sitting;supported standing  -     Set-up Assistance (Bathing) obtain supplies;adjust water temperature  -[facility] Name 07/08/22 1225          Upper Body Dressing    [place] Level (Upper Body Dressing) upper body dressing skills;doff;don;pull over garment;set up assistance  -     Position (Upper Body Dressing) supported sitting  -[facility] Name 07/08/22 1225          Lower Body Dressing    [place] Level (Lower Body Dressing) doff;don;pants/bottoms;shoes/slippers;underwear;set up;contact guard assist;minimum assist (75% patient effort)  -     Position (Lower Body Dressing) supported sitting;supported standing  -     Set-up Assistance (Lower Body Dressing) obtain clothing  -[facility] Name 07/08/22 1225          Grooming    [place] Level (Grooming) grooming skills;hair care, combing/brushing;wash face, hands;set up;supervision  -     Position (Grooming) supported sitting  -[facility] Name 07/08/22 1225          Toileting    [place] Level (Toileting) toileting skills;adjust/manage clothing;perform skin inspection;set up assistance;contact  guard assist  -     Assistive Device Use (Toileting) grab bar/safety frame  -     Position (Toileting) supported sitting;supported standing  -     Set-up Assistance (Toileting) obtain supplies  -     Row Name 07/08/22 1225          Bed Mobility    Comment, (Bed Mobility) NT [facility]     Row Name 07/08/22 1225          Functional Mobility    Functional Mobility- Ind. Level set up required;contact guard assist  -     Functional Mobility- Device walker, front-wheeled  -     Functional Mobility- Comment in room, in [place] to shower room  -     Row Name 07/08/22 1225          Transfers    Sit-Stand [place] (Transfers) set up;contact guard  -     Stand-Sit [place] (Transfers) set up;contact guard  -     [place] Level (Toilet Transfer) standby assist;set up;contact guard  -     Assistive Device (Toilet Transfer) grab bars/safety frame;walker, front-wheeled  -     [place] Level (Shower Transfer) set up;contact guard  -     Assistive Device (Shower Transfer) walker, front-wheeled;shower chair;grab bar, tub/shower  -     Row Name 07/08/22 1225          Sit-Stand Transfer    Assistive Device (Sit-Stand Transfers) walker, front-wheeled  -     Row Name 07/08/22 1225          Stand-Sit Transfer    Assistive Device (Stand-Sit Transfers) walker, front-wheeled  -     Row Name 07/08/22 1225          Toilet Transfer    Type (Toilet Transfer) sit-stand;stand-sit;stand pivot/stand step  -     Row Name 07/08/22 1225          Shower Transfer    Type (Shower Transfer) sit-stand;stand-sit;stand pivot/stand step  -     Row Name 07/08/22 1225          Positioning and Restraints    Pre-Treatment Position sitting in chair/recliner  on toilet at first then in recliner when Ot comes back 5 min later as OT helped pt from toilet to chair before shower.  -     In Chair sitting;encouraged to call for assist;call light within reach;exit alarm on  -     Row Name 07/08/22 1225          Weekly  Progress Summary (OT)    Overall Progress Toward Functional Goals (OT) progressing toward functional goals as expected  -           User Key  (r) = Recorded By, (t) = Taken By, (c) = Cosigned By    Initials Name Effective Dates     Miranda Rahman, OTR 06/16/21 -                  Occupational Therapy Education                 Title: PT OT SLP Therapies (In Progress)     Topic: Occupational Therapy (In Progress)     Point: ADL training (Done)     Description:   Instruct learner(s) on proper safety adaptation and remediation techniques during self care or transfers.   Instruct in proper use of assistive devices.              Learning Progress Summary           Patient Acceptance, E, VU,NR by  at 7/4/2022 9724    Comment: educated on safety with ADls and transfers                   Point: Home exercise program (Not Started)     Description:   Instruct learner(s) on appropriate technique for monitoring, assisting and/or progressing therapeutic exercises/activities.              Learner Progress:  Not documented in this visit.          Point: Precautions (Not Started)     Description:   Instruct learner(s) on prescribed precautions during self-care and functional transfers.              Learner Progress:  Not documented in this visit.          Point: Body mechanics (Not Started)     Description:   Instruct learner(s) on proper positioning and spine alignment during self-care, functional mobility activities and/or exercises.              Learner Progress:  Not documented in this visit.                      User Key     Initials Effective Dates Name Provider Type Discipline     06/16/21 -  Dolores Odom OTR Occupational Therapist OT                    OT Recommendation and Plan                         Time Calculation:      Time Calculation- OT     Row Name 07/08/22 1229             Time Calculation- OT    OT Start Time 0800  -      OT Stop Time 0830  -      OT Time Calculation (min) 30 min  -             User Key  (r) = Recorded By, (t) = Taken By, (c) = Cosigned By    Initials Name Provider Type    Miranda Robledo OTR Occupational Therapist              Therapy Charges for Today     Code Description Service Date Service Provider Modifiers Qty    30201388511 HC OT SELF CARE/MGMT/TRAIN EA 15 MIN 7/8/2022 Miranda Rahman OTR GO 2                   RICH Church  7/8/2022

## 2022-07-08 NOTE — THERAPY TREATMENT NOTE
Inpatient Rehabilitation - Speech Language Pathology Treatment Note    [facility]     Patient Name: Maryellen Crump  : 1950  MRN: [id number]    Today's Date: 2022                   Admit Date: 2022       Visit Dx:      ICD-10-CM ICD-9-CM   1. Impaired functional mobility, balance, gait, and endurance  Z74.09 V49.89       Patient Active Problem List   Diagnosis   • Family history of colon cancer   • Iron deficiency anemia   • Diarrhea of presumed infectious origin   • Type 2 diabetes mellitus with kidney complication, without long-term current use of insulin (HCC)   • Hypertension   • Hyperlipidemia   • Stroke-like symptoms   • Acute CVA (cerebrovascular accident) (HCC)   • Acute lacunar stroke (HCC)   • Vitamin B12 deficiency   • CVA (cerebral vascular accident) (HCC)   • CKD (chronic kidney disease) stage 3, GFR 30-59 ml/min (HCC)       Past Medical History:   Diagnosis Date   • Diabetes mellitus (HCC)    • Hyperlipidemia    • Hypertension        Past Surgical History:   Procedure Laterality Date   • COLONOSCOPY N/A 2018    Procedure: COLONOSCOPY TO CECUM;  Surgeon: Josh Muñoz MD;  Location: [facility] ENDOSCOPY;  Service: General   • DILATION AND CURETTAGE, DIAGNOSTIC / THERAPEUTIC     • ENDOSCOPY N/A 2018    Procedure: ESOPHAGOGASTRODUODENOSCOPY WITH BIOPSIES;  Surgeon: Josh Muñoz MD;  Location: [facility] ENDOSCOPY;  Service: General   • ROTATOR CUFF REPAIR Right        SLP Recommendation and Plan                                                   SLP EVALUATION (last 72 hours)     SLP SLC Evaluation     Row Name 22 1237 22 1039 22 1253 22 1035 22 1300       Communication Assessment/Intervention    Document Type therapy note (daily note)  -SL therapy note (daily note)  -SL therapy note (daily note)  -SL therapy note (daily note)  -SL therapy note (daily note)  -SL    Subjective Information no complaints  -SL no complaints  -SL no complaints  -SL no  complaints  -SL no complaints  -SL    Patient Observations alert;cooperative;agree to therapy  -SL alert;cooperative;agree to therapy  -SL alert;cooperative;agree to therapy  -SL alert;cooperative;agree to therapy  -SL alert;cooperative  -SL    Patient Effort excellent  -SL good  -SL good  -SL good  -SL good  -SL    Symptoms Noted During/After Treatment none  -SL none  -SL none  -SL none  -SL none  -SL    Row Name 07/06/22 1037 07/05/22 1300                Communication Assessment/Intervention    Document Type therapy note (daily note)  -SL therapy note (daily note)  -SL       Subjective Information no complaints  -SL no complaints  -SL       Patient Observations alert;cooperative;agree to therapy  -SL alert;cooperative;agree to therapy  -SL       Patient Effort good  -SL good  -SL       Symptoms Noted During/After Treatment none  -SL none  -SL             User Key  (r) = Recorded By, (t) = Taken By, (c) = Cosigned By    Initials Name Effective Dates    Ember Alejandro, MS CCC-SLP 06/16/21 -                    EDUCATION    The patient has been educated in the following areas:       Cognitive Impairment.             SLP GOALS     Row Name 07/08/22 1238 07/08/22 1039 07/07/22 1253       Memory Skills Goal 1 (SLP)    Barriers (Memory Skills Goal 1, SLP) -- -- recall of recipe info- 90% indep  -    Progress/Outcomes (Memory Skills Goal 1, SLP) -- continuing progress toward goal;goal ongoing  - goal ongoing  -    Comment (Memory Skills Goal 1, SLP) -- immediate recall- 5 numbers with spatial components- boxed info- 80% without cues;  immediate recall- 4 words with spatiall restrictions- 88% indep  -SL mental manipulation task- 4 words - reverse ordering- 60% indep  -SL       Reasoning Goal 1 (SLP)    Progress/Outcomes (Reasoning Goal 1, SLP) goal ongoing  - goal ongoing  - --    Comment (Reasoning Goal 1, SLP) 10 item deductive puzzle- 100% with 2 x min cues; 80% indep  -SL deduction by exclusion calendar task-  66% indep;  100% without cues;  cues for details in chart  -SL --       Functional Problem Solving Skills Goal 1 (SLP)    Progress/Outcomes (Problem Solving Goal 1, SLP) -- -- continuing progress toward goal;goal ongoing  -SL    Comment (Problem Solving Goal 1, SLP) -- -- tv channel listings guide- 100%  without cues for use and understanding with good attn to details  -SL       Functional Math Skills Goal 1 (SLP)    Progress/Outcomes (Functional Math Skills Goal 1, SLP) goal ongoing  -SL continuing progress toward goal;goal ongoing  -SL continuing progress toward goal;goal ongoing  -SL    Comment (Functional Math Skills Goal 1, SLP) money calculation relating to restaurant prices- 4/5 accurate (80% ) indep  -SL time calculations relating to mod complex bus schedule- 100% with extra tiem and rereading of stimulus; self correction also noted x2  -SL 80%without cues and 100% with 1 x min cues for calculations relating to donation tree  -SL       Executive Functional Skills Goal 1 (SLP)    Progress/Outcomes (Executive Function Skills Goal 1, SLP) continuing progress toward goal;goal ongoing  -SL -- continuing progress toward goal;goal ongoing  -SL    Comment (Executive Function Skills Goal 1, SLP) using numerical tepelphone sequences to decode words, given categories- x2 categories- 80% indep; 100% with min cues  -SL -- planning/scheduling of furniture deliveries based on paragraph- 100% with min cues and extra time  -SL    Row Name 07/07/22 1035 07/06/22 1300 07/06/22 1038       Memory Skills Goal 1 (SLP)    Progress/Outcomes (Memory Skills Goal 1, SLP) -- continuing progress toward goal;goal ongoing  -SL continuing progress toward goal  -SL    Comment (Memory Skills Goal 1, SLP) -- 70% for immediate recall of details from short paragraph;  100% for visual recall- details from picture scene  -% for 4 word category inclusion task; 90% for 5 word stimulus  -SL       Reasoning Goal 1 (SLP)    Comment (Reasoning  "Goal 1, SLP) 10 item grid deductive puzzles ( x2)-  90% on first puzzle and 70% on second puzzle without cues  -SL -- --       Functional Problem Solving Skills Goal 1 (SLP)    Progress/Outcomes (Problem Solving Goal 1, SLP) continuing progress toward goal;goal ongoing  -SL continuing progress toward goal;goal ongoing  -SL continuing progress toward goal  -SL    Comment (Problem Solving Goal 1, SLP) answering ?'s re: catalog ordering page/info- 90% without cues; 100% with 1 x min cue  -SL 80% without cues for multistep directives and use of hospital map  -% without cues  for sequencing of 6 steps/senteces for stories-  -SL       Functional Math Skills Goal 1 (SLP)    Progress/Outcomes (Functional Math Skills Goal 1, SLP) continuing progress toward goal;goal ongoing  -SL -- goal ongoing  -    Comment (Functional Math Skills Goal 1, SLP) answering ?'s re: bills/statements- 100% without cues  - -- measurement conversions/calculations-75% indep; 100% with min cues for task analysis and breakdown at [facility]  -       Executive Functional Skills Goal 1 (SLP)    Comment (Executive Function Skills Goal 1, SLP) -- thought flexibility task- word altering- 4 different restrictions/configurations- 80% indep; 100% with min cues;  repetition of task requirements needed intially- impaired working memory noted  - --    Row Name 07/05/22 1300             Reasoning Goal 1 (SLP)    Barriers (Reasoning Goal 1, SLP) 60% indep for 12 item deductive puzzle  pt stated- \"I don't do things like this\"  -SL      Progress/Outcomes (Reasoning Goal 1, SLP) goal ongoing  -SL      Comment (Reasoning Goal 1, SLP) 70% for making inferences from short paragraphs  -SL              Functional Math Skills Goal 1 (SLP)    Barriers (Functional Math Skills Goal 1, SLP) time related activity- answering ?'s re: bank hours/location chart- 83% indep; 100% with 1 x cue for detail  -      Progress/Outcomes (Functional Math Skills Goal 1, SLP) goal " ongoing  -SL      Comment (Functional Math Skills Goal 1, SLP) 100% for determining denomination amounts  -SL            User Key  (r) = Recorded By, (t) = Taken By, (c) = Cosigned By    Initials Name Provider Type    Ember Alejandro MS CCC-SLP Speech and Language Pathologist                            Time Calculation:        Time Calculation- SLP     Row Name 07/08/22 1257 07/08/22 1101          Time Calculation- SLP    SLP Start Time 1230  -SL 1030  -SL     SLP Stop Time 1300  -SL 1100  -SL     SLP Time Calculation (min) 30 min  -SL 30 min  -SL           User Key  (r) = Recorded By, (t) = Taken By, (c) = Cosigned By    Initials Name Provider Type    Ember Alejandro MS CCC-SLP Speech and Language Pathologist                  Therapy Charges for Today     Code Description Service Date Service Provider Modifiers Qty    52239809627 HC ST DEV OF COGN SKILLS INITIAL 15 MIN 7/7/2022 Ember Werner MS CCC-SLP  1    30566338013 HC ST DEV OF COGN SKILLS EACH ADDT'L 15 MIN 7/7/2022 Ember Werner MS CCC-SLP  1    85162406697 HC ST DEV OF COGN SKILLS EACH ADDT'L 15 MIN 7/7/2022 Ember Werner MS CCC-SLP  2    90924957599 HC ST DEV OF COGN SKILLS INITIAL 15 MIN 7/8/2022 Ember Werner MS CCC-SLP  1    34391983419 HC ST DEV OF COGN SKILLS EACH ADDT'L 15 MIN 7/8/2022 Ember Werner MS CCC-SLP  1    70215411266 HC ST DEV OF COGN SKILLS EACH ADDT'L 15 MIN 7/8/2022 Ember Werner MS CCC-SLP  2                           Ember Werner MS CCC-SLP  7/8/2022

## 2022-07-08 NOTE — THERAPY PROGRESS REPORT/RE-CERT
Inpatient Rehabilitation - Physical Therapy Progress Note       [facility]     Patient Name: Maryellen Crump  : 1950  MRN: [id number]    Today's Date: 2022                    Admit Date: 2022      Visit Dx:     ICD-10-CM ICD-9-CM   1. Impaired functional mobility, balance, gait, and endurance  Z74.09 V49.89       Patient Active Problem List   Diagnosis   • Family history of colon cancer   • Iron deficiency anemia   • Diarrhea of presumed infectious origin   • Type 2 diabetes mellitus with kidney complication, without long-term current use of insulin (HCC)   • Hypertension   • Hyperlipidemia   • Stroke-like symptoms   • Acute CVA (cerebrovascular accident) (HCC)   • Acute lacunar stroke (HCC)   • Vitamin B12 deficiency   • CVA (cerebral vascular accident) (HCC)   • CKD (chronic kidney disease) stage 3, GFR 30-59 ml/min (HCC)       Past Medical History:   Diagnosis Date   • Diabetes mellitus (HCC)    • Hyperlipidemia    • Hypertension        Past Surgical History:   Procedure Laterality Date   • COLONOSCOPY N/A 2018    Procedure: COLONOSCOPY TO CECUM;  Surgeon: Josh Muñoz MD;  Location: [facility] ENDOSCOPY;  Service: General   • DILATION AND CURETTAGE, DIAGNOSTIC / THERAPEUTIC     • ENDOSCOPY N/A 2018    Procedure: ESOPHAGOGASTRODUODENOSCOPY WITH BIOPSIES;  Surgeon: Josh Muñoz MD;  Location: [facility] ENDOSCOPY;  Service: General   • ROTATOR CUFF REPAIR Right        PT ASSESSMENT (last 12 hours)     IRF PT Evaluation and Treatment     Row Name 22          PT Time and Intention    Document Type progress note (P)   -KM     Mode of Treatment physical therapy (P)   -KM     Patient/Family/Caregiver Comments/Observations Pt sitting in chair in room with exit alarm on. Pt expresses she had cramping in calf muscles over night. (P)   -KM     Total Minutes, Physical Therapy 60 (P)   -KM     Row Name 22          General Information    Patient Profile Reviewed yes (P)    -KM     Existing Precautions/Restrictions fall (P)   -KM     Row Name 07/08/22 0700          Pain Assessment    Pretreatment Pain Rating 0/10 - no pain (P)   -KM     Posttreatment Pain Rating 0/10 - no pain (P)   -KM     Row Name 07/08/22 0700          Cognition/Psychosocial    Affect/Mental Status (Cognition) WNL (P)   -KM     Orientation Status (Cognition) oriented x 4 (P)   -KM     Follows Commands (Cognition) follows two-step commands (P)   -KM     Personal Safety Interventions fall prevention program maintained;gait belt;muscle strengthening facilitated;nonskid shoes/slippers when out of bed (P)   -KM     Cognitive Function WFL (P)   -KM     Row Name 07/08/22 0700          Transfer Assessment/Treatment    Comment, (Transfers) W/c <> chair CGA (P)   -KM     Row Name 07/08/22 0700          Transfers    Sit-Stand [place] (Transfers) contact guard;verbal cues (P)   -KM     Row Name 07/08/22 0700          Sit-Stand Transfer    Assistive Device (Sit-Stand Transfers) walker, front-wheeled (P)   -KM     Row Name 07/08/22 0700          Car Transfer    Type (Car Transfer) -- (P)   -KM     [place] Level (Car Transfer) -- (P)   -KM     Assistive Device (Car Transfer) -- (P)   -KM     Row Name 07/08/22 0700          Gait/Stairs (Locomotion)    [place] Level (Gait) contact guard;1 person assist (P)   -KM     Assistive Device (Gait) walker, front-wheeled;parallel bars (P)   -KM     Distance in Feet (Gait) 240' x 2 trials (P)   -KM     Pattern (Gait) step-through (P)   -KM     Deviations/Abnormal Patterns (Gait) base of support, narrow;gait speed decreased;stride length decreased (P)   -KM     Left Sided Gait Deviations leans left (P)   -KM     Right Sided Gait Deviations heel strike decreased;decreased knee extension;Trendelenburg sign (P)   -KM     Gait Assessment/Intervention Pt performed ambulation today with CGA x1 and RW for 240' x 2 trials. Pt is showing improved endurance with distance she is able to  walk at one time without needing rest breaks. Pt also shows improved heel strike on on right side and improved posture. Pt still requires minor cues for step length but improves with vc's. (P)   -KM     Handrail Location (Stairs) both sides (P)   -KM     Number of Steps (Stairs) 4 x 4 trials (P)   -KM     Ascending Technique (Stairs) step-to-step (P)   -KM     Descending Technique (Stairs) step-to-step (P)   -KM     Stairs, Safety Issues balance decreased during turns;weight-shifting ability decreased (P)   -KM     Stairs, Impairments strength decreased;impaired balance (P)   -KM     Stairs Assessment/Intervention Pt performed stairs today 4 steps x 4 trials ascending and descending. Pt showed good balance and sequencing ability. Pt did have minor deficit in right hip flexion for clearing but improved with vc's. Pt used both rails. (P)   -KM     Comment, (Gait/Stairs) Pt performed advanced gait activity at wilma-bars with crossover stepping 8' x 4 trials. Pt showed good ability to perform task but did express discomfort with left hip during activity. No apparent loss of balance noted. (P)   -KM     Row Name 07/08/22 0700          Safety Issues, Functional Mobility    Safety Issues Affecting Function (Mobility) judgment;safety precautions follow-through/compliance (P)   -KM     Impairments Affecting Function (Mobility) balance;endurance/activity tolerance;coordination;strength;motor control (P)   -KM     Row Name 07/08/22 0700          Balance    Static Sitting Balance supervision (P)   -KM     Dynamic Sitting Balance standby assist (P)   -KM     Position, Sitting Balance supported;sitting in chair (P)   -KM     Static Standing Balance contact guard (P)   -KM     Dynamic Standing Balance verbal cues (P)   -KM     Position/Device Used, Standing Balance walker, front-wheeled (P)   -KM     Balance Interventions standing;dynamic reaching;foam;tandem standing;single limb stance;tandem gait;narrowed base of support (P)    -KM     Comment, Balance Pt performed balance activities w/ and w/o wilma-bars. Pt perfomed balance techniques using foam pad and narrow [place] and tandem stance. Pt also performed balloon toss while standing on foam pad with narrow [place] and normal stance. Pt also attempted SLS but was unable to hold for longer than a few seconds. Pt also performed kicking a ball with alternating legs without AD and therapist being [facility]. Pt did show some minor instability when attempting to kick ball with RLE but no apparent loss of balance was noted. (P)   -KM     Row Name 07/08/22 0700          Motor Skills    Therapeutic Exercise aerobic (P)   -KM     Additional Documentation -- (P)   Heel raises 1 x 10  -KM     Row Name 07/08/22 0700          Ankle (Therapeutic Exercise)    Ankle (Therapeutic Exercise) strengthening exercise (P)   -KM     Row Name 07/08/22 0700          Positioning and Restraints    Pre-Treatment Position sitting in chair/recliner (P)   -KM     Post Treatment Position chair (P)   -KM     In Chair sitting;call light within reach;encouraged to call for assist;exit alarm on;with family/caregiver (P)   -KM     In Wheelchair -- (P)   -KM     Row Name 07/08/22 0700          Weekly Progress Summary (PT)    Functional Goal Overall Progress (PT) progressing toward functional goals as expected (P)   -KM     Weekly Progress Summary (PT) Pt has made good progress towards goals for inpatient rehab PT. Pt has shown improvements in strength, endurance and balance. Pt is at CGA-Yany w/ RW for ambulation, CGA for transfers, and bed mobility is at SV-SBA. Pt has not shown any other signs of knee buckling on right side but does experience fatigue. Pt still presents with deficits at RLE that affect balance and endurance but overall has improved. PT will continue to focus on deficits presented as pt tolerates. PT continues to recommend outpatient PT following d/c. Pt continues to benefit from skilled inpatient PT services. (P)   -KM      Impairments Still Limiting Function (PT) balance impairment;coordination impairment;functional activity tolerance impairment;strength deficit (P)   -KM     Plan for Continued Service After Discharge (PT) Outpatient PT (P)   -KM     Row Name 07/08/22 0700          Bed Mobility Goal 1 (PT-IRF)    Activity/Assistive Device (Bed Mobility Goal 1, PT-IRF) bed mobility activities, all (P)   -KM     [place] Level (Bed Mobility Goal 1, PT-IRF) independent (P)   -KM     Time Frame (Bed Mobility Goal 1, PT-IRF) 1 week;short-term goal (STG) (P)   -KM     Progress/Outcomes (Bed Mobility Goal 1, PT-IRF) goal ongoing (P)   -KM     Row Name 07/08/22 0700          Transfer Goal 1 (PT-IRF)    Activity/Assistive Device (Transfer Goal 1, PT-IRF) all transfers;cane, straight (P)   -KM     [place] Level (Transfer Goal 1, PT-IRF) supervision required (P)   -KM     Time Frame (Transfer Goal 1, PT-IRF) 2 weeks;long-term goal (LTG) (P)   -KM     Progress/Outcomes (Transfer Goal 1, PT-IRF) goal ongoing (P)   -KM     Row Name 07/08/22 0700          Gait/Walking Locomotion Goal 1 (PT-IRF)    Activity/Assistive Device (Gait/Walking Locomotion Goal 1, PT-IRF) gait (walking locomotion) (P)   -KM     Gait/Walking Locomotion Distance Goal 1 (PT-IRF) 250 (P)   -KM     [place] Level (Gait/Walking Locomotion Goal 1, PT-IRF) supervision required (P)   -KM     Time Frame (Gait/Walking Locomotion Goal 1, PT-IRF) 2 weeks;long-term goal (LTG) (P)   -KM     Progress/Outcomes (Gait/Walking Locomotion Goal 1, PT-IRF) goal ongoing (P)   -KM     Row Name 07/08/22 0700          Stairs Goal 1 (PT-IRF)    Activity/Assistive Device (Stairs Goal 1, PT-IRF) stairs, all skills (P)   -KM     Number of Stairs (Stairs Goal 1, PT-IRF) 2 (P)   -KM     [place] Level (Stairs Goal 1, PT-IRF) standby assist (P)   -KM     Time Frame (Stairs Goal 1, PT-IRF) long-term goal (LTG) (P)   -KM     Progress/Outcomes (Stairs Goal 1, PT-IRF) goal ongoing (P)   -KM      Row Name 07/08/22 0700          Strength Goal 1 (PT-IRF)    Strength Goal 1 (PT-IRF) Improve R LE stength by 1/2 grade to allow for imprved functional mobility and safety (P)   -KM     Time Frame (Strength Goal 1, PT-IRF) long-term goal (LTG);2 weeks (P)   -KM     Progress/Outcomes (Strength Goal 1, PT-IRF) goal ongoing (P)   -KM           User Key  (r) = Recorded By, (t) = Taken By, (c) = Cosigned By    Initials Name Provider Type     Otis Funes, PT Student PT Student                 Physical Therapy Education                 Title: PT OT SLP Therapies (In Progress)     Topic: Physical Therapy (Done)     Point: Mobility training (Done)     Learning Progress Summary           Patient Acceptance, E, VU,NR by  at 7/8/2022 1027    Acceptance, E, VU,NR by  at 7/7/2022 1217    Acceptance, E, VU,NR by  at 7/6/2022 1118    Acceptance, E, VU,NR by  at 7/5/2022 1230    Eager, E,TB,H,D, VU,DU by  at 7/4/2022 1405    Comment: Written HEp for strength, need to remain on rwx for now.    Eager, E,TB,D, VU,DU by  at 7/3/2022 1108    Comment: POC, goals.                   Point: Home exercise program (Done)     Learning Progress Summary           Patient Acceptance, E, VU,NR by  at 7/8/2022 1027    Acceptance, E, VU,NR by  at 7/7/2022 1217    Acceptance, E, VU,NR by  at 7/6/2022 1118    Acceptance, E, VU,NR by  at 7/5/2022 1230    Eager, E,TB,H,D, VU,DU by  at 7/4/2022 1405    Comment: Written HEp for strength, need to remain on rwx for now.    Eager, E,TB,D, VU,DU by  at 7/3/2022 1108    Comment: POC, goals.                               User Key     Initials Effective Dates Name Provider Type Discipline     06/16/21 -  Tara Sanders, PT Physical Therapist PT     06/06/22 -  Otis Funes, PT Student PT Student PT                PT Recommendation and Plan                          Time Calculation:      PT Charges     Row Name 07/08/22 1025             Time Calculation    Start Time 0900 (P)    -KM      Stop Time 1000 (P)   -KM      Time Calculation (min) 60 min (P)   -KM      PT Received On 07/08/22 (P)   -KM      PT - Next Appointment 07/09/22 (P)   -KM      PT Goal Re-Cert Due Date 07/15/22 (P)   -KM              Time Calculation- PT    Total Timed Code Minutes- PT 60 minute(s) (P)   -KM            User Key  (r) = Recorded By, (t) = Taken By, (c) = Cosigned By    Initials Name Provider Type    Otis Mulligan, PT Student PT Student                Therapy Charges for Today     Code Description Service Date Service Provider Modifiers Qty    23056175024  PT THERAPEUTIC ACT EA 15 MIN 7/7/2022 Otis Funes, PT Student GP 2    [id number]  PT THER PROC EA 15 MIN 7/7/2022 Otis Funes, PT Student GP 2    [id number]  PT THERAPEUTIC ACT EA 15 MIN 7/8/2022 Otis Funes, PT Student GP 2    [id number]  PT THER SUPP EA 15 MIN 7/8/2022 Otis Funes, PT Student GP 1    [id number]  PT NEUROMUSC RE EDUCATION EA 15 MIN 7/8/2022 Otis Funes, PT Student GP 2                   Otis Funes PT Student  7/8/2022

## 2022-07-08 NOTE — PLAN OF CARE
Goal Outcome Evaluation:              Outcome Evaluation: L. lacunar infarct. R. wilma (slight). A&OX4. Calm, cooperative, and pleasant. Continent B&B. Last BM today. Full code. Diet: Reg, cc, cardiac, thins. Meds whole with thins. Spouse will be bringing home med (Januvia) and will re-start today. Also re-started Metformin today. OOBx1 with walker. Participated fully in therapy. BS checks ACHS. Had sliding scale at lunch. N&T to bilateral feet (neuropathy, not new).

## 2022-07-08 NOTE — THERAPY TREATMENT NOTE
Inpatient Rehabilitation - Occupational Therapy Treatment Note    [facility]     Patient Name: Maryellen Crump  : 1950  MRN: [id number]    Today's Date: 2022                 Admit Date: 2022         ICD-10-CM ICD-9-CM   1. Impaired functional mobility, balance, gait, and endurance  Z74.09 V49.89       Patient Active Problem List   Diagnosis   • Family history of colon cancer   • Iron deficiency anemia   • Diarrhea of presumed infectious origin   • Type 2 diabetes mellitus with kidney complication, without long-term current use of insulin (HCC)   • Hypertension   • Hyperlipidemia   • Stroke-like symptoms   • Acute CVA (cerebrovascular accident) (HCC)   • Acute lacunar stroke (HCC)   • Vitamin B12 deficiency   • CVA (cerebral vascular accident) (HCC)   • CKD (chronic kidney disease) stage 3, GFR 30-59 ml/min (HCC)       Past Medical History:   Diagnosis Date   • Diabetes mellitus (HCC)    • Hyperlipidemia    • Hypertension        Past Surgical History:   Procedure Laterality Date   • COLONOSCOPY N/A 2018    Procedure: COLONOSCOPY TO CECUM;  Surgeon: Josh Muñoz MD;  Location: [facility] ENDOSCOPY;  Service: General   • DILATION AND CURETTAGE, DIAGNOSTIC / THERAPEUTIC     • ENDOSCOPY N/A 2018    Procedure: ESOPHAGOGASTRODUODENOSCOPY WITH BIOPSIES;  Surgeon: Josh Muñoz MD;  Location: [facility] ENDOSCOPY;  Service: General   • ROTATOR CUFF REPAIR Right              IRF OT ASSESSMENT FLOWSHEET (last 12 hours)     IRF OT Evaluation and Treatment     Row Name 22 1557 22 1225       OT Time and Intention    Document Type daily treatment  -KP daily treatment  -KP    Mode of Treatment individual therapy;occupational therapy  -KP individual therapy;occupational therapy  -KP    Patient Effort excellent  -KP excellent  -KP    Symptoms Noted During/After Treatment none  -KP none  -KP    Row Name 22 1557 22 1223       General Information    Patient/Family/Caregiver  Comments/Observations pt sitting in recliner chair  -KP pt sitting in BR intially then in chair, after OT A passing out trays  -    Existing Precautions/Restrictions fall  -KP fall  -KP    Row Name 07/08/22 1557 07/08/22 1225       Pain Assessment    Pretreatment Pain Rating 0/10 - no pain  -KP 0/10 - no pain  -KP    Posttreatment Pain Rating 0/10 - no pain  -KP 0/10 - no pain  -KP    Row Name 07/08/22 1557 07/08/22 1225       Cognition/Psychosocial    Affect/Mental Status (Cognition) WFL  -KP WFL  -KP    Orientation Status (Cognition) oriented x 4  -KP oriented x 4  -KP    Follows Commands (Cognition) follows one-step commands;over 90% accuracy  -KP follows one-step commands;over 90% accuracy  -KP    Personal Safety Interventions fall prevention program maintained;gait belt;muscle strengthening facilitated;nonskid shoes/slippers when out of bed  -KP gait belt;fall prevention program maintained;muscle strengthening facilitated;nonskid shoes/slippers when out of bed  -KP    Cognitive Function WFL  -KP WFL  -KP    Row Name 07/08/22 1225          Bathing    [place] Level (Bathing) bathing skills;set up;contact guard assist  -     Assistive Device (Bathing) hand held shower spray hose;grab bar/tub rail;shower chair  -     Position (Bathing) supported sitting;supported standing  -     Set-up Assistance (Bathing) obtain supplies;adjust water temperature  -     Row Name 07/08/22 1225          Upper Body Dressing    [place] Level (Upper Body Dressing) upper body dressing skills;doff;don;pull over garment;set up assistance  -     Position (Upper Body Dressing) supported sitting  -     Row Name 07/08/22 1225          Lower Body Dressing    [place] Level (Lower Body Dressing) doff;don;pants/bottoms;shoes/slippers;underwear;set up;contact guard assist;minimum assist (75% patient effort)  -     Position (Lower Body Dressing) supported sitting;supported standing  -     Set-up Assistance (Lower  Body Dressing) obtain clothing  -     Row Name 07/08/22 1225          Grooming    [place] Level (Grooming) grooming skills;hair care, combing/brushing;wash face, hands;set up;supervision  -     Position (Grooming) supported sitting  -     Row Name 07/08/22 1225          Toileting    [place] Level (Toileting) toileting skills;adjust/manage clothing;perform skin inspection;set up assistance;contact guard assist  -     Assistive Device Use (Toileting) grab bar/safety frame  [facility]     Position (Toileting) supported sitting;supported standing  -     Set-up Assistance (Toileting) obtain supplies  -     Row Name 07/08/22 1557 07/08/22 1225       Bed Mobility    Comment, (Bed Mobility) [facility]    Row Name 07/08/22 1557 07/08/22 1225       Functional Mobility    Functional Mobility- Ind. Level set up required;contact guard assist  - set up required;contact guard assist  [facility]    Functional Mobility- Device walker, front-wheeled  - walker, front-wheeled  -    Functional Mobility- Comment in room, down [place] to gym, in [place] by nsg station and back to her room  - in room, in [place] to shower room  -    Row Name 07/08/22 1557 07/08/22 1225       Transfers    Sit-Stand [place] (Transfers) set up;verbal cues;contact guard  -KP set up;contact guard  -KP    Stand-Sit [place] (Transfers) set up;contact guard  - set up;contact guard  -    [place] Level (Toilet Transfer) -- standby assist;set up;contact guard  -    Assistive Device (Toilet Transfer) -- grab bars/safety frame;walker, front-wheeled  -KP    [place] Level (Shower Transfer) -- set up;contact guard  -    Assistive Device (Shower Transfer) -- walker, front-wheeled;shower chair;grab bar, tub/shower  -    Row Name 07/08/22 1557 07/08/22 1225       Sit-Stand Transfer    Assistive Device (Sit-Stand Transfers) walker, front-wheeled  -KP walker, front-wheeled  -KP    Row Name 07/08/22 1557 07/08/22 1225        Stand-Sit Transfer    Assistive Device (Stand-Sit Transfers) walker, front-wheeled  -KP walker, front-wheeled  -KP    Row Name 07/08/22 1225          Toilet Transfer    Type (Toilet Transfer) sit-stand;stand-sit;stand pivot/stand step  -KP     Row Name 07/08/22 1225          Shower Transfer    Type (Shower Transfer) sit-stand;stand-sit;stand pivot/stand step  -KP     Row Name 07/08/22 1557          Motor Skills    Results, 9 Hole Peg Test of Fine Motor Coordination pt completed small color peg design to incr FMC w R hand w min difficulty and incr time needed. pt pulls pegs from resitive putty to incr hand/finger strength and FMC skills. x8 w R hand.  pt inserts close pins onto reji using two pt pinch w min difficulty R hand to incr FMC.  -KP     Row Name 07/08/22 1557 07/08/22 1225       Positioning and Restraints    Pre-Treatment Position sitting in chair/recliner  -KP sitting in chair/recliner  on toilet at first then in recliner when Ot comes back 5 min later as OT helped pt from toilet to chair before shower.  -KP    Post Treatment Position chair  -KP --    In Chair sitting;call light within reach;encouraged to call for assist;exit alarm on  -KP sitting;encouraged to call for assist;call light within reach;exit alarm on  -KP    Row Name 07/08/22 1557 07/08/22 1225       Weekly Progress Summary (OT)    Overall Progress Toward Functional Goals (OT) progressing toward functional goals as expected  -KP progressing toward functional goals as expected  -KP          User Key  (r) = Recorded By, (t) = Taken By, (c) = Cosigned By    Initials Name Effective Dates    Miranda Robledo, OTR 06/16/21 -                  Occupational Therapy Education                 Title: PT OT SLP Therapies (In Progress)     Topic: Occupational Therapy (In Progress)     Point: ADL training (Done)     Description:   Instruct learner(s) on proper safety adaptation and remediation techniques during self care or transfers.   Instruct in  proper use of assistive devices.              Learning Progress Summary           Patient Acceptance, E, VU,NR by  at 7/4/2022 [id number]    Comment: educated on safety with ADls and transfers                   Point: Home exercise program (Not Started)     Description:   Instruct learner(s) on appropriate technique for monitoring, assisting and/or progressing therapeutic exercises/activities.              Learner Progress:  Not documented in this visit.          Point: Precautions (Not Started)     Description:   Instruct learner(s) on prescribed precautions during self-care and functional transfers.              Learner Progress:  Not documented in this visit.          Point: Body mechanics (Not Started)     Description:   Instruct learner(s) on proper positioning and spine alignment during self-care, functional mobility activities and/or exercises.              Learner Progress:  Not documented in this visit.                      User Key     Initials Effective Dates Name Provider Type [facility] 06/16/21 -  Dolores Odom OTMANNY Occupational Therapist OT                    OT Recommendation and Plan                         Time Calculation:      Time Calculation- OT     Row Name 07/08/22 1600 07/08/22 1229          Time Calculation- OT    OT Start Time 1330  - 0800  -     OT Stop Time 1400  - 0830  -     OT Time Calculation (min) 30 min  - 30 min  -           User Key  (r) = Recorded By, (t) = Taken By, (c) = Cosigned By    Initials Name Provider Type     Miranda Rahman OTR Occupational Therapist              Therapy Charges for Today     Code Description Service Date Service Provider Modifiers Qty    56081254590 HC OT SELF CARE/MGMT/TRAIN EA 15 MIN 7/8/2022 Miranda Rahman OTR GO 2    [id number]  OT NEUROMUSC RE EDUCATION EA 15 MIN 7/8/2022 Miranda Rahman OTR GO 2                   RICH Church  7/8/2022

## 2022-07-08 NOTE — PROGRESS NOTES
LOS: 6 days   Patient Care Team:  Juni Cortez MD as PCP - General (Family Medicine)      ONEYDA RAMIREZ CARROL  1950    Diagnoses    1. IMPAIRED FUNCTIONAL MOBILITY, BALANCE, GAIT, AND ENDURANCE       ADMITTING DIAGNOSIS:    Stroke    Subjective       Patient reports that her strength continues improved.  She had cramping in the legs last night which she states she has at home and takes medication over-the-counter.  Her  will bring in the name of the medication.    Objective     Vitals:    07/08/22 1214   BP: 139/76   Pulse: 76   Resp: 18   Temp: 97.6 °F (36.4 °C)   SpO2: 97%       PHYSICAL EXAM:   MENTAL STATUS -  AWAKE / ALERT  HEENT-  SCLERAE ANICTERIC, CONJUNCTIVAE PINK, OP MOIST, NO JVD,    LUNGS - NORMAL RESPIRATIONS  HEART- RRR,   ABD -  SOFT, NONDISTENDED  EXT - NO EDEMA OR CYANOSIS  NEURO - AWAKE, ALERT  MOTOR EXAM -takes resistance bilaterally.  Impaired dexterity in the right upper extremity        MEDICATIONS  Scheduled Meds:aspirin, 81 mg, Oral, Daily  atorvastatin, 80 mg, Oral, Nightly  clopidogrel, 75 mg, Oral, Daily  ferrous sulfate, 325 mg, Oral, Every Other Day  hydroCHLOROthiazide, 12.5 mg, Oral, Daily  insulin lispro, 0-9 Units, Subcutaneous, TID AC  metFORMIN, 500 mg, Oral, BID With Meals  pantoprazole, 40 mg, Oral, Q AM  polyethylene glycol, 17 g, Oral, Daily  ramipril, 10 mg, Oral, Daily  [START ON 7/9/2022] SITagliptin, 50 mg, Oral, Daily  vitamin B-12, 1,000 mcg, Oral, Daily      Continuous Infusions:   PRN Meds:.•  acetaminophen **OR** acetaminophen  •  bisacodyl  •  calcium carbonate  •  dextrose  •  dextrose  •  glucagon (human recombinant)  •  hydrALAZINE  •  ondansetron      RESULTS  Glucose   Date/Time Value Ref Range Status   07/08/2022 1605 217 (H) 70 - 130 mg/dL Final     Comment:     Meter: [id number] : [id number] Eric AMES   07/08/2022 1128 340 (H) 70 - 130 mg/dL Final     Comment:     Meter: [id number] : [id number] Eric AMES    07/08/2022 0608 130 70 - 130 mg/dL Final     Comment:     Meter: [id number] : [id number] Fran Mackey CNA   07/07/2022 2045 279 (H) 70 - 130 mg/dL Final     Comment:     Meter: [id number] : [id number] Fran Mackey CNA   07/07/2022 1559 182 (H) 70 - 130 mg/dL Final     Comment:     Meter: [id number] : [id number] Foster D'Deanne Foster NA   07/07/2022 1108 231 (H) 70 - 130 mg/dL Final     Comment:     Meter: [id number] : [id number] Foster D'Deanne Foster NA   07/07/2022 0634 129 70 - 130 mg/dL Final     Comment:     Meter: [id number] : [id number] Rolan Lucero NA   07/06/2022 2101 237 (H) 70 - 130 mg/dL Final     Comment:     Meter: [id number] : [id number] Daniel AMES     Results from last 7 days   Lab Units 07/05/22  0942 07/02/22  0613   WBC 10*3/mm3 6.93 8.96   HEMOGLOBIN g/dL 11.8* 10.7*   HEMATOCRIT % 35.9 32.3*   PLATELETS 10*3/mm3 281 259     Results from last 7 days   Lab Units 07/05/22  0942 07/02/22  0613   SODIUM mmol/L 136 140   POTASSIUM mmol/L 4.0 4.2   CHLORIDE mmol/L 97* 106   CO2 mmol/L 25.0 24.0   BUN mg/dL 14 15   CREATININE mg/dL 1.11* 1.02*   CALCIUM mg/dL 10.2 9.3   GLUCOSE mg/dL 272* 136*      Latest Reference Range & Units 07/01/22 04:54 07/05/22 09:42   Vitamin B-12 211 - 946 pg/mL 204 (L)    25 Hydroxy, Vitamin D 30.0 - 100.0 ng/ml  54.1      BRAIN MRI WITHOUT CONTRAST-June 29, 2022     HISTORY: Stroke, follow up     COMPARISON: 06/29/2022.     FINDINGS:  Multiplanar images of the head were obtained without  gadolinium. There is an area of restricted diffusion which is noted  within the left frontoparietal coronal radiata, measuring up to 1.1 cm  in size. An additional tiny focus is seen just superior to it within the  subcortical white matter. This measures approximately 4 mm in size. No  additional areas of restricted diffusion are seen. There is diffuse  atrophy. There is periventricular and deep white matter microangiopathic  change. Degree of ventricular  dilatation may be somewhat out of  proportion to the degree of atrophy, and correlation with any evidence  of normal pressure hydrocephalus is recommended. There is no midline  shift or mass effect. Intracranial flow voids appear intact. No  abnormality is seen on gradient echo imaging There is mucosal thickening  noted within the ethmoid sinuses.     IMPRESSION:  1. Foci of restricted diffusion are noted within the left frontoparietal  corona radiata and subcortical white matter.  No additional areas of  acute infarction are identified.    ASSESSMENT and PLAN    Iron deficiency anemia    Type 2 diabetes mellitus with kidney complication, without long-term current use of insulin ([facility])    Hypertension    Hyperlipidemia    Acute CVA (cerebrovascular accident) ([facility])    Acute lacunar stroke ([facility])    Vitamin B12 deficiency    CVA (cerebral vascular accident) ([facility])    CKD (chronic kidney disease) stage 3, GFR 30-59 ml/min ([facility])    CVA 6/29/2022 with right-sided weakness-lacunar infarct left corona radiata region  Stroke prophylaxis-aspirin/Plavix/atorvastatin    DVT [id number] dual antiplatelet therapy    Diabetes mellitus-on metformin/Starlix at home.  Lantus  July 6-titrate up on Lantus to 14 units every morning.  Look at transitioning back to home regimen soon  July 7-plan to review with internal medicine timeframe of transitioning back to home regimen with metformin and Januvia  July 8-Januvia increased to 50 mg daily.  Metformin 500 mg twice daily    Vitamin B12 replacement    Hypertension-hydrochlorothiazide/ramipril    Impaired mobility  Impaired self-care  Impaired cognition    Leg cramps-took medication over-the-counter at home-patient to bring in the name    Functional status-July 5-Pt scored WNL for her age on CLQT in domains of  attention, memory, executive functions, language and visuospatial skills;  however, she exhibited mild difficulty with story retell task, alternating  attention and maze  completion.  Upper body dressing contact-guard.  Lower body dressing minimum assist.  Transfers minimal-contact-guard assist.  Gait rolling walker 100 feet minimum assist.  30 feet with quad tip cane.    TEAM CONF - JULY 7 - POOR HAND WRITING, IMPAIRED MOTOR CONTROL. TRANSFERS CTG MIN. GAIT 160 FEET CTG -MIN RW UBD CTG. LBD  MIN. BATH MIN. TOILETING MIN. DEFICITS WITH DIVIDED ATTENTION AND WORKING MEMORY, DEDUCTIVE REASONING.   BNE (Active)  Att'n. - WNL  Exec. Fx. - Min Imp.  Rsng/Jgmnt - WNL  Arith - WNL  Visuospatial Skills - WNL  Visual Mem. - WNL  Verbal Mem. - Mod.Imp. for immediate recall, Min-Mildly Imp. for delayed recall  Emot - Pt denied dep/anx  CONTINENT BOWEL AND BLADDER. SKIN INTACT  ELOS - WED    Now admit for comprehensive acute inpatient rehabilitation .  This would be an interdisciplinary program with physical therapy 1 hour,  occupational therapy 1 hour, and speech therapy 1 hour, 5 days a week.  Rehabilitation nursing for carryover, monitoring of hypertension and neurologic   status, bowel and bladder, and skin  Ongoing physician follow-up.  Weekly team conferences.  Goals are to achieve a level of supervision with  mobility and self-care and improved balance.   Rehabilitation prognosis fair.  Medical prognosis fair.  Estimated length of stay is approximately 2 weeks, but is only an estimation.   The patient's functional status and clinical status is unchanged from preadmission assessment and the patient continues appropriate for acute inpatient rehabilitation.  Goal is for home with outpatient   therapies.  Barrier to discharge: Impaired mobility and self-care- work on balance, transfers, progressive ambulation, actives of daily living to overcome.         Tanmay Espinoza MD      During rounds, used appropriate personal protective equipment including mask and gloves.  Additional gown if indicated.  Mask used was standard procedure mask. Appropriate PPE was worn during the entire visit.   Hand hygiene was completed before and after.

## 2022-07-08 NOTE — PLAN OF CARE
Goal Outcome Evaluation:      Pt A/Ox4, calm/cooperative, OOB x1 w CGA w rwx. Meds taken whole w thin liquids. No c/o pain. Pt continent using toilet.

## 2022-07-09 LAB
GLUCOSE BLDC GLUCOMTR-MCNC: 135 MG/DL (ref 70–130)
GLUCOSE BLDC GLUCOMTR-MCNC: 192 MG/DL (ref 70–130)
GLUCOSE BLDC GLUCOMTR-MCNC: 212 MG/DL (ref 70–130)
GLUCOSE BLDC GLUCOMTR-MCNC: 240 MG/DL (ref 70–130)

## 2022-07-09 PROCEDURE — 63710000001 INSULIN LISPRO (HUMAN) PER 5 UNITS: Performed by: STUDENT IN AN ORGANIZED HEALTH CARE EDUCATION/TRAINING PROGRAM

## 2022-07-09 PROCEDURE — 82962 GLUCOSE BLOOD TEST: CPT

## 2022-07-09 PROCEDURE — 97110 THERAPEUTIC EXERCISES: CPT

## 2022-07-09 PROCEDURE — 97530 THERAPEUTIC ACTIVITIES: CPT

## 2022-07-09 RX ADMIN — FERROUS SULFATE TAB 325 MG (65 MG ELEMENTAL FE) 325 MG: 325 (65 FE) TAB at 08:40

## 2022-07-09 RX ADMIN — ATORVASTATIN CALCIUM 80 MG: 80 TABLET, FILM COATED ORAL at 20:33

## 2022-07-09 RX ADMIN — INSULIN LISPRO 2 UNITS: 100 INJECTION, SOLUTION INTRAVENOUS; SUBCUTANEOUS at 17:16

## 2022-07-09 RX ADMIN — HYDROCHLOROTHIAZIDE 12.5 MG: 25 TABLET ORAL at 08:41

## 2022-07-09 RX ADMIN — RAMIPRIL 10 MG: 10 CAPSULE ORAL at 08:41

## 2022-07-09 RX ADMIN — SITAGLIPTIN 50 MG: 100 TABLET, FILM COATED ORAL at 08:41

## 2022-07-09 RX ADMIN — METFORMIN HYDROCHLORIDE 500 MG: 500 TABLET ORAL at 08:40

## 2022-07-09 RX ADMIN — Medication 1000 MCG: at 08:41

## 2022-07-09 RX ADMIN — METFORMIN HYDROCHLORIDE 500 MG: 500 TABLET ORAL at 17:16

## 2022-07-09 RX ADMIN — CLOPIDOGREL 75 MG: 75 TABLET, FILM COATED ORAL at 08:40

## 2022-07-09 RX ADMIN — PANTOPRAZOLE SODIUM 40 MG: 40 TABLET, DELAYED RELEASE ORAL at 05:26

## 2022-07-09 RX ADMIN — INSULIN LISPRO 4 UNITS: 100 INJECTION, SOLUTION INTRAVENOUS; SUBCUTANEOUS at 11:49

## 2022-07-09 RX ADMIN — ASPIRIN 81 MG: 81 TABLET, COATED ORAL at 08:40

## 2022-07-09 NOTE — PLAN OF CARE
Goal Outcome Evaluation:  Plan of Care Reviewed With: patient           Outcome Evaluation: Maryellen has had a good day, minimal therapy, and family at bedside. She has had no complaints of pain or safety issues. She is alert and oriented x4, continent of b/b-BM today, and medication with water. Right sided weakness, assist x1, and refused Miralax. Accucheck AC/HS- coverage needed at lunch,VS stable, and no other concerns at this time.

## 2022-07-09 NOTE — THERAPY TREATMENT NOTE
Inpatient Rehabilitation - Physical Therapy Treatment Note       [facility]     Patient Name: Maryellen Crump  : 1950  MRN: [id number]    Today's Date: 2022                    Admit Date: 2022      Visit Dx:     ICD-10-CM ICD-9-CM   1. Impaired functional mobility, balance, gait, and endurance  Z74.09 V49.89       Patient Active Problem List   Diagnosis   • Family history of colon cancer   • Iron deficiency anemia   • Diarrhea of presumed infectious origin   • Type 2 diabetes mellitus with kidney complication, without long-term current use of insulin (HCC)   • Hypertension   • Hyperlipidemia   • Stroke-like symptoms   • Acute CVA (cerebrovascular accident) (HCC)   • Acute lacunar stroke (HCC)   • Vitamin B12 deficiency   • CVA (cerebral vascular accident) (HCC)   • CKD (chronic kidney disease) stage 3, GFR 30-59 ml/min (HCC)       Past Medical History:   Diagnosis Date   • Diabetes mellitus (HCC)    • Hyperlipidemia    • Hypertension        Past Surgical History:   Procedure Laterality Date   • COLONOSCOPY N/A 2018    Procedure: COLONOSCOPY TO CECUM;  Surgeon: Josh Muñoz MD;  Location: [facility] ENDOSCOPY;  Service: General   • DILATION AND CURETTAGE, DIAGNOSTIC / THERAPEUTIC     • ENDOSCOPY N/A 2018    Procedure: ESOPHAGOGASTRODUODENOSCOPY WITH BIOPSIES;  Surgeon: Josh Muñoz MD;  Location: [facility] ENDOSCOPY;  Service: General   • ROTATOR CUFF REPAIR Right        PT ASSESSMENT (last 12 hours)     IRF PT Evaluation and Treatment     Row Name 22          PT Time and Intention    Document Type daily treatment  -     Mode of Treatment physical therapy  -     Patient/Family/Caregiver Comments/Observations pt seated in  no acute distress  -     Row Name 22          General Information    Existing Precautions/Restrictions fall  -     Row Name 22          Pain Assessment    Pretreatment Pain Rating 0/10 - no pain  -     Posttreatment Pain  Rating 0/10 - no pain  -Critical access hospital Name 07/09/22 0918          Cognition/Psychosocial    Affect/Mental Status (Cognition) WFL  -     Orientation Status (Cognition) oriented x 4  -LH     Follows Commands (Cognition) [facility]  -     Personal Safety Interventions fall prevention program maintained;gait belt;supervised activity  -Critical access hospital Name 07/09/22 0918          Bed Mobility    Comment, (Bed Mobility) NT  -Critical access hospital Name 07/09/22 0918          Transfers    Sit-Stand [place] (Transfers) contact guard;standby assist  -     Stand-Sit [place] (Transfers) contact guard;standby assist  -Critical access hospital Name 07/09/22 0918          Sit-Stand Transfer    Assistive Device (Sit-Stand Transfers) walker, front-wheeled  -     Row Name 07/09/22 0918          Stand-Sit Transfer    Assistive Device (Stand-Sit Transfers) walker, front-wheeled  -     Row Name 07/09/22 0918          Gait/Stairs (Locomotion)    [place]tt Level (Gait) contact guard;standby assist  -     Assistive Device (Gait) walker, front-wheeled  -     Distance in Feet (Gait) 240  -     Pattern (Gait) step-through  -     Deviations/Abnormal Patterns (Gait) ruby decreased  -     Bilateral Gait Deviations forward flexed posture;heel strike decreased  -     Negotiation (Ramp) ramp independence;ramp assistive device  -     [place] Level (Ramp) contact guard  -     Assistive Device (Ramp) walker, front-wheeled  -     Comment, (Gait/Stairs) 80ft x 2 CGA no AD, slowed ruby and dec heel strike on R  -Critical access hospital Name 07/09/22 0918          Motor Skills    Therapeutic Exercise --  AP, LAQs, MIP x 10  -Critical access hospital Name 07/09/22 0918          Positioning and Restraints    Pre-Treatment Position sitting in chair/recliner  -     Post Treatment Position wheelchair  -     In Wheelchair sitting;call light within reach;encouraged to call for assist;exit alarm on  -           User Key  (r) = Recorded By, (t) = Taken By, (c) = Cosigned By     Initials Name Provider Type     Renetta Harrison, PT Physical Therapist                 Physical Therapy Education                 Title: PT OT SLP Therapies (In Progress)     Topic: Physical Therapy (In Progress)     Point: Mobility training (In Progress)     Learning Progress Summary           Patient Acceptance, E, NR by  at 7/9/2022 0921    Acceptance, E, VU,NR by  at 7/8/2022 1027    Acceptance, E, VU,NR by KM at 7/7/2022 1217    Acceptance, E, VU,NR by KM at 7/6/2022 1118    Acceptance, E, VU,NR by  at 7/5/2022 1230    Eager, E,TB,H,D, VU,DU by  at 7/4/2022 1405    Comment: Written HEp for strength, need to remain on rwx for now.    Eager, E,TB,D, VU,DU by  at 7/3/2022 1108    Comment: POC, goals.                   Point: Home exercise program (In Progress)     Learning Progress Summary           Patient Acceptance, E, NR by  at 7/9/2022 0921    Acceptance, E, VU,NR by  at 7/8/2022 1027    Acceptance, E, VU,NR by KM at 7/7/2022 1217    Acceptance, E, VU,NR by KM at 7/6/2022 1118    Acceptance, E, VU,NR by  at 7/5/2022 1230    Eager, E,TB,H,D, VU,DU by  at 7/4/2022 1405    Comment: Written HEp for strength, need to remain on rwx for now.    Eager, E,TB,D, VU,DU by  at 7/3/2022 1108    Comment: POC, goals.                               User Key     Initials Effective Dates Name Provider Type Discipline     06/16/21 -  Renetta Harrison, PT Physical Therapist PT     06/16/21 -  Tara Sanders, PT Physical Therapist PT     06/06/22 -  Otis Funes, TABITHA Student PT Student PT                PT Recommendation and Plan                          Time Calculation:      PT Charges     Row Name 07/09/22 0921             Time Calculation    Start Time 0900  -      Stop Time 0930  -      Time Calculation (min) 30 min  -      PT Received On 07/09/22  -      PT - Next Appointment 07/11/22  -              Time Calculation- PT    Total Timed Code Minutes- PT 30 minute(s)  -            User  Key  (r) = Recorded By, (t) = Taken By, (c) = Cosigned By    Initials Name Provider Type     Renetta Harrison PT Physical Therapist                Therapy Charges for Today     Code Description Service Date Service Provider Modifiers Qty    [id number]  PT THER PROC EA 15 MIN 7/9/2022 Renetta Harrison PT GP 1    [id number]  PT THERAPEUTIC ACT EA 15 MIN 7/9/2022 Renetta Harrison PT GP 1          .Patient was wearing a face mask during this therapy encounter. Therapist used appropriate personal protective equipment including eye protection, mask, and gloves.  Mask used was standard procedure mask. Appropriate PPE was worn during the entire therapy session. Hand hygiene was completed before and after therapy session. Patient is not in enhanced droplet precautions.              Renetta Harrison PT  7/9/2022

## 2022-07-09 NOTE — PLAN OF CARE
Problem: Rehabilitation (IRF) Plan of Care  Goal: Plan of Care Review  Outcome: Ongoing, Progressing  Flowsheets (Taken 7/9/2022 0233)  Progress: improving  Plan of Care Reviewed With: patient  Outcome Evaluation: Patient appears to be sleeping well tonight. A&Ox4, pleasant and cooperative. Continent B&B. Ambulatory with RW. No c/o any pain tonight. R hemiparesis. N/T to foot which is not from current stroke. No unsafe behavior.

## 2022-07-09 NOTE — PROGRESS NOTES
Inpatient Rehabilitation Plan of Care Note    Plan of Care  Care Plan Reviewed - No updates at this time.    Psychosocial    Performed Intervention(s)  Offer support  Encourage to express any concerns      Sphincter Control    Performed Intervention(s)  Monitor I&O  Encourage fluids      Safety    Performed Intervention(s)  Falls/safety precaution  Bed/Chair alarms  Call light/personal items within easy reach      Body Systems    Performed Intervention(s)  Monitor labs; hypo/hyperglycemia    Signed by: Chelsy Brody RN

## 2022-07-09 NOTE — PROGRESS NOTES
LOS: 7 days   Patient Care Team:  Juni Cortez MD as PCP - General (Family Medicine)      ONEYDA BRANHAM  1950    Diagnoses    1. IMPAIRED FUNCTIONAL MOBILITY, BALANCE, GAIT, AND ENDURANCE       ADMITTING DIAGNOSIS:    Stroke    Subjective       Seen and examined, no acute events overnight. Feels OK, denies chest pain, shortness of breath, f/c. Slept well. Therapy going, continues with balance impairment.  brought home med in for leg cramping at night.     Objective     Vitals:    07/09/22 0839   BP: 135/76   Pulse: 77   Resp:    Temp:    SpO2: 96%       PHYSICAL EXAM:   MENTAL STATUS -  AWAKE / ALERT  HEENT-  SCLERAE ANICTERIC, CONJUNCTIVAE PINK, OP MOIST, NO JVD,    LUNGS - NORMAL RESPIRATIONS  HEART- RRR,   ABD -  SOFT, NONDISTENDED  EXT - NO EDEMA OR CYANOSIS  NEURO - AWAKE, ALERT  MOTOR EXAM -takes resistance bilaterally.  Impaired dexterity in the right upper extremity        MEDICATIONS  Scheduled Meds:aspirin, 81 mg, Oral, Daily  atorvastatin, 80 mg, Oral, Nightly  clopidogrel, 75 mg, Oral, Daily  ferrous sulfate, 325 mg, Oral, Every Other Day  hydroCHLOROthiazide, 12.5 mg, Oral, Daily  insulin lispro, 0-9 Units, Subcutaneous, TID AC  metFORMIN, 500 mg, Oral, BID With Meals  pantoprazole, 40 mg, Oral, Q AM  polyethylene glycol, 17 g, Oral, Daily  ramipril, 10 mg, Oral, Daily  SITagliptin, 50 mg, Oral, Daily  vitamin B-12, 1,000 mcg, Oral, Daily      Continuous Infusions:   PRN Meds:.•  acetaminophen **OR** acetaminophen  •  bisacodyl  •  calcium carbonate  •  dextrose  •  dextrose  •  glucagon (human recombinant)  •  hydrALAZINE  •  ondansetron      RESULTS  Glucose   Date/Time Value Ref Range Status   07/09/2022 1142 212 (H) 70 - 130 mg/dL Final     Comment:     Meter: [id number] : [id number] Feliberto Arnold RN   07/09/2022 0646 135 (H) 70 - 130 mg/dL Final     Comment:     Meter: [id number] : [id number] Fran Mackey CNA   07/08/2022 2102 269 (H) 70 - 130 mg/dL Final     Comment:      Meter: [id number] : [id number] Fran Mackey CNA   07/08/2022 1605 217 (H) 70 - 130 mg/dL Final     Comment:     Meter: [id number] : [id number] Eric Reyes NA   07/08/2022 1128 340 (H) 70 - 130 mg/dL Final     Comment:     Meter: [id number] : [id number] Eric Reyes NA   07/08/2022 0608 130 70 - 130 mg/dL Final     Comment:     Meter: [id number] : [id number] Fran Mackey CNA   07/07/2022 2045 279 (H) 70 - 130 mg/dL Final     Comment:     Meter: [id number] : [id number] Fran Mackey CNA   07/07/2022 1559 182 (H) 70 - 130 mg/dL Final     Comment:     Meter: [id number] : [id number] Eric Reyes NA     Results from last 7 days   Lab Units 07/05/22  0942   WBC 10*3/mm3 6.93   HEMOGLOBIN g/dL 11.8*   HEMATOCRIT % 35.9   PLATELETS 10*3/mm3 281     Results from last 7 days   Lab Units 07/05/22  0942   SODIUM mmol/L 136   POTASSIUM mmol/L 4.0   CHLORIDE mmol/L 97*   CO2 mmol/L 25.0   BUN mg/dL 14   CREATININE mg/dL 1.11*   CALCIUM mg/dL 10.2   GLUCOSE mg/dL 272*      Latest Reference Range & Units 07/01/22 04:54 07/05/22 09:42   Vitamin B-12 211 - 946 pg/mL 204 (L)    25 Hydroxy, Vitamin D 30.0 - 100.0 ng/ml  54.1      BRAIN MRI WITHOUT CONTRAST-June 29, 2022     HISTORY: Stroke, follow up     COMPARISON: 06/29/2022.     FINDINGS:  Multiplanar images of the head were obtained without  gadolinium. There is an area of restricted diffusion which is noted  within the left frontoparietal coronal radiata, measuring up to 1.1 cm  in size. An additional tiny focus is seen just superior to it within the  subcortical white matter. This measures approximately 4 mm in size. No  additional areas of restricted diffusion are seen. There is diffuse  atrophy. There is periventricular and deep white matter microangiopathic  change. Degree of ventricular dilatation may be somewhat out of  proportion to the degree of atrophy, and correlation with any evidence  of normal pressure hydrocephalus is  recommended. There is no midline  shift or mass effect. Intracranial flow voids appear intact. No  abnormality is seen on gradient echo imaging There is mucosal thickening  noted within the ethmoid sinuses.     IMPRESSION:  1. Foci of restricted diffusion are noted within the left frontoparietal  corona radiata and subcortical white matter.  No additional areas of  acute infarction are identified.    ASSESSMENT and PLAN    Iron deficiency anemia    Type 2 diabetes mellitus with kidney complication, without long-term current use of insulin (HCC)    Hypertension    Hyperlipidemia    Acute CVA (cerebrovascular accident) ([facility])    Acute lacunar stroke ([facility])    Vitamin B12 deficiency    CVA (cerebral vascular accident) ([facility])    CKD (chronic kidney disease) stage 3, GFR 30-59 ml/min ([facility])    CVA 3/29/2022 with right-sided weakness-lacunar infarct left corona radiata region  Stroke prophylaxis-aspirin/Plavix/atorvastatin    DVT [id number] dual antiplatelet therapy    Diabetes mellitus-on metformin/Starlix at home.  Lantus  July 6-titrate up on Lantus to 14 units every morning.  Look at transitioning back to home regimen soon  July 7-plan to review with internal medicine timeframe of transitioning back to home regimen with metformin and Januvia  July 8-Januvia increased to 50 mg daily.  Metformin 500 mg twice daily    Vitamin B12 replacement    Hypertension-hydrochlorothiazide/ramipril    Impaired mobility  Impaired self-care  Impaired cognition    Leg cramps-took medication over-the-counter at home-patient to bring in the name    Functional status-July 5-Pt scored WNL for her age on CLQT in domains of  attention, memory, executive functions, language and visuospatial skills;  however, she exhibited mild difficulty with story retell task, alternating  attention and maze completion.  Upper body dressing contact-guard.  Lower body dressing minimum assist.  Transfers minimal-contact-guard assist.  Gait rolling walker  100 feet minimum assist.  30 feet with quad tip cane.    TEAM CONF - JULY 7 - POOR HAND WRITING, IMPAIRED MOTOR CONTROL. TRANSFERS CTG MIN. GAIT 160 FEET CTG -MIN RW UBD CTG. LBD  MIN. BATH MIN. TOILETING MIN. DEFICITS WITH DIVIDED ATTENTION AND WORKING MEMORY, DEDUCTIVE REASONING.   BNE (Active)  Att'n. - WNL  Exec. Fx. - Min Imp.  Rsng/Jgmnt - WNL  Arith - WNL  Visuospatial Skills - WNL  Visual Mem. - WNL  Verbal Mem. - Mod.Imp. for immediate recall, Min-Mildly Imp. for delayed recall  Emot - Pt denied dep/anx  CONTINENT BOWEL AND BLADDER. SKIN INTACT  ELOS - WED    Now admit for comprehensive acute inpatient rehabilitation .  This would be an interdisciplinary program with physical therapy 1 hour,  occupational therapy 1 hour, and speech therapy 1 hour, 5 days a week.  Rehabilitation nursing for carryover, monitoring of hypertension and neurologic   status, bowel and bladder, and skin  Ongoing physician follow-up.  Weekly team conferences.  Goals are to achieve a level of supervision with  mobility and self-care and improved balance.   Rehabilitation prognosis fair.  Medical prognosis fair.  Estimated length of stay is approximately 2 weeks, but is only an estimation.   The patient's functional status and clinical status is unchanged from preadmission assessment and the patient continues appropriate for acute inpatient rehabilitation.  Goal is for home with outpatient   therapies.  Barrier to discharge: Impaired mobility and self-care- work on balance, transfers, progressive ambulation, actives of daily living to overcome.     7/9  - Continue comprehensive inpatient rehabilitation program  - Continue close medical monitoring for functional progress, decline or additional intervenable factors to amplify functional recovery  - OK for home med: homeopathic leg cramp OTC medication  - BG remains labile, slightly improving but typically elevates between breakfast and lunch, recently dropping overnight, continue  metformin and januvia, recently adjusted, will monitor for effect    Kosta Bone MD      During rounds, used appropriate personal protective equipment including mask and gloves.  Additional gown if indicated.  Mask used was standard procedure mask. Appropriate PPE was worn during the entire visit.  Hand hygiene was completed before and after.

## 2022-07-10 LAB
GLUCOSE BLDC GLUCOMTR-MCNC: 137 MG/DL (ref 70–130)
GLUCOSE BLDC GLUCOMTR-MCNC: 144 MG/DL (ref 70–130)
GLUCOSE BLDC GLUCOMTR-MCNC: 202 MG/DL (ref 70–130)
GLUCOSE BLDC GLUCOMTR-MCNC: 277 MG/DL (ref 70–130)

## 2022-07-10 PROCEDURE — 82962 GLUCOSE BLOOD TEST: CPT

## 2022-07-10 RX ADMIN — METFORMIN HYDROCHLORIDE 500 MG: 500 TABLET ORAL at 07:44

## 2022-07-10 RX ADMIN — PANTOPRAZOLE SODIUM 40 MG: 40 TABLET, DELAYED RELEASE ORAL at 06:14

## 2022-07-10 RX ADMIN — METFORMIN HYDROCHLORIDE 500 MG: 500 TABLET ORAL at 17:20

## 2022-07-10 RX ADMIN — ASPIRIN 81 MG: 81 TABLET, COATED ORAL at 07:45

## 2022-07-10 RX ADMIN — ATORVASTATIN CALCIUM 80 MG: 80 TABLET, FILM COATED ORAL at 21:20

## 2022-07-10 RX ADMIN — CLOPIDOGREL 75 MG: 75 TABLET, FILM COATED ORAL at 07:44

## 2022-07-10 RX ADMIN — Medication 1000 MCG: at 07:44

## 2022-07-10 RX ADMIN — RAMIPRIL 10 MG: 10 CAPSULE ORAL at 07:45

## 2022-07-10 RX ADMIN — SITAGLIPTIN 50 MG: 100 TABLET, FILM COATED ORAL at 07:46

## 2022-07-10 RX ADMIN — HYDROCHLOROTHIAZIDE 12.5 MG: 25 TABLET ORAL at 07:44

## 2022-07-10 NOTE — PLAN OF CARE
Goal Outcome Evaluation:  Plan of Care Reviewed With: patient        Progress: improving  Outcome Evaluation: Pt rested very well this shift.  Cont of bladder.  Up, assist x1.  All meds whole w/thins.  No c/o pain.

## 2022-07-10 NOTE — PLAN OF CARE
Goal Outcome Evaluation:  Plan of Care Reviewed With: patient           Outcome Evaluation: Maryellen has had a good day, no therapy, and family at bedside. She has had no complaints of pain or safety issues. She is alert and oriented x4, continent of b/b, and medication with water. Right sided weakness, assist x1, and refused Miralax. Accucheck AC/HS- no coverage needed so far ,VS stable, and no other concerns at this time.

## 2022-07-10 NOTE — PROGRESS NOTES
LOS: 8 days   Patient Care Team:  Juni Cortez MD as PCP - General (Family Medicine)      ONEYDA BRANHAM  1950    Diagnoses    1. IMPAIRED FUNCTIONAL MOBILITY, BALANCE, GAIT, AND ENDURANCE       ADMITTING DIAGNOSIS:    Stroke    Subjective       Seen and examined, no acute events overnight. Feels OK, denies chest pain, shortness of breath, f/c. Slept well. Therapy going well, feel strength is stable    Objective     Vitals:    07/10/22 0500   BP: 111/64   Pulse: 73   Resp: 18   Temp: 97.9 °F (36.6 °C)   SpO2: 97%       PHYSICAL EXAM:   MENTAL STATUS -  AWAKE / ALERT  HEENT-  SCLERAE ANICTERIC, CONJUNCTIVAE PINK, OP MOIST, NO JVD,    LUNGS - NORMAL RESPIRATIONS  HEART- RRR,   ABD -  SOFT, NONDISTENDED  EXT - NO EDEMA OR CYANOSIS  NEURO - AWAKE, ALERT  MOTOR EXAM -takes resistance bilaterally.  Impaired dexterity in the right upper extremity        MEDICATIONS  Scheduled Meds:aspirin, 81 mg, Oral, Daily  atorvastatin, 80 mg, Oral, Nightly  clopidogrel, 75 mg, Oral, Daily  ferrous sulfate, 325 mg, Oral, Every Other Day  hydroCHLOROthiazide, 12.5 mg, Oral, Daily  insulin lispro, 0-9 Units, Subcutaneous, TID AC  metFORMIN, 500 mg, Oral, BID With Meals  pantoprazole, 40 mg, Oral, Q AM  polyethylene glycol, 17 g, Oral, Daily  ramipril, 10 mg, Oral, Daily  SITagliptin, 50 mg, Oral, Daily  vitamin B-12, 1,000 mcg, Oral, Daily      Continuous Infusions:   PRN Meds:.•  acetaminophen **OR** acetaminophen  •  bisacodyl  •  calcium carbonate  •  dextrose  •  dextrose  •  glucagon (human recombinant)  •  hydrALAZINE  •  ondansetron      RESULTS  Glucose   Date/Time Value Ref Range Status   07/10/2022 1114 144 (H) 70 - 130 mg/dL Final     Comment:     Meter: [id number] : [id number] Feliberto Arnold RN   07/10/2022 0613 137 (H) 70 - 130 mg/dL Final     Comment:     Meter: [id number] : [id number] Dandre Ramon RN   07/09/2022 2045 240 (H) 70 - 130 mg/dL Final     Comment:     Meter: [id number] : [id number] Fran Mackey  CNA   07/09/2022 1640 192 (H) 70 - 130 mg/dL Final     Comment:     Meter: [id number] : [id number] Arik Neumann NA   07/09/2022 1142 212 (H) 70 - 130 mg/dL Final     Comment:     Meter: [id number] : [id number] Feliberto Arnold RN   07/09/2022 0646 135 (H) 70 - 130 mg/dL Final     Comment:     Meter: [id number] : [id number] Fran Mackey [facility]   07/08/2022 2102 269 (H) 70 - 130 mg/dL Final     Comment:     Meter: [id number] : [id number] Fran Mackey [facility]   07/08/2022 1605 217 (H) 70 - 130 mg/dL Final     Comment:     Meter: [id number] : [id number] Eric Reyes [facility]     Results from last 7 days   Lab Units 07/05/22  0942   WBC 10*3/mm3 6.93   HEMOGLOBIN g/dL 11.8*   HEMATOCRIT % 35.9   PLATELETS 10*3/mm3 281     Results from last 7 days   Lab Units 07/05/22  0942   SODIUM mmol/L 136   POTASSIUM mmol/L 4.0   CHLORIDE mmol/L 97*   CO2 mmol/L 25.0   BUN mg/dL 14   CREATININE mg/dL 1.11*   CALCIUM mg/dL 10.2   GLUCOSE mg/dL 272*      Latest Reference Range & Units 07/01/22 04:54 07/05/22 09:42   Vitamin B-12 211 - 946 pg/mL 204 (L)    25 Hydroxy, Vitamin D 30.0 - 100.0 ng/ml  54.1      BRAIN MRI WITHOUT CONTRAST-June 29, 2022     HISTORY: Stroke, follow up     COMPARISON: 06/29/2022.     FINDINGS:  Multiplanar images of the head were obtained without  gadolinium. There is an area of restricted diffusion which is noted  within the left frontoparietal coronal radiata, measuring up to 1.1 cm  in size. An additional tiny focus is seen just superior to it within the  subcortical white matter. This measures approximately 4 mm in size. No  additional areas of restricted diffusion are seen. There is diffuse  atrophy. There is periventricular and deep white matter microangiopathic  change. Degree of ventricular dilatation may be somewhat out of  proportion to the degree of atrophy, and correlation with any evidence  of normal pressure hydrocephalus is recommended. There is no midline  shift or mass effect.  Intracranial flow voids appear intact. No  abnormality is seen on gradient echo imaging There is mucosal thickening  noted within the ethmoid sinuses.     IMPRESSION:  1. Foci of restricted diffusion are noted within the left frontoparietal  corona radiata and subcortical white matter.  No additional areas of  acute infarction are identified.    ASSESSMENT and PLAN    Iron deficiency anemia    Type 2 diabetes mellitus with kidney complication, without long-term current use of insulin (HCC)    Hypertension    Hyperlipidemia    Acute CVA (cerebrovascular accident) (HCC)    Acute lacunar stroke (HCC)    Vitamin B12 deficiency    CVA (cere[facility] vascular accident) ([facility])    CKD (chronic kidney disease) stage 3, GFR 30-59 ml/min ([facility])    CVA 3/29/2022 with right-sided weakness-lacunar infarct left corona radiata region  Stroke prophylaxis-aspirin/Plavix/atorvastatin    DVT [id number] dual antiplatelet therapy    Diabetes mellitus-on metformin/Starlix at home.  Lantus  July 6-titrate up on Lantus to 14 units every morning.  Look at transitioning back to home regimen soon  July 7-plan to review with internal medicine timeframe of transitioning back to home regimen with metformin and Januvia  July 8-Januvia increased to 50 mg daily.  Metformin 500 mg twice daily    Vitamin B12 replacement    Hypertension-hydrochlorothiazide/ramipril    Impaired mobility  Impaired self-care  Impaired cognition    Leg cramps-took medication over-the-counter at home-patient to bring in the name    Functional status-July 5-Pt scored WNL for her age on CLQT in domains of  attention, memory, executive functions, language and visuospatial skills;  however, she exhibited mild difficulty with story retell task, alternating  attention and maze completion.  Upper body dressing contact-guard.  Lower body dressing minimum assist.  Transfers minimal-contact-guard assist.  Gait rolling walker 100 feet minimum assist.  30 feet with quad tip  cane.    TEAM CONF - JULY 7 - POOR HAND WRITING, IMPAIRED MOTOR CONTROL. TRANSFERS CTG MIN. GAIT 160 FEET CTG -MIN RW UBD CTG. LBD  MIN. BATH MIN. TOILETING MIN. DEFICITS WITH DIVIDED ATTENTION AND WORKING MEMORY, DEDUCTIVE REASONING.   BNE (Active)  Att'n. - WNL  Exec. Fx. - Min Imp.  Rsng/Jgmnt - WNL  Arith - WNL  Visuospatial Skills - WNL  Visual Mem. - WNL  Verbal Mem. - Mod.Imp. for immediate recall, Min-Mildly Imp. for delayed recall  Emot - Pt denied dep/anx  CONTINENT BOWEL AND BLADDER. SKIN INTACT  ELOS - WED    Now admit for comprehensive acute inpatient rehabilitation .  This would be an interdisciplinary program with physical therapy 1 hour,  occupational therapy 1 hour, and speech therapy 1 hour, 5 days a week.  Rehabilitation nursing for carryover, monitoring of hypertension and neurologic   status, bowel and bladder, and skin  Ongoing physician follow-up.  Weekly team conferences.  Goals are to achieve a level of supervision with  mobility and self-care and improved balance.   Rehabilitation prognosis fair.  Medical prognosis fair.  Estimated length of stay is approximately 2 weeks, but is only an estimation.   The patient's functional status and clinical status is unchanged from preadmission assessment and the patient continues appropriate for acute inpatient rehabilitation.  Goal is for home with outpatient   therapies.  Barrier to discharge: Impaired mobility and self-care- work on balance, transfers, progressive ambulation, actives of daily living to overcome.     7/9  - Continue comprehensive inpatient rehabilitation program  - Continue close medical monitoring for functional progress, decline or additional intervenable factors to amplify functional recovery  - OK for home med: homeopathic leg cramp OTC medication  - BG remains labile, slightly improving but typically elevates between breakfast and lunch, recently dropping overnight, continue metformin and januvia, recently adjusted, will monitor  for effect    7/10  - Continue comprehensive inpatient rehabilitation program  - Continue close medical monitoring for functional progress, decline or additional intervenable factors to amplify functional recovery  - BP well controlled, continue current regimen  - BG improved over past 24 hours      Kosta Bone MD      During rounds, used appropriate personal protective equipment including mask and gloves.  Additional gown if indicated.  Mask used was standard procedure mask. Appropriate PPE was worn during the entire visit.  Hand hygiene was completed before and after.

## 2022-07-11 LAB
ALBUMIN SERPL-MCNC: 3.9 G/DL (ref 3.5–5.2)
ALBUMIN/GLOB SERPL: 1.6 G/DL
ALP SERPL-CCNC: 100 U/L (ref 39–117)
ALT SERPL W P-5'-P-CCNC: 18 U/L (ref 1–33)
ANION GAP SERPL CALCULATED.3IONS-SCNC: 9 MMOL/L (ref 5–15)
AST SERPL-CCNC: 14 U/L (ref 1–32)
BASOPHILS # BLD AUTO: 0.06 10*3/MM3 (ref 0–0.2)
BASOPHILS NFR BLD AUTO: 0.8 % (ref 0–1.5)
BILIRUB SERPL-MCNC: 0.4 MG/DL (ref 0–1.2)
BUN SERPL-MCNC: 19 MG/DL (ref 8–23)
BUN/CREAT SERPL: 15.6 (ref 7–25)
CALCIUM SPEC-SCNC: 10.1 MG/DL (ref 8.6–10.5)
CHLORIDE SERPL-SCNC: 99 MMOL/L (ref 98–107)
CO2 SERPL-SCNC: 27 MMOL/L (ref 22–29)
CREAT SERPL-MCNC: 1.22 MG/DL (ref 0.57–1)
DEPRECATED RDW RBC AUTO: 40.6 FL (ref 37–54)
EGFRCR SERPLBLD CKD-EPI 2021: 47.2 ML/MIN/1.73
EOSINOPHIL # BLD AUTO: 0.16 10*3/MM3 (ref 0–0.4)
EOSINOPHIL NFR BLD AUTO: 2.2 % (ref 0.3–6.2)
ERYTHROCYTE [DISTWIDTH] IN BLOOD BY AUTOMATED COUNT: 11.8 % (ref 12.3–15.4)
GLOBULIN UR ELPH-MCNC: 2.4 GM/DL
GLUCOSE BLDC GLUCOMTR-MCNC: 135 MG/DL (ref 70–130)
GLUCOSE BLDC GLUCOMTR-MCNC: 153 MG/DL (ref 70–130)
GLUCOSE BLDC GLUCOMTR-MCNC: 154 MG/DL (ref 70–130)
GLUCOSE BLDC GLUCOMTR-MCNC: 213 MG/DL (ref 70–130)
GLUCOSE SERPL-MCNC: 181 MG/DL (ref 65–99)
HCT VFR BLD AUTO: 34.1 % (ref 34–46.6)
HGB BLD-MCNC: 11.1 G/DL (ref 12–15.9)
IMM GRANULOCYTES # BLD AUTO: 0.04 10*3/MM3 (ref 0–0.05)
IMM GRANULOCYTES NFR BLD AUTO: 0.5 % (ref 0–0.5)
LYMPHOCYTES # BLD AUTO: 2.46 10*3/MM3 (ref 0.7–3.1)
LYMPHOCYTES NFR BLD AUTO: 33.2 % (ref 19.6–45.3)
MCH RBC QN AUTO: 30.9 PG (ref 26.6–33)
MCHC RBC AUTO-ENTMCNC: 32.6 G/DL (ref 31.5–35.7)
MCV RBC AUTO: 95 FL (ref 79–97)
MONOCYTES # BLD AUTO: 0.57 10*3/MM3 (ref 0.1–0.9)
MONOCYTES NFR BLD AUTO: 7.7 % (ref 5–12)
NEUTROPHILS NFR BLD AUTO: 4.12 10*3/MM3 (ref 1.7–7)
NEUTROPHILS NFR BLD AUTO: 55.6 % (ref 42.7–76)
NRBC BLD AUTO-RTO: 0 /100 WBC (ref 0–0.2)
PLATELET # BLD AUTO: 272 10*3/MM3 (ref 140–450)
PMV BLD AUTO: 9.8 FL (ref 6–12)
POTASSIUM SERPL-SCNC: 3.8 MMOL/L (ref 3.5–5.2)
PROT SERPL-MCNC: 6.3 G/DL (ref 6–8.5)
RBC # BLD AUTO: 3.59 10*6/MM3 (ref 3.77–5.28)
SODIUM SERPL-SCNC: 135 MMOL/L (ref 136–145)
WBC NRBC COR # BLD: 7.41 10*3/MM3 (ref 3.4–10.8)

## 2022-07-11 PROCEDURE — 97129 THER IVNTJ 1ST 15 MIN: CPT

## 2022-07-11 PROCEDURE — 97130 THER IVNTJ EA ADDL 15 MIN: CPT

## 2022-07-11 PROCEDURE — 82962 GLUCOSE BLOOD TEST: CPT

## 2022-07-11 PROCEDURE — 97535 SELF CARE MNGMENT TRAINING: CPT

## 2022-07-11 PROCEDURE — 97112 NEUROMUSCULAR REEDUCATION: CPT

## 2022-07-11 PROCEDURE — 63710000001 INSULIN LISPRO (HUMAN) PER 5 UNITS: Performed by: STUDENT IN AN ORGANIZED HEALTH CARE EDUCATION/TRAINING PROGRAM

## 2022-07-11 PROCEDURE — 85025 COMPLETE CBC W/AUTO DIFF WBC: CPT | Performed by: PHYSICAL MEDICINE & REHABILITATION

## 2022-07-11 PROCEDURE — 97530 THERAPEUTIC ACTIVITIES: CPT | Performed by: OCCUPATIONAL THERAPIST

## 2022-07-11 PROCEDURE — 80053 COMPREHEN METABOLIC PANEL: CPT | Performed by: PHYSICAL MEDICINE & REHABILITATION

## 2022-07-11 PROCEDURE — 97530 THERAPEUTIC ACTIVITIES: CPT

## 2022-07-11 RX ADMIN — PANTOPRAZOLE SODIUM 40 MG: 40 TABLET, DELAYED RELEASE ORAL at 05:49

## 2022-07-11 RX ADMIN — FERROUS SULFATE TAB 325 MG (65 MG ELEMENTAL FE) 325 MG: 325 (65 FE) TAB at 07:40

## 2022-07-11 RX ADMIN — CLOPIDOGREL 75 MG: 75 TABLET, FILM COATED ORAL at 07:40

## 2022-07-11 RX ADMIN — ATORVASTATIN CALCIUM 80 MG: 80 TABLET, FILM COATED ORAL at 20:40

## 2022-07-11 RX ADMIN — INSULIN LISPRO 2 UNITS: 100 INJECTION, SOLUTION INTRAVENOUS; SUBCUTANEOUS at 07:39

## 2022-07-11 RX ADMIN — METFORMIN HYDROCHLORIDE 500 MG: 500 TABLET ORAL at 16:34

## 2022-07-11 RX ADMIN — SITAGLIPTIN 50 MG: 100 TABLET, FILM COATED ORAL at 07:42

## 2022-07-11 RX ADMIN — HYDROCHLOROTHIAZIDE 12.5 MG: 25 TABLET ORAL at 07:41

## 2022-07-11 RX ADMIN — RAMIPRIL 10 MG: 10 CAPSULE ORAL at 07:41

## 2022-07-11 RX ADMIN — INSULIN LISPRO 2 UNITS: 100 INJECTION, SOLUTION INTRAVENOUS; SUBCUTANEOUS at 12:18

## 2022-07-11 RX ADMIN — Medication 1000 MCG: at 07:41

## 2022-07-11 RX ADMIN — ASPIRIN 81 MG: 81 TABLET, COATED ORAL at 07:44

## 2022-07-11 RX ADMIN — METFORMIN HYDROCHLORIDE 500 MG: 500 TABLET ORAL at 07:41

## 2022-07-11 NOTE — PLAN OF CARE
Goal Outcome Evaluation:              Outcome Evaluation: L. lacunar infarct. R. wilma (slight). A&OX4. Calm, cooperative, and pleasant. Continent B&B. Last BM 7/10. Full code. Diet: Reg, cc, cardiac, thins. Meds whole with thins. Family conference today. OOBx1 with walker. Participated fully in therapy. BS checks ACHS. Had sliding scale at BF. N&T to bilateral feet (neuropathy, not new). D/C home Wednesday, 7/13. Follow-up appointments have been made.

## 2022-07-11 NOTE — PROGRESS NOTES
LOS: 9 days   Patient Care Team:  Juni Cortez MD as PCP - General (Family Medicine)      ONEYDA E CARROL  1950    Diagnoses    1. IMPAIRED FUNCTIONAL MOBILITY, BALANCE, GAIT, AND ENDURANCE       ADMITTING DIAGNOSIS:    Stroke    Subjective       Tolerating activities.  Strength continues improved.    Objective     Vitals:    07/11/22 1228   BP: 125/83   Pulse: 70   Resp: 18   Temp: 97.9 °F (36.6 °C)   SpO2: 99%       PHYSICAL EXAM:   MENTAL STATUS -  AWAKE / ALERT  HEENT-  SCLERAE ANICTERIC, CONJUNCTIVAE PINK,    LUNGS - NORMAL RESPIRATIONS  HEART- RRR,   ABD -  SOFT, NONDISTENDED  EXT - NO EDEMA OR CYANOSIS  NEURO - AWAKE, ALERT  MOTOR EXAM -takes resistance bilaterally.            MEDICATIONS  Scheduled Meds:aspirin, 81 mg, Oral, Daily  atorvastatin, 80 mg, Oral, Nightly  clopidogrel, 75 mg, Oral, Daily  ferrous sulfate, 325 mg, Oral, Every Other Day  hydroCHLOROthiazide, 12.5 mg, Oral, Daily  insulin lispro, 0-9 Units, Subcutaneous, TID AC  metFORMIN, 500 mg, Oral, BID With Meals  pantoprazole, 40 mg, Oral, Q AM  polyethylene glycol, 17 g, Oral, Daily  ramipril, 10 mg, Oral, Daily  SITagliptin, 50 mg, Oral, Daily  vitamin B-12, 1,000 mcg, Oral, Daily      Continuous Infusions:   PRN Meds:.•  acetaminophen **OR** acetaminophen  •  bisacodyl  •  calcium carbonate  •  dextrose  •  dextrose  •  glucagon (human recombinant)  •  hydrALAZINE  •  ondansetron      RESULTS  Glucose   Date/Time Value Ref Range Status   07/11/2022 1612 135 (H) 70 - 130 mg/dL Final     Comment:     Meter: [id number] : [id number] Karlene Cessli NA   07/11/2022 1215 154 (H) 70 - 130 mg/dL Final     Comment:     Meter: [id number] : [id number] Karlene Cessli NA   07/11/2022 0623 153 (H) 70 - 130 mg/dL Final     Comment:     Meter: [id number] : [id number] Marguerite COLLIER RN   07/10/2022 2146 202 (H) 70 - 130 mg/dL Final     Comment:     Meter: [id number] : [id number] Arik Neumann NA   07/10/2022 1657 277 (H) 70 - 130 mg/dL  Final     Comment:     Meter: [id number] : [id number] Arik Neumann NA   07/10/2022 1114 144 (H) 70 - 130 mg/dL Final     Comment:     Meter: [id number] : [id number] Feliberto Arnold RN   07/10/2022 0613 137 (H) 70 - 130 mg/dL Final     Comment:     Meter: [id number] : [id number] Dandre Ramon RN   07/09/2022 2045 240 (H) 70 - 130 mg/dL Final     Comment:     Meter: [id number] : [id number] Fran Mackey CNA     Results from last 7 days   Lab Units 07/11/22  1435 07/05/22  0942   WBC 10*3/mm3 7.41 6.93   HEMOGLOBIN g/dL 11.1* 11.8*   HEMATOCRIT % 34.1 35.9   PLATELETS 10*3/mm3 272 281     Results from last 7 days   Lab Units 07/11/22  1435 07/05/22  0942   SODIUM mmol/L 135* 136   POTASSIUM mmol/L 3.8 4.0   CHLORIDE mmol/L 99 97*   CO2 mmol/L 27.0 25.0   BUN mg/dL 19 14   CREATININE mg/dL 1.22* 1.11*   CALCIUM mg/dL 10.1 10.2   BILIRUBIN mg/dL 0.4  --    ALK PHOS U/L 100  --    ALT (SGPT) U/L 18  --    AST (SGOT) U/L 14  --    GLUCOSE mg/dL 181* 272*      Latest Reference Range & Units 07/01/22 04:54 07/05/22 09:42   Vitamin B-12 211 - 946 pg/mL 204 (L)    25 Hydroxy, Vitamin D 30.0 - 100.0 ng/ml  54.1      BRAIN MRI WITHOUT CONTRAST-June 29, 2022     HISTORY: Stroke, follow up     COMPARISON: 06/29/2022.     FINDINGS:  Multiplanar images of the head were obtained without  gadolinium. There is an area of restricted diffusion which is noted  within the left frontoparietal coronal radiata, measuring up to 1.1 cm  in size. An additional tiny focus is seen just superior to it within the  subcortical white matter. This measures approximately 4 mm in size. No  additional areas of restricted diffusion are seen. There is diffuse  atrophy. There is periventricular and deep white matter microangiopathic  change. Degree of ventricular dilatation may be somewhat out of  proportion to the degree of atrophy, and correlation with any evidence  of normal pressure hydrocephalus is recommended. There is no midline  shift or mass  effect. Intracranial flow voids appear intact. No  abnormality is seen on gradient echo imaging There is mucosal thickening  noted within the ethmoid sinuses.     IMPRESSION:  1. Foci of restricted diffusion are noted within the left frontoparietal  corona radiata and subcortical white matter.  No additional areas of  acute infarction are identified.    ASSESSMENT and PLAN    Iron deficiency anemia    Type 2 diabetes mellitus with kidney complication, without long-term current use of insulin (HCC)    Hypertension    Hyperlipidemia    Acute CVA (cerebrovascular accident) ([facility])    Acute lacunar stroke (HCC)    Vitamin B12 deficiency    CVA (cerebral vascular accident) ([facility])    CKD (chronic kidney disease) stage 3, GFR 30-59 ml/min ([facility])    CVA 6/29/2022 with right-sided weakness-lacunar infarct left corona radiata region  Stroke prophylaxis-aspirin/Plavix/atorvastatin    DVT [id number] dual antiplatelet therapy    Diabetes mellitus-on metformin/Starlix at home.  Lantus  July 6-titrate up on Lantus to 14 units every morning.  Look at transitioning back to home regimen soon  July 7-plan to review with internal medicine timeframe of transitioning back to home regimen with metformin and Januvia  July 8-Januvia increased to 50 mg daily.  Metformin 500 mg twice daily    Vitamin B12 replacement    Hypertension-hydrochlorothiazide/ramipril    Impaired mobility  Impaired self-care  Impaired cognition    Leg cramps-took medication over-the-counter at home-patient to bring in the name    Functional status-July 5-Pt scored WNL for her age on CLQT in domains of  attention, memory, executive functions, language and visuospatial skills;  however, she exhibited mild difficulty with story retell task, alternating  attention and maze completion.  Upper body dressing contact-guard.  Lower body dressing minimum assist.  Transfers minimal-contact-guard assist.  Gait rolling walker 100 feet minimum assist.  30 feet with quad tip  cane.    TEAM CONF - JULY 7 - POOR HAND WRITING, IMPAIRED MOTOR CONTROL. TRANSFERS CTG MIN. GAIT 160 FEET CTG -MIN RW UBD CTG. LBD  MIN. BATH MIN. TOILETING MIN. DEFICITS WITH DIVIDED ATTENTION AND WORKING MEMORY, DEDUCTIVE REASONING.   BNE (Active)  Att'n. - WNL  Exec. Fx. - Min Imp.  Rsng/Jgmnt - WNL  Arith - WNL  Visuospatial Skills - WNL  Visual Mem. - WNL  Verbal Mem. - Mod.Imp. for immediate recall, Min-Mildly Imp. for delayed recall  Emot - Pt denied dep/anx  CONTINENT BOWEL AND BLADDER. SKIN INTACT  ELOS - WED    Now admit for comprehensive acute inpatient rehabilitation .  This would be an interdisciplinary program with physical therapy 1 hour,  occupational therapy 1 hour, and speech therapy 1 hour, 5 days a week.  Rehabilitation nursing for carryover, monitoring of hypertension and neurologic   status, bowel and bladder, and skin  Ongoing physician follow-up.  Weekly team conferences.  Goals are to achieve a level of supervision with  mobility and self-care and improved balance.   Rehabilitation prognosis fair.  Medical prognosis fair.  Estimated length of stay is approximately 2 weeks, but is only an estimation.   The patient's functional status and clinical status is unchanged from preadmission assessment and the patient continues appropriate for acute inpatient rehabilitation.  Goal is for home with outpatient   therapies.  Barrier to discharge: Impaired mobility and self-care- work on balance, transfers, progressive ambulation, actives of daily living to overcome.         Tanmay Espinoza MD      During rounds, used appropriate personal protective equipment including mask and gloves.  Additional gown if indicated.  Mask used was standard procedure mask. Appropriate PPE was worn during the entire visit.  Hand hygiene was completed before and after.

## 2022-07-11 NOTE — THERAPY TREATMENT NOTE
Inpatient Rehabilitation - Physical Therapy Treatment Note       [facility]     Patient Name: Maryellen Crump  : 1950  MRN: [id number]    Today's Date: 2022                    Admit Date: 2022      Visit Dx:     ICD-10-CM ICD-9-CM   1. Impaired functional mobility, balance, gait, and endurance  Z74.09 V49.89       Patient Active Problem List   Diagnosis   • Family history of colon cancer   • Iron deficiency anemia   • Diarrhea of presumed infectious origin   • Type 2 diabetes mellitus with kidney complication, without long-term current use of insulin (HCC)   • Hypertension   • Hyperlipidemia   • Stroke-like symptoms   • Acute CVA (cerebrovascular accident) (HCC)   • Acute lacunar stroke (HCC)   • Vitamin B12 deficiency   • CVA (cerebral vascular accident) (HCC)   • CKD (chronic kidney disease) stage 3, GFR 30-59 ml/min (HCC)       Past Medical History:   Diagnosis Date   • Diabetes mellitus (HCC)    • Hyperlipidemia    • Hypertension        Past Surgical History:   Procedure Laterality Date   • COLONOSCOPY N/A 2018    Procedure: COLONOSCOPY TO CECUM;  Surgeon: Josh Muñoz MD;  Location: [facility] ENDOSCOPY;  Service: General   • DILATION AND CURETTAGE, DIAGNOSTIC / THERAPEUTIC     • ENDOSCOPY N/A 2018    Procedure: ESOPHAGOGASTRODUODENOSCOPY WITH BIOPSIES;  Surgeon: Josh Muñoz MD;  Location: [facility] ENDOSCOPY;  Service: General   • ROTATOR CUFF REPAIR Right        PT ASSESSMENT (last 12 hours)     IRF PT Evaluation and Treatment     Row Name 22          PT Time and Intention    Document Type daily treatment  -MS (r) KM (t) MS (c)     Mode of Treatment physical therapy  -MS (r) KM (t) MS (c)     Patient/Family/Caregiver Comments/Observations Pt supine in bed with exit alarm on.  -MS (r) KM (t) MS (c)     Total Minutes, Physical Therapy 60  -MS (r) KM (t) MS (c)     Row Name 22          General Information    Patient Profile Reviewed yes  -MS (r) KM (t) MS  (c)     Existing Precautions/Restrictions fall  -MS (r) KM (t) MS (c)     Row Name 07/11/22 0700          Pain Assessment    Pretreatment Pain Rating 0/10 - no pain  -MS (r) KM (t) MS (c)     Posttreatment Pain Rating 0/10 - no pain  -MS (r) KM (t) MS (c)     Row Name 07/11/22 0700          Cognition/Psychosocial    Affect/Mental Status (Cognition) WFL  -MS (r) KM (t) MS (c)     Orientation Status (Cognition) oriented x 4  -MS (r) KM (t) MS (c)     Follows Commands (Cognition) WFL  -MS (r) KM (t) MS (c)     Personal Safety Interventions fall prevention program maintained;gait belt;muscle strengthening facilitated;nonskid shoes/slippers when out of bed  -MS (r) KM (t) MS (c)     Cognitive Function WFL  -MS (r) KM (t) MS (c)     Row Name 07/11/22 0700          Bed Mobility    All Activities, [place] (Bed Mobility) supervision;modified independence  -MS (r) KM (t) MS (c)     Rolling Left [place] (Bed Mobility) supervision  -MS (r) KM (t) MS (c)     Rolling Right [place] (Bed Mobility) supervision  -MS (r) KM (t) MS (c)     Supine-Sit [place] (Bed Mobility) supervision  -MS (r) KM (t) MS (c)     Assistive Device (Bed Mobility) bed rails;head of bed elevated  -MS (r) KM (t) MS (c)     Row Name 07/11/22 0700          Transfers    Bed-Chair [place] (Transfers) contact guard;standby assist;verbal cues  -MS (r) KM (t) MS (c)     Sit-Stand [place] (Transfers) contact guard;standby assist  -MS (r) KM (t) MS (c)     Stand-Sit [place] (Transfers) contact guard;standby assist  -MS (r) KM (t) MS (c)     Row Name 07/11/22 0700          Bed-Chair Transfer    Comment, (Bed-Chair Transfer) No device used. PT at [facility]  -MS (r) KM (t) MS (c)     Row Name 07/11/22 0700          Sit-Stand Transfer    Assistive Device (Sit-Stand Transfers) walker, front-wheeled  -MS (r) KM (t) MS (c)     Row Name 07/11/22 0700          Stand-Sit Transfer    Assistive Device (Stand-Sit Transfers) walker, front-wheeled  -MS  (r) KM (t) MS (c)     Row Name 07/11/22 0700          Car Transfer    Type (Car Transfer) sit-stand;stand-sit  -MS (r) KM (t) MS (c)     [place] Level (Car Transfer) contact guard  -MS (r) KM (t) MS (c)     Assistive Device (Car Transfer) walker, front-wheeled  -MS (r) KM (t) MS (c)     Row Name 07/11/22 0700          Gait/Stairs (Locomotion)    [place] Level (Gait) contact guard;standby assist  -MS (r) KM (t) MS (c)     Assistive Device (Gait) walker, front-wheeled  -MS (r) KM (t) MS (c)     Distance in Feet (Gait) 240' w/ RW, 64' w/ no AD.  -MS (r) KM (t) MS (c)     Pattern (Gait) step-through  -MS (r) KM (t) MS (c)     Deviations/Abnormal Patterns (Gait) ruby decreased  -MS (r) KM (t) MS (c)     Bilateral Gait Deviations forward flexed posture;heel strike decreased  -MS (r) KM (t) MS (c)     Left Sided Gait Deviations leans left  -MS (r) KM (t) MS (c)     Right Sided Gait Deviations heel strike decreased;decreased knee extension;Trendelenburg sign  -MS (r) KM (t) MS (c)     Gait Assessment/Intervention Pt performed ambulation today both w/ and w/o AD. Pt showed some mild instability w/o AD and decreased step length but no apparent loss of balance was noted.  -MS (r) KM (t) MS (c)     [place] Level (Stairs) verbal cues;contact guard  -MS (r) KM (t) MS (c)     Handrail Location (Stairs) both sides  -MS (r) KM (t) MS (c)     Number of Steps (Stairs) 8 steps x 2 trials.  -MS (r) KM (t) MS (c)     Ascending Technique (Stairs) step-to-step  -MS (r) KM (t) MS (c)     Descending Technique (Stairs) step-to-step  -MS (r) KM (t) MS (c)     Stairs, Safety Issues balance decreased during turns  -MS (r) KM (t) MS (c)     Stairs, Impairments strength decreased;impaired balance  -MS (r) KM (t) MS (c)     Stairs Assessment/Intervention Pt performed stairs today and showed improved LE strength and sequencing ability. Pt still required minor vc's to increase hip flexion on right side when ascending stairs but  improved with practice.  -MS (r) KM (t) MS (c)     Comment, (Gait/Stairs) Pt performed advanced gait activity by weaving through cones w/o AD. Pt performed activitt well but had decreased step length and mild instability on turns but not loss of balance.  -MS (r) KM (t) MS (c)     Row Name 07/11/22 0700          Safety Issues, Functional Mobility    Safety Issues Affecting Function (Mobility) judgment;safety precaution awareness  -MS (r) KM (t) MS (c)     Impairments Affecting Function (Mobility) balance;endurance/activity tolerance;coordination;strength;motor control  -MS (r) KM (t) MS (c)     Row Name 07/11/22 0700          Curb Negotiation (Mobility)    [place], Curb Negotiation contact guard;verbal cues  -MS (r) KM (t) MS (c)     Assistive Device (Curb Negotiation) walker, front-wheeled  -MS (r) KM (t) MS (c)     Comment, Curb Negotiation (Mobility) Pt performed curb well and only required minor vc's for sequencing.  -MS (r) KM (t) MS (c)     Row Name 07/11/22 0700          Step Over Obstacle (Mobility)    [place], Stepping Over Obstacles (Mobility) contact guard  -MS (r) KM (t) MS (c)     Assistive Device (Step Over Obstacle) parallel bars  -MS (r) KM (t) MS (c)     Comment, Stepping Over Obstacles (Mobility) Pt performed forward and side-stepping over half bolsters in // bars without holding onto bars. Pt showed mild instability and decreased step length but both improved with practices and vc's for body position and step length.  -MS (r) KM (t) MS (c)     Row Name 07/11/22 0700          Balance    Static Sitting Balance supervision  -MS (r) KM (t) MS (c)     Dynamic Sitting Balance standby assist  -MS (r) KM (t) MS (c)     Position, Sitting Balance unsupported;sitting edge of bed  -MS (r) KM (t) MS (c)     Static Standing Balance contact guard  -MS (r) KM (t) MS (c)     Dynamic Standing Balance verbal cues  -MS (r) KM (t) MS (c)     Position/Device Used, Standing Balance walker, front-wheeled  -MS  (r) KM (t) MS (c)     Balance Interventions standing;motor strategy training  -MS (r) KM (t) MS (c)     Comment, Balance Pt performed balance intervention by stepping on colored dots w/o AD. Pt showed mild instability when having to cross midline to step on dot  -MS (r) KM (t) MS (c)     Row Name 07/11/22 0700          Positioning and Restraints    Pre-Treatment Position in bed  -MS (r) KM (t) MS (c)     Post Treatment Position chair  -MS (r) KM (t) MS (c)     In Chair sitting;call light within reach;encouraged to call for assist;exit alarm on  -MS (r) KM (t) MS (c)           User Key  (r) = Recorded By, (t) = Taken By, (c) = Cosigned By    Initials Name Provider Type    Myra Willard JAREN, PT Physical Therapist    Otsi Mulligan, PT Student PT Student                 Physical Therapy Education                 Title: PT OT SLP Therapies (In Progress)     Topic: Physical Therapy (Done)     Point: Mobility training (Done)     Learning Progress Summary           Patient Acceptance, E, VU,NR by  at 7/11/2022 1214    Acceptance, E, VU,NR by  at 7/11/2022 1213    Acceptance, E, NR by  at 7/9/2022 0921    Acceptance, E, VU,NR by  at 7/8/2022 1027    Acceptance, E, VU,NR by  at 7/7/2022 1217    Acceptance, E, VU,NR by  at 7/6/2022 1118    Acceptance, E, VU,NR by  at 7/5/2022 1230    ASHLEY Carson,TB,H,D, VU,DU by  at 7/4/2022 1405    Comment: Written HEp for strength, need to remain on rwx for now.    ASHLEY Carson,TB,D, VU,DU by  at 7/3/2022 1108    Comment: POC, goals.                   Point: Home exercise program (Done)     Learning Progress Summary           Patient Acceptance, E, VU,NR by KM at 7/11/2022 1214    Acceptance, E, VU,NR by  at 7/11/2022 1213    Acceptance, E, NR by  at 7/9/2022 0921    Acceptance, E, VU,NR by KM at 7/8/2022 1027    Acceptance, E, VU,NR by KM at 7/7/2022 1217    Acceptance, E, VU,NR by KM at 7/6/2022 1118    Acceptance, E, VU,NR by KM at 7/5/2022 1230    ASHLEY Carson,THEA,H,D,  KATHLEEN MILLS by  at 7/4/2022 1405    Comment: Written HEp for strength, need to remain on rwx for now.    Yamileth, ASHLEY,TB,D, KATHLEEN MILLS by  at 7/3/2022 1108    Comment: POC, goals.                               User Key     Initials Effective Dates Name Provider Type Discipline     06/16/21 -  Renetta Harrison, PT Physical Therapist PT     06/16/21 -  Tara Sanders, PT Physical Therapist PT     06/06/22 -  Otis Funes, PT Student PT Student PT                PT Recommendation and Plan                          Time Calculation:      PT Charges     Row Name 07/11/22 1212             Time Calculation    Start Time 0900  -MS (r) KM (t) MS (c)      Stop Time 1000  -MS (r) KM (t) MS (c)      Time Calculation (min) 60 min  -MS (r) KM (t)      PT Received On 07/11/22  -MS (r) KM (t) MS (c)      PT - Next Appointment 07/12/22  -MS (r) KM (t) MS (c)              Time Calculation- PT    Total Timed Code Minutes- PT 60 minute(s)  -MS (r) KM (t) MS (c)            User Key  (r) = Recorded By, (t) = Taken By, (c) = Cosigned By    Initials Name Provider Type    Myra Willard, PT Physical Therapist     Otis Funes, PT Student PT Student                Therapy Charges for Today     Code Description Service Date Service Provider Modifiers Qty    48238595771 HC PT THERAPEUTIC ACT EA 15 MIN 7/11/2022 Otis Funes, PT Student GP 2    61014574179 HC PT NEUROMUSC RE EDUCATION EA 15 MIN 7/11/2022 Otis Funes PT Student GP 2                   Otis Funes PT Student  7/11/2022

## 2022-07-11 NOTE — PROGRESS NOTES
Inpatient Rehabilitation Plan of Care Note    Plan of Care  Care Plan Reviewed - No updates at this time.    Psychosocial    Performed Intervention(s)  Offer support  Encourage to express any concerns      Sphincter Control    Performed Intervention(s)  Monitor I&O  Encourage fluids      Safety    Performed Intervention(s)  Falls/safety precaution  Bed/Chair alarms  Call light/personal items within easy reach      Body Systems    Performed Intervention(s)  Monitor labs; hypo/hyperglycemia    Signed by: Shaila Trevizo RN

## 2022-07-11 NOTE — THERAPY TREATMENT NOTE
Inpatient Rehabilitation - Speech Language Pathology Treatment Note    [facility]     Patient Name: Maryellen Crump  : 1950  MRN: [id number]    Today's Date: 2022                   Admit Date: 2022       Visit Dx:      ICD-10-CM ICD-9-CM   1. Impaired functional mobility, balance, gait, and endurance  Z74.09 V49.89       Patient Active Problem List   Diagnosis   • Family history of colon cancer   • Iron deficiency anemia   • Diarrhea of presumed infectious origin   • Type 2 diabetes mellitus with kidney complication, without long-term current use of insulin (HCC)   • Hypertension   • Hyperlipidemia   • Stroke-like symptoms   • Acute CVA (cerebrovascular accident) (HCC)   • Acute lacunar stroke (HCC)   • Vitamin B12 deficiency   • CVA (cerebral vascular accident) (HCC)   • CKD (chronic kidney disease) stage 3, GFR 30-59 ml/min (HCC)       Past Medical History:   Diagnosis Date   • Diabetes mellitus (HCC)    • Hyperlipidemia    • Hypertension        Past Surgical History:   Procedure Laterality Date   • COLONOSCOPY N/A 2018    Procedure: COLONOSCOPY TO CECUM;  Surgeon: Josh Muñoz MD;  Location: [facility] ENDOSCOPY;  Service: General   • DILATION AND CURETTAGE, DIAGNOSTIC / THERAPEUTIC     • ENDOSCOPY N/A 2018    Procedure: ESOPHAGOGASTRODUODENOSCOPY WITH BIOPSIES;  Surgeon: Josh Muñoz MD;  Location: [facility] ENDOSCOPY;  Service: General   • ROTATOR CUFF REPAIR Right        SLP Recommendation and Plan                                                   SLP EVALUATION (last 72 hours)     SLP SLC Evaluation     Row Name 22 1229 22 0900                Communication Assessment/Intervention    Document Type therapy note (daily note)  -SL therapy note (daily note)  -SL       Subjective Information no complaints  -SL no complaints  -SL       Patient Observations cooperative;agree to therapy;alert  -SL alert;cooperative  -SL       Patient Effort excellent  -SL good  -SL        Symptoms Noted During/After Treatment none  -SL none  -SL             User Key  (r) = Recorded By, (t) = Taken By, (c) = Cosigned By    Initials Name Effective Dates    Ember Alejandro MS CCC-SLP 06/16/21 -                    EDUCATION    The patient has been educated in the following areas:       Cognitive Impairment.             SLP GOALS     Row Name 07/11/22 1239 07/11/22 0900          Memory Skills Goal 1 (SLP)    Progress/Outcomes (Memory Skills Goal 1, SLP) -- continuing progress toward goal  -SL     Comment (Memory Skills Goal 1, SLP) -- mental manipualtion task- 4 word stimulus- ranking/sequencing  -SL            Reasoning Goal 1 (SLP)    Progress/Outcomes (Reasoning Goal 1, SLP) goal ongoing  -SL goal ongoing  -SL     Comment (Reasoning Goal 1, SLP) visual shape analogies ( complex)- 100% indep  -SL seating arrangements based on multiple clues- 100%- min cues  -SL            Functional Math Skills Goal 1 (SLP)    Progress/Outcomes (Functional Math Skills Goal 1, SLP) -- goal ongoing  -SL     Comment (Functional Math Skills Goal 1, SLP) -- 80% without cues and 100% with min cues for calculations- taxi cab rates  -SL            Executive Functional Skills Goal 1 (SLP)    Barriers (Executive Function Skills Goal 1, SLP) answering ?'s re: 80 item chart/grid- 100% indep with improved attn to details  -SL --     Progress/Outcomes (Executive Function Skills Goal 1, SLP) goal ongoing  -SL --     Comment (Executive Function Skills Goal 1, SLP) sequencing of 10 paragraphs for stories- 80% indep- difficulty noted for self correcting  -SL --           User Key  (r) = Recorded By, (t) = Taken By, (c) = Cosigned By    Initials Name Provider Type    Ember Alejandro MS CCC-SLP Speech and Language Pathologist                            Time Calculation:        Time Calculation- SLP     Row Name 07/11/22 1259 07/11/22 1141          Time Calculation- SLP    SLP Start Time 1230  -SL 0830  -     SLP Stop Time 1300  -SL 0900   -SL     SLP Time Calculation (min) 30 min  -SL 30 min  -           User Key  (r) = Recorded By, (t) = Taken By, (c) = Cosigned By    Initials Name Provider Type    Ember lAejandro MS CCC-SLP Speech and Language Pathologist                  Therapy Charges for Today     Code Description Service Date Service Provider Modifiers Qty    38381292696 HC ST DEV OF COGN SKILLS INITIAL 15 MIN 7/11/2022 Ember Werner MS CCC-SLP  1    86860925591 HC ST DEV OF COGN SKILLS EACH ADDT'L 15 MIN 7/11/2022 Ember Werner MS CCC-SLP  1    20676647169 HC ST DEV OF COGN SKILLS EACH ADDT'L 15 MIN 7/11/2022 Ember Werner MS CCC-SLP  2                           Ember Werner MS CCC-ETHEL  7/11/2022

## 2022-07-11 NOTE — THERAPY TREATMENT NOTE
Inpatient Rehabilitation - Occupational Therapy Treatment Note    [facility]     Patient Name: Maryellen Crump  : 1950  MRN: [id number]    Today's Date: 2022                 Admit Date: 2022         ICD-10-CM ICD-9-CM   1. Impaired functional mobility, balance, gait, and endurance  Z74.09 V49.89       Patient Active Problem List   Diagnosis   • Family history of colon cancer   • Iron deficiency anemia   • Diarrhea of presumed infectious origin   • Type 2 diabetes mellitus with kidney complication, without long-term current use of insulin (HCC)   • Hypertension   • Hyperlipidemia   • Stroke-like symptoms   • Acute CVA (cerebrovascular accident) (HCC)   • Acute lacunar stroke (HCC)   • Vitamin B12 deficiency   • CVA (cerebral vascular accident) (HCC)   • CKD (chronic kidney disease) stage 3, GFR 30-59 ml/min (HCC)       Past Medical History:   Diagnosis Date   • Diabetes mellitus (HCC)    • Hyperlipidemia    • Hypertension        Past Surgical History:   Procedure Laterality Date   • COLONOSCOPY N/A 2018    Procedure: COLONOSCOPY TO CECUM;  Surgeon: Josh Muñoz MD;  Location: [facility] ENDOSCOPY;  Service: General   • DILATION AND CURETTAGE, DIAGNOSTIC / THERAPEUTIC     • ENDOSCOPY N/A 2018    Procedure: ESOPHAGOGASTRODUODENOSCOPY WITH BIOPSIES;  Surgeon: Josh Muñoz MD;  Location: [facility] ENDOSCOPY;  Service: General   • ROTATOR CUFF REPAIR Right              IRF OT ASSESSMENT FLOWSHEET (last 12 hours)     IRF OT Evaluation and Treatment     Row Name 22 1252          OT Time and Intention    Document Type daily treatment  -SG     Mode of Treatment individual therapy;occupational therapy  -SG     Patient Effort good  -SG     Symptoms Noted During/After Treatment none  -SG     Row Name 22 1252          General Information    Patient/Family/Caregiver Comments/Observations Pt sitting up in chair  -SG     Row Name 22 1252          Pain Assessment    Pretreatment Pain  "Rating 0/10 - no pain  -     Row Name 07/11/22 1252          Cognition/Psychosocial    Orientation Status (Cognition) oriented x 4  -SG     Follows Commands (Cognition) WFL  -     Personal Safety Interventions fall prevention program maintained;gait belt  -     Row Name 07/11/22 1252          Grooming    [place] Level (Grooming) oral care regimen;wash face, hands;standby assist  -     Position (Grooming) sink side;supported sitting  -     Row Name 07/11/22 1252          Transfer Assessment/Treatment    Comment, (Transfers) Chair to w/c, CGA/SBA  -     Row Name 07/11/22 1252          Motor Skills    Results, 9 Hole Peg Test of Fine Motor Coordination Manipulation of chinese checkers to place into slots, 4 pegs for each color, mild difficulty and increased time to complete; pt threads 1/2\" foam beads onto string with R hand, very mild difficulty  -     Row Name 07/11/22 1252          Positioning and Restraints    Pre-Treatment Position sitting in chair/recliner  -     Post Treatment Position wheelchair  -     In Wheelchair sitting;call light within reach;encouraged to call for assist;exit alarm on  -           User Key  (r) = Recorded By, (t) = Taken By, (c) = Cosigned By    Initials Name Effective Dates     Shira Wong, OTR 06/16/21 -                  Occupational Therapy Education                 Title: PT OT SLP Therapies (In Progress)     Topic: Occupational Therapy (In Progress)     Point: ADL training (Done)     Description:   Instruct learner(s) on proper safety adaptation and remediation techniques during self care or transfers.   Instruct in proper use of assistive devices.              Learning Progress Summary           Patient Acceptance, E, VU,NR by AF at 7/4/2022 [id number]    Comment: educated on safety with ADls and transfers                   Point: Home exercise program (Not Started)     Description:   Instruct learner(s) on appropriate technique for monitoring, assisting " and/or progressing therapeutic exercises/activities.              Learner Progress:  Not documented in this visit.          Point: Precautions (Not Started)     Description:   Instruct learner(s) on prescribed precautions during self-care and functional transfers.              Learner Progress:  Not documented in this visit.          Point: Body mechanics (Not Started)     Description:   Instruct learner(s) on proper positioning and spine alignment during self-care, functional mobility activities and/or exercises.              Learner Progress:  Not documented in this visit.                      User Key     Initials Effective Dates Name Provider Type Discipline    AF 06/16/21 -  Dolores Odom OTMANNY Occupational Therapist OT                    OT Recommendation and Plan                         Time Calculation:      Time Calculation- OT     Row Name 07/11/22 1253             Time Calculation- OT    OT Start Time 1000  -SG      OT Stop Time 1030  -SG      OT Time Calculation (min) 30 min  -SG      OT Received On 07/11/22  -SG              Timed Charges    45779 - OT Therapeutic Activity Minutes 30  -SG              Total Minutes    Timed Charges Total Minutes 30  -SG       Total Minutes 30  -SG            User Key  (r) = Recorded By, (t) = Taken By, (c) = Cosigned By    Initials Name Provider Type     Shira Wong OTR Occupational Therapist              Therapy Charges for Today     Code Description Service Date Service Provider Modifiers Qty    66142799915 HC OT THERAPEUTIC ACT EA 15 MIN 7/11/2022 Shira Wong OTR GO 2                   RICH Day  7/11/2022

## 2022-07-11 NOTE — THERAPY TREATMENT NOTE
Inpatient Rehabilitation - Occupational Therapy Treatment Note    [facility]     Patient Name: Maryellen Crump  : 1950  MRN: [id number]    Today's Date: 2022                 Admit Date: 2022         ICD-10-CM ICD-9-CM   1. Impaired functional mobility, balance, gait, and endurance  Z74.09 V49.89       Patient Active Problem List   Diagnosis   • Family history of colon cancer   • Iron deficiency anemia   • Diarrhea of presumed infectious origin   • Type 2 diabetes mellitus with kidney complication, without long-term current use of insulin (HCC)   • Hypertension   • Hyperlipidemia   • Stroke-like symptoms   • Acute CVA (cerebrovascular accident) (HCC)   • Acute lacunar stroke (HCC)   • Vitamin B12 deficiency   • CVA (cerebral vascular accident) (HCC)   • CKD (chronic kidney disease) stage 3, GFR 30-59 ml/min (HCC)       Past Medical History:   Diagnosis Date   • Diabetes mellitus (HCC)    • Hyperlipidemia    • Hypertension        Past Surgical History:   Procedure Laterality Date   • COLONOSCOPY N/A 2018    Procedure: COLONOSCOPY TO CECUM;  Surgeon: Josh Muñoz MD;  Location: [facility] ENDOSCOPY;  Service: General   • DILATION AND CURETTAGE, DIAGNOSTIC / THERAPEUTIC     • ENDOSCOPY N/A 2018    Procedure: ESOPHAGOGASTRODUODENOSCOPY WITH BIOPSIES;  Surgeon: Josh Muñoz MD;  Location: [facility] ENDOSCOPY;  Service: General   • ROTATOR CUFF REPAIR Right              IRF OT ASSESSMENT FLOWSHEET (last 12 hours)     IRF OT Evaluation and Treatment     Row Name 22 1607 22 1252       OT Time and Intention    Document Type daily treatment  -CC daily treatment  -SG    Mode of Treatment occupational therapy  -CC individual therapy;occupational therapy  -SG    Patient Effort good  -CC good  -SG    Symptoms Noted During/After Treatment none  -CC none  -SG    Row Name 22 1252          General Information    Patient/Family/Caregiver Comments/Observations Pt sitting up in chair  -SG      [facility] Name 07/11/22 1607 07/11/22 1252       Pain Assessment    Pretreatment Pain Rating 0/10 - no pain  - 0/10 - no pain  -    Posttreatment Pain Rating 0/10 - no pain  - --    [facility] Name 07/11/22 1607 07/11/22 1252       Cognition/Psychosocial    Affect/Mental Status (Cognition) [facility] --    Orientation Status (Cognition) oriented x 4  -CC oriented x 4  -SG    Follows Commands (Cognition) WFL  - WFL  -    Personal Safety Interventions fall prevention program maintained;gait belt;nonskid shoes/slippers when out of bed  - fall prevention program maintained;gait belt  -    Cognitive Function [facility] --    Row Name 07/11/22 1607          Bathing    [place] Level (Bathing) bathing skills;set up;contact guard assist  -     Assistive Device (Bathing) grab bar/tub rail;hand held shower spray hose;shower chair  -     Position (Bathing) supported sitting;supported standing  -CC     Row Name 07/11/22 1607          Upper Body Dressing    [place] Level (Upper Body Dressing) upper body dressing skills;doff;don;front opening garment;set up assistance  -     Position (Upper Body Dressing) supported sitting  -     Set-up Assistance (Upper Body Dressing) obtain clothing  -CC     Row Name 07/11/22 1607          Lower Body Dressing    [place] Level (Lower Body Dressing) doff;don;pants/bottoms;shoes/slippers;underwear;set up;contact guard assist;minimum assist (75% patient effort)  -     Position (Lower Body Dressing) supported sitting;supported standing  -     Set-up Assistance (Lower Body Dressing) obtain clothing  -CC     Row Name 07/11/22 1607 07/11/22 1252       Grooming    [place] Level (Grooming) grooming skills;deodorant application;hair care, combing/brushing;wash face, hands;set up  - oral care regimen;wash face, hands;standby assist  -    Position (Grooming) sink side;supported standing  -CC sink side;supported sitting  -    Set-up Assistance (Grooming) obtain supplies   "- --    Row Name 07/11/22 1607          Functional Mobility    Functional Mobility- Ind. Level supervision required  -     Functional Mobility- Device walker, front-wheeled  -     Functional Mobility-Distance (Feet) --  to and from shower room  -     Row Name 07/11/22 1252          Transfer Assessment/Treatment    Comment, (Transfers) Chair to w/c, CGA/SBA  -     Row Name 07/11/22 1607          Transfers    Sit-Stand [place] (Transfers) supervision  -     Stand-Sit [place] (Transfers) supervision  -     Row Name 07/11/22 1607          Sit-Stand Transfer    Assistive Device (Sit-Stand Transfers) walker, front-wheeled  -     Row Name 07/11/22 1607          Stand-Sit Transfer    Assistive Device (Stand-Sit Transfers) walker, front-wheeled  -     Row Name 07/11/22 1252          Motor Skills    Results, 9 Hole Peg Test of Fine Motor Coordination Manipulation of chinese checkers to place into slots, 4 pegs for each color, mild difficulty and increased time to complete; pt threads 1/2\" foam beads onto string with R hand, very mild difficulty  -     Row Name 07/11/22 1607 07/11/22 1252       Positioning and Restraints    Pre-Treatment Position sitting in chair/recliner  - sitting in chair/recliner  -    Post Treatment Position bed  - wheelchair  -    In Bed fowlers;call light within reach;encouraged to call for assist;exit alarm on  - --    In Wheelchair -- sitting;call light within reach;encouraged to call for assist;exit alarm on  -SG          User Key  (r) = Recorded By, (t) = Taken By, (c) = Cosigned By    Initials Name Effective Dates     Francesca Flynn, OTR 06/16/21 -     SG Shira Wong, OTR 06/16/21 -                  Occupational Therapy Education                 Title: PT OT SLP Therapies (In Progress)     Topic: Occupational Therapy (In Progress)     Point: ADL training (Done)     Description:   Instruct learner(s) on proper safety adaptation and remediation " techniques during self care or transfers.   Instruct in proper use of assistive devices.              Learning Progress Summary           Patient Acceptance, E, VU,NR by  at 7/4/2022 1518    Comment: educated on safety with ADls and transfers                   Point: Home exercise program (Not Started)     Description:   Instruct learner(s) on appropriate technique for monitoring, assisting and/or progressing therapeutic exercises/activities.              Learner Progress:  Not documented in this visit.          Point: Precautions (Not Started)     Description:   Instruct learner(s) on prescribed precautions during self-care and functional transfers.              Learner Progress:  Not documented in this visit.          Point: Body mechanics (Not Started)     Description:   Instruct learner(s) on proper positioning and spine alignment during self-care, functional mobility activities and/or exercises.              Learner Progress:  Not documented in this visit.                      User Key     Initials Effective Dates Name Provider Type Discipline     06/16/21 -  Dolores Odom OTR Occupational Therapist OT                    OT Recommendation and Plan                         Time Calculation:      Time Calculation- OT     Row Name 07/11/22 1515 07/11/22 1253          Time Calculation- OT    OT Start Time 1515  -CC 1000  -SG     OT Stop Time 1545  -CC 1030  -SG     OT Time Calculation (min) 30 min  -CC 30 min  -SG     OT Received On -- 07/11/22  -            Timed Charges    26636 - OT Therapeutic Activity Minutes -- 30  -SG            Total Minutes    Timed Charges Total Minutes -- 30  -SG      Total Minutes -- 30  -SG           User Key  (r) = Recorded By, (t) = Taken By, (c) = Cosigned By    Initials Name Provider Type    CC Francesca Flynn OTR Occupational Therapist    SG Shira Wong OTR Occupational Therapist              Therapy Charges for Today     Code Description Service Date Service  Provider Modifiers Qty    00133223209 HC OT SELF CARE/MGMT/TRAIN EA 15 MIN 7/11/2022 Francesca Flynn, OTMANNY GO 2                   RICH Lewis  7/11/2022

## 2022-07-11 NOTE — PLAN OF CARE
Goal Outcome Evaluation:  Plan of Care Reviewed With: patient        Progress: improving  Outcome Evaluation: No c/o pain. R side weak. Meds with thin liquids. No unsafe behavior noted. Turns self in bed. HS .   Subjective     Kelly Barnes is a 78 year old female who presents to clinic today for the following health issues:    HPI   Rash      Duration: 2 months     Description  Location: back  Itching: severe    Intensity:  severe    Accompanying signs and symptoms: spotty, patchy and dry.    History (similar episodes/previous evaluation): None    Precipitating or alleviating factors:  New exposures:  None   Recent travel: no      Therapies tried and outcome: tacrolimus from derm.    Hypertension Follow-up      Do you check your blood pressure regularly outside of the clinic? Yes     Are you following a low salt diet? Yes    Are your blood pressures ever more than 140 on the top number (systolic) OR more   than 90 on the bottom number (diastolic), for example 140/90? No  BP Readings from Last 3 Encounters:   05/01/20 136/78   04/14/20 139/74   03/30/20 (!) 174/88        Patient Active Problem List   Diagnosis     Allergic rhinitis     Osteopenia     Obesity     Hypertension goal BP (blood pressure) < 140/90     Hyperlipidemia LDL goal <100     Leg edema, left     Atypical chest pain     Dizzy spells     Obesity, Class I, BMI 30-34.9     Aortic stenosis     Type 2 diabetes mellitus without complication, without long-term current use of insulin (H)     Musculoskeletal chest pain     Nonrheumatic aortic valve stenosis     Family history of breast cancer in sister     Hepatitis A immune     Left leg swelling     Allergic dermatitis     Dermatitis     Past Surgical History:   Procedure Laterality Date     C NONSPECIFIC PROCEDURE      none       Social History     Tobacco Use     Smoking status: Never Smoker     Smokeless tobacco: Never Used   Substance Use Topics     Alcohol use: Yes     Alcohol/week: 10.0 standard drinks     Comment: 1-2 drinks per week     Family History   Problem Relation Age of Onset     Hypertension Mother      Diabetes No family hx of      Cerebrovascular Disease No family hx of      Breast Cancer No  family hx of      Cancer - colorectal No family hx of      Prostate Cancer No family hx of          Current Outpatient Medications   Medication Sig Dispense Refill     NIFEdipine ER (ADALAT CC) 30 MG 24 hr tablet Take 1 tablet (30 mg) by mouth At Bedtime 30 tablet 1     ASPIRIN 81 MG OR TABS ONE DAILY 100 3     blood glucose (ONETOUCH ULTRA) test strip Use to test blood sugar twice daily. Dispense 1 box of 200 test strips, #3 refills. 1 Box 3     blood glucose monitor KIT 1 kit daily. 1 kit 0     blood glucose monitoring (ONE TOUCH ULTRA 2) meter device kit Use to test blood sugar 3 times daily 1 kit 0     fluticasone (FLONASE) 50 MCG/ACT nasal spray Spray 2 sprays into both nostrils daily (Patient not taking: Reported on 7/3/2019) 1 Package 1     metFORMIN (GLUCOPHAGE) 500 MG tablet Take 2 tablets in the morning and one tablet at night 270 tablet 3     ONE TOUCH LANCETS MISC        ONETOUCH DELICA LANCETS 33G MISC 1 Device by In Vitro route 2 times daily 100 each 11     simvastatin (ZOCOR) 10 MG tablet TAKE ONE TABLET BY MOUTH AT BEDTIME 90 tablet 3     Allergies   Allergen Reactions     Losartan Rash     Metoprolol Rash     Ibuprofen      numbness in hands and feet     Recent Labs   Lab Test 10/15/19  0920 10/25/18  0920 10/26/17  0808 03/28/17  0941   A1C 7.6* 6.7* 7.3* 8.4*   LDL 94 126*  --  131*   HDL 73 74  --  67   TRIG 90 76  --  83   ALT 21 18  --  18   CR 0.78 0.82  --  0.70   GFRESTIMATED 72 67  --  81   GFRESTBLACK 84 82  --  >90   GFR Calc     POTASSIUM 4.6 4.3  --  4.2   TSH 1.11  --  1.52  --       BP Readings from Last 3 Encounters:   05/01/20 136/78   04/14/20 139/74   03/30/20 (!) 174/88    Wt Readings from Last 3 Encounters:   05/01/20 77.1 kg (170 lb)   04/24/20 74.8 kg (165 lb)   04/23/20 75.8 kg (167 lb)                 Reviewed and updated as needed this visit by Provider  Allergies         Review of Systems   ROS COMP: Constitutional, HEENT, cardiovascular, pulmonary, GI,  , musculoskeletal, neuro, skin, endocrine and psych systems are negative, except as otherwise noted.      Objective    /78 (BP Location: Right arm, Patient Position: Sitting, Cuff Size: Adult Regular)   Pulse 70   Temp 98.4  F (36.9  C) (Tympanic)   Resp 16   Wt 77.1 kg (170 lb)   SpO2 97%   BMI 31.09 kg/m    Body mass index is 31.09 kg/m .  Physical Exam   GENERAL: healthy, alert and no distress  RESP: lungs clear to auscultation - no rales, rhonchi or wheezes  CV: regular rate and rhythm, normal S1 S2, no S3 or S4, no murmur, click or rub, no peripheral edema and peripheral pulses strong  MS: no gross musculoskeletal defects noted, no edema  SKIN: no suspicious lesions or rashes  NEURO: Normal strength and tone, mentation intact and speech normal  PSYCH: mentation appears normal, affect normal/bright    Diagnostic Test Results:  Labs reviewed in Epic  Dermatology report reviewed, see below        Assessment & Plan     1. Hypertension goal BP (blood pressure) < 140/90  At goal but need to discontinue metoprolol as possible dermatitis causative agent  Start nifedipine as below, follow up 1 week  - NIFEdipine ER (ADALAT CC) 30 MG 24 hr tablet; Take 1 tablet (30 mg) by mouth At Bedtime  Dispense: 30 tablet; Refill: 1    2. Dermatitis  I reviewed dermatopathology, dermatitis with eosinophils and spongiform appearance, suspicious for medication related alelrgy. See scanned report. Copy also given to patient today.   Dermatologist advised discontinuing in order 1. Losartan 2. Metoprolol 3. Metformin  If discontinuing metoprolol does not help, will need to discontinue metformin and figure out alternative diabetic agent           Return in about 2 weeks (around 5/15/2020) for Hypertension follow up.    Lea Lopez MD  [facility]

## 2022-07-12 LAB
GLUCOSE BLDC GLUCOMTR-MCNC: 138 MG/DL (ref 70–130)
GLUCOSE BLDC GLUCOMTR-MCNC: 152 MG/DL (ref 70–130)
GLUCOSE BLDC GLUCOMTR-MCNC: 176 MG/DL (ref 70–130)
GLUCOSE BLDC GLUCOMTR-MCNC: 182 MG/DL (ref 70–130)

## 2022-07-12 PROCEDURE — 97530 THERAPEUTIC ACTIVITIES: CPT

## 2022-07-12 PROCEDURE — 97130 THER IVNTJ EA ADDL 15 MIN: CPT

## 2022-07-12 PROCEDURE — 97112 NEUROMUSCULAR REEDUCATION: CPT

## 2022-07-12 PROCEDURE — 82962 GLUCOSE BLOOD TEST: CPT

## 2022-07-12 PROCEDURE — 97129 THER IVNTJ 1ST 15 MIN: CPT

## 2022-07-12 PROCEDURE — 97530 THERAPEUTIC ACTIVITIES: CPT | Performed by: OCCUPATIONAL THERAPIST

## 2022-07-12 PROCEDURE — 63710000001 INSULIN LISPRO (HUMAN) PER 5 UNITS: Performed by: STUDENT IN AN ORGANIZED HEALTH CARE EDUCATION/TRAINING PROGRAM

## 2022-07-12 PROCEDURE — 97535 SELF CARE MNGMENT TRAINING: CPT | Performed by: OCCUPATIONAL THERAPIST

## 2022-07-12 RX ADMIN — ATORVASTATIN CALCIUM 80 MG: 80 TABLET, FILM COATED ORAL at 19:55

## 2022-07-12 RX ADMIN — Medication 1000 MCG: at 08:06

## 2022-07-12 RX ADMIN — SITAGLIPTIN 50 MG: 100 TABLET, FILM COATED ORAL at 08:18

## 2022-07-12 RX ADMIN — PANTOPRAZOLE SODIUM 40 MG: 40 TABLET, DELAYED RELEASE ORAL at 05:44

## 2022-07-12 RX ADMIN — METFORMIN HYDROCHLORIDE 500 MG: 500 TABLET ORAL at 17:10

## 2022-07-12 RX ADMIN — CLOPIDOGREL 75 MG: 75 TABLET, FILM COATED ORAL at 08:07

## 2022-07-12 RX ADMIN — HYDROCHLOROTHIAZIDE 12.5 MG: 25 TABLET ORAL at 08:07

## 2022-07-12 RX ADMIN — METFORMIN HYDROCHLORIDE 500 MG: 500 TABLET ORAL at 08:06

## 2022-07-12 RX ADMIN — ASPIRIN 81 MG: 81 TABLET, COATED ORAL at 08:06

## 2022-07-12 RX ADMIN — RAMIPRIL 10 MG: 10 CAPSULE ORAL at 08:07

## 2022-07-12 RX ADMIN — INSULIN LISPRO 2 UNITS: 100 INJECTION, SOLUTION INTRAVENOUS; SUBCUTANEOUS at 11:28

## 2022-07-12 NOTE — PROGRESS NOTES
Recreational Therapy Note    Patient Name: Maryellen Crump   MRN: [id number]    Therapeutic Recreation Eval and Treat (last 12 hours)     Therapeutic Recreation Eval & Treat     Row Name 07/12/22 1500       Therapeutic Recreation Participation    Recreation Therapy Participation games  -TW    Games board games  Sequence  -TW    Objectives of Recreation Participation maintain;sense of autonomy by choosing level of participation;positive attitudes leading to a healthy leisure lifestyle  -TW    Comment, Recreation Participation using RUE to place chips/cards  -TW    Recreation Therapy Summary of Participation active participation  -TW          User Key  (r) = Recorded By, (t) = Taken By, (c) = Cosigned By    Initials Name Provider Type    TW Shanita Villafuerte CTRS Recreational Therapist                  RASHAD Oakes  7/12/2022

## 2022-07-12 NOTE — PROGRESS NOTES
LOS: 10 days   Patient Care Team:  Juni Cortez MD as PCP - General (Family Medicine)      ONEYDA BRANHAM  1950    Diagnoses    1. IMPAIRED FUNCTIONAL MOBILITY, BALANCE, GAIT, AND ENDURANCE       ADMITTING DIAGNOSIS:    Stroke    Subjective     Tolerating therapies.  Feels overall stronger.  Reviewed anticipated discharge home tomorrow      Objective     Vitals:    07/12/22 1226   BP: 130/60   Pulse: 66   Resp: 18   Temp: 98 °F (36.7 °C)   SpO2: 99%       PHYSICAL EXAM:   MENTAL STATUS -  AWAKE / ALERT  HEENT-  SCLERAE ANICTERIC, CONJUNCTIVAE PINK,    LUNGS - NORMAL RESPIRATIONS  HEART- RRR,   ABD -  SOFT, NONDISTENDED  EXT - NO EDEMA OR CYANOSIS  NEURO - AWAKE, ALERT  MOTOR EXAM -takes resistance bilaterally.            MEDICATIONS  Scheduled Meds:aspirin, 81 mg, Oral, Daily  atorvastatin, 80 mg, Oral, Nightly  clopidogrel, 75 mg, Oral, Daily  ferrous sulfate, 325 mg, Oral, Every Other Day  hydroCHLOROthiazide, 12.5 mg, Oral, Daily  insulin lispro, 0-9 Units, Subcutaneous, TID AC  metFORMIN, 500 mg, Oral, BID With Meals  pantoprazole, 40 mg, Oral, Q AM  polyethylene glycol, 17 g, Oral, Daily  ramipril, 10 mg, Oral, Daily  SITagliptin, 50 mg, Oral, Daily  vitamin B-12, 1,000 mcg, Oral, Daily      Continuous Infusions:   PRN Meds:.•  acetaminophen **OR** acetaminophen  •  bisacodyl  •  calcium carbonate  •  dextrose  •  dextrose  •  glucagon (human recombinant)  •  hydrALAZINE  •  ondansetron      RESULTS  Glucose   Date/Time Value Ref Range Status   07/12/2022 1612 152 (H) 70 - 130 mg/dL Final     Comment:     Meter: [id number] : [id number] Karlene Clay NA   07/12/2022 1117 176 (H) 70 - 130 mg/dL Final     Comment:     Meter: [id number] : [id number] Rolan Jazmine NA   07/12/2022 0624 138 (H) 70 - 130 mg/dL Final     Comment:     Meter: [id number] : [id number] Fran Mackey CNA   07/11/2022 2029 213 (H) 70 - 130 mg/dL Final     Comment:     Meter: [id number] : [id number] Fran Mackey  CNA   07/11/2022 1612 135 (H) 70 - 130 mg/dL Final     Comment:     Meter: [id number] : [id number] Karlene Clay NA   07/11/2022 1215 154 (H) 70 - 130 mg/dL Final     Comment:     Meter: [id number] : [id number] Karlene Clay NA   07/11/2022 0623 153 (H) 70 - 130 mg/dL Final     Comment:     Meter: [id number] : [id number] Marguerite COLLIER RN   07/10/2022 2146 202 (H) 70 - 130 mg/dL Final     Comment:     Meter: [id number] : [id number] Arik AMES     Results from last 7 days   Lab Units 07/11/22  1435   WBC 10*3/mm3 7.41   HEMOGLOBIN g/dL 11.1*   HEMATOCRIT % 34.1   PLATELETS 10*3/mm3 272     Results from last 7 days   Lab Units 07/11/22  1435   SODIUM mmol/L 135*   POTASSIUM mmol/L 3.8   CHLORIDE mmol/L 99   CO2 mmol/L 27.0   BUN mg/dL 19   CREATININE mg/dL 1.22*   CALCIUM mg/dL 10.1   BILIRUBIN mg/dL 0.4   ALK PHOS U/L 100   ALT (SGPT) U/L 18   AST (SGOT) U/L 14   GLUCOSE mg/dL 181*      Latest Reference Range & Units 07/01/22 04:54 07/05/22 09:42   Vitamin B-12 211 - 946 pg/mL 204 (L)    25 Hydroxy, Vitamin D 30.0 - 100.0 ng/ml  54.1      BRAIN MRI WITHOUT CONTRAST-June 29, 2022     HISTORY: Stroke, follow up     COMPARISON: 06/29/2022.     FINDINGS:  Multiplanar images of the head were obtained without  gadolinium. There is an area of restricted diffusion which is noted  within the left frontoparietal coronal radiata, measuring up to 1.1 cm  in size. An additional tiny focus is seen just superior to it within the  subcortical white matter. This measures approximately 4 mm in size. No  additional areas of restricted diffusion are seen. There is diffuse  atrophy. There is periventricular and deep white matter microangiopathic  change. Degree of ventricular dilatation may be somewhat out of  proportion to the degree of atrophy, and correlation with any evidence  of normal pressure hydrocephalus is recommended. There is no midline  shift or mass effect. Intracranial flow voids appear intact.  No  abnormality is seen on gradient echo imaging There is mucosal thickening  noted within the ethmoid sinuses.     IMPRESSION:  1. Foci of restricted diffusion are noted within the left frontoparietal  corona radiata and subcortical white matter.  No additional areas of  acute infarction are identified.    ASSESSMENT and PLAN    Iron deficiency anemia    Type 2 diabetes mellitus with kidney complication, without long-term current use of insulin (HCC)    Hypertension    Hyperlipidemia    Acute CVA (cerebrovascular accident) (HCC)    Acute lacunar stroke (HCC)    Vitamin B12 deficiency    CVA (cerebral vascular accident) ([facility])    CKD (chronic kidney disease) stage 3, GFR 30-59 ml/min ([facility])    CVA 6/29/2022 with right-sided weakness-lacunar infarct left corona radiata region  Stroke prophylaxis-aspirin/Plavix/atorvastatin    DVT ehboswpttwh-[id number]-eb dual antiplatelet therapy    Diabetes mellitus-on metformin/Starlix at home.  Lantus  July 6-titrate up on Lantus to 14 units every morning.  Look at transitioning back to home regimen soon  July 7-plan to review with internal medicine timeframe of transitioning back to home regimen with metformin and Januvia  July 8-Januvia increased to 50 mg daily.  Metformin 500 mg twice daily    Vitamin B12 replacement    Hypertension-hydrochlorothiazide/ramipril    Impaired mobility  Impaired self-care  Impaired cognition    Leg cramps-took medication over-the-counter at home-patient to bring in the name    Functional status-July 5-Pt scored WNL for her age on CLQT in domains of  attention, memory, executive functions, language and visuospatial skills;  however, she exhibited mild difficulty with story retell task, alternating  attention and maze completion.  Upper body dressing contact-guard.  Lower body dressing minimum assist.  Transfers minimal-contact-guard assist.  Gait rolling walker 100 feet minimum assist.  30 feet with quad tip cane.    TEAM CONF - JULY 7 - POOR HAND WRITING,  IMPAIRED MOTOR CONTROL. TRANSFERS CTG MIN. GAIT 160 FEET CTG -MIN RW UBD CTG. LBD  MIN. BATH MIN. TOILETING MIN. DEFICITS WITH DIVIDED ATTENTION AND WORKING MEMORY, DEDUCTIVE REASONING.   BNE (Active)  Att'n. - WNL  Exec. Fx. - Min Imp.  Rsng/Jgmnt - WNL  Arith - WNL  Visuospatial Skills - WNL  Visual Mem. - WNL  Verbal Mem. - Mod.Imp. for immediate recall, Min-Mildly Imp. for delayed recall  Emot - Pt denied dep/anx  CONTINENT BOWEL AND BLADDER. SKIN INTACT  ELOS - WED    Now admit for comprehensive acute inpatient rehabilitation .  This would be an interdisciplinary program with physical therapy 1 hour,  occupational therapy 1 hour, and speech therapy 1 hour, 5 days a week.  Rehabilitation nursing for carryover, monitoring of hypertension and neurologic   status, bowel and bladder, and skin  Ongoing physician follow-up.  Weekly team conferences.  Goals are to achieve a level of supervision with  mobility and self-care and improved balance.   Rehabilitation prognosis fair.  Medical prognosis fair.  Estimated length of stay is approximately 2 weeks, but is only an estimation.   The patient's functional status and clinical status is unchanged from preadmission assessment and the patient continues appropriate for acute inpatient rehabilitation.  Goal is for home with outpatient   therapies.  Barrier to discharge: Impaired mobility and self-care- work on balance, transfers, progressive ambulation, actives of daily living to overcome.         Tanmay Espinoza MD      During rounds, used appropriate personal protective equipment including mask and gloves.  Additional gown if indicated.  Mask used was standard procedure mask. Appropriate PPE was worn during the entire visit.  Hand hygiene was completed before and after.

## 2022-07-12 NOTE — PLAN OF CARE
Goal Outcome Evaluation:  Plan of Care Reviewed With: patient           Outcome Evaluation: Maryellen has had a good day, no complaints of pain and no safety issues. She is alert and oriented x4, continent of b/b, and medication with water. Right sided weakness, assist x1, and refused Miralax. Accucheck AC/HS- coverage needed at lunch so far ,VS stable, and no other concerns at this time. Discharge scheduled for Wednesday 7/13

## 2022-07-12 NOTE — THERAPY TREATMENT NOTE
Inpatient Rehabilitation - Occupational Therapy Treatment Note    [facility]     Patient Name: Maryellen Crump  : 1950  MRN: [id number]    Today's Date: 2022                 Admit Date: 2022         ICD-10-CM ICD-9-CM   1. Impaired functional mobility, balance, gait, and endurance  Z74.09 V49.89       Patient Active Problem List   Diagnosis   • Family history of colon cancer   • Iron deficiency anemia   • Diarrhea of presumed infectious origin   • Type 2 diabetes mellitus with kidney complication, without long-term current use of insulin (HCC)   • Hypertension   • Hyperlipidemia   • Stroke-like symptoms   • Acute CVA (cerebrovascular accident) (HCC)   • Acute lacunar stroke (HCC)   • Vitamin B12 deficiency   • CVA (cerebral vascular accident) (HCC)   • CKD (chronic kidney disease) stage 3, GFR 30-59 ml/min (HCC)       Past Medical History:   Diagnosis Date   • Diabetes mellitus (HCC)    • Hyperlipidemia    • Hypertension        Past Surgical History:   Procedure Laterality Date   • COLONOSCOPY N/A 2018    Procedure: COLONOSCOPY TO CECUM;  Surgeon: Josh Muñoz MD;  Location: [facility] ENDOSCOPY;  Service: General   • DILATION AND CURETTAGE, DIAGNOSTIC / THERAPEUTIC     • ENDOSCOPY N/A 2018    Procedure: ESOPHAGOGASTRODUODENOSCOPY WITH BIOPSIES;  Surgeon: Josh Muñoz MD;  Location: [facility] ENDOSCOPY;  Service: General   • ROTATOR CUFF REPAIR Right              IRF OT ASSESSMENT FLOWSHEET (last 12 hours)     IRF OT Evaluation and Treatment     Row Name 22 1438          OT Time and Intention    Document Type daily treatment  -SG     Patient Effort good  -SG     Row Name 22 1438          General Information    Patient/Family/Caregiver Comments/Observations Pt sitting up in chair in am and pm  -SG     General Observations of Patient Pt excited for d/c tomorrow  -SG     Existing Precautions/Restrictions fall  -SG     Row Name 22 1438          Pain Assessment     Pretreatment Pain Rating 0/10 - no pain  -     Row Name 07/12/22 1438          Cognition/Psychosocial    Affect/Mental Status (Cognition) WFL  -     Orientation Status (Cognition) oriented x 4  -     Comment, Cognition Cues for path finding on unit  -     Row Name 07/12/22 1438          Strength (Manual Muscle Testing)    Left Hand, Setting 2 (Dynamometer Testing) 40  -SG     Right Hand, Setting 2 (Dynamometer Testing) 33  -SG     Left Hand: Tip (Pincer) Pinch Strength (Pinch Dynamometer Testing) 12  -SG     Left Hand: Lateral (Key) Pinch Strength (Pinch Dynamometer Testing) 15  Previous L hand score is 15  -     Right Hand: Tip (Pincer) Pinch Strength (Pinch Dynamometer Testing) 11  -SG     Right Hand: Lateral (Key) Pinch Strength (Pinch Dynamometer Testing) 12  Previous R hand score is 10  -     Row Name 07/12/22 1438          Grooming    [place] Level (Grooming) hair care, combing/brushing;oral care regimen;wash face, hands;standby assist  -     Position (Grooming) sink side;unsupported standing  -     Comment (Grooming) Rwx sinkside  -     Row Name 07/12/22 1438          Toileting    [place] Level (Toileting) toileting skills;adjust/manage clothing;perform perineal hygiene  SBA  -     Assistive Device Use (Toileting) grab bar/safety frame  -     Position (Toileting) supported sitting;supported standing  -     Row Name 07/12/22 1438          Bed Mobility    Comment, (Bed Mobility) NT [facility]  -     Row Name 07/12/22 1438          Functional Mobility    Functional Mobility- Ind. Level standby assist  -     Functional Mobility- Comment Walks from room to therapy gym and back, vcing for directions  -     Row Name 07/12/22 1438          Transfer Assessment/Treatment    Transfers sit-stand transfer;stand-sit transfer;toilet transfer  -     Row Name 07/12/22 1438          Transfers    Sit-Stand [place] (Transfers) standby assist  -     Stand-Sit [place] (Transfers)  "standby assist  -     [place] Level (Toilet Transfer) standby assist  -     Assistive Device (Toilet Transfer) grab bars/safety frame;walker, front-wheeled  -     Row Name 07/12/22 1438          Sit-Stand Transfer    Assistive Device (Sit-Stand Transfers) walker, front-wheeled  -     Row Name 07/12/22 1438          Stand-Sit Transfer    Assistive Device (Stand-Sit Transfers) walker, front-wheeled  -     Row Name 07/12/22 1438          Toilet Transfer    Type (Toilet Transfer) stand pivot/stand step  -     Row Name 07/12/22 1438          Motor Skills    Results, 9 Hole Peg Test of Fine Motor Coordination Box and blocks RUE 40; 9 hole peg RUE 42  -     Comments, Motor Control/Coordination Pt performs fine motor tasks shuffling and dealing cards with R hand, mild difficulty noted. Pt manipulates and places 1\" pegs.  -     Row Name 07/12/22 1438          Positioning and Restraints    Pre-Treatment Position sitting in chair/recliner  -     Post Treatment Position chair  -SG     In Chair sitting;call light within reach;encouraged to call for assist;exit alarm on  -           User Key  (r) = Recorded By, (t) = Taken By, (c) = Cosigned By    Initials Name Effective Dates    SG Shira Wong OTR 06/16/21 -                  Occupational Therapy Education                 Title: PT OT SLP Therapies (In Progress)     Topic: Occupational Therapy (In Progress)     Point: ADL training (Done)     Description:   Instruct learner(s) on proper safety adaptation and remediation techniques during self care or transfers.   Instruct in proper use of assistive devices.              Learning Progress Summary           Patient Acceptance, E, VU,NR by AF at 7/4/2022 [id number]    Comment: educated on safety with ADls and transfers                   Point: Home exercise program (Not Started)     Description:   Instruct learner(s) on appropriate technique for monitoring, assisting and/or progressing therapeutic " exercises/activities.              Learner Progress:  Not documented in this visit.          Point: Precautions (Not Started)     Description:   Instruct learner(s) on prescribed precautions during self-care and functional transfers.              Learner Progress:  Not documented in this visit.          Point: Body mechanics (Not Started)     Description:   Instruct learner(s) on proper positioning and spine alignment during self-care, functional mobility activities and/or exercises.              Learner Progress:  Not documented in this visit.                      User Key     Initials Effective Dates Name Provider Type Discipline    AF 06/16/21 -  Dolores Odom OTMANNY Occupational Therapist OT                    OT Recommendation and Plan                         Time Calculation:      Time Calculation- OT     Row Name 07/12/22 1514 07/12/22 1513          Time Calculation- OT    OT Start Time 1430  -SG 0800  -SG     OT Stop Time 1500  -SG 0830  -     OT Time Calculation (min) 30 min  -SG 30 min  -     OT Received On 07/12/22  -SG 07/12/22  -           User Key  (r) = Recorded By, (t) = Taken By, (c) = Cosigned By    Initials Name Provider Type     Shira Wong OTR Occupational Therapist              Therapy Charges for Today     Code Description Service Date Service Provider Modifiers Qty    23012643857 HC OT THERAPEUTIC ACT EA 15 MIN 7/11/2022 Shira Wong OTR GO 2    75211588423 HC OT THERAPEUTIC ACT EA 15 MIN 7/12/2022 Shira Wong OTR GO 2    66136574371 HC OT SELF CARE/MGMT/TRAIN EA 15 MIN 7/12/2022 Shira Wong OTR GO 1    58865252085 HC OT THERAPEUTIC ACT EA 15 MIN 7/12/2022 Shira Wong OTR GO 1                   RICH Day  7/12/2022   No

## 2022-07-12 NOTE — PLAN OF CARE
Goal Outcome Evaluation:  Plan of Care Reviewed With: patient        Progress: improving  Outcome Evaluation: Pt ambulates CGA 1 with walker. Continent b&b. No c/o pain. Meds with thin liquids. Buttock cleft red/ blanchable. Turns self in bed.

## 2022-07-12 NOTE — PROGRESS NOTES
Inpatient Rehabilitation Plan of Care Note    Plan of Care  Care Plan Reviewed - Updates as Follows    Body Systems    [RN] Endocrine(Active)  Current Status(07/12/2022): Blood glucose uncontrolled  Weekly Goal(07/19/2022): Blood glucose WNL  Discharge Goal: Blood glucose controlled    Performed Intervention(s)  Monitor labs; hypo/hyperglycemia      Psychosocial    [RN] Coping/Adjustment(Active)  Current Status(07/12/2022): Pt A&Ox4, calm and looking forward to starting  rehab. Supportive family.  Weekly Goal(07/18/2022): Pt will demonstrate positive coping mechanism  Discharge Goal: Coping with current health status    Performed Intervention(s)  Offer support  Encourage to express any concerns      Safety    [RN] Potential for Injury(Active)  Current Status(07/12/2022): Risk for fall r/t recent stroke with R hemiparesis  and unsteady gait  Weekly Goal(07/19/2022): Call light will be used for assistance  Discharge Goal: No falls/injuries    Performed Intervention(s)  Falls/safety precaution  Bed/Chair alarms  Call light/personal items within easy reach      Sphincter Control    [RN] Bladder Management(Active)  Current Status(07/12/2022): Continent 100%  Weekly Goal(07/18/2022): Continent 100%  Discharge Goal: Continent 100%    [RN] Bowel Management(Active)  Current Status(07/12/2022): Continent 100%  Weekly Goal(07/19/2022): Continent 100%  Discharge Goal: Continent 100%    Performed Intervention(s)  Monitor I&O  Encourage fluids    Signed by: Shaila Trevizo RN

## 2022-07-12 NOTE — THERAPY DISCHARGE NOTE
Inpatient Rehabilitation - Physical Therapy Treatment Note/Discharge  [facility]     Patient Name: Maryellen Crump  : 1950  MRN: [id number]  Today's Date: 2022                Admit Date: 2022    Visit Dx:    ICD-10-CM ICD-9-CM   1. Impaired functional mobility, balance, gait, and endurance  Z74.09 V49.89     Patient Active Problem List   Diagnosis   • Family history of colon cancer   • Iron deficiency anemia   • Diarrhea of presumed infectious origin   • Type 2 diabetes mellitus with kidney complication, without long-term current use of insulin (HCC)   • Hypertension   • Hyperlipidemia   • Stroke-like symptoms   • Acute CVA (cerebrovascular accident) (HCC)   • Acute lacunar stroke (HCC)   • Vitamin B12 deficiency   • CVA (cerebral vascular accident) (HCC)   • CKD (chronic kidney disease) stage 3, GFR 30-59 ml/min (HCC)     Past Medical History:   Diagnosis Date   • Diabetes mellitus (HCC)    • Hyperlipidemia    • Hypertension      Past Surgical History:   Procedure Laterality Date   • COLONOSCOPY N/A 2018    Procedure: COLONOSCOPY TO CECUM;  Surgeon: Josh Muñoz MD;  Location: [facility] ENDOSCOPY;  Service: General   • DILATION AND CURETTAGE, DIAGNOSTIC / THERAPEUTIC     • ENDOSCOPY N/A 2018    Procedure: ESOPHAGOGASTRODUODENOSCOPY WITH BIOPSIES;  Surgeon: Josh Muñoz MD;  Location: [facility] ENDOSCOPY;  Service: General   • ROTATOR CUFF REPAIR Right        PT ASSESSMENT (last 12 hours)     IRF PT Evaluation and Treatment     Row Name 22 0714          PT Time and Intention    Document Type discharge evaluation  -MS (r) KM (t) MS (c)     Mode of Treatment physical therapy  -MS (r) KM (t) MS (c)     Patient/Family/Caregiver Comments/Observations Pt in chair in room with exit alarm on.  -MS (r) KM (t) MS (c)     Total Minutes, Physical Therapy 60  -MS (r) KM (t) MS (c)     Row Name 22 0714          General Information    Patient Profile Reviewed yes  -MS (r) KM (t) MS (c)      Existing Precautions/Restrictions fall  -MS (r) KM (t) MS (c)     Row Name 07/12/22 0714          Pain Assessment    Pretreatment Pain Rating 0/10 - no pain  -MS (r) KM (t) MS (c)     Posttreatment Pain Rating 0/10 - no pain  -MS (r) KM (t) MS (c)     Row Name 07/12/22 0714          Cognition/Psychosocial    Affect/Mental Status (Cognition) WFL  -MS (r) KM (t) MS (c)     Orientation Status (Cognition) oriented x 4  -MS (r) KM (t) MS (c)     Follows Commands (Cognition) WFL  -MS (r) KM (t) MS (c)     Personal Safety Interventions fall prevention program maintained;gait belt;muscle strengthening facilitated;nonskid shoes/slippers when out of bed  -MS (r) KM (t) MS (c)     Cognitive Function WFL  -MS (r) KM (t) MS (c)     Row Name 07/12/22 0714          Strength (Manual Muscle Testing)    Hip, Right (Strength) Flex - 4, ext - 4-, abd - 4-,  -MS (r) KM (t) MS (c)     Row Name 07/12/22 0714          Bed Mobility    All Activities, [place] (Bed Mobility) independent (P)   -KM     Rolling Left [place] (Bed Mobility) independent (P)   -KM     Rolling Right [place] (Bed Mobility) independent (P)   -KM     Supine-Sit [place] (Bed Mobility) independent (P)   -KM     Sit-Supine [place] (Bed Mobility) independent (P)   -KM     Comment, (Bed Mobility) Performed on gym mat.  -MS (r) KM (t) MS (c)     Row Name 07/12/22 0714          Transfer Assessment/Treatment    Transfers sit-stand transfer;stand-sit transfer  -MS (r) KM (t) MS (c)     Comment, (Transfers) Chair to w/c, SBA-SV  -MS (r) KM (t) MS (c)     Row Name 07/12/22 0714          Transfers    Bed-Chair [place] (Transfers) standby assist;supervision  -MS (r) KM (t) MS (c)     Sit-Stand [place] (Transfers) supervision  -MS (r) KM (t) MS (c)     Row Name 07/12/22 0714          Bed-Chair Transfer    Comment, (Bed-Chair Transfer) No device used.  -MS (r) KM (t) MS (c)     Row Name 07/12/22 0714          Sit-Stand Transfer    Assistive Device  (Sit-Stand Transfers) --  No AD  -MS (r) KM (t) MS (c)     Comment, (Sit-Stand Transfer) Pt performed STS 5x for 2 trials. Pt performed w/o AD or pushing off with hands. Pt was SV-SBA for STS.  -MS (r) KM (t) MS (c)     Row Name 07/12/22 0714          Car Transfer    Type (Car Transfer) sit-stand;stand-sit  -MS (r) KM (t) MS (c)     [place] Level (Car Transfer) standby assist  -MS (r) KM (t) MS (c)     Assistive Device (Car Transfer) --  No AD used.  -MS (r) KM (t) MS (c)     Row Name 07/12/22 0714          Gait/Stairs (Locomotion)    [place] Level (Gait) standby assist;supervision  -MS (r) KM (t) MS (c)     Assistive Device (Gait) walker, front-wheeled  -MS (r) KM (t) MS (c)     Distance in Feet (Gait) 240', 300' w/ RW  -MS (r) KM (t) MS (c)     Pattern (Gait) step-through  -MS (r) KM (t) MS (c)     Deviations/Abnormal Patterns (Gait) ruby decreased  -MS (r) KM (t) MS (c)     Bilateral Gait Deviations forward flexed posture;heel strike decreased  -MS (r) KM (t) MS (c)     Left Sided Gait Deviations leans left  -MS (r) KM (t) MS (c)     Right Sided Gait Deviations heel strike decreased;decreased knee extension;Trendelenburg sign  -MS (r) KM (t) MS (c)     Gait Assessment/Intervention Pt performed ambulation using RW and SBA-SV. Pt showed good balance and increased step length. Pt has decreased heel strike on right side when leg gets fatigued.  -MS (r) KM (t) MS (c)     [place] Level (Stairs) stand by assist;verbal cues  -MS (r) KM (t) MS (c)     Handrail Location (Stairs) both sides  -MS (r) KM (t) MS (c)     Number of Steps (Stairs) 4 steps x 3 trials  -MS (r) KM (t) MS (c)     Ascending Technique (Stairs) step-to-step  -MS (r) KM (t) MS (c)     Descending Technique (Stairs) step-to-step  -MS (r) KM (t) MS (c)     Stairs, Safety Issues balance decreased during turns  -MS (r) KM (t) MS (c)     Stairs, Impairments strength decreased;impaired balance  -MS (r) KM (t) MS (c)     Row Name 07/12/22 0714           Safety Issues, Functional Mobility    Safety Issues Affecting Function (Mobility) judgment;safety precaution awareness  -MS (r) KM (t) MS (c)     Impairments Affecting Function (Mobility) balance;endurance/activity tolerance;coordination;strength;motor control  -MS (r) KM (t) MS (c)     Row Name 07/12/22 0714          Curb Negotiation (Mobility)    [place], Curb Negotiation contact guard;standby assist;verbal cues  -MS (r) KM (t) MS (c)     Assistive Device (Curb Negotiation) walker, front-wheeled  -MS (r) KM (t) MS (c)     Comment, Curb Negotiation (Mobility) Performed curb negotiation 2x with CGA-SBA. Pt showed good sequencing and balance.  -MS (r) KM (t) MS (c)     Row Name 07/12/22 0714          Rough/Uneven Surface Gait Skills (Mobility)    [place], Gait on Rough/Uneven Surface (Mobility) standby assist;contact guard  -MS (r) KM (t) MS (c)     Assistive Device (Rough/Uneven Surface Gait) walker, front-wheeled  -MS (r) KM (t) MS (c)     Distance in Feet (Rough/Uneven Surface Gait) 8' x 2  -MS (r) KM (t) MS (c)     Row Name 07/12/22 0714          Step Over Obstacle (Mobility)    [place], Stepping Over Obstacles (Mobility) standby assist;contact guard  -MS (r) KM (t) MS (c)     Assistive Device (Step Over Obstacle) parallel bars  -MS (r) KM (t) MS (c)     Comment, Stepping Over Obstacles (Mobility) Pt performed forward stepping and side-stepping over half bolsters in // bars but w/o holding onto the bars.  -MS (r) KM (t) MS (c)     Row Name 07/12/22 0714          Balance    Static Sitting Balance supervision  -MS (r) KM (t) MS (c)     Dynamic Sitting Balance standby assist  -MS (r) KM (t) MS (c)     Position, Sitting Balance unsupported;sitting edge of mat  -MS (r) KM (t) MS (c)     Static Standing Balance standby assist;supervision  -MS (r) KM (t) MS (c)     Dynamic Standing Balance verbal cues;standby assist  -MS (r) KM (t) MS (c)     Position/Device Used, Standing Balance walker,  front-wheeled  -MS (r) KM (t) MS (c)     Row Name 07/12/22 0714          Positioning and Restraints    Pre-Treatment Position sitting in chair/recliner  -MS (r) KM (t) MS (c)     Post Treatment Position wheelchair  -MS (r) KM (t) MS (c)     In Wheelchair sitting;call light within reach;encouraged to call for assist;exit alarm on  -MS (r) KM (t) MS (c)     Row Name 07/12/22 0714          Bed Mobility Goal 1 (PT-IRF)    Activity/Assistive Device (Bed Mobility Goal 1, PT-IRF) bed mobility activities, all  -MS (r) KM (t) MS (c)     [place] Level (Bed Mobility Goal 1, PT-IRF) independent  -MS (r) KM (t) MS (c)     Time Frame (Bed Mobility Goal 1, PT-IRF) 1 week;short-term goal (STG)  -MS (r) KM (t) MS (c)     Progress/Outcomes (Bed Mobility Goal 1, PT-IRF) goal met  -MS (r) KM (t) MS (c)     Row Name 07/12/22 0714          Transfer Goal 1 (PT-IRF)    Activity/Assistive Device (Transfer Goal 1, PT-IRF) all transfers;cane, straight  -MS (r) KM (t) MS (c)     [place] Level (Transfer Goal 1, PT-IRF) supervision required  -MS (r) KM (t) MS (c)     Time Frame (Transfer Goal 1, PT-IRF) 2 weeks;long-term goal (LTG)  -MS (r) KM (t) MS (c)     Progress/Outcomes (Transfer Goal 1, PT-IRF) goal met  -MS (r) KM (t) MS (c)     Row Name 07/12/22 0714          Gait/Walking Locomotion Goal 1 (PT-IRF)    Activity/Assistive Device (Gait/Walking Locomotion Goal 1, PT-IRF) gait (walking locomotion)  -MS (r) KM (t) MS (c)     Gait/Walking Locomotion Distance Goal 1 (PT-IRF) 250  -MS (r) KM (t) MS (c)     [place] Level (Gait/Walking Locomotion Goal 1, PT-IRF) supervision required  -MS (r) KM (t) MS (c)     Time Frame (Gait/Walking Locomotion Goal 1, PT-IRF) 2 weeks;long-term goal (LTG)  -MS (r) KM (t) MS (c)     Progress/Outcomes (Gait/Walking Locomotion Goal 1, PT-IRF) goal not met  -MS (r) KM (t) MS (c)     Row Name 07/12/22 0714          Stairs Goal 1 (PT-IRF)    Activity/Assistive Device (Stairs Goal 1, PT-IRF) stairs, all  skills  -MS (r) KM (t) MS (c)     Number of Stairs (Stairs Goal 1, PT-IRF) 2  -MS (r) KM (t) MS (c)     [place] Level (Stairs Goal 1, PT-IRF) standby assist  -MS (r) KM (t) MS (c)     Time Frame (Stairs Goal 1, PT-IRF) long-term goal (LTG)  -MS (r) KM (t) MS (c)     Progress/Outcomes (Stairs Goal 1, PT-IRF) goal met  -MS (r) KM (t) MS (c)     Row Name 07/12/22 0714          Strength Goal 1 (PT-IRF)    Strength Goal 1 (PT-IRF) Improve R LE stength by 1/2 grade to allow for imprved functional mobility and safety  -MS (r) KM (t) MS (c)     Time Frame (Strength Goal 1, PT-IRF) long-term goal (LTG);2 weeks  -MS (r) KM (t) MS (c)     Progress/Outcomes (Strength Goal 1, PT-IRF) goal met  -MS (r) KM (t) MS (c)     Row Name 07/12/22 0714          Discharge Summary (PT)    Outcomes Achieved/Progress Made Upon Discharge (PT) goals partially achieved prior to discharge  -MS (r) KM (t) MS (c)     Transfer to Another Level of Care or Facility (PT) home with assist recommended;home with outpatient therapy services recommended  -MS (r) KM (t) MS (c)     Discharge Summary Statement (PT) Pt made good progress towards completing goals during inpatient rehab PT. Pt has shown improvements in strength, endurance, transfers, ambulation, stairs, and bed mobility. Pt is at mod I for bed mobility, SV-SBA for transfers, ambulation, and stairs. Pt has shown improvements in muscle strength on RLE. Pt has not shown any apparent loss of balance or knee buckling. Pt endurance continued to improve each day. Pt was educated on and sent home with HEP. PT recommends continue outpatient PT following d/c. At this time the pt no longer requires skilled inpatient PT services and is to be d/c home.  -MS (r) KM (t) MS (c)           User Key  (r) = Recorded By, (t) = Taken By, (c) = Cosigned By    Initials Name Provider Type    Myra Willard, PT Physical Therapist    Otis Mulligan, PT Student PT Student                Physical Therapy  Education                 Title: PT OT SLP Therapies (In Progress)     Topic: Physical Therapy (Done)     Point: Mobility training (Done)     Learning Progress Summary           Patient Acceptance, E, VU,NR by KM at 7/12/2022 1217    Acceptance, E, VU,NR by KM at 7/11/2022 1214    Acceptance, E, VU,NR by KM at 7/11/2022 1213    Acceptance, E, NR by  at 7/9/2022 0921    Acceptance, E, VU,NR by  at 7/8/2022 1027    Acceptance, E, VU,NR by KM at 7/7/2022 1217    Acceptance, E, VU,NR by KM at 7/6/2022 1118    Acceptance, E, VU,NR by  at 7/5/2022 1230    Eager, E,TB,H,D, VU,DU by  at 7/4/2022 1405    Comment: Written HEp for strength, need to remain on rwx for now.    Eager, E,TB,D, VU,DU by  at 7/3/2022 1108    Comment: POC, goals.                   Point: Home exercise program (Done)     Learning Progress Summary           Patient Acceptance, E, VU,NR by KM at 7/12/2022 1217    Acceptance, E, VU,NR by  at 7/11/2022 1214    Acceptance, E, VU,NR by  at 7/11/2022 1213    Acceptance, E, NR by  at 7/9/2022 0921    Acceptance, E, VU,NR by KM at 7/8/2022 1027    Acceptance, E, VU,NR by  at 7/7/2022 1217    Acceptance, E, VU,NR by  at 7/6/2022 1118    Acceptance, E, VU,NR by KM at 7/5/2022 1230    Eager, E,TB,H,D, VU,DU by  at 7/4/2022 1405    Comment: Written HEp for strength, need to remain on rwx for now.    Eager, E,TB,D, VU,DU by  at 7/3/2022 1108    Comment: POC, goals.                               User Key     Initials Effective Dates Name Provider Type Discipline     06/16/21 -  Renetta Harrison, PT Physical Therapist PT     06/16/21 -  Tara Sanders, PT Physical Therapist PT    KM 06/06/22 -  Otis Funes, PT Student PT Student PT                PT Recommendation and Plan                  Time Calculation:    PT Charges     Row Name 07/12/22 1217             Time Calculation    Start Time 0900  -MS (r) KM (t) MS (c)      Stop Time 1000  -MS (r) KM (t) MS (c)      Time Calculation (min) 60  min  -MS (r) KM (t)      PT Received On 07/12/22  -MS (r) KM (t) MS (c)              Time Calculation- PT    Total Timed Code Minutes- PT 60 minute(s)  -MS (r) KM (t) MS (c)            User Key  (r) = Recorded By, (t) = Taken By, (c) = Cosigned By    Initials Name Provider Type    Myra Willard JAREN, PT Physical Therapist    Otis Mulligan, PT Student PT Student                Therapy Charges for Today     Code Description Service Date Service Provider Modifiers Qty    71190714593 HC PT THERAPEUTIC ACT EA 15 MIN 7/11/2022 Otis Funes, PT Student GP 2    [id number]  PT NEUROMUSC RE EDUCATION EA 15 MIN 7/11/2022 Otis Funse, PT Student GP 2    [id number]  PT THERAPEUTIC ACT EA 15 MIN 7/12/2022 Otis Funes, PT Student GP 3    [id number]  PT NEUROMUSC RE EDUCATION EA 15 MIN 7/12/2022 Otis Funes, PT Student GP 1                    Otis Funes, PT Student  7/12/2022

## 2022-07-12 NOTE — DISCHARGE INSTRUCTIONS
No driving until cleared by physician    Continue with plans of DAPT ASA 81 mg and Plavix 75 mg x 3 months and then ASA 81 mg monotherapy;     Lab work - recheck lipid panel an CMP mid-August    On pantopraole rather than omeprazole, as omeprazole can affect function of Plavix

## 2022-07-12 NOTE — PROGRESS NOTES
Family conference held with patient, patient's , adult daughter, and other adult daughter being on the conference call.   PT: Focusing on ambulating, steps, and ambulating. Patient is supervision- CGA for bed mobility. CGA-SBA for transfer from bed to walker. Patient completed a car transfer and did great with only CGA. Patient is ambulating 240' with her right side weakness improving. Patient was able to use a 8 inch step fine, but her  is looking into building 2 steps that are 4 inches. Recommending outpatient PT. OT will order rolling walker for patient.   OT: Focusing on patient completing ADLs. Patient is SBA for ADLs. She uses hers rwx to ambulate to and from the bathroom/shower. Recommending outpatient OT.  ST: Patient has been working on cognitive skills and memory. Patient does well with recalling old memory but has some difficulties with new memory. ST recommending patient use a daily medication box and supervision with medications. Recommending outpatient ST.   NSG: Reviewed patients medications. Patient has a f/u appointment with her PCP and Neurology.   Family Training is scheduled with PT on 07/13/22 at 11:00am.  Discharging plans and patient's updates on her progress will continue.

## 2022-07-12 NOTE — THERAPY TREATMENT NOTE
Inpatient Rehabilitation - Speech Language Pathology Treatment Note    [facility]     Patient Name: Maryellen Crump  : 1950  MRN: [id number]    Today's Date: 2022                   Admit Date: 2022       Visit Dx:      ICD-10-CM ICD-9-CM   1. Impaired functional mobility, balance, gait, and endurance  Z74.09 V49.89       Patient Active Problem List   Diagnosis   • Family history of colon cancer   • Iron deficiency anemia   • Diarrhea of presumed infectious origin   • Type 2 diabetes mellitus with kidney complication, without long-term current use of insulin (HCC)   • Hypertension   • Hyperlipidemia   • Stroke-like symptoms   • Acute CVA (cerebrovascular accident) (HCC)   • Acute lacunar stroke (HCC)   • Vitamin B12 deficiency   • CVA (cerebral vascular accident) (HCC)   • CKD (chronic kidney disease) stage 3, GFR 30-59 ml/min (HCC)       Past Medical History:   Diagnosis Date   • Diabetes mellitus (HCC)    • Hyperlipidemia    • Hypertension        Past Surgical History:   Procedure Laterality Date   • COLONOSCOPY N/A 2018    Procedure: COLONOSCOPY TO CECUM;  Surgeon: Josh Muñoz MD;  Location: [facility] ENDOSCOPY;  Service: General   • DILATION AND CURETTAGE, DIAGNOSTIC / THERAPEUTIC     • ENDOSCOPY N/A 2018    Procedure: ESOPHAGOGASTRODUODENOSCOPY WITH BIOPSIES;  Surgeon: Josh Muñoz MD;  Location: [facility] ENDOSCOPY;  Service: General   • ROTATOR CUFF REPAIR Right        SLP Recommendation and Plan                                                   SLP EVALUATION (last 72 hours)     SLP SLC Evaluation     Row Name 22 1300 22 0900 22 1229 22 0900          Communication Assessment/Intervention    Document Type therapy note (daily note)  -SL therapy note (daily note)  -SL therapy note (daily note)  -SL therapy note (daily note)  -SL     Subjective Information no complaints  -SL no complaints  -SL no complaints  -SL no complaints  -SL     Patient Observations  alert;cooperative;agree to therapy  -SL alert;cooperative;agree to therapy  -SL cooperative;agree to therapy;alert  -SL alert;cooperative  -SL     Patient/Family/Caregiver Comments/Observations provided pt with written list of tx apps to use at home  - -- -- --     Patient Effort excellent  -SL excellent  -SL excellent  -SL good  -SL     Symptoms Noted During/After Treatment none  -SL none  -SL none  -SL none  -SL           User Key  (r) = Recorded By, (t) = Taken By, (c) = Cosigned By    Initials Name Effective Dates    Ember Alejandro, MS CCC-SLP 06/16/21 -                    EDUCATION    The patient has been educated in the following areas:       Cognitive Impairment.             SLP GOALS     Row Name 07/12/22 1300 07/12/22 0903 07/11/22 1239       Memory Skills Goal 1 (SLP)    Barriers (Memory Skills Goal 1, SLP) 2 step verbal directives with temporal components- 100%;  3 step verbal directives with picture manipulations- 98%  - -- --    Progress/Outcomes (Memory Skills Goal 1, SLP) continuing progress toward goal  - -- --    Comment (Memory Skills Goal 1, SLP) 30 item hidden picture matching task- 100%; 24 item hidden face matching task- 77%;  - -- --       Reasoning Goal 1 (SLP)    Progress/Outcomes (Reasoning Goal 1, SLP) -- continuing progress toward goal;goal ongoing  - goal ongoing  -    Comment (Reasoning Goal 1, SLP) -- word circles with missing letters- 70% indep and 90% with min cues  - visual shape analogies ( complex)- 100% indep  -       Functional Math Skills Goal 1 (SLP)    Progress/Outcomes (Functional Math Skills Goal 1, SLP) -- continuing progress toward goal;goal ongoing  - --    Comment (Functional Math Skills Goal 1, SLP) -- 80% indep for math word problems and 100% with min cues and calculator use  - --       Executive Functional Skills Goal 1 (SLP)    Barriers (Executive Function Skills Goal 1, SLP) -- -- answering ?'s re: 80 item chart/grid- 100% indep with improved  attn to details  -    Progress/Outcomes (Executive Function Skills Goal 1, SLP) continuing progress toward goal  -SL continuing progress toward goal;goal ongoing  -SL goal ongoing  -SL    Comment (Executive Function Skills Goal 1, SLP) 90% for alternating attn task- uppercase/lowercase alphabetizing task  -SL min cues for meal planning task with multiple restrictions - 85% indep, 100% with min cues  -SL sequencing of 10 paragraphs for stories- 80% indep- difficulty noted for self correcting  -    Row Name 07/11/22 0900             Memory Skills Goal 1 (SLP)    Progress/Outcomes (Memory Skills Goal 1, SLP) continuing progress toward goal  -SL      Comment (Memory Skills Goal 1, SLP) mental manipualtion task- 4 word stimulus- ranking/sequencing  -              Reasoning Goal 1 (SLP)    Progress/Outcomes (Reasoning Goal 1, SLP) goal ongoing  -SL      Comment (Reasoning Goal 1, SLP) seating arrangements based on multiple clues- 100%- min cues  -SL              Functional Math Skills Goal 1 (SLP)    Progress/Outcomes (Functional Math Skills Goal 1, SLP) goal ongoing  -SL      Comment (Functional Math Skills Goal 1, SLP) 80% without cues and 100% with min cues for calculations- taxi cab rates  -            User Key  (r) = Recorded By, (t) = Taken By, (c) = Cosigned By    Initials Name Provider Type    Ember Alejandro MS CCC-SLP Speech and Language Pathologist                            Time Calculation:        Time Calculation- SLP     Row Name 07/12/22 1415 07/12/22 0905          Time Calculation- [facility]    SLP Start Time 1230  -SL 0830  -     SLP Stop Time 1300  -SL 0900  -[facility] Time Calculation (min) 30 min  - 30 min  -           User Key  (r) = Recorded By, (t) = Taken By, (c) = Cosigned By    Initials Name Provider Type    Ember Alejandro MS CCC-SLP Speech and Language Pathologist                  Therapy Charges for Today     Code Description Service Date Service Provider Modifiers Qty    45649430707  HC ST DEV OF COGN SKILLS INITIAL 15 MIN 7/11/2022 Ember Werner, MS CCC-SLP  1    31550990631 HC ST DEV OF COGN SKILLS EACH ADDT'L 15 MIN 7/11/2022 Ember Werner, MS CCC-SLP  1    [id number] HC ST DEV OF COGN SKILLS EACH ADDT'L 15 MIN 7/11/2022 Ember Werner, MS CCC-SLP  2    [id number] HC ST DEV OF COGN SKILLS INITIAL 15 MIN 7/12/2022 Ember Werner, MS CCC-SLP  1    [id number] HC ST DEV OF COGN SKILLS EACH ADDT'L 15 MIN 7/12/2022 Ebmer Werner, MS CCC-SLP  1    [id number] HC ST DEV OF COGN SKILLS EACH ADDT'L 15 MIN 7/12/2022 Ember Werner, MS CCC-SLP  2                           Ember Werner MS CCC-SLP  7/12/2022

## 2022-07-12 NOTE — PROGRESS NOTES
SW spoke with patient and patient's  via telephone to confirm family training is scheduled for tomorrow, July 13th at 11:00am with PT. SW also discussed patients schedule for [facility] outpatient therapies. Patient has therapy beginning this Thursday 07/14/22. Therapy is Tuesdays: OT 1:45-2:30pm, ST 2:30-3:15pm, PT 3:15pm-4:00pm and Thursdays: ST 9:30-10:15am, PT 10:15-11:00am, OT 11:00-11:45am.

## 2022-07-13 VITALS
DIASTOLIC BLOOD PRESSURE: 68 MMHG | HEIGHT: 63 IN | WEIGHT: 113 LBS | OXYGEN SATURATION: 96 % | HEART RATE: 76 BPM | SYSTOLIC BLOOD PRESSURE: 109 MMHG | RESPIRATION RATE: 18 BRPM | TEMPERATURE: 97.8 F | BODY MASS INDEX: 20.02 KG/M2

## 2022-07-13 LAB
GLUCOSE BLDC GLUCOMTR-MCNC: 142 MG/DL (ref 70–130)
GLUCOSE BLDC GLUCOMTR-MCNC: 229 MG/DL (ref 70–130)

## 2022-07-13 PROCEDURE — 63710000001 INSULIN LISPRO (HUMAN) PER 5 UNITS: Performed by: STUDENT IN AN ORGANIZED HEALTH CARE EDUCATION/TRAINING PROGRAM

## 2022-07-13 PROCEDURE — 97130 THER IVNTJ EA ADDL 15 MIN: CPT

## 2022-07-13 PROCEDURE — 97129 THER IVNTJ 1ST 15 MIN: CPT

## 2022-07-13 PROCEDURE — 97110 THERAPEUTIC EXERCISES: CPT

## 2022-07-13 PROCEDURE — 82962 GLUCOSE BLOOD TEST: CPT

## 2022-07-13 PROCEDURE — 97535 SELF CARE MNGMENT TRAINING: CPT | Performed by: OCCUPATIONAL THERAPIST

## 2022-07-13 RX ORDER — RAMIPRIL 10 MG/1
10 CAPSULE ORAL DAILY
Qty: 30 CAPSULE | Refills: 1 | Status: SHIPPED | OUTPATIENT
Start: 2022-07-13

## 2022-07-13 RX ORDER — CLOPIDOGREL BISULFATE 75 MG/1
75 TABLET ORAL DAILY
Qty: 79 TABLET | Refills: 0 | Status: SHIPPED | OUTPATIENT
Start: 2022-07-13 | End: 2022-07-13 | Stop reason: SDUPTHER

## 2022-07-13 RX ORDER — RAMIPRIL 10 MG/1
10 CAPSULE ORAL DAILY
Start: 2022-07-13 | End: 2022-07-13 | Stop reason: SDUPTHER

## 2022-07-13 RX ORDER — POLYETHYLENE GLYCOL 3350 17 G/17G
17 POWDER, FOR SOLUTION ORAL DAILY
Qty: 30 EACH | Refills: 0 | Status: SHIPPED | OUTPATIENT
Start: 2022-07-13 | End: 2022-07-13 | Stop reason: SDUPTHER

## 2022-07-13 RX ORDER — PANTOPRAZOLE SODIUM 40 MG/1
40 TABLET, DELAYED RELEASE ORAL
Qty: 30 TABLET | Refills: 1 | Status: SHIPPED | OUTPATIENT
Start: 2022-07-14

## 2022-07-13 RX ORDER — FERROUS SULFATE 325(65) MG
325 TABLET ORAL EVERY OTHER DAY
Qty: 30 TABLET | Refills: 1 | Status: SHIPPED | OUTPATIENT
Start: 2022-07-13

## 2022-07-13 RX ORDER — ATORVASTATIN CALCIUM 80 MG/1
80 TABLET, FILM COATED ORAL NIGHTLY
Qty: 30 TABLET | Refills: 1 | Status: SHIPPED | OUTPATIENT
Start: 2022-07-13 | End: 2022-11-10 | Stop reason: DRUGHIGH

## 2022-07-13 RX ORDER — ATORVASTATIN CALCIUM 80 MG/1
80 TABLET, FILM COATED ORAL NIGHTLY
Qty: 30 TABLET | Refills: 1 | Status: SHIPPED | OUTPATIENT
Start: 2022-07-13 | End: 2022-07-13 | Stop reason: SDUPTHER

## 2022-07-13 RX ORDER — ASPIRIN 81 MG/1
81 TABLET ORAL DAILY
Qty: 30 TABLET | Refills: 1 | Status: SHIPPED | OUTPATIENT
Start: 2022-07-13

## 2022-07-13 RX ORDER — FERROUS SULFATE 325(65) MG
325 TABLET ORAL EVERY OTHER DAY
Qty: 90 TABLET | Refills: 0 | Status: SHIPPED | OUTPATIENT
Start: 2022-07-13 | End: 2022-07-13 | Stop reason: SDUPTHER

## 2022-07-13 RX ORDER — PANTOPRAZOLE SODIUM 40 MG/1
40 TABLET, DELAYED RELEASE ORAL
Qty: 90 TABLET | Refills: 0 | Status: SHIPPED | OUTPATIENT
Start: 2022-07-14 | End: 2022-07-13 | Stop reason: SDUPTHER

## 2022-07-13 RX ORDER — POLYETHYLENE GLYCOL 3350 17 G/17G
17 POWDER, FOR SOLUTION ORAL DAILY
Qty: 510 G | Refills: 0 | Status: SHIPPED | OUTPATIENT
Start: 2022-07-13 | End: 2022-10-24

## 2022-07-13 RX ORDER — ASPIRIN 81 MG/1
81 TABLET ORAL DAILY
Qty: 90 TABLET | Refills: 1 | Status: SHIPPED | OUTPATIENT
Start: 2022-07-13 | End: 2022-07-13 | Stop reason: SDUPTHER

## 2022-07-13 RX ORDER — CLOPIDOGREL BISULFATE 75 MG/1
75 TABLET ORAL DAILY
Qty: 79 TABLET | Refills: 0 | Status: SHIPPED | OUTPATIENT
Start: 2022-07-13 | End: 2022-08-31 | Stop reason: SDUPTHER

## 2022-07-13 RX ADMIN — SITAGLIPTIN 50 MG: 100 TABLET, FILM COATED ORAL at 07:26

## 2022-07-13 RX ADMIN — METFORMIN HYDROCHLORIDE 500 MG: 500 TABLET ORAL at 07:23

## 2022-07-13 RX ADMIN — PANTOPRAZOLE SODIUM 40 MG: 40 TABLET, DELAYED RELEASE ORAL at 05:37

## 2022-07-13 RX ADMIN — Medication 1000 MCG: at 07:23

## 2022-07-13 RX ADMIN — HYDROCHLOROTHIAZIDE 12.5 MG: 25 TABLET ORAL at 07:23

## 2022-07-13 RX ADMIN — INSULIN LISPRO 4 UNITS: 100 INJECTION, SOLUTION INTRAVENOUS; SUBCUTANEOUS at 11:36

## 2022-07-13 RX ADMIN — RAMIPRIL 10 MG: 10 CAPSULE ORAL at 07:23

## 2022-07-13 RX ADMIN — ASPIRIN 81 MG: 81 TABLET, COATED ORAL at 07:22

## 2022-07-13 RX ADMIN — FERROUS SULFATE TAB 325 MG (65 MG ELEMENTAL FE) 325 MG: 325 (65 FE) TAB at 07:23

## 2022-07-13 RX ADMIN — CLOPIDOGREL 75 MG: 75 TABLET, FILM COATED ORAL at 07:22

## 2022-07-13 NOTE — PROGRESS NOTES
SECTION GG      Self Care Performance Discharge:   Oral Hygiene: Bowen provides verbal cues and/or touching/steadying and/or  contact guard assistance as patient completes activity.   Toileting Hygiene: : Bowen provides verbal cues and/or touching/steadying  and/or contact guard assistance as patient completes activity.   Shower/Bathe Self: Bowen provides verbal cues and/or touching/steadying and/or  contact guard assistance as patient completes activity.   Upper Body Dressing: Bowen provides verbal cues and/or touching/steadying  and/or contact guard assistance as patient completes activity.   Lower Body Dressing: Bowen provides verbal cues and/or touching/steadying  and/or contact guard assistance as patient completes activity.   Putting On/Taking Off Footwear: Bowen provides verbal cues and/or  touching/steadying and/or contact guard assistance as patient completes  activity.    Mobility Toilet Transfer Discharge: Bowen provides verbal cues or  touching/steadying assistance as patient completes activity.    Signed by: Shira Wong OTR/EMMA

## 2022-07-13 NOTE — DISCHARGE SUMMARY
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       HISTORY OF PRESENT ILLNESS:    Ms. Nicholson is a 71-year-old  white female admitted here in transfer from [facility] where she presented on 62/29.  Patient had been camping and awakened on the morning of 629 with right-sided weakness and numbness.  Work-up at the acute hospital review to a lacunar stroke and left corona radiata region.     Laboratory studies in the acute setting includes a CBC with a white count of 8.96 hemoglobin of 10.7 hematocrit of 32.3 platelet count of 259,000.  Iron studies showed an iron of 58 iron saturation of 14.  Chemistry study shows glucose of 136 BUN of 15 creatinine 1.02 sodium 140 potassium 4.2 chloride of 106 CO2 of 24.     Ridging studies on on 630 patient underwent echocardiogram showed an ejection fraction of 59.2%.  Right to left atrial shunt suggestive is of small PFO.     Patient did participate in therapies on the acute care setting as of July 1 Meaux bed mobility with standby assistance transfers were contact-guard to min assistance with a rolling walker she was ambulating contact-guard with min assistance using a rolling walker 80 feet with right knee buckling.  In occupational therapy grooming with standby assistance with set up.       HOSPITAL COURSE:    Patient participated in a comprehensive acute inpatient rehabilitation program.     During the hospital stay the following areas were addressed:      CVA 6/29/2022 with right-sided weakness-lacunar infarct left corona radiata region  Stroke prophylaxis-aspirin/Plavix/atorvastatin     DVT apkrneitzto-SNKz-li dual antiplatelet therapy     Diabetes mellitus-on metformin/Starlix at home.  Lantus  July 6-titrate up on Lantus to 14 units every  morning.  Look at transitioning back to home regimen soon  July 7-plan to review with internal medicine timeframe of transitioning back to home regimen with metformin and Januvia  July 8-Januvia increased to 50 mg daily.  Metformin 500 mg twice daily     Vitamin B12 replacement     Hypertension-hydrochlorothiazide/ramipril     Impaired mobility  Impaired self-care  Impaired cognition     Leg cramps-took medication over-the-counter at home-patient to bring in the name     Functional status-July 5-Pt scored WNL for her age on CLQT in domains of  attention, memory, executive functions, language and visuospatial skills;  however, she exhibited mild difficulty with story retell task, alternating  attention and maze completion.  Upper body dressing contact-guard.  Lower body dressing minimum assist.  Transfers minimal-contact-guard assist.  Gait rolling walker 100 feet minimum assist.  30 feet with quad tip cane.     TEAM CONF - JULY 7 - POOR HAND WRITING, IMPAIRED MOTOR CONTROL. TRANSFERS CTG MIN. GAIT 160 FEET CTG -MIN RW UBD CTG. LBD  MIN. BATH MIN. TOILETING MIN. DEFICITS WITH DIVIDED ATTENTION AND WORKING MEMORY, DEDUCTIVE REASONING.   BNE (Active)  Att'n. - WNL  Exec. Fx. - Min Imp.  Rsng/Jgmnt - WNL  Arith - WNL  Visuospatial Skills - WNL  Visual Mem. - WNL  Verbal Mem. - Mod.Imp. for immediate recall, Min-Mildly Imp. for delayed recall  Emot - Pt denied dep/anx  CONTINENT BOWEL AND BLADDER. SKIN INTACT  ELOS - WED     Disposition - July 13 - home today. Meds and follow up reviewed.      REHAB - admit for comprehensive acute inpatient rehabilitation .  This would be an interdisciplinary program with physical therapy 1 hour,  occupational therapy 1 hour, and speech therapy 1 hour, 5 days a week.  Rehabilitation nursing for carryover, monitoring of hypertension and neurologic   status, bowel and bladder, and skin  Ongoing physician follow-up.  Weekly team conferences.     Goal is for home with outpatient    therapies.  Barrier to discharge: Impaired mobility and self-care- workd on balance, transfers, progressive ambulation, actives of daily living to overcome.          Physical Exam near the time of discharge:    MENTAL STATUS -  AWAKE / ALERT  HEENT-  SCLERAE ANICTERIC, CONJUNCTIVAE PINK,    LUNGS - NORMAL RESPIRATIONS, CTA.   HEART- RRR,   ABD -  SOFT, NONDISTENDED  EXT - NO EDEMA OR CYANOSIS  NEURO - AWAKE, ALERT  MOTOR EXAM -takes resistance bilaterally.       RESULTS:  Glucose   Date/Time Value Ref Range Status   07/13/2022 1048 229 (H) 70 - 130 mg/dL Final     Comment:     Meter: [id number] : [id number] Karlene Clay NA   07/13/2022 0623 142 (H) 70 - 130 mg/dL Final     Comment:     Meter: [id number] : [id number] [facility]   07/12/2022 2125 182 (H) 70 - 130 mg/dL Final     Comment:     Meter: [id number] : [id number] [facility]   07/12/2022 1612 152 (H) 70 - 130 mg/dL Final     Comment:     Meter: [id number] : [id number] Karlene Clay NA   07/12/2022 1117 176 (H) 70 - 130 mg/dL Final     Comment:     Meter: [id number] : [id number] Rolan Lucero    07/12/2022 0624 138 (H) 70 - 130 mg/dL Final     Comment:     Meter: [id number] : [id number] Fran Mackey [facility]   07/11/2022 2029 213 (H) 70 - 130 mg/dL Final     Comment:     Meter: [id number] : [id number] Fran Mackey [facility]   07/11/2022 1612 135 (H) 70 - 130 mg/dL Final     Comment:     Meter: [id number] : [id number] [place] Cessli NA     Results from last 7 days   Lab Units 07/11/22  1435   WBC 10*3/mm3 7.41   HEMOGLOBIN g/dL 11.1*   HEMATOCRIT % 34.1   PLATELETS 10*3/mm3 272     Results from last 7 days   Lab Units 07/11/22  1435   SODIUM mmol/L 135*   POTASSIUM mmol/L 3.8   CHLORIDE mmol/L 99   CO2 mmol/L 27.0   BUN mg/dL 19   CREATININE mg/dL 1.22*   CALCIUM mg/dL 10.1   BILIRUBIN mg/dL 0.4   ALK PHOS U/L 100   ALT (SGPT) U/L 18   AST (SGOT) U/L 14   GLUCOSE mg/dL 181*           Latest Reference Range & Units 07/01/22 04:54  07/05/22 09:42   Vitamin B-12 211 - 946 pg/mL 204 (L)     25 Hydroxy, Vitamin D 30.0 - 100.0 ng/ml   54.1      BRAIN MRI WITHOUT CONTRAST-June 29, 2022     HISTORY: Stroke, follow up     COMPARISON: 06/29/2022.     FINDINGS:  Multiplanar images of the head were obtained without  gadolinium. There is an area of restricted diffusion which is noted  within the left frontoparietal coronal radiata, measuring up to 1.1 cm  in size. An additional tiny focus is seen just superior to it within the  subcortical white matter. This measures approximately 4 mm in size. No  additional areas of restricted diffusion are seen. There is diffuse  atrophy. There is periventricular and deep white matter microangiopathic  change. Degree of ventricular dilatation may be somewhat out of  proportion to the degree of atrophy, and correlation with any evidence  of normal pressure hydrocephalus is recommended. There is no midline  shift or mass effect. Intracranial flow voids appear intact. No  abnormality is seen on gradient echo imaging There is mucosal thickening  noted within the ethmoid sinuses.     IMPRESSION:  1. Foci of restricted diffusion are noted within the left frontoparietal  corona radiata and subcortical white matter.  No additional areas of  acute infarction are identified.            Discharge Medications      New Medications      Instructions Start Date   Adult Aspirin Regimen 81 MG EC tablet  Generic drug: aspirin   81 mg, Oral, Daily      atorvastatin 80 MG tablet  Commonly known as: LIPITOR   80 mg, Oral, Nightly      clopidogrel 75 MG tablet  Commonly known as: PLAVIX   75 mg, Oral, Daily, For three months total      ferrous sulfate 325 (65 Fe) MG tablet   325 mg, Oral, Every Other Day      pantoprazole 40 MG EC tablet  Commonly known as: PROTONIX   40 mg, Oral, Every Early Morning   Start Date: July 14, 2022     polyethylene glycol 17 GM/SCOOP powder  Commonly known as: MIRALAX   17 g, Oral, Daily      vitamin B-12 1000  MCG tablet  Commonly known as: CYANOCOBALAMIN   1,000 mcg, Oral, Daily         Changes to Medications      Instructions Start Date   Januvia 50 MG tablet  Generic drug: SITagliptin  What changed:   · medication strength  · how much to take   50 mg, Oral, Daily      metFORMIN 500 MG tablet  Commonly known as: GLUCOPHAGE  What changed:   · medication strength  · how much to take  · when to take this  · additional instructions   500 mg, Oral, 2 Times Daily With Meals      ramipril 10 MG capsule  Commonly known as: ALTACE  What changed: when to take this   10 mg, Oral, Daily         Continue These Medications      Instructions Start Date   hydroCHLOROthiazide 12.5 MG capsule  Commonly known as: MICROZIDE   12.5 mg, Oral, Daily         Stop These Medications    omeprazole 20 MG capsule  Commonly known as: priLOSEC     PROGESTERONE PO     simvastatin 40 MG tablet  Commonly known as: Juni Watson MD Payne, Michael S, [facility] [phone] [address] [facility] [place] [id number] Steps: Schedule an appointment as soon as possible for a visit on 8/1/2022Appointment: Instructions: Follow up within 30 days after discharge.  ---------  At 8:00 A.M.    ===    Capri Oropeza APRN Greene, Jayla, APRNNurse Practitioner  [phone] [facility] [address]  [facility] [id number] Steps: Follow up on 10/25/2022Appointment: Instructions: Pt. will have f/u appointmnent with Capri Oropeza instead of Nohemy Segovia.  And she is re-scheduled on 10/25 at at 1:00 P.M.     They will call the Pt. if they can re-schedule her again earlier or before Sept. 13.    ===    Tanmay Espinoza MD Gormley, John Michael, [facility] Medicine and [phone] [facility] [address]  [facility] [id number] Steps: Follow up on 8/17/2022Appointment: Instructions: At 9:00 A.M.    ===    [facility] TherapyNext Steps: Go toAppointment: Instructions: [facility] has [facility] beginningThursday 07/14/22. Therapy is Tuesdays: OT 1:45-2:30pm, ST 2:30-3:15pm, PT 3:15pm-4:00pm and Thursdays: ST 9:30-10:15am, PT 10:15-11:00am, OT 11:00-11:45am.     Please arrive 15 minutes early to your 1st appointment.      No driving until cleared by physician    Continue with plans of DAPT ASA 81 mg and Plavix 75 mg x 3 months and then ASA 81 mg monotherapy;     Lab work - recheck lipid panel an CMP mid-August    On pantopraole rather than omeprazole, as omeprazole can affect function of Plavix  >30 on discharge    Tanmay Espinoza MD

## 2022-07-13 NOTE — THERAPY TREATMENT NOTE
Inpatient Rehabilitation - Speech Language Pathology Treatment Note    [facility]     Patient Name: Maryellen Crump  : 1950  MRN: [id number]    Today's Date: 2022                   Admit Date: 2022       Visit Dx:      ICD-10-CM ICD-9-CM   1. Impaired functional mobility, balance, gait, and endurance  Z74.09 V49.89   2. Cerebrovascular accident (CVA), unspecified mechanism (HCC)  I63.9 434.91       Patient Active Problem List   Diagnosis   • Family history of colon cancer   • Iron deficiency anemia   • Diarrhea of presumed infectious origin   • Type 2 diabetes mellitus with kidney complication, without long-term current use of insulin (HCC)   • Hypertension   • Hyperlipidemia   • Stroke-like symptoms   • Acute CVA (cerebrovascular accident) (HCC)   • Acute lacunar stroke (HCC)   • Vitamin B12 deficiency   • CVA (cerebral vascular accident) (HCC)   • CKD (chronic kidney disease) stage 3, GFR 30-59 ml/min (HCC)       Past Medical History:   Diagnosis Date   • Diabetes mellitus (HCC)    • Hyperlipidemia    • Hypertension        Past Surgical History:   Procedure Laterality Date   • COLONOSCOPY N/A 2018    Procedure: COLONOSCOPY TO CECUM;  Surgeon: Josh Muñoz MD;  Location: [facility] ENDOSCOPY;  Service: General   • DILATION AND CURETTAGE, DIAGNOSTIC / THERAPEUTIC     • ENDOSCOPY N/A 2018    Procedure: ESOPHAGOGASTRODUODENOSCOPY WITH BIOPSIES;  Surgeon: Josh Muñoz MD;  Location: [facility] ENDOSCOPY;  Service: General   • ROTATOR CUFF REPAIR Right        SLP Recommendation and Plan                                                   SLP EVALUATION (last 72 hours)     SLP SLC Evaluation     Row Name 22 0900 22 1300 22 0900 22 1229 22 0900       Communication Assessment/Intervention    Document Type therapy note (daily note)  -SL therapy note (daily note)  -SL therapy note (daily note)  -SL therapy note (daily note)  -SL therapy note (daily note)  -SL     Subjective Information no complaints  -SL no complaints  -SL no complaints  -SL no complaints  -SL no complaints  -SL    Patient Observations alert;cooperative;agree to therapy  -SL alert;cooperative;agree to therapy  -SL alert;cooperative;agree to therapy  -SL cooperative;agree to therapy;alert  -SL alert;cooperative  -SL    Patient/Family/Caregiver Comments/Observations -- provided pt with written list of tx apps to use at home  -SL -- -- --    Patient Effort good  -SL excellent  -SL excellent  -SL excellent  -SL good  -SL    Symptoms Noted During/After Treatment none  -SL none  -SL none  -SL none  -SL none  -SL          User Key  (r) = Recorded By, (t) = Taken By, (c) = Cosigned By    Initials Name Effective Dates    Ember Alejandro, MS CCC-SLP 06/16/21 -                    EDUCATION    The patient has been educated in the following areas:       Cognitive Impairment.             SLP GOALS     Row Name 07/13/22 0900 07/12/22 1300 07/12/22 0903       Memory Skills Goal 1 (SLP)    Barriers (Memory Skills Goal 1, SLP) -- 2 step verbal directives with temporal components- 100%;  3 step verbal directives with picture manipulations- 98%  -SL --    Progress/Outcomes (Memory Skills Goal 1, SLP) continuing progress toward goal  -SL continuing progress toward goal  -SL --    Comment (Memory Skills Goal 1, SLP) 73% for  visual immediate  recall of ads  -SL 30 item hidden picture matching task- 100%; 24 item hidden face matching task- 77%;  -SL --       Reasoning Goal 1 (SLP)    Progress/Outcomes (Reasoning Goal 1, SLP) goal ongoing;continuing progress toward goal  -SL -- continuing progress toward goal;goal ongoing  -SL    Comment (Reasoning Goal 1, SLP) 80% indep and 100% with min cues for 2 components seating arrangements with multiple clues  -SL -- word circles with missing letters- 70% indep and 90% with min cues  -SL       Functional Math Skills Goal 1 (SLP)    Progress/Outcomes (Functional Math Skills Goal 1, SLP) -- --  continuing progress toward goal;goal ongoing  -SL    Comment (Functional Math Skills Goal 1, SLP) -- -- 80% indep for math word problems and 100% with min cues and calculator use  -       Executive Functional Skills Goal 1 (SLP)    Progress/Outcomes (Executive Function Skills Goal 1, SLP) continuing progress toward goal;goal ongoing  - continuing progress toward goal  -SL continuing progress toward goal;goal ongoing  -SL    Comment (Executive Function Skills Goal 1, SLP) creative logic puzzles with multiple words- letter elimination clues for each word to finally ID target letters to form new words- min cues for each of 3 tasks  -SL 90% for alternating attn task- uppercase/lowercase alphabetizing task  -SL min cues for meal planning task with multiple restrictions - 85% indep, 100% with min cues  -    Row Name 07/11/22 1239 07/11/22 0900          Memory Skills Goal 1 (SLP)    Progress/Outcomes (Memory Skills Goal 1, SLP) -- continuing progress toward goal  -     Comment (Memory Skills Goal 1, SLP) -- mental manipualtion task- 4 word stimulus- ranking/sequencing  -            Reasoning Goal 1 (SLP)    Progress/Outcomes (Reasoning Goal 1, SLP) goal ongoing  -SL goal ongoing  -SL     Comment (Reasoning Goal 1, SLP) visual shape analogies ( complex)- 100% indep  - seating arrangements based on multiple clues- 100%- min cues  -            Functional Math Skills Goal 1 (SLP)    Progress/Outcomes (Functional Math Skills Goal 1, SLP) -- goal ongoing  -SL     Comment (Functional Math Skills Goal 1, SLP) -- 80% without cues and 100% with min cues for calculations- taxi cab rates  -            Executive Functional Skills Goal 1 (SLP)    Barriers (Executive Function Skills Goal 1, SLP) answering ?'s re: 80 item chart/grid- 100% indep with improved attn to details  - --     Progress/Outcomes (Executive Function Skills Goal 1, SLP) goal ongoing  - --     Comment (Executive Function Skills Goal 1, SLP)  sequencing of 10 paragraphs for stories- 80% indep- difficulty noted for self correcting  -SL --           User Key  (r) = Recorded By, (t) = Taken By, (c) = Cosigned By    Initials Name Provider Type    Ember Alejandro MS CCC-SLP Speech and Language Pathologist                            Time Calculation:        Time Calculation- SLP     Row Name 07/13/22 1139             Time Calculation- SLP    SLP Start Time 0830  -      SLP Stop Time 0900  -      SLP Time Calculation (min) 30 min  -SL            User Key  (r) = Recorded By, (t) = Taken By, (c) = Cosigned By    Initials Name Provider Type    Ember Alejandro MS CCC-SLP Speech and Language Pathologist                  Therapy Charges for Today     Code Description Service Date Service Provider Modifiers Qty    95106263442 HC ST DEV OF COGN SKILLS INITIAL 15 MIN 7/12/2022 Ember Werner MS CCC-SLP  1    61553541359 HC ST DEV OF COGN SKILLS EACH ADDT'L 15 MIN 7/12/2022 Ember Werner MS CCC-SLP  1    20632369415 HC ST DEV OF COGN SKILLS EACH ADDT'L 15 MIN 7/12/2022 Ember Werner MS CCC-SLP  2    77708557677 HC ST DEV OF COGN SKILLS INITIAL 15 MIN 7/13/2022 Ember Werner MS CCC-SLP  1    20403353443 HC ST DEV OF COGN SKILLS EACH ADDT'L 15 MIN 7/13/2022 Ember Werner MS CCC-SLP  1                           Ember Werner MS CCC-SLP  7/13/2022

## 2022-07-13 NOTE — PROGRESS NOTES
Patient to d/c home today with her  being able to provide SBA-CGA. [facility] has [facility]. Patient has therapy beginning this Thursday 07/14/22. Therapy is Tuesdays: OT 1:45-2:30pm, ST 2:30-3:15pm, PT 3:15pm-4:00pm and Thursdays: ST 9:30-10:15am, PT 10:15-11:00am, OT 11:00-11:45am. PT has ordered patient's rolling walker and patient is to receive this today. Patient's  is to complete family training today at 11:00am with PT.

## 2022-07-13 NOTE — THERAPY DISCHARGE NOTE
Inpatient Rehabilitation - IRF Occupational Therapy Treatment Note/Discharge  [facility]     Patient Name: Maryellen Crump  : 1950  MRN: [id number]  Today's Date: 2022               Admit Date: 2022       ICD-10-CM ICD-9-CM   1. Impaired functional mobility, balance, gait, and endurance  Z74.09 V49.89   2. Cerebrovascular accident (CVA), unspecified mechanism (HCC)  I63.9 434.91     Patient Active Problem List   Diagnosis   • Family history of colon cancer   • Iron deficiency anemia   • Diarrhea of presumed infectious origin   • Type 2 diabetes mellitus with kidney complication, without long-term current use of insulin (HCC)   • Hypertension   • Hyperlipidemia   • Stroke-like symptoms   • Acute CVA (cerebrovascular accident) (HCC)   • Acute lacunar stroke (HCC)   • Vitamin B12 deficiency   • CVA (cerebral vascular accident) (HCC)   • CKD (chronic kidney disease) stage 3, GFR 30-59 ml/min (HCC)     Past Medical History:   Diagnosis Date   • Diabetes mellitus (HCC)    • Hyperlipidemia    • Hypertension      Past Surgical History:   Procedure Laterality Date   • COLONOSCOPY N/A 2018    Procedure: COLONOSCOPY TO CECUM;  Surgeon: Josh Muñoz MD;  Location: [facility] ENDOSCOPY;  Service: General   • DILATION AND CURETTAGE, DIAGNOSTIC / THERAPEUTIC     • ENDOSCOPY N/A 2018    Procedure: ESOPHAGOGASTRODUODENOSCOPY WITH BIOPSIES;  Surgeon: Josh Muñoz MD;  Location: [facility] ENDOSCOPY;  Service: General   • ROTATOR CUFF REPAIR Right        IRF OT ASSESSMENT FLOWSHEET (last 12 hours)     IRF OT Evaluation and Treatment     Row Name 22 1016          OT Time and Intention    Document Type daily treatment;discharge evaluation  -SG     Mode of Treatment individual therapy;occupational therapy  -SG     Patient Effort good  -SG     Symptoms Noted During/After Treatment none  -SG     Row Name 22 1016          General Information    Patient/Family/Caregiver Comments/Observations Pt  sitting up on EOB  -[facility] Name 07/13/22 1016          Pain Assessment    Pretreatment Pain Rating 0/10 - no pain  -SG     Row Name 07/13/22 1016          Cognition/Psychosocial    Affect/Mental Status (Cognition) WFL  -     Orientation Status (Cognition) oriented x 4  -SG     Follows Commands (Cognition) WFL  -     Personal Safety Interventions gait belt;fall prevention program maintained  -SG     Row Name 07/13/22 1016          Bathing    [place] Level (Bathing) bathing skills;supervision  -     Assistive Device (Bathing) grab bar/tub rail;hand held shower spray hose;shower chair  -     Position (Bathing) supported sitting;unsupported standing  -SG     Row Name 07/13/22 1016          Upper Body Dressing    [place] Level (Upper Body Dressing) upper body dressing skills;supervision  -     Position (Upper Body Dressing) supported sitting  -SG     Row Name 07/13/22 1016          Lower Body Dressing    [place] Level (Lower Body Dressing) doff;don;pants/bottoms;shoes/slippers;socks;underwear;supervision  -     Position (Lower Body Dressing) supported sitting;unsupported standing  -SG     Row Name 07/13/22 1016          Grooming    [place] Level (Grooming) grooming skills;modified independence  -     Position (Grooming) sink side;unsupported sitting  -SG     Row Name 07/13/22 1016          Toileting    [place] Level (Toileting) toileting skills;modified independence  -SG     Row Name 07/13/22 1016          Functional Mobility    Functional Mobility- Ind. Level supervision required  -     Functional Mobility- Device walker, front-wheeled  -     Functional Mobility- Comment Walks from room to shower room and back  -SG     Row Name 07/13/22 1016          Transfer Assessment/Treatment    Transfers sit-stand transfer;stand-sit transfer;shower transfer  -SG     Row Name 07/13/22 1016          Transfers    Sit-Stand [place] (Transfers) supervision  -     Stand-Sit  [place] (Transfers) supervision  -SG     [place] Level (Shower Transfer) supervision  -SG     Assistive Device (Shower Transfer) walker, front-wheeled;tub bench  -     Row Name 07/13/22 1016          Sit-Stand Transfer    Assistive Device (Sit-Stand Transfers) walker, front-wheeled  -SG     Row Name 07/13/22 1016          Stand-Sit Transfer    Assistive Device (Stand-Sit Transfers) walker, front-wheeled  -     Row Name 07/13/22 1016          Shower Transfer    Type (Shower Transfer) stand pivot/stand step  -     Row Name 07/13/22 1016          Positioning and Restraints    Pre-Treatment Position sitting in chair/recliner  -SG     Post Treatment Position chair  -SG     In Chair sitting;call light within reach;encouraged to call for assist;exit alarm on  -     Row Name 07/13/22 1016          Transfer Goal 1 (OT-IRF)    Activity/Assistive Device (Transfer Goal 1, OT-IRF) toilet;walk-in shower;walker, rolling  -SG     [place] Level (Transfer Goal 1, OT-IRF) contact guard required  -SG     Time Frame (Transfer Goal 1, OT-IRF) short-term goal (STG)  -SG     Progress/Outcomes (Transfer Goal 1, OT-IRF) goal met  -[facility] Name 07/13/22 1016          Transfer Goal 2 (OT-IRF)    Activity/Assistive Device (Transfer Goal 2, OT-IRF) toilet;shower chair;walker, rolling  -SG     [place] Level (Transfer Goal 2, OT-IRF) modified independence;supervision required  -SG     Time Frame (Transfer Goal 2, OT-IRF) long-term goal (LTG)  -SG     Progress/Outcomes (Transfer Goal 2, OT-IRF) goal met  -     Row Name 07/13/22 1016          Bathing Goal 1 (OT-IRF)    Activity/Device (Bathing Goal 1, OT-IRF) bathing skills, all;grab bar, tub/shower;hand-held shower spray hose;tub bench  -SG     [place] Level (Bathing Goal 1, OT-IRF) contact guard required  -SG     Time Frame (Bathing Goal 1, OT-IRF) short-term goal (STG)  -SG     Progress/Outcomes (Bathing Goal 1, OT-IRF) goal met  -     Row Name 07/13/22  1016          Bathing Goal 2 (OT-IRF)    Activity/Device (Bathing Goal 2, OT-IRF) bathing skills, all;grab bar, tub/shower;hand-held shower spray hose;shower chair  -SG     [place] Level (Bathing Goal 2, OT-IRF) modified independence;supervision required  -SG     Time Frame (Bathing Goal 2, OT-IRF) long-term goal (LTG)  -SG     Progress/Outcomes (Bathing Goal 2, OT-IRF) goal met  -SG     Row Name 07/13/22 1016          UB Dressing Goal 1 (OT-IRF)    Activity/Device (UB Dressing Goal 1, OT-IRF) upper body dressing  -SG     [place] (UB Dress Goal 1, OT-IRF) set-up required  -SG     Time Frame (UB Dressing Goal 1, OT-IRF) short-term goal (STG)  -SG     Progress/Outcomes (UB Dressing Goal 1, OT-IRF) goal met  -SG     Row Name 07/13/22 1016          UB Dressing Goal 2 (OT-IRF)    Activity/Device (UB Dressing Goal 2, OT-IRF) upper body dressing  -SG     [place] (UB Dress Goal 2, OT-IRF) independent  -SG     Time Frame (UB Dressing Goal 2, OT-IRF) long-term goal (LTG)  -SG     Progress/Outcomes (UB Dressing Goal 2, OT-IRF) goal not met  -SG     Row Name 07/13/22 1016          LB Dressing Goal 1 (OT-IRF)    Activity/Device (LB Dressing Goal 1, OT-IRF) lower body dressing  -SG     [place] (LB Dressing Goal 1, OT-IRF) contact guard required  -SG     Time Frame (LB Dressing Goal 1, OT-IRF) short-term goal (STG)  -SG     Progress/Outcomes (LB Dressing Goal 1, OT-IRF) goal met  -SG     Row Name 07/13/22 1016          LB Dressing Goal 2 (OT-IRF)    Activity/Device (LB Dressing Goal 2, OT-IRF) lower body dressing  -SG     [place] (LB Dressing Goal 2, OT-IRF) modified independence;supervision required  -SG     Time Frame (LB Dressing Goal 2, OT-IRF) long-term goal (LTG)  -SG     Progress/Outcomes (LB Dressing Goal 2, OT-IRF) goal met  -SG     Row Name 07/13/22 1016          Grooming Goal 1 (OT-IRF)    Activity/Device (Grooming Goal 1, OT-IRF) grooming skills, all  -SG     [place] (Grooming Goal 1,  OT-IRF) modified independence  -SG     Time Frame (Grooming Goal 1, OT-IRF) long-term goal (LTG)  -SG     Progress/Outcomes (Grooming Goal 1, OT-IRF) goal met  -     Row Name 07/13/22 1016          Toileting Goal 1 (OT-IRF)    Activity/Device (Toileting Goal 1, OT-IRF) toileting skills, all;grab bar/safety frame;raised toilet seat  -SG     [place] Level (Toileting Goal 1, OT-IRF) contact guard required  -SG     Progress/Outcomes (Toileting Goal 1, OT-IRF) goal met  -SG     Time Frame (Toileting Goal 1, OT-IRF) short-term goal (STG)  -SG     Row Name 07/13/22 1016          Toileting Goal 2 (OT-IRF)    Activity/Device (Toileting Goal 2, OT-IRF) toileting skills, all;grab bar/safety frame;raised toilet seat  -SG     [place] Level (Toileting Goal 2, OT-IRF) modified independence;supervision required  -SG     Progress/Outcomes (Toileting Goal 2, OT-IRF) goal met  -SG     Time Frame (Toileting Goal 2, OT-IRF) long-term goal (LTG)  -     Row Name 07/13/22 1016          Balance Goal 1 (OT)    Activity/Assistive Device (Balance Goal 1, OT) standing, dynamic;standing, static;walker, rolling  -SG     [place] Level/Cues Needed (Balance Goal 1, OT) supervision required;conditional independence  -SG     Time Frame (Balance Goal 1, OT) long term goal (LTG)  -SG     Progress/Outcomes (Balance Goal 1, OT) goal met  -     Row Name 07/13/22 1016          Functional Mobility Goal 1 (OT)    Activity/Assistive Device (Functional Mobility Goal 1, OT) walker, rolling  -SG     [place] Level/Cues Needed (Functional Mobility Goal 1, OT) conditional independence  -SG     Distance Goal 1 (Functional Mobility, OT) to and from bathroom  -SG     Time Frame (Functional Mobility Goal 1, OT) long term goal (LTG)  -SG     Progress/Outcome (Functional Mobility Goal 1, OT) goal not met  -     Row Name 07/13/22 1016          Coordination Goal 1 (OT)    Activity/Assistive Device (Coordination Goal 1, OT) FM task  -SG      [place] Level/Cues Needed (Coordination Goal 1, OT) supervision required  -SG     Time Frame (Coordination Goal 1, OT) long term goal (LTG)  -SG     Progress/Outcomes (Coordination Goal 1, OT) goal met  -SG     Row Name 07/13/22 1016          Caregiver Training Goal 1 (OT-IRF)    Caregiver Training Goal 1 (OT-IRF) Pt will demo safe techniques with ADLs, transfers, HEP and needed equipment  -SG     Time Frame (Caregiver Training Goal 1, OT-IRF) long-term goal (LTG)  -SG     Progress/Outcomes (Caregiver Training Goal 1, OT-IRF) goal met  -SG           User Key  (r) = Recorded By, (t) = Taken By, (c) = Cosigned By    Initials Name Effective Dates    MALCOLM Kvngana paulacharlene Shira, OTR 06/16/21 -                    Occupational Therapy Education                 Title: PT OT SLP Therapies (Done)     Topic: Occupational Therapy (Done)     Point: ADL training (Done)     Description:   Instruct learner(s) on proper safety adaptation and remediation techniques during self care or transfers.   Instruct in proper use of assistive devices.              Learning Progress Summary           Patient Acceptance, E,TB, VU by SG at 7/13/2022 1249    Comment: Pt to d/c home with family, has built in shower and grab bars with HH shower head. Will be starting OP. Has yellow theraputty.    Acceptance, E, VU,NR by AF at 7/4/2022 1518    Comment: educated on safety with ADls and transfers                   Point: Home exercise program (Resolved)     Description:   Instruct learner(s) on appropriate technique for monitoring, assisting and/or progressing therapeutic exercises/activities.              Learner Progress:  Not documented in this visit.          Point: Precautions (Resolved)     Description:   Instruct learner(s) on prescribed precautions during self-care and functional transfers.              Learner Progress:  Not documented in this visit.          Point: Body mechanics (Resolved)     Description:   Instruct learner(s) on proper  positioning and spine alignment during self-care, functional mobility activities and/or exercises.              Learner Progress:  Not documented in this visit.                      User Key     Initials Effective Dates Name Provider Type Discipline    SG 06/16/21 -  Shira Wong OTR Occupational Therapist OT    AF 06/16/21 -  Dolores Odom OTR Occupational Therapist OT                OT Recommendation and Plan  Planned Therapy Interventions (OT): BADL retraining, neuromuscular control/coordination retraining, occupation/activity based interventions, patient/caregiver education/training, ROM/therapeutic exercise, strengthening exercise, transfer/mobility retraining           OT IRF GOALS     Row Name 07/13/22 1016 07/04/22 1505          Transfer Goal 1 (OT-IRF)    Activity/Assistive Device (Transfer Goal 1, OT-IRF) toilet;walk-in shower;walker, rolling  -SG toilet;walk-in shower;walker, rolling  -AF     [place] Level (Transfer Goal 1, OT-IRF) contact guard required  -SG contact guard required  -AF     Time Frame (Transfer Goal 1, OT-IRF) short-term goal (STG)  -SG short-term goal (STG)  -AF     Progress/Outcomes (Transfer Goal 1, OT-IRF) goal met  -SG goal ongoing  -AF            Transfer Goal 2 (OT-IRF)    Activity/Assistive Device (Transfer Goal 2, OT-IRF) toilet;shower chair;walker, rolling  -SG toilet;shower chair;walker, rolling  -AF     [place] Level (Transfer Goal 2, OT-IRF) modified independence;supervision required  -SG modified independence;supervision required  -AF     Time Frame (Transfer Goal 2, OT-IRF) long-term goal (LTG)  -SG long-term goal (LTG)  -AF     Progress/Outcomes (Transfer Goal 2, OT-IRF) goal met  -SG goal ongoing  -AF            Bathing Goal 1 (OT-IRF)    Activity/Device (Bathing Goal 1, OT-IRF) bathing skills, all;grab bar, tub/shower;hand-held shower spray hose;tub bench  -SG bathing skills, all;grab bar, tub/shower;hand-held shower spray hose;tub bench  -AF      [place] Level (Bathing Goal 1, OT-IRF) contact guard required  -SG contact guard required  -AF     Time Frame (Bathing Goal 1, OT-IRF) short-term goal (STG)  -SG short-term goal (STG)  -AF     Progress/Outcomes (Bathing Goal 1, OT-IRF) goal met  -SG goal ongoing  -AF            Bathing Goal 2 (OT-IRF)    Activity/Device (Bathing Goal 2, OT-IRF) bathing skills, all;grab bar, tub/shower;hand-held shower spray hose;shower chair  -SG bathing skills, all;grab bar, tub/shower;hand-held shower spray hose;shower chair  -AF     [place] Level (Bathing Goal 2, OT-IRF) modified independence;supervision required  -SG modified independence;supervision required  -AF     Time Frame (Bathing Goal 2, OT-IRF) long-term goal (LTG)  -SG long-term goal (LTG)  -AF     Progress/Outcomes (Bathing Goal 2, OT-IRF) goal met  -SG goal ongoing  -AF            UB Dressing Goal 1 (OT-IRF)    Activity/Device (UB Dressing Goal 1, OT-IRF) upper body dressing  -SG upper body dressing  -AF     [place] (UB Dress Goal 1, OT-IRF) set-up required  -SG set-up required  -AF     Time Frame (UB Dressing Goal 1, OT-IRF) short-term goal (STG)  -SG short-term goal (STG)  -AF     Progress/Outcomes (UB Dressing Goal 1, OT-IRF) goal met  -SG goal ongoing  -AF            UB Dressing Goal 2 (OT-IRF)    Activity/Device (UB Dressing Goal 2, OT-IRF) upper body dressing  -SG upper body dressing  -AF     [place] (UB Dress Goal 2, OT-IRF) independent  -SG independent  -AF     Time Frame (UB Dressing Goal 2, OT-IRF) long-term goal (LTG)  -SG long-term goal (LTG)  -AF     Progress/Outcomes (UB Dressing Goal 2, OT-IRF) goal not met  -SG goal ongoing  -AF            LB Dressing Goal 1 (OT-IRF)    Activity/Device (LB Dressing Goal 1, OT-IRF) lower body dressing  -SG lower body dressing  -AF     [place] (LB Dressing Goal 1, OT-IRF) contact guard required  -SG contact guard required  -AF     Time Frame (LB Dressing Goal 1, OT-IRF) short-term goal (STG)   -SG short-term goal (STG)  -AF     Progress/Outcomes (LB Dressing Goal 1, OT-IRF) goal met  -SG goal ongoing  -AF            LB Dressing Goal 2 (OT-IRF)    Activity/Device (LB Dressing Goal 2, OT-IRF) lower body dressing  -SG lower body dressing  -AF     [place] (LB Dressing Goal 2, OT-IRF) modified independence;supervision required  -SG modified independence;supervision required  -AF     Time Frame (LB Dressing Goal 2, OT-IRF) long-term goal (LTG)  -SG long-term goal (LTG)  -AF     Progress/Outcomes (LB Dressing Goal 2, OT-IRF) goal met  -SG goal ongoing  -AF            Grooming Goal 1 (OT-IRF)    Activity/Device (Grooming Goal 1, OT-IRF) grooming skills, all  -SG grooming skills, all  -AF     [place] (Grooming Goal 1, OT-IRF) modified independence  -SG modified independence  -AF     Time Frame (Grooming Goal 1, OT-IRF) long-term goal (LTG)  -SG long-term goal (LTG)  -AF     Progress/Outcomes (Grooming Goal 1, OT-IRF) goal met  -SG goal ongoing  -AF            Toileting Goal 1 (OT-IRF)    Activity/Device (Toileting Goal 1, OT-IRF) toileting skills, all;grab bar/safety frame;raised toilet seat  -SG toileting skills, all;grab bar/safety frame;raised toilet seat  -AF     [place] Level (Toileting Goal 1, OT-IRF) contact guard required  -SG contact guard required  -AF     Progress/Outcomes (Toileting Goal 1, OT-IRF) goal met  -SG goal ongoing  -AF     Time Frame (Toileting Goal 1, OT-IRF) short-term goal (STG)  -SG short-term goal (STG)  -AF            Toileting Goal 2 (OT-IRF)    Activity/Device (Toileting Goal 2, OT-IRF) toileting skills, all;grab bar/safety frame;raised toilet seat  -SG toileting skills, all;grab bar/safety frame;raised toilet seat  -AF     [place] Level (Toileting Goal 2, OT-IRF) modified independence;supervision required  -SG modified independence;supervision required  -AF     Progress/Outcomes (Toileting Goal 2, OT-IRF) goal met  -SG goal ongoing  -AF     Time Frame (Toileting  Goal 2, OT-IRF) long-term goal (LTG)  -SG long-term goal (LTG)  -AF            Balance Goal 1 (OT)    Activity/Assistive Device (Balance Goal 1, OT) standing, dynamic;standing, static;walker, rolling  -SG standing, dynamic;standing, static;walker, rolling  with ADl tasks  -AF     [place] Level/Cues Needed (Balance Goal 1, OT) supervision required;conditional independence  -SG supervision required;conditional independence  -AF     Time Frame (Balance Goal 1, OT) long term goal (LTG)  -SG long term goal (LTG)  -AF     Progress/Outcomes (Balance Goal 1, OT) goal met  -SG goal ongoing  -AF            Caregiver Training Goal 1 (OT-IRF)    Caregiver Training Goal 1 (OT-IRF) Pt will demo safe techniques with ADLs, transfers, HEP and needed equipment  -SG Pt will demo safe techniques with ADLs, transfers, HEP and needed equipment  -AF     Time Frame (Caregiver Training Goal 1, OT-IRF) long-term goal (LTG)  -SG long-term goal (LTG)  -AF     Progress/Outcomes (Caregiver Training Goal 1, OT-IRF) goal met  -SG goal ongoing  -AF           User Key  (r) = Recorded By, (t) = Taken By, (c) = Cosigned By    Initials Name Provider Type    Shira Link OTR Occupational Therapist    AF Dolores Odom OTMANNY Occupational Therapist                    Time Calculation:    Time Calculation- OT     Row Name 07/13/22 1250             Time Calculation- OT    OT Start Time 0800  -SG      OT Stop Time 0830  -SG      OT Time Calculation (min) 30 min  -SG            User Key  (r) = Recorded By, (t) = Taken By, (c) = Cosigned By    Initials Name Provider Type    Shira Link OTR Occupational Therapist                Therapy Charges for Today     Code Description Service Date Service Provider Modifiers Qty    99896819008 HC OT THERAPEUTIC ACT EA 15 MIN 7/12/2022 Shira Wong OTR GO 2    80430359461 HC OT SELF CARE/MGMT/TRAIN EA 15 MIN 7/12/2022 Shira Wong OTR GO 1    [id number] HC OT THERAPEUTIC ACT EA 15 MIN  7/12/2022 Shira Wong OTR GO 1    15166733082 HC OT SELF CARE/MGMT/TRAIN EA 15 MIN 7/13/2022 Shira Wong OTR GO 2                    RICH Day  7/13/2022

## 2022-07-13 NOTE — THERAPY TREATMENT NOTE
Inpatient Rehabilitation - Physical Therapy Addendum to Discharge       [facility]     Patient Name: Maryellen Crump  : 1950  MRN: [id number]    Today's Date: 2022                    Admit Date: 2022      Visit Dx:     ICD-10-CM ICD-9-CM   1. Impaired functional mobility, balance, gait, and endurance  Z74.09 V49.89   2. Cerebrovascular accident (CVA), unspecified mechanism (HCC)  I63.9 434.91       Patient Active Problem List   Diagnosis   • Family history of colon cancer   • Iron deficiency anemia   • Diarrhea of presumed infectious origin   • Type 2 diabetes mellitus with kidney complication, without long-term current use of insulin (HCC)   • Hypertension   • Hyperlipidemia   • Stroke-like symptoms   • Acute CVA (cerebrovascular accident) (HCC)   • Acute lacunar stroke (HCC)   • Vitamin B12 deficiency   • CVA (cerebral vascular accident) (HCC)   • CKD (chronic kidney disease) stage 3, GFR 30-59 ml/min (HCC)       Past Medical History:   Diagnosis Date   • Diabetes mellitus (HCC)    • Hyperlipidemia    • Hypertension        Past Surgical History:   Procedure Laterality Date   • COLONOSCOPY N/A 2018    Procedure: COLONOSCOPY TO CECUM;  Surgeon: Josh Muñoz MD;  Location: [facility] ENDOSCOPY;  Service: General   • DILATION AND CURETTAGE, DIAGNOSTIC / THERAPEUTIC     • ENDOSCOPY N/A 2018    Procedure: ESOPHAGOGASTRODUODENOSCOPY WITH BIOPSIES;  Surgeon: Josh Muñoz MD;  Location: [facility] ENDOSCOPY;  Service: General   • ROTATOR CUFF REPAIR Right        PT ASSESSMENT (last 12 hours)     IRF PT Evaluation and Treatment     Row Name 22 1100          PT Time and Intention    Document Type daily treatment  -MS     Mode of Treatment physical therapy  -MS     Patient/Family/Caregiver Comments/Observations Sitting up in recliner, spouse present for family teaching  -MS     Total Minutes, Physical Therapy 30  -MS     Row Name 22 1100          General Information    Patient  Profile Reviewed yes  -MS     Existing Precautions/Restrictions fall  -MS     Row Name 07/13/22 1100          Pain Assessment    Pretreatment Pain Rating 0/10 - no pain  -MS     Posttreatment Pain Rating 0/10 - no pain  -MS     Row Name 07/13/22 1100          Cognition/Psychosocial    Affect/Mental Status (Cognition) WFL  -MS     Orientation Status (Cognition) oriented x 4  -MS     Follows Commands (Cognition) WFL  -MS     Personal Safety Interventions fall prevention program maintained;gait belt;nonskid shoes/slippers when out of bed  -MS     Cognitive Function WFL  -MS     Row Name 07/13/22 1100          Mobility    Advanced Gait Activity sloped surfaces  -MS     Row Name 07/13/22 1100          Transfer Assessment/Treatment    Comment, (Transfers) Mild cues for sequencing- spouse present and aware of cues needed for safety awareness.  -MS     Row Name 07/13/22 1100          Transfers    Bed-Chair [place] (Transfers) standby assist;verbal cues;nonverbal cues (demo/gesture);supervision  -MS     Chair-Bed [place] (Transfers) standby assist;verbal cues;nonverbal cues (demo/gesture);supervision  -MS     Sit-Stand [place] (Transfers) standby assist;supervision  -MS     Stand-Sit [place] (Transfers) standby assist;supervision  -MS     Row Name 07/13/22 1100          Sit-Stand Transfer    Assistive Device (Sit-Stand Transfers) walker, front-wheeled  -MS     Row Name 07/13/22 1100          Stand-Sit Transfer    Assistive Device (Stand-Sit Transfers) walker, front-wheeled  -MS     Row Name 07/13/22 1100          Car Transfer    Type (Car Transfer) sit-stand;stand-sit  -MS     [place] Level (Car Transfer) standby assist  -MS     Assistive Device (Car Transfer) walker, front-wheeled  -MS     Comment, (Car Transfer) Spouse guarding and reviewed placement.  -MS     Row Name 07/13/22 1100          Gait/Stairs (Locomotion)    [place] Level (Gait) standby assist;verbal cues  -MS     Assistive Device  (Gait) walker, front-wheeled  -MS     Distance in Feet (Gait) 160' total  -MS     Pattern (Gait) step-through  -MS     Deviations/Abnormal Patterns (Gait) ruby decreased  -MS     Bilateral Gait Deviations forward flexed posture;heel strike decreased  -MS     Left Sided Gait Deviations leans left  -MS     Right Sided Gait Deviations heel strike decreased;decreased knee extension;Trendelenburg sign  -MS     [place] Level (Stairs) verbal cues;stand by assist  -MS     Handrail Location (Stairs) both sides  -MS     Number of Steps (Stairs) 4 steps x 2 trials  -MS     Ascending Technique (Stairs) step-to-step  -MS     Descending Technique (Stairs) step-to-step  -MS     Stairs, Impairments strength decreased;impaired balance  -MS     Comment, (Gait/Stairs) Spouse demonstrated guarding technique with stairs and ambulation- denies any further questions.  -MS     Row Name 07/13/22 1100          Safety Issues, Functional Mobility    Impairments Affecting Function (Mobility) balance;endurance/activity tolerance;coordination;strength;motor control  -MS     Row Name 07/13/22 1100          Curb Negotiation (Mobility)    [place], Curb Negotiation contact guard;verbal cues  -MS     Assistive Device (Curb Negotiation) walker, front-wheeled  -MS     Comment, [place] Neg[place]iation (Mobility) x 2 trials (one trial with spouse guarding)  -MS     Row Name 07/13/22 1100          Sloped Surface Gait Skills (Mobility)    [place], Gait On Sloped Surface (Mobility) contact guard;verbal cues  -MS     Assistive Device (Sloped Surface Gait) walker, front-wheeled  -MS     Comment, Gait On Sloped Surface (Mobility) Ambulated ramp in therapy gym, CGA-Yany for safety.  -MS     Row Name 07/13/22 1100          Motor Skills    Therapeutic Exercise --  HEP printed and distributed to patient.  -MS     Row Name 07/13/22 1100          Positioning and Restraints    Pre-Treatment Position sitting in chair/recliner  -MS     In Wheelchair  sitting;call light within reach;encouraged to call for assist;exit alarm on;with family/caregiver  -MS           User Key  (r) = Recorded By, (t) = Taken By, (c) = Cosigned By    Initials Name Provider Type    Myra Willard JAREN, PT Physical Therapist                 Physical Therapy Education                 Title: PT OT SLP Therapies (Done)     Topic: Physical Therapy (Resolved)     Point: Mobility training (Resolved)     Learning Progress Summary           Patient Acceptance, E, VU,NR by KM at 7/12/2022 1217    Acceptance, E, VU,NR by KM at 7/11/2022 1214    Acceptance, E, VU,NR by KM at 7/11/2022 1213    Acceptance, E, NR by  at 7/9/2022 0921    Acceptance, E, VU,NR by KM at 7/8/2022 1027    Acceptance, E, VU,NR by KM at 7/7/2022 1217    Acceptance, E, VU,NR by KM at 7/6/2022 1118    Acceptance, E, VU,NR by KM at 7/5/2022 1230    Eager, E,TB,H,D, VU,DU by  at 7/4/2022 1405    Comment: Written HEp for strength, need to remain on rwx for now.    Eager, E,TB,D, VU,DU by  at 7/3/2022 1108    Comment: POC, goals.                   Point: Home exercise program (Resolved)     Learning Progress Summary           Patient Acceptance, E, VU,NR by KM at 7/12/2022 1217    Acceptance, E, VU,NR by KM at 7/11/2022 1214    Acceptance, E, VU,NR by KM at 7/11/2022 1213    Acceptance, E, NR by  at 7/9/2022 0921    Acceptance, E, VU,NR by KM at 7/8/2022 1027    Acceptance, E, VU,NR by KM at 7/7/2022 1217    Acceptance, E, VU,NR by KM at 7/6/2022 1118    Acceptance, E, VU,NR by KM at 7/5/2022 1230    Eager, E,TB,H,D, VU,DU by  at 7/4/2022 1405    Comment: Written HEp for strength, need to remain on rwx for now.    Eager, E,TB,D, VU,DU by  at 7/3/2022 1108    Comment: POC, goals.                   Point: Body mechanics (Resolved)     Learner Progress:  Not documented in this visit.          Point: Precautions (Resolved)     Learner Progress:  Not documented in this visit.                      User Key     Initials  Effective Dates Name Provider Type Discipline     06/16/21 -  Renetta Harrison, PT Physical Therapist PT    KP 06/16/21 -  Tara Sanders, PT Physical Therapist PT    KM 06/06/22 -  Otis Funes PT Student PT Student PT                PT Recommendation and Plan                          Time Calculation:      PT Charges     Row Name 07/13/22 1442             Time Calculation    Start Time 1100  -MS      Stop Time 1130  -MS      Time Calculation (min) 30 min  -MS      PT Received On 07/13/22  -MS            User Key  (r) = Recorded By, (t) = Taken By, (c) = Cosigned By    Initials Name Provider Type    Myra Willard, PT Physical Therapist                Therapy Charges for Today     Code Description Service Date Service Provider Modifiers Qty    26110830038 HC PT THER PROC EA 15 MIN 7/13/2022 Myra Lorenzana, PT GP 2                   Myra Lorenzana, PT  7/13/2022

## 2022-07-13 NOTE — THERAPY DISCHARGE NOTE
Inpatient Rehabilitation - Speech Language Pathology Discharge Summary  [facility]       Patient Name: Maryellen Crump  : 1950  MRN: [id number]    Today's Date: 2022                   Admit Date: 2022      SLP Recommendation and Plan    The patient has made good progress in therapy. She does continue to display mild deficits in the areas of executive functioning, attention, memory, and complex reasoning, and math.  It is recommended that therapy continue at the next level of care.    Visit Dx:    ICD-10-CM ICD-9-CM   1. Impaired functional mobility, balance, gait, and endurance  Z74.09 V49.89   2. Cerebrovascular accident (CVA), unspecified mechanism (HCC)  I63.9 434.91          Time Calculation- SLP     Row Name 22 1139             Time Calculation- SLP    SLP Start Time 0830  -SL      SLP Stop Time 0900  -      SLP Time Calculation (min) 30 min  -            User Key  (r) = Recorded By, (t) = Taken By, (c) = Cosigned By    Initials Name Provider Type    Ember Alejandro MS CCC-SLP Speech and Language Pathologist                   SLP GOALS     Row Name 22 0900 22 1300 22 0903       Memory Skills Goal 1 (SLP)    Barriers (Memory Skills Goal 1, SLP) -- 2 step verbal directives with temporal components- 100%;  3 step verbal directives with picture manipulations- 98%  -SL --    Progress/Outcomes (Memory Skills Goal 1, SLP) continuing progress toward goal  -SL continuing progress toward goal  -SL --    Comment (Memory Skills Goal 1, SLP) 73% for  visual immediate  recall of ads  -SL 30 item hidden picture matching task- 100%; 24 item hidden face matching task- 77%;  -SL --       Reasoning Goal 1 (SLP)    Progress/Outcomes (Reasoning Goal 1, SLP) goal ongoing;continuing progress toward goal  -SL -- continuing progress toward goal;goal ongoing  -SL    Comment (Reasoning Goal 1, SLP) 80% indep and 100% with min cues for 2 components seating arrangements with multiple clues   -SL -- word circles with missing letters- 70% indep and 90% with min cues  -SL       Functional Math Skills Goal 1 (SLP)    Progress/Outcomes (Functional Math Skills Goal 1, SLP) -- -- continuing progress toward goal;goal ongoing  -SL    Comment (Functional Math Skills Goal 1, SLP) -- -- 80% indep for math word problems and 100% with min cues and calculator use  -SL       Executive Functional Skills Goal 1 (SLP)    Progress/Outcomes (Executive Function Skills Goal 1, SLP) continuing progress toward goal;goal ongoing  -SL continuing progress toward goal  -SL continuing progress toward goal;goal ongoing  -SL    Comment (Executive Function Skills Goal 1, SLP) creative logic puzzles with multiple words- letter elimination clues for each word to finally ID target letters to form new words- min cues for each of 3 tasks  -SL 90% for alternating attn task- uppercase/lowercase alphabetizing task  -SL min cues for meal planning task with multiple restrictions - 85% indep, 100% with min cues  -SL    Row Name 07/11/22 1239 07/11/22 0900          Memory Skills Goal 1 (SLP)    Progress/Outcomes (Memory Skills Goal 1, SLP) -- continuing progress toward goal  -SL     Comment (Memory Skills Goal 1, SLP) -- mental manipualtion task- 4 word stimulus- ranking/sequencing  -            Reasoning Goal 1 (SLP)    Progress/Outcomes (Reasoning Goal 1, SLP) goal ongoing  -SL goal ongoing  -SL     Comment (Reasoning Goal 1, SLP) visual shape analogies ( complex)- 100% indep  -SL seating arrangements based on multiple clues- 100%- min cues  -SL            Functional Math Skills Goal 1 (SLP)    Progress/Outcomes (Functional Math Skills Goal 1, SLP) -- goal ongoing  -SL     Comment (Functional Math Skills Goal 1, SLP) -- 80% without cues and 100% with min cues for calculations- taxi cab rates  -            Executive Functional Skills Goal 1 (SLP)    Barriers (Executive Function Skills Goal 1, SLP) answering ?'s re: 80 item chart/grid- 100%  indep with improved attn to details  -SL --     Progress/Outcomes (Executive Function Skills Goal 1, SLP) goal ongoing  -SL --     Comment (Executive Function Skills Goal 1, SLP) sequencing of 10 paragraphs for stories- 80% indep- difficulty noted for self correcting  -SL --           User Key  (r) = Recorded By, (t) = Taken By, (c) = Cosigned By    Initials Name Provider Type    Ember Alejandro MS CCC-SLP Speech and Language Pathologist                  Therapy Charges for Today     Code Description Service Date Service Provider Modifiers Qty    11552265635 HC ST DEV OF COGN SKILLS INITIAL 15 MIN 7/12/2022 Ember Werner MS CCC-SLP  1    55334027929 HC ST DEV OF COGN SKILLS EACH ADDT'L 15 MIN 7/12/2022 Ember Werner MS CCC-SLP  1    61719535824 HC ST DEV OF COGN SKILLS EACH ADDT'L 15 MIN 7/12/2022 Ember Werner MS CCC-SLP  2    30710092912 HC ST DEV OF COGN SKILLS INITIAL 15 MIN 7/13/2022 Ember Werner MS CCC-SLP  1    95394192640 HC ST DEV OF COGN SKILLS EACH ADDT'L 15 MIN 7/13/2022 Ember Werner MS CCC-SLP  1                   Ember Werner MS CHRISSIE-SLP  7/13/2022

## 2022-07-13 NOTE — PLAN OF CARE
Goal Outcome Evaluation:              Outcome Evaluation: L. lacunar infarct. R. wilma (slight). A&OX4. Calm, cooperative, and pleasant. Continent B&B. Last BM 7/12. Full code. Diet: Reg, cc, cardiac, thins. Meds whole with thins. Family conference completed. OOBx1 with walker, stand-by assist. Participated fully in therapy. BS checks ACHS. N&T to bilateral feet (neuropathy, not new). D/C home today,  Wednesday, 7/13. Follow-up appointments have been made. Enrolled in meds to beds program. Ate well at meals.

## 2022-07-13 NOTE — PROGRESS NOTES
SECTION GG      Mobility Performance Discharge:     Roll Left and Right: Patient completed the activities by him/herself with no  assistance from a helper.   Lying to Sitting on Side of Bed: Patient completed the activities by  him/herself with no assistance from a helper.   Sit to Lying: Patient completed the activities by him/herself with no  assistance from a helper.   Sit to Stand: [place] provides verbal cues and/or touching/steadying and/or  contact guard assistance as patient completes activity. Assistance may be  provided throughout the activity or intermittently.   Chair/Bed to Chair Transfer: [place] provides verbal cues and/or  touching/steadying and/or contact guard assistance as patient completes  activity. Assistance may be provided throughout the activity or intermittently.   Car Transfer: [place] provides verbal cues and/or touching/steadying and/or  contact guard assistance as patient completes activity. Assistance may be  provided throughout the activity or intermittently.   Walk 10 Feet:   [place] provides verbal cues and/or touching/steadying and/or  contact guard assistance as patient completes activity. Assistance may be  provided throughout the activity or intermittently.  Walk 50 Feet with 2 Turns:   [place] provides verbal cues and/or  touching/steadying and/or contact guard assistance as patient completes  activity. Assistance may be provided throughout the activity or intermittently.  Walk 150 Feet:   [place] provides verbal cues and/or touching/steadying and/or  contact guard assistance as patient completes activity. Assistance may be  provided throughout the activity or intermittently.  Walking 10 Feet on Uneven Surfaces:   [place] provides verbal cues and/or  touching/steadying and/or contact guard assistance as patient completes  activity. Assistance may be provided throughout the activity or intermittently.  1 Step Over Curb or Up/Down Stair:   [place] provides verbal cues  and/or  touching/steadying and/or contact guard assistance as patient completes  activity. Assistance may be provided throughout the activity or intermittently.  4 Steps Up and Down, With/Without Rail:   [place] provides verbal cues and/or  touching/steadying and/or contact guard assistance as patient completes  activity. Assistance may be provided throughout the activity or intermittently.  12 Steps Up and Down, With/Without Rail:   [place] provides verbal cues and/or  touching/steadying and/or contact guard assistance as patient completes  activity. Assistance may be provided throughout the activity or intermittently.  Picking up an Object:   [place] provides verbal cues and/or touching/steadying  and/or contact guard assistance as patient completes activity. Assistance may be  provided throughout the activity or intermittently. Uses Wheelchair and/or  Scooter: No    Signed by: Otis Funes PT Student     - CoSigned By: Myra Lorenzana PT 7/13/2022 3:44:18 PM

## 2022-07-13 NOTE — PLAN OF CARE
Goal Outcome Evaluation:  Plan of Care Reviewed With: patient        Progress: improving  Outcome Evaluation: Ambulates CGA with walker. No pain. Meds with thin liquids. R side weak. Appears to be sleeping well.

## 2022-07-13 NOTE — CONSULTS
Nutrition Services    Patient Name:  Maryellen Crump  YOB: 1950  MRN: [id number]  Admit Date:  7/2/2022    Comments: Follow up    Intake %. Skin intact. Labs reviewed (Aic 6.9%), BG running in 100s with occasional 200's. Will continue to monitor.       CLINICAL NUTRITION ASSESSMENT      Reason for Assessment Acute Rehab Admission     H&P  Ms. Nicholson is a 71-year-old  white female admitted here in transfer from [facility] where she presented on 62/29.  Patient had been camping and awakened on the morning of 629 with right-sided weakness and numbness.  Work-up at the acute hospital review to a lacunar stroke and left corona radiata region.       Past Medical History:   Diagnosis Date   • Diabetes mellitus (HCC)    • Hyperlipidemia    • Hypertension        Past Surgical History:   Procedure Laterality Date   • COLONOSCOPY N/A 9/12/2018    Procedure: COLONOSCOPY TO CECUM;  Surgeon: Josh Muñoz MD;  Location: [facility] ENDOSCOPY;  Service: General   • DILATION AND CURETTAGE, DIAGNOSTIC / THERAPEUTIC     • ENDOSCOPY N/A 9/12/2018    Procedure: ESOPHAGOGASTRODUODENOSCOPY WITH BIOPSIES;  Surgeon: Josh Muñoz MD;  Location: [facility] ENDOSCOPY;  Service: General   • ROTATOR CUFF REPAIR Right           Nutritional Risk Screening       MST SCORE 0   Risk Screening Criteria No indicators     Encounter Information        Nutrition History/Narrative:    7/13: eating well, %. Glu typically well controlled in 100s.   7/4: Good appetite. No requests or issues when I rounded at lunch.    Food Preferences: Bottled water, fruit plates, soups     Supplements: none     Functional Status/  Activity Pattern:      Factors Affecting Intake: weakness - R side weakness 2* to stroke       Current Nutrition Orders & Evaluation of Intake       Oral Nutrition     Food Allergies NKFA   Current PO Diet Diet Regular; Cardiac, Consistent Carbohydrate   Supplement n/a   PO Evaluation      "Trending % PO Intake % x 4 days       --  Anthropometrics        Current Height  Current Weight  BMI kg/m2 Height: 160 cm (62.99\")  Weight: 51.3 kg (113 lb) (07/02/22 1910)  Body mass index is 20.02 kg/m².       Admission Weight 113 lb   Ideal Body Weight (IBW) 115 lb   Usual Body Weight (UBW) 115-121 lb       Trending Weight Hx  Wt Readings from Last 15 Encounters:   07/02/22 1910 51.3 kg (113 lb)   06/30/22 1134 51.3 kg (113 lb)   06/30/22 0500 51.6 kg (113 lb 12.1 oz)   06/29/22 2019 51.9 kg (114 lb 8 oz)   06/29/22 1716 53.5 kg (118 lb)   06/11/20 1129 54.9 kg (121 lb 1.6 oz)   09/12/18 1333 52.2 kg (115 lb)      --  Physical Findings        Physical Appearance  alert, oriented     Edema  no edema   Gastrointestinal constipation, last bowel movement: 7/12   Tubes/Drains none   Oral/Mouth Cavity WNL   Skin skin intact     Tests/Procedures/Labs        Tests/Procedures No new tests/procedures       Pertinent Labs     Results from last 7 days   Lab Units 07/11/22  1435   SODIUM mmol/L 135*   POTASSIUM mmol/L 3.8   CHLORIDE mmol/L 99   CO2 mmol/L 27.0   BUN mg/dL 19   CREATININE mg/dL 1.22*   CALCIUM mg/dL 10.1   BILIRUBIN mg/dL 0.4   ALK PHOS U/L 100   ALT (SGPT) U/L 18   AST (SGOT) U/L 14   GLUCOSE mg/dL 181*     Results from last 7 days   Lab Units 07/11/22  1435   HEMOGLOBIN g/dL 11.1*   HEMATOCRIT % 34.1   WBC 10*3/mm3 7.41     Results from last 7 days   Lab Units 07/11/22  1435   PLATELETS 10*3/mm3 272     COVID19   Date Value Ref Range Status   07/02/2022 Not Detected Not Detected - Ref. Range Final     Lab Results   Component Value Date    HGBA1C 6.90 (H) 06/30/2022        Medications           Scheduled Medications aspirin, 81 mg, Oral, Daily  atorvastatin, 80 mg, Oral, Nightly  clopidogrel, 75 mg, Oral, Daily  ferrous sulfate, 325 mg, Oral, Every Other Day  hydroCHLOROthiazide, 12.5 mg, Oral, Daily  insulin lispro, 0-9 Units, Subcutaneous, TID AC  metFORMIN, 500 mg, Oral, BID With Meals  pantoprazole, " 40 mg, Oral, Q AM  polyethylene glycol, 17 g, Oral, Daily  ramipril, 10 mg, Oral, Daily  SITagliptin, 50 mg, Oral, Daily  vitamin B-12, 1,000 mcg, Oral, Daily       Infusions     PRN Medications dextrose  •  dextrose  •  glucagon (human recombinant)        Nutrition Diagnosis        Nutrition Dx Problem 1 Problem: Nutrition Appropriate for Condition at this Time    Etiology: Medical Diagnosis and MNT for Treatment/Condition    Signs/Symptoms: PO intake    Comment: >75% intake           Intervention Goal        Intervention Goal(s) Maintain nutrition status, Disease management/therapy, Tolerate PO , Maintain intake, Maintain weight and PO intake goal %: 75     Nutrition Intervention        RD Action Advise alternative selection, Menu provided, Encourage intake, Follow Tx Progress and Care plan reviewed     Nutrition Prescription         Diet Prescription    Supplement Prescription    EN Prescription    --  Monitor/Evaluation        Monitor Per protocol     RD to follow up per protocol.    Electronically signed by:  Reva Espinosa RD  07/13/22 13:07 EDT

## 2022-07-13 NOTE — PROGRESS NOTES
SECTION GG    Eating Performance Discharge: Patient completed the activities by him/herself  with no assistance from a helper.    Signed by: Tamar Aguilera RN

## 2022-07-14 ENCOUNTER — HOSPITAL ENCOUNTER (OUTPATIENT)
Dept: SPEECH THERAPY | Facility: HOSPITAL | Age: 72
Setting detail: THERAPIES SERIES
Discharge: HOME | End: 2022-07-14

## 2022-07-14 ENCOUNTER — HOSPITAL ENCOUNTER (OUTPATIENT)
Dept: PHYSICAL THERAPY | Facility: HOSPITAL | Age: 72
Setting detail: THERAPIES SERIES
Discharge: HOME | End: 2022-07-14

## 2022-07-14 ENCOUNTER — HOSPITAL ENCOUNTER (OUTPATIENT)
Dept: OCCUPATIONAL THERAPY | Facility: HOSPITAL | Age: 72
Setting detail: THERAPIES SERIES
Discharge: HOME | End: 2022-07-14

## 2022-07-14 DIAGNOSIS — I63.9 CEREBROVASCULAR ACCIDENT (CVA), UNSPECIFIED MECHANISM: Primary | ICD-10-CM

## 2022-07-14 DIAGNOSIS — R29.898 WEAKNESS OF RIGHT ARM: ICD-10-CM

## 2022-07-14 DIAGNOSIS — R27.8 LOSS OF COORDINATION: ICD-10-CM

## 2022-07-14 DIAGNOSIS — R26.89 FUNCTIONAL GAIT ABNORMALITY: ICD-10-CM

## 2022-07-14 DIAGNOSIS — R41.841 COGNITIVE COMMUNICATION DEFICIT: ICD-10-CM

## 2022-07-14 DIAGNOSIS — R53.1 ACUTE RIGHT-SIDED WEAKNESS: ICD-10-CM

## 2022-07-14 PROCEDURE — 96125 COGNITIVE TEST BY HC PRO: CPT | Performed by: SPEECH-LANGUAGE PATHOLOGIST

## 2022-07-14 PROCEDURE — 97110 THERAPEUTIC EXERCISES: CPT | Performed by: OCCUPATIONAL THERAPIST

## 2022-07-14 PROCEDURE — 97161 PT EVAL LOW COMPLEX 20 MIN: CPT

## 2022-07-14 PROCEDURE — 97166 OT EVAL MOD COMPLEX 45 MIN: CPT | Performed by: OCCUPATIONAL THERAPIST

## 2022-07-14 NOTE — THERAPY EVALUATION
.Outpatient Physical Therapy Neuro Initial Evaluation  [facility]     Patient Name: Maryellen Crump  : 1950  MRN: [id number]  Today's Date: 2022      Visit Date: 2022    Patient Active Problem List   Diagnosis   • Family history of colon cancer   • Iron deficiency anemia   • Diarrhea of presumed infectious origin   • Type 2 diabetes mellitus with kidney complication, without long-term current use of insulin (HCC)   • Hypertension   • Hyperlipidemia   • Stroke-like symptoms   • Acute CVA (cerebrovascular accident) (HCC)   • Acute lacunar stroke (HCC)   • Vitamin B12 deficiency   • CVA (cerebral vascular accident) (HCC)   • CKD (chronic kidney disease) stage 3, GFR 30-59 ml/min (HCC)        Past Medical History:   Diagnosis Date   • Diabetes mellitus (HCC)    • Hyperlipidemia    • Hypertension         Past Surgical History:   Procedure Laterality Date   • COLONOSCOPY N/A 2018    Procedure: COLONOSCOPY TO CECUM;  Surgeon: Josh Muñoz MD;  Location: [facility] ENDOSCOPY;  Service: General   • DILATION AND CURETTAGE, DIAGNOSTIC / THERAPEUTIC     • ENDOSCOPY N/A 2018    Procedure: ESOPHAGOGASTRODUODENOSCOPY WITH BIOPSIES;  Surgeon: Josh Muñoz MD;  Location: [facility] ENDOSCOPY;  Service: General   • ROTATOR CUFF REPAIR Right          Visit Dx:     ICD-10-CM ICD-9-CM   1. Cerebrovascular accident (CVA), unspecified mechanism (HCC)  I63.9 434.91   2. Acute right-sided weakness  R53.1 728.87   3. Functional gait abnormality  R26.89 781.2        Patient History     Row Name 22 1030             History    Chief Complaint Balance Problems  -LB      Date Current Problem(s) Began 22  -LB      Brief Description of Current Complaint Pt is 72 y/o female who developed right sided weakness and numbness.  Pt found to have left lacunar stroke.  -LB      Patient/Caregiver Goals Return to prior level of function  -LB      Patient/Caregiver Goals Comment I finally slept last night.  I  want to improve walking  -LB      Hand Dominance right-handed  -LB              Fall Risk Assessment    Any falls in the past year: No  -LB              Services    Prior Rehab/Home Health Experiences Yes  -LB      Where was the prior experience with Rehab/Home Health [facility]  -LB      Are you currently receiving Home Health services No  -LB      Do you plan to receive Home Health services in the near future No  -LB              Daily Activities    Primary Language English  -LB      Pt Participated in POC and Goals Yes  -LB              Safety    Are you being hurt, hit, or frightened by anyone at home or in your life? No  -LB      Are you being neglected by a caregiver No  -LB      Have you had any of the following issues with N/A  -LB            User Key  (r) = Recorded By, (t) = Taken By, (c) = Cosigned By    Initials Name Provider Type    Nora Arroyo, PT Physical Therapist                     PT Neuro     Row Name 07/14/22 1030             Subjective Comments    Subjective Comments I want to improve my balance  -LB              Precautions and Contraindications    Precautions/Limitations fall precautions  -LB              Subjective Pain    Able to rate subjective pain? yes  -LB      Pre-Treatment Pain Level 0  -LB      Post-Treatment Pain Level 0  -LB              Home Living    Current Living Arrangements home  -LB      Home Accessibility stairs to enter home;stairs within home  -LB      Number of Stairs, Main Entrance two  -LB      Stair Railings, Main Entrance none  -LB              Vision-Basic Assessment    Current Vision No visual deficits  -LB              Cognition    Overall Cognitive Status WFL  -LB      Arousal/Alertness Appropriate responses to stimuli  -LB      Memory Appears intact  -LB      Orientation Level Oriented X4  -LB      Safety Judgment Good awareness of safety precautions  -LB              Sensation    Sensation WNL? WFL  -LB              Posture/Observations     Alignment Options Forward head;Thoracic kyphosis  -LB      Forward Head Mild  -LB      Thoracic Kyphosis Mild  -LB      Posture/Observations Comments pt walked up to unit with spouse using a FWW  -LB              Coordination    Coordination Tests --  decreased heel to shin  -LB              General ROM    GENERAL ROM COMMENTS Bilateral LEs: WFL  -LB              MMT (Manual Muscle Testing)    Rt Lower Ext Rt Hip Flexion;Rt Hip ABduction;Rt Knee Extension;Rt Knee Flexion;Rt Ankle Plantarflexion;Rt Ankle Dorsiflexion  -LB      Lt Lower Ext Lt Hip WFL;Lt Knee WFL;Lt Ankle WFL  -LB              MMT Right Lower Ext    Rt Hip Flexion MMT, Gross Movement (4-/5) good minus  -LB      Rt Hip ABduction MMT, Gross Movement (3+/5) fair plus  -LB      Rt Knee Extension MMT, Gross Movement (4/5) good  -LB      Rt Knee Flexion MMT, Gross Movement (4/5) good  -LB      Rt Ankle Plantarflexion MMT, Gross Movement (4/5) good  -LB      Rt Ankle Dorsiflexion MMT, Gross Movement (4/5) good  -LB              Bed Mobility    Bed Mobility rolling left;rolling right;supine-sit-supine  -LB      Rolling Left Coleman (Bed Mobility) independent  -LB      Rolling Right Coleman (Bed Mobility) independent  -LB      Supine-Sit-Supine Coleman (Bed Mobility) independent  -LB              Transfers    Sit-Stand Coleman (Transfers) supervision;modified independence  -LB      Stand-Sit Coleman (Transfers) supervision;modified independence  -LB      Transfers, Sit-Stand-Sit, Assist Device rolling walker  -LB      Transfer, Impairments impaired balance  -LB      Comment, (Transfers) 4/5 trials, pt's toes lifted off the floor due to decreased weight shift forward  -LB              Gait/Stairs (Locomotion)    Coleman Level (Gait) supervision;standby assist  -LB      Assistive Device (Gait) walker, front-wheeled  -LB      Distance in Feet (Gait) 100x 2  -LB      Pattern (Gait) step-through  -LB      Deviations/Abnormal Patterns  (Gait) bilateral deviations;base of support, narrow;gait speed decreased;stride length decreased  -LB      Right Sided Gait Deviations heel strike decreased  -LB      Assonet Level (Stairs) contact guard  -LB      Handrail Location (Stairs) both sides  -LB      Number of Steps (Stairs) 4  -LB      Ascending Technique (Stairs) step-over-step  -LB      Descending Technique (Stairs) step-over-step  -LB              Curb Negotiation (Mobility)    Assonet, Curb Negotiation verbal cues;contact guard  -LB      Assistive Device (Curb Negotiation) walker, front-wheeled  -LB              Balance Skills Training    Sitting-Level of Assistance Independent  -LB      Standing-Level of Assistance Contact guard;Close supervision  -LB      Balance Comments see FGA and DAMON  -LB            User Key  (r) = Recorded By, (t) = Taken By, (c) = Cosigned By    Initials Name Provider Type    Nora Arroyo, PT Physical Therapist                        Therapy Education  Education Details: Advised pt does not need CGA or close supervision when walking with rolling walker, distant supervision  Given: Mobility training  Program: New  How Provided: Verbal  Provided to: Patient, Caregiver  Level of Understanding: Verbalized, Teach back education performed     PT OP Goals     Row Name 07/14/22 1100          PT Short Term Goals    STG Date to Achieve 08/11/22  -LB     STG 1 Pt will be indep with HEP for strengthening to maintain progress in therapy  -LB     STG 2 Pt will come to stand from various chairs Independently with increased weight shift forward  -LB     STG 3 Pt will ambulate 200 ft with least restrictive device and SBA  -LB     STG 4 Pt will negogiate 4 steps with 1 HR, step over pattern and SBA  -LB     STG 5 Pt will perform floor transfer with Mod indep  -LB            Long Term Goals    LTG Date to Achieve 09/22/22  -LB     LTG 1 Pt will be indep with HEP for balance to maintain progress in therapy  -LB     LTG 2 Pt will  improve FGA to to indicate less risk for falls  -LB     LTG 3 Pt will improve DAMON to to indicate less risk for falls and demonstrate improved balance.  -LB     LTG 4 Pt will negogiate a flight of stairs step over pattern  Mod Indep  -LB     LTG 5 Pt will ambulate 300 ft with AAD if needed Indep  -LB     LTG 6 Pt will perform semi-tandem for 30 secs  -LB     LTG 7 Pt will ambulate over uneven surfaces with Mod Indep  -LB            Time Calculation    PT Goal Re-Cert Due Date 08/11/22  -LB           User Key  (r) = Recorded By, (t) = Taken By, (c) = Cosigned By    Initials Name Provider Type    LB Nora Guillory, PT Physical Therapist                 PT Assessment/Plan     Row Name 07/14/22 1200          PT Assessment    Functional Limitations Decreased safety during functional activities;Impaired gait;Limitation in home management;Limitations in community activities;Performance in leisure activities;Performance in self-care ADL  -LB     Impairments Balance;Coordination;Endurance;Gait;Muscle strength  -LB     Assessment Comments Pt is pleasant 70 y/o female who sustained a stroke last month resulting in right sided weakness more proximally than distallly, impaired balance, and endurance level. The patient has history of DM and CKD but both appear to be stable.  The patient has impaired balance and is at risk for falls as indicated by her scores on FGA and DAMON balance tests.  Currently, the patient was advided to continue to use her rolling walker at home for safety and patient and spouse agreeable as well as verbalized understanding.  The patient was very active prior to her CVA and is very motivated to hopefully return to her baseline.  Anticipate that the patient will progress to no AD in the home setting and possible AD in the community setting  -LB     Please refer to paper survey for additional self-reported information Yes  -LB     Rehab Potential Good  -LB     Patient/caregiver participated in establishment  of treatment plan and goals Yes  -LB     Patient would benefit from skilled therapy intervention Yes  -LB            PT Plan    PT Frequency 2x/week  -LB     Predicted Duration of Therapy Intervention (PT) 10 weeks  -LB     Planned CPT's? PT EVAL LOW COMPLEXITY: 58426;PT THER PROC EA 15 MIN: 60897;PT THER ACT EA 15 MIN: 37564;PT NEUROMUSC RE-EDUCATION EA 15 MIN: 95759;PT GAIT TRAINING EA 15 MIN: 99081  -LB     Physical Therapy Interventions (Optional Details) balance training;gait training;home exercise program;neuromuscular re-education;motor coordination training;patient/family education;stair training;strengthening;transfer training  -LB           User Key  (r) = Recorded By, (t) = Taken By, (c) = Cosigned By    Initials Name Provider Type    Nora Arroyo PT Physical Therapist                    OP Exercises     Row Name 07/14/22 1030             Subjective Comments    Subjective Comments I want to improve my balance  -LB              Subjective Pain    Able to rate subjective pain? yes  -LB      Pre-Treatment Pain Level 0  -LB      Post-Treatment Pain Level 0  -LB            User Key  (r) = Recorded By, (t) = Taken By, (c) = Cosigned By    Initials Name Provider Type    Nora Arroyo, PT Physical Therapist                            Outcome Measure Options: Nagy Balance, FGA (Functional Gait Assessment)  Nagy Balance Scale  Sitting to Standing: able to stand without using hands and stabilize independently  Standing Unsupported: able to stand safely for 2 minutes  Sitting with Back Unsupported but Feet Supported on Floor or on Stool: able to sit safely and securely for 2 minutes  Standing to Sitting: controls descent by using hands  Transfers: able to transfer safely definite need of hands  Standing Unsupported with Eyes Closed: able to stand 10 seconds with supervision  Standing Unsupported with Feet Together: able to place feet together independently and stand 1 minute with supervision  Reaching Forward  with Outstretched Arm While Standing: can reach forward 5 cm (2 inches)   Object From the Floor From a Standing Position: able to  object but needs supervision  Turning to Look Behind Over Left and Right Shoulders While Standing: looks behind one side only other side shows less weight shift  Turn 360 Degrees: needs close supervision or verbal cuing  Place Alternate Foot on Step or Stool While Standing Unsupported: able to complete > 2 steps needs minimal assist  Standing Unsupported with One Foot in Front: needs help to step but can hold 15 seconds  Standing on One Leg: unable to try of needs assist to prevent fall  Nagy Total Score: 35  Nagy Comments: 35/56  Functional Gait Assessment (FGA)  Gait Level Surface: Moderate Impairment  Change in Gait Speed: Mild Impairment  Gait with Horizontal Head Turns: Moderate Impairment  Gait with Vertical Head Turns: Mild Impairment  Gait and Pivot Turn: Mild Impairment  Step Over Obstacle: Moderate Impairment  Gait with Narrow Base of Support: Severe Impairment  Gait with Eyes Closed: Mild Impairment  Ambulating Backwards: Moderate Impairment  Steps: Mild Impairment  FGA Total Score: 14    Time Calculation:   Start Time: 1015  Stop Time: 1100  Time Calculation (min): 45 min  Untimed Charges  PT Eval/Re-eval Minutes: 45  Total Minutes  Untimed Charges Total Minutes: 45   Total Minutes: 45   Therapy Charges for Today     Code Description Service Date Service Provider Modifiers Qty    00112805654 HC PT EVAL LOW COMPLEXITY 3 7/14/2022 Nora Guillory, PT GP 1          PT G-Codes  Outcome Measure Options: Nagy Balance, FGA (Functional Gait Assessment)  Nagy Total Score: 35  FGA Total Score: 14     Patient was wearing a face mask during this therapy encounter. Therapist used appropriate personal protective equipment including mask and gloves.  Mask used was standard procedure mask. Appropriate PPE was worn during the entire therapy session. Hand hygiene was completed  before and after therapy session. Patient is not in enhanced droplet precautions.         Nora Guillory, PT  7/14/2022

## 2022-07-14 NOTE — THERAPY EVALUATION
Outpatient Speech Language Pathology   Adult Speech Language Cognitive Initial Evaluation  [facility]     Patient Name: Maryellen Crump  : 1950  MRN: [id number]  Today's Date: 2022        Visit Date: 2022   Patient Active Problem List   Diagnosis   • Family history of colon cancer   • Iron deficiency anemia   • Diarrhea of presumed infectious origin   • Type 2 diabetes mellitus with kidney complication, without long-term current use of insulin (HCC)   • Hypertension   • Hyperlipidemia   • Stroke-like symptoms   • Acute CVA (cerebrovascular accident) (HCC)   • Acute lacunar stroke (HCC)   • Vitamin B12 deficiency   • CVA (cerebral vascular accident) (HCC)   • CKD (chronic kidney disease) stage 3, GFR 30-59 ml/min (HCC)        Past Medical History:   Diagnosis Date   • Diabetes mellitus (HCC)    • Hyperlipidemia    • Hypertension         Past Surgical History:   Procedure Laterality Date   • COLONOSCOPY N/A 2018    Procedure: COLONOSCOPY TO CECUM;  Surgeon: Josh Muñoz MD;  Location: [facility] ENDOSCOPY;  Service: General   • DILATION AND CURETTAGE, DIAGNOSTIC / THERAPEUTIC     • ENDOSCOPY N/A 2018    Procedure: ESOPHAGOGASTRODUODENOSCOPY WITH BIOPSIES;  Surgeon: Josh Muñoz MD;  Location: [facility] ENDOSCOPY;  Service: General   • ROTATOR CUFF REPAIR Right          Visit Dx:    ICD-10-CM ICD-9-CM   1. Cerebrovascular accident (CVA), unspecified mechanism (HCC)  I63.9 434.91   2. Cognitive communication deficit  R41.841 799.52        Patient History     Row Name 22 1400             History    Chief Complaint Other 1 (comment)  see below  -KA      Date Current Problem(s) Began 22  -KA      Patient/Caregiver Goals Return to prior level of function  -KA      Hand Dominance right-handed  -KA              Pain     Pain at Present 0  -KA              Fall Risk Assessment    Any falls in the past year: No  -KA              Services    Prior Rehab/Home Health Experiences Yes   -KA      Where was the prior experience with Rehab/Home Health [facility]  -KA      Are you currently receiving Home Health services No  -KA      Do you plan to receive Home Health services in the near future No  -KA              Daily Activities    Primary Language English  -KA      Are you able to read Yes  -KA      Are you able to write Yes  -KA      How does patient learn best? Demonstration  -KA      Pt Participated in POC and Goals Yes  -KA              Safety    Are you being hurt, hit, or frightened by anyone at home or in your life? No  -KA      Are you being neglected by a caregiver No  -KA            User Key  (r) = Recorded By, (t) = Taken By, (c) = Cosigned By    Initials Name Provider Type    Taqueria Pop MA,CCC-SLP Speech and Language Pathologist                 OP SLP Assessment/Plan - 07/14/22 1444        SLP Assessment    Functional Problems Speech Language- Adult/Cognition  -KA    Impact on Function: Adult Speech Language/Cognition Difficulty participating in avocational activities;Trouble learning or remembering new information;Restrictions in personal and social life;Difficulty sequencing or problem solving to complete ADLs  -KA    Prognosis Good (comment)  -KA    Patient/caregiver participated in establishment of treatment plan and goals Yes  -KA    Patient would benefit from skilled therapy intervention Yes  -KA       SLP Plan    Frequency 2x a week  -KA    Duration 8 to 12 weeks  -KA    Planned CPT's? SLP INDIVIDUAL SPEECH THERAPY: 12297;SLP DEV COG SKILLS INITIAL (15 MIN) : 21828;SLP DEV COG SKILLS ADD (15 MIN) : 25205  -KA    Expected Duration of Therapy Session (SLP Eval) 45  -KA          User Key  (r) = Recorded By, (t) = Taken By, (c) = Cosigned By    Initials Name Provider Type    Taqueria Pop MA,CCC-SLP Speech and Language Pathologist                 SLP SLC Evaluation - 07/14/22 1400        Communication Assessment/Intervention    Document Type evaluation  -KA     Subjective Information no complaints  -KA    Patient Observations alert;cooperative  -KA    Patient Effort good  -KA    Symptoms Noted During/After Treatment none  -KA       General Information    Patient Profile Reviewed yes  -KA    Pertinent History Of Current Problem Patient referred to outpatient speech therapy for continuation of therapy following recent CVA resulting in cognitive communication impairment. Patient [facility] on [facility] with right sided weakness and numbness. Work up and MRI revealed acute left CVA, left lacunar stroke and left corona radiata region. Patient received inpatient rehab from 7/3/22 to 7/13/22 with speech therapy targeting patients cognitive and communication impairments. Patient reports her goals are to return to her baseline including managing finances, medications, and drive. She reports difficulty with word finding and memory.  -KA    Precautions/Limitations, Vision WFL;for purposes of eval  -KA    Precautions/Limitations, Hearing WFL  -KA    Patient Level of Education Highschool  -KA    Prior Level of Function-Communication WFL  -KA    Plans/Goals Discussed with patient  -KA    Barriers to Rehab none identified  -KA    Patient's Goals for Discharge functional communication;functional cognition  -KA    Standardized Assessment Used RBANS  -KA       Standardized Tests    Cognitive/Memory Tests RBANS: Repeatable Battery for the Assessment of Neuropsychological Status  -KA       RBANS- Repeatable Battery for the Assessment of Neuropsychological Status    Immediate Memory Index Score 94  -KA    Immediate Memory Percentile 34 %  -KA    Immediate Memory Qualitative Description average  -KA    Visuospatial Index Score 92  -KA    Visuospatial Percentile 30 %  -KA    Visuospatial Qualitative Description average  -KA    Language Index Score 105  -KA    Language Percentile 63 %  -KA    Language Qualitative Description average  -KA    Attention Index Score 125  -KA     Attention Percentile 95 %  -KA    Attention Qualitative Description superior  -KA    Delayed Memory Index Score 103  -KA    Delayed Memory Percentile 58 %  -KA    Delayed Memory Qualitative Description average  -KA    Total Index Score 519  -KA    Total Percentile 61 %  -KA    Total Qualitative Description average  -KA    RBANS Comments Patient scored average overall based on total score. In listing learning task assessing immediate memory for list of words patient scored low average. Patient performed average in visuospatial, language  and delayed memory and superior in attention skills. During informal assessment, functional math word problems=67% without cues and deductive reasoning assessment with word deductions=70% without cues.  Recommend assessment of higher level cognitive communication skills.  -KA       SLP Evaluation Clinical Impressions    SLP Diagnosis Mild cognitive communication impairment  -KA    Rehab Potential/Prognosis good  -KA    SLC Criteria for Skilled Therapy Interventions Met yes  -KA    Functional Impact restrictions in personal and social life;difficulty in expressing complex messages;difficulty completing home management task;difficulty completing vocational tasks  -KA       Recommendations    Therapy Frequency (SLP SLC) 2 days per week  -KA    Predicted Duration Therapy Intervention (Days) until discharge  -KA    Anticipated Discharge Disposition (SLP) home with assist  -KA          User Key  (r) = Recorded By, (t) = Taken By, (c) = Cosigned By    Initials Name Provider Type    Taqueria Pop MA,CCC-SLP Speech and Language Pathologist                                OP SLP Education     Row Name 07/14/22 5146       Education    Barriers to Learning No barriers identified  -KA    Education Provided Described results of evaluation;Patient expressed understanding of evaluation  -KA    Assessed Learning needs;Learning motivation  -KA    Learning Motivation Strong  -KA    Learning Method  Explanation  -KA    Teaching Response Verbalized understanding  -KA          User Key  (r) = Recorded By, (t) = Taken By, (c) = Cosigned By    Initials Name Effective Dates    KA Taqueria Olvera MA,CCC-SLP 06/02/22 -                SLP OP Goals     Row Name 07/14/22 1400          Goal Type Needed    Goal Type Needed Written Language Comprehension;Probelm Solving;Verbal Expression;Memory  -KA            Written Language Comprehension Goals    Written Language Comprehension LTG's Patient will be able to comprehend written material in social/avocational/work setting  -KA     Patient will be able to comprehend written material in social/avocational/work setting 90%:;without cues  -KA     Status: Patient will be able to comprehend written material in social/avocational/work setting New  -KA     Written Language Comprehension STG's Patient will improve comprehension of written language skills by answering written questions related to home management/social/work tasks (bills, recipes, tv guide, emails, procedures)  -KA     Patient will improve comprehension of written language skills by answering written questions related to home management/social/work tasks (bills, recipes, tv guide, emails, procedures) 90%:;without cues  -KA     Status: Patient will improve comprehension of written language skills by answering written questions related to home management/social/work tasks (bills, recipes, tv guide, emails, procedures) New  -KA            Verbal Expression Goals    Verbal Expression LTG's Patient will be able to use verbal expressive language skills to communicate effectively in all situations with unfamiliar listener  -KA     Patient will be able to use verbal expressive language skills to communicate effectively in all situations with unfamiliar listener 90%:;without cues  -KA     Status: Patient will be able to use verbal expressive language skills to communicate effectively in all situations with unfamiliar listener  New  -KA     Verbal Expression STG's Patient will improve verbal expressive language skills by completing divergent naming tasks;Patient will improve verbal expressive language skills by expressing complex concepts and ideas  -KA     Patient will improve verbal expressive language skills by completing divergent naming tasks 90%:;without cues  -KA     Status: Patient will improve verbal expressive language skills by completing divergent naming tasks New  -KA     Patient will improve verbal expressive language skills by expressing complex concepts and ideas 90%:;without cues  -KA     Status: Patient will improve verbal expressive language skills by expressing complex concepts and ideas New  -KA            Memory Goals    Memory LTG's Patient will be able to remember information needed to participate in avocational activities  -KA     Status: Patient will be able to remember information needed to participate in avocational activities New  -KA     Memory STG's Patient will demonstrate improved ability to recall information by listening to paragraph and answering yes/no questions;Patient’s memory skills will be enhanced as reported by patient by utilizing internal memory strategies to recall up to 3 pieces of information after a 5- minute delay  -KA     Patient’s memory skills will be enhanced as reported by patient by utilizing internal memory strategies to recall up to 3 pieces of information after a 5- minute delay 90%:;without cues  -KA     Status: Patient’s memory skills will be enhanced as reported by patient by utilizing internal memory strategies to recall up to 3 pieces of information after a 5- minute delay New  -KA     Patient will demonstrate improved ability to recall information by listening to paragraph and answering yes/no questions 90%:;without cues  -KA     Status: Patient will demonstrate improved ability to recall information by listening to paragraph and answering yes/no questions New  -KA             Problem Solving Goals    Problem Solving LTG's Patient will be able to engage in avocational activities requiring high level cognitive skills  -KA     Patient will be able to engage in avocational activities requiring high level cognitive skills Independently  -KA     Status: Patient will be able to engage in avocational activities requiring high level cognitive skills New  -KA     Problem Solving STG's Patient will improve ability to analyze problems and determine solutions by using reasoning skills to complete activities of independence;Patient will improve ability to analyze problems and determine solutions by completing functional math problems  -KA     Patient will improve ability to analyze problems and determine solutions by using reasoning skills to complete activities of independence 90%:;without cues  -KA     Status: Patient will improve ability to analyze problems and determine solutions by using reasoning skills to complete activities of independence New  -KA     Patient will improve ability to analyze problems and determine solutions by completing functional math problems 90%:;without cues  -KA     Status: Patient will improve ability to analyze problems and determine solutions by completing functional math problems New  -KA           User Key  (r) = Recorded By, (t) = Taken By, (c) = Cosigned By    Initials Name Provider Type    Taqueria Pop MA,CCC-SLP Speech and Language Pathologist              SLP Outcome Measures (last 72 hours)     SLP Outcome Measures     Row Name 07/14/22 1400             SLP Outcome Measures    Outcome Measure Used? Adult NOMS  -KA              Adult FCM Scores    FCM Chosen Memory  -KA      Swallowing FCM Score 5  -KA            User Key  (r) = Recorded By, (t) = Taken By, (c) = Cosigned By    Initials Name Effective Dates    Taqueria Pop MA,CCC-SLP 06/02/22 -                  11;30 to 12:30, 13:45 to 14:00  Time Calculation:   SLP Start Time: 0930  SLP Stop Time:  1015  SLP Time Calculation (min): 45 min  Timed Charges  96125-Standardized cognitive performance testin  Total Minutes  Timed Charges Total Minutes: 120   Total Minutes: 120    Therapy Charges for Today     Code Description Service Date Service Provider Modifiers Qty    [id number] [facility] STD COG PERF TEST PER HOUR 2022 Taqueria Olvera MA,CCC-SLP GN 2                   Taqueria Olvera MA,CCC-SLP  2022

## 2022-07-14 NOTE — PROGRESS NOTES
PPS CMG Coordinator  Inpatient Rehabilitation Admission    Ethnic Group: White.  Marital Status:  Marital Status: .    IRF Admission Date:  07/02/2022  Admission Class: Initial Rehab.  Admit From:  [facility]    Pre-Hospital Living: Home. Pre-Hospital Living  With: (2) Family/Relatives.    Payment Sources: Primary: Medicare - Medicare Advantage  Secondary: Not Listed.  Impairment Group: 01.2 Right Body Involvement (Left Brain)  Date of Onset of Impairment: 06/29/2022    Etiologic Diagnosis Code(s):  Rank Code      Description  1    I63.9     Cerebral infarction, unspecified    Comorbidities:  ICD    Are there any arthritis conditions recorded for Impairment Group, Etiologic  Diagnosis, or Comorbid Conditions that meet all of the regulatory requirements  for IRF classification (in 42 .29(b)(2)(x), (xi), and xii))? No    Presence of Pressure Ulcer:  No observed/documented pressure ulcers.    MEDICAL NEEDS  Height on Admission:  62.99 inches.  Weight on Admission:  113 pounds.    QUALITY INDICATORS  Prior Functioning:  Self Care: Patient completed the activities by him/herself, with or without an  assistive device, with no assistance from a helper.  Indoor Mobility: Patient completed the activities by him/herself, with or  without an assistive device, with no assistance from a helper.  Stairs: Patient completed the activities by him/herself, with or without an  assistive device, with no assistance from a helper.  Functional Cognition: Patient completed the activities by him/herself, with or  without an assistive device, with no assistance from a helper.  Prior Device Use: Patient does not use manual or motorized wheelchair or  scooter, mechanical lift, walker, or an orthotic/prosthesis.    Bladder and Bowel: Bladder Continence: Always continent (no documented  incontinence).  Bowel Continence: Not rated (patient had an ostomy or did not have a bowel  movement for the entire 3  days).  Swallowing/Nutritional Status: Regular food (solids and liquids swallowed safely  without supervision or modified food or liquid consistency).  Special Conditions: Patient did not receive total parenteral nutrition treatment  at the time of admission.    Section I. Active Diagnosis: Comorbidities and Co-existing Conditions:  Diabetes Mellitus (DM) - e.g., diabetic retinopathy, nephropathy, and  neuropathy).  Section J. Health Conditions: Patient has not had any falls in the past year.  Patient has not had major surgery during the 100 days prior to admission.  Section M. Skin Conditions  Unhealed Pressure Ulcer/Injuries at Stage 1 or  Higher on Admission:  No.  Section N. Medication:  Potential Clinically Significant Medication Issues: No issues found during  review    Signed by: Renee Ribera RN

## 2022-07-14 NOTE — PROGRESS NOTES
PPS CMG Coordinator  Inpatient Rehabilitation Discharge    Mode of Locomotion: Walking.    Discharge Against Medical Advice:  No.  Discharge Information  Patient Discharged Alive:  Yes  Discharge Destination/Living Setting: Home.  At discharge, the patient was discharged to live (with) (02)  Family / Relatives    Diagnosis for Interruption/Death: ICD    Impairment Group: Stroke: 01.2 Right Body Involvement (Left Brain)    Comorbidities: ICD    Complications: ICD    QUALITY INDICATORS  Section J Health Conditions: Fall(s) Since Admission:  No    Section M. Skin Conditions Discharge:  Unhealed Pressure Ulcer(s) at Stage 1 or  Higher:  No    . Current Number of Unhealed Pressure Ulcers  Branch    Section N. Medication:  Medication Intervention: N/A - There were no potential clinically significant  medication issues identified since admission or patient is not taking any  medications.    Signed by: Renee Ribera RN

## 2022-07-14 NOTE — THERAPY EVALUATION
.Outpatient Physical Therapy Neuro Initial Evaluation  [facility]     Patient Name: Maryellen Crump  : 1950  MRN: [id number]  Today's Date: 2022      Visit Date: 2022    Patient Active Problem List   Diagnosis   • Family history of colon cancer   • Iron deficiency anemia   • Diarrhea of presumed infectious origin   • Type 2 diabetes mellitus with kidney complication, without long-term current use of insulin (HCC)   • Hypertension   • Hyperlipidemia   • Stroke-like symptoms   • Acute CVA (cerebrovascular accident) (HCC)   • Acute lacunar stroke (HCC)   • Vitamin B12 deficiency   • CVA (cerebral vascular accident) (HCC)   • CKD (chronic kidney disease) stage 3, GFR 30-59 ml/min (HCC)        Past Medical History:   Diagnosis Date   • Diabetes mellitus (HCC)    • Hyperlipidemia    • Hypertension         Past Surgical History:   Procedure Laterality Date   • COLONOSCOPY N/A 2018    Procedure: COLONOSCOPY TO CECUM;  Surgeon: Josh Muñoz MD;  Location: [facility] ENDOSCOPY;  Service: General   • DILATION AND CURETTAGE, DIAGNOSTIC / THERAPEUTIC     • ENDOSCOPY N/A 2018    Procedure: ESOPHAGOGASTRODUODENOSCOPY WITH BIOPSIES;  Surgeon: Josh Muñoz MD;  Location: [facility] ENDOSCOPY;  Service: General   • ROTATOR CUFF REPAIR Right          Visit Dx:     ICD-10-CM ICD-9-CM   1. Cerebrovascular accident (CVA), unspecified mechanism (HCC)  I63.9 434.91   2. Acute right-sided weakness  R53.1 728.87   3. Functional gait abnormality  R26.89 781.2        Patient History     Row Name 22 1030             History    Chief Complaint Balance Problems  -LB      Date Current Problem(s) Began 22  -LB      Brief Description of Current Complaint Pt is 70 y/o female who developed right sided weakness and numbness.  Pt found to have left lacunar stroke.  -LB      Patient/Caregiver Goals Return to prior level of function  -LB      Patient/Caregiver Goals Comment I finally slept last night.  I  want to improve walking  -LB      Hand Dominance right-handed  -LB              Fall Risk Assessment    Any falls in the past year: No  -LB              Services    Prior Rehab/Home Health Experiences Yes  -LB      Where was the prior experience with Rehab/Home Health [facility]  -LB      Are you currently receiving Home Health services No  -LB      Do you plan to receive Home Health services in the near future No  -LB              Daily Activities    Primary Language English  -LB      Pt Participated in POC and Goals Yes  -LB              Safety    Are you being hurt, hit, or frightened by anyone at home or in your life? No  -LB      Are you being neglected by a caregiver No  -LB      Have you had any of the following issues with N/A  -LB            User Key  (r) = Recorded By, (t) = Taken By, (c) = Cosigned By    Initials Name Provider Type    Nora Arroyo, PT Physical Therapist                     PT Neuro     Row Name 07/14/22 1030             Subjective Comments    Subjective Comments I want to improve my balance  -LB              Precautions and Contraindications    Precautions/Limitations fall precautions  -LB              Subjective Pain    Able to rate subjective pain? yes  -LB      Pre-Treatment Pain Level 0  -LB      Post-Treatment Pain Level 0  -LB              Home Living    Current Living Arrangements home  -LB      Home Accessibility stairs to enter home;stairs within home  -LB      Number of Stairs, Main Entrance two  -LB      Stair Railings, Main Entrance none  -LB              Vision-Basic Assessment    Current Vision No visual deficits  -LB              Cognition    Overall Cognitive Status WFL  -LB      Arousal/Alertness Appropriate responses to stimuli  -LB      Memory Appears intact  -LB      Orientation Level Oriented X4  -LB      Safety Judgment Good awareness of safety precautions  -LB              Sensation    Sensation WNL? WFL  -LB              Posture/Observations     Alignment Options Forward head;Thoracic kyphosis  -LB      Forward Head Mild  -LB      Thoracic Kyphosis Mild  -LB      Posture/Observations Comments pt walked up to unit with spouse using a FWW  -LB              Coordination    Coordination Tests --  decreased heel to shin  -LB              General ROM    GENERAL ROM COMMENTS Bilateral LEs: WFL  -LB              MMT (Manual Muscle Testing)    Rt Lower Ext Rt Hip Flexion;Rt Hip ABduction;Rt Knee Extension;Rt Knee Flexion;Rt Ankle Plantarflexion;Rt Ankle Dorsiflexion  -LB      Lt Lower Ext Lt Hip WFL;Lt Knee WFL;Lt Ankle WFL  -LB              MMT Right Lower Ext    Rt Hip Flexion MMT, Gross Movement (4-/5) good minus  -LB      Rt Hip ABduction MMT, Gross Movement (3+/5) fair plus  -LB      Rt Knee Extension MMT, Gross Movement (4/5) good  -LB      Rt Knee Flexion MMT, Gross Movement (4/5) good  -LB      Rt Ankle Plantarflexion MMT, Gross Movement (4/5) good  -LB      Rt Ankle Dorsiflexion MMT, Gross Movement (4/5) good  -LB              Bed Mobility    Bed Mobility rolling left;rolling right;supine-sit-supine  -LB      Rolling Left [place] (Bed Mobility) independent  -LB      Rolling Right [place] (Bed Mobility) independent  -LB      Supine-Sit-Supine [place] (Bed Mobility) independent  -LB              Transfers    Sit-Stand [place] (Transfers) supervision;modified independence  -LB      Stand-Sit [place] (Transfers) supervision;modified independence  -LB      Transfers, Sit-Stand-Sit, Assist Device rolling walker  -LB      Transfer, Impairments impaired balance  -LB      Comment, (Transfers) 4/5 trials, pt's toes lifted off the floor due to decreased weight shift forward  -LB              Gait/Stairs (Locomotion)    [place] Level (Gait) supervision;standby assist  -LB      Assistive Device (Gait) walker, front-wheeled  -LB      Distance in Feet (Gait) 100x 2  -LB      Pattern (Gait) step-through  -LB      Deviations/Abnormal Patterns  (Gait) bilateral deviations;base of support, narrow;gait speed decreased;stride length decreased  -LB      Right Sided Gait Deviations heel strike decreased  -LB      [place] Level (Stairs) contact guard  -LB      Handrail Location (Stairs) both sides  -LB      Number of Steps (Stairs) 4  -LB      Ascending Technique (Stairs) step-over-step  -LB      Descending Technique (Stairs) step-over-step  -LB              Curb Negotiation (Mobility)    [place], Curb Negotiation verbal cues;contact guard  -LB      Assistive Device (Curb Negotiation) walker, front-wheeled  -LB              Balance Skills Training    Sitting-Level of Assistance Independent  -LB      Standing-Level of Assistance Contact guard;Close supervision  -LB      Balance Comments see FGA and DAMON  -LB            User Key  (r) = Recorded By, (t) = Taken By, (c) = Cosigned By    Initials Name Provider Type    Nora Arroyo, PT Physical Therapist                        Therapy Education  Education Details: Advised pt does not need CGA or close supervision when walking with rolling walker, distant supervision  Given: Mobility training  Program: New  How Provided: Verbal  Provided to: Patient, Caregiver  Level of Understanding: Verbalized, Teach back education performed     PT OP Goals     Row Name 07/14/22 1100          PT Short Term Goals    STG Date to Achieve 08/11/22  -LB     STG 1 Pt will be indep with HEP for strengthening to maintain progress in therapy  -LB     STG 2 Pt will come to stand from various chairs Independently with increased weight shift forward  -LB     STG 3 Pt will ambulate 200 ft with least restrictive device and SBA  -LB     STG 4 Pt will negogiate 4 steps with 1 HR, step over pattern and SBA  -LB     STG 5 Pt will perform floor transfer with Mod indep  -LB            Long Term Goals    LTG Date to Achieve 09/08/22  -LB     LTG 1 Pt will be indep with HEP for balance to maintain progress in therapy  -LB     LTG 2 Pt will  improve FGA to to indicate less risk for falls  -LB     LTG 3 Pt will improve DAMON to to indicate less risk for falls and demonstrate improved balance.  -LB     LTG 4 Pt will negogiate a flight of stairs step over pattern  Mod Indep  -LB     LTG 5 Pt will ambulate 300 ft with AAD if needed Indep  -LB     LTG 6 Pt will perform semi-tandem for 30 secs  -LB     LTG 7 Pt will ambulate over uneven surfaces with Mod Indep  -LB            Time Calculation    PT Goal Re-Cert Due Date 08/11/22  -LB           User Key  (r) = Recorded By, (t) = Taken By, (c) = Cosigned By    Initials Name Provider Type    LB Nora Guillory, PT Physical Therapist                 PT Assessment/Plan     Row Name 07/14/22 1200          PT Assessment    Functional Limitations Decreased safety during functional activities;Impaired gait;Limitation in home management;Limitations in community activities;Performance in leisure activities;Performance in self-care ADL  -LB     Impairments Balance;Coordination;Endurance;Gait;Muscle strength  -LB     Assessment Comments Pt is pleasant 72 y/o female who sustained a stroke last month resulting in right sided weakness more proximally than distallly, impaired balance, and endurance level. The patient has history of DM and CKD but both appear to be stable.  The patient has impaired balance and is at risk for falls as indicated by her scores on FGA and DAMON balance tests.  Currently, the patient was advided to continue to use her rolling walker at home for safety and patient and spouse agreeable as well as verbalized understanding.  The patient was very active prior to her CVA and is very motivated to hopefully return to her baseline.  Anticipate that the patient will progress to no AD in the home setting and possible AD in the community setting  -LB     Please refer to paper survey for additional self-reported information Yes  -LB     Rehab Potential Good  -LB     Patient/caregiver participated in establishment  of treatment plan and goals Yes  -LB     Patient would benefit from skilled therapy intervention Yes  -LB            PT Plan    PT Frequency 2x/week  -LB     Predicted Duration of Therapy Intervention (PT) 8 weeks  -LB     Planned CPT's? PT EVAL LOW COMPLEXITY: 87136;PT THER PROC EA 15 MIN: 63010;PT THER ACT EA 15 MIN: 34950;PT NEUROMUSC RE-EDUCATION EA 15 MIN: 87585;PT GAIT TRAINING EA 15 MIN: 85842  -LB     Physical Therapy Interventions (Optional Details) balance training;gait training;home exercise program;neuromuscular re-education;motor coordination training;patient/family education;stair training;strengthening;transfer training  -LB           User Key  (r) = Recorded By, (t) = Taken By, (c) = Cosigned By    Initials Name Provider Type    Nora Arroyo PT Physical Therapist                    OP Exercises     Row Name 07/14/22 1030             Subjective Comments    Subjective Comments I want to improve my balance  -LB              Subjective Pain    Able to rate subjective pain? yes  -LB      Pre-Treatment Pain Level 0  -LB      Post-Treatment Pain Level 0  -LB            User Key  (r) = Recorded By, (t) = Taken By, (c) = Cosigned By    Initials Name Provider Type    Nora Arroyo, PT Physical Therapist                            Outcome Measure Options: Nagy Balance, FGA (Functional Gait Assessment)  Nagy Balance Scale  Sitting to Standing: able to stand without using hands and stabilize independently  Standing Unsupported: able to stand safely for 2 minutes  Sitting with Back Unsupported but Feet Supported on Floor or on Stool: able to sit safely and securely for 2 minutes  Standing to Sitting: controls descent by using hands  Transfers: able to transfer safely definite need of hands  Standing Unsupported with Eyes Closed: able to stand 10 seconds with supervision  Standing Unsupported with Feet Together: able to place feet together independently and stand 1 minute with supervision  Reaching Forward  with Outstretched Arm While Standing: can reach forward 5 cm (2 inches)   Object From the Floor From a Standing Position: able to  object but needs supervision  Turning to Look Behind Over Left and Right Shoulders While Standing: looks behind one side only other side shows less weight shift  Turn 360 Degrees: needs close supervision or verbal cuing  Place Alternate Foot on Step or Stool While Standing Unsupported: able to complete > 2 steps needs minimal assist  Standing Unsupported with One Foot in Front: needs help to step but can hold 15 seconds  Standing on One Leg: unable to try of needs assist to prevent fall  Nagy Total Score: 35  Nagy Comments: 35/56  Functional Gait Assessment (FGA)  Gait Level Surface: Moderate Impairment  Change in Gait Speed: Mild Impairment  Gait with Horizontal Head Turns: Moderate Impairment  Gait with Vertical Head Turns: Mild Impairment  Gait and Pivot Turn: Mild Impairment  Step Over Obstacle: Moderate Impairment  Gait with Narrow Base of Support: Severe Impairment  Gait with Eyes Closed: Mild Impairment  Ambulating Backwards: Moderate Impairment  Steps: Mild Impairment  FGA Total Score: 14    Time Calculation:   Start Time: 1015  Stop Time: 1100  Time Calculation (min): 45 min  Untimed Charges  PT Eval/Re-eval Minutes: 45  Total Minutes  Untimed Charges Total Minutes: 45   Total Minutes: 45   Therapy Charges for Today     Code Description Service Date Service Provider Modifiers Qty    59975350938 HC PT EVAL LOW COMPLEXITY 3 7/14/2022 Nora Guillory, PT GP 1          PT G-Codes  Outcome Measure Options: Nagy Balance, FGA (Functional Gait Assessment)  Nagy Total Score: 35  FGA Total Score: 14     Patient was wearing a face mask during this therapy encounter. Therapist used appropriate personal protective equipment including mask and gloves.  Mask used was standard procedure mask. Appropriate PPE was worn during the entire therapy session. Hand hygiene was completed  before and after therapy session. Patient is not in enhanced droplet precautions.         Nora Guillory, PT  7/14/2022

## 2022-07-14 NOTE — THERAPY EVALUATION
Outpatient Occupational Therapy Neuro Initial Evaluation  [facility]     Patient Name: Maryellen Crump  : 1950  MRN: [id number]  Today's Date: 2022      Visit Date: 2022    Patient Active Problem List   Diagnosis   • Family history of colon cancer   • Iron deficiency anemia   • Diarrhea of presumed infectious origin   • Type 2 diabetes mellitus with kidney complication, without long-term current use of insulin (HCC)   • Hypertension   • Hyperlipidemia   • Stroke-like symptoms   • Acute CVA (cerebrovascular accident) (HCC)   • Acute lacunar stroke (HCC)   • Vitamin B12 deficiency   • CVA (cerebral vascular accident) (HCC)   • CKD (chronic kidney disease) stage 3, GFR 30-59 ml/min (HCC)        Past Medical History:   Diagnosis Date   • Diabetes mellitus (HCC)    • Hyperlipidemia    • Hypertension         Past Surgical History:   Procedure Laterality Date   • COLONOSCOPY N/A 2018    Procedure: COLONOSCOPY TO CECUM;  Surgeon: Josh Muñoz MD;  Location: [facility] ENDOSCOPY;  Service: General   • DILATION AND CURETTAGE, DIAGNOSTIC / THERAPEUTIC     • ENDOSCOPY N/A 2018    Procedure: ESOPHAGOGASTRODUODENOSCOPY WITH BIOPSIES;  Surgeon: Josh Muñoz MD;  Location: [facility] ENDOSCOPY;  Service: General   • ROTATOR CUFF REPAIR Right          Visit Dx:      ICD-10-CM ICD-9-CM   1. Cerebrovascular accident (CVA), unspecified mechanism (HCC)  I63.9 434.91   2. Weakness of right arm  R29.898 729.89   3. Loss of coordination  R27.8 781.3        Patient History     Row Name 22 1321             History    Chief Complaint Difficulty with daily activities;Muscle weakness  -SG      Date Current Problem(s) Began 22  -SG      Brief Description of Current Complaint Pt is 72 y/o female who developed right sided weakness and numbness.  Pt found to have left lacunar stroke.  -SG      Patient/Caregiver Goals Return to prior level of function  -SG      Patient/Caregiver Goals Comment Improve RUE  function  -SG      Hand Dominance right-handed  -SG              Pain     Pain at Present 0  -SG              Fall Risk Assessment    Any falls in the past year: No  -SG              Services    Prior Rehab/Home Health Experiences Yes  -SG      Where was the prior experience with Rehab/Home Health [facility]  -SG      Are you currently receiving Home Health services No  -SG      Do you plan to receive Home Health services in the near future No  -SG              Daily Activities    Primary Language English  -SG      Pt Participated in POC and Goals Yes  -SG              Safety    Are you being hurt, hit, or frightened by anyone at home or in your life? No  -SG      Are you being neglected by a caregiver No  -SG      Have you had any of the following issues with N/A  -SG            User Key  (r) = Recorded By, (t) = Taken By, (c) = Cosigned By    Initials Name Provider Type    Shira Link OTR Occupational Therapist                 OT Neuro     Row Name 07/14/22 1100             Subjective Pain    Able to rate subjective pain? yes  -SG      Pre-Treatment Pain Level 0  -SG              Vision - Complex Assessment    Ocular Range of Motion WFL  -SG              Cognitive Assessment/Intervention    Cognition Comments Going to speech therapy for cognition and memory  -SG              Sensation    Light Touch No apparent deficits  -SG      Sharp/Dull No apparent deficits  -SG              General ROM    RT Upper Ext Rt Shoulder Flexion;Rt Shoulder ABduction;Rt Shoulder External Rotation;Rt Shoulder Internal Rotation;Rt Elbow Extension/Flexion;Rt Elbow Supination;Rt Elbow Pronation;Rt Wrist Flexion;Rt Wrist Extension  -SG              Right Upper Ext    Rt Upper Extremity Comments  WFL  -SG              MMT (Manual Muscle Testing)    Rt Upper Ext Rt Shoulder Flexion;Rt Shoulder Extension;Rt Shoulder ABduction;Rt Shoulder ADduction;Rt Shoulder Internal Rotation;Rt Shoulder External Rotation;Rt Elbow  Flexion;Rt Elbow Extension;Rt Forearm Supination;Rt Forearm Pronation;Rt Wrist Flexion;Rt Wrist Extension  -SG              MMT Right Upper Ext    Rt Shoulder Flexion MMT, Gross Movement (4-/5) good minus  -SG      Rt Shoulder Extension MMT, Gross Movement (3+/5) fair plus  -SG      Rt Shoulder ABduction MMT, Gross Movement (3+/5) fair plus  -SG      Rt Shoulder ADduction MMT, Gross Movement (3+/5) fair plus  -SG      Rt Shoulder Internal Rotation MMT, Gross Movement (3/5) fair  -SG      Rt Shoulder External Rotation MMT, Gross Movement (3+/5) fair plus  -SG      Rt Elbow Flexion MMT, Gross Movement: (3+/5) fair plus  -SG      Rt Elbow Extension MMT, Gross Movement: (3+/5) fair plus  -SG      Rt Forearm Supination MMT, Gross Movement (3/5) fair  -SG      Rt Forearm Pronation MMT, Gross Movement (3/5) fair  -SG      Rt Wrist Flexion MMT, Gross Movement (3/5) fair  -SG      Rt Wrist Extension MMT, Gross Movement (3+/5) fair plus  -SG              ADL Assessment/Intervention    Comment, IADL Assessment/Training Spouse providing CGA for ADLs at home  -SG            User Key  (r) = Recorded By, (t) = Taken By, (c) = Cosigned By    Initials Name Provider Type    Shira Link OTR Occupational Therapist                Hand Therapy (last 24 hours)     Hand Eval     Row Name 07/14/22 1100             Hand  Strength     Strength Affected Side Bilateral  -SG               Strength Right    # Reps 3  -SG      Right Rung 2  -SG      Right  Test 1 33  -SG      Right  Test 2 36  -SG      Right  Test 3 35  -SG       Strength Average Right 34.67  -SG               Strength Left    # Reps 3  -SG      Left Rung 2  -SG      Left  Test 1 35  -SG      Left  Test 2 40  -SG      Left  Test 3 50  -SG       Strength Average Left 41.67  -SG              Pinch Strength    Affected Side Bilateral  -SG              Right Hand Strength - Pinch (lbs)    Lateral 12 lbs  -SG      Tip (2 point) 7  lbs  -SG              Left Hand Strength - Pinch (lbs)    Lateral 15 lbs  -SG      Tip (2 point) 10 lbs  -SG            User Key  (r) = Recorded By, (t) = Taken By, (c) = Cosigned By    Initials Name Provider Type    Shira Link OTR Occupational Therapist                    Therapy Education  Education Details: Dicuss OT role and goals, encouraged SBA while getting into the shower for the first time  Given: Fall prevention and home safety  Program: New  How Provided: Verbal  Provided to: Patient  Level of Understanding: Verbalized     OT Goals     Row Name 07/14/22 1300          OT Short Term Goals    STG Date to Achieve 08/11/22  -SG     STG 1 Pt. to increase  strength to 40# to increase independence with functional tasks.  -SG     STG 2 Pt. to be (I) with strengthening HEP for theraband and DB to improve overall strength and endurance for functional tasks.  -SG     STG 3 Patient will be instructed in RUE prewriting exercises to increase RUE writing legibility and control  -SG            Long Term Goals    LTG Date to Achieve 09/08/22  -SG     LTG 1 Pt. to increase L 2 point pinch to 10 to increase independence with fine motor skills.  -SG     LTG 2 Pt to increase overall RUE strength to 4/5.  -SG     LTG 3 Pt will increase box and blocks assessment score to 55 blocks with the RUE to show improvement in gross motor coordination.  -SG     LTG 4 Patient to improve RUE 9 hole peg test score to 28 sec to demonstrate increased independence with fine motor tasks.  -SG            Time Calculation    OT Goal Re-Cert Due Date 08/13/22  -SG           User Key  (r) = Recorded By, (t) = Taken By, (c) = Cosigned By    Initials Name Provider Type    Shira Link OTR Occupational Therapist                        OT Assessment/Plan     Row Name 07/14/22 1100          OT Assessment    Functional Limitations Limitations in functional capacity and performance;Performance in leisure activities;Limitation in home  management;Limitations in community activities;Performance in self-care ADL  -SG     Impairments Coordination;Dexterity;Muscle strength  -SG     Assessment Comments Pt is a 70 y/o female referred to occupational therapy for RUE weakness following CVA. Pt presents with evolving clinical presentation, along with comorbidities of DM, HTN, hx CVA, and CKD that may impact RUE progress in the plan of care. Pt lives at home with her spouse. Pt presents today with RUE weakness, decreased coordination, and decreased strength. Pt demonstrates good independence with ADLs and is at a supervision level. She is having difficulty with ADL tasks such as curling her hair, using R hand to feed herself and bring a glass to her mouth, and has not showered at home yet. Pt signs and symptoms are consistent with referring diagnosis. The previous impairments limit independence with ADL/IADL tasks. Pt will benefit from skilled OP OT to address the previous impairments and return to PLOF.  -SG     OT Rehab Potential Good  -SG     Patient/caregiver participated in establishment of treatment plan and goals Yes  -SG     Patient would benefit from skilled therapy intervention Yes  -SG            OT Plan    OT Frequency 2x/week  -SG     Predicted Duration of Therapy Intervention (OT) 2X/WEEK FOR 8 WEEKS  -SG     Planned CPT's? OT EVAL MOD COMPLEXITY: 16949;OT THER ACT EA 15 MIN: 73263EP;OT THER PROC EA 15 MIN: 37973UF;OT NEUROMUSC RE EDUCATION EA 15 MIN: 99723;OT SELF CARE/MGMT/TRAIN 15 MIN: 98798  -SG     Planned Therapy Interventions (Optional Details) home exercise program;motor coordination training;stretching;strengthening;postural re-education;patient/family education;neuromuscular re-education  -SG           User Key  (r) = Recorded By, (t) = Taken By, (c) = Cosigned By    Initials Name Provider Type    Shira Link OTR Occupational Therapist               OT Exercises     Row Name 07/14/22 1100             Exercise 1    Exercise  Name 1 Isolated shoulder flexion  -SG      Cueing 1 Verbal;Tactile;Demo;Auditory  -SG      Equipment 1 Theraband  -SG      Resistance 1 Yellow  -SG      Sets 1 3  -SG      Reps 1 10  -SG              Exercise 2    Exercise Name 2 Shoulder abduction with self anchor  -SG      Cueing 2 Verbal;Tactile;Demo;Auditory  -SG      Equipment 2 Theraband  -SG      Resistance 2 Yellow  -SG      Sets 2 3  -SG      Reps 2 10  -SG              Exercise 3    Exercise Name 3 Seated protraction punches  -SG      Cueing 3 Verbal;Tactile;Demo;Auditory  -SG      Equipment 3 Theraband  -SG      Resistance 3 Yellow  -SG      Sets 3 3  -SG      Reps 3 10  -SG            User Key  (r) = Recorded By, (t) = Taken By, (c) = Cosigned By    Initials Name Provider Type    Shira Link OTR Occupational Therapist                  Outcome Measure Options: 9 Hole Peg, Box and Blocks, Quick DASH  9 Hole Peg  9-Hole Peg Left: 23  9-Hole Peg Right: 36  Box and Blocks  Box and Blocks Left: 55  Box and Blocks Right: 46         Time Calculation:   OT Start Time: 1101  OT Stop Time: 1145  OT Time Calculation (min): 44 min  Total Timed Code Minutes- OT: 20 minute(s)  Timed Charges  57489 - OT Therapeutic Exercise Minutes: 20  Untimed Charges  OT Eval/Re-eval Minutes: 25  Total Minutes  Timed Charges Total Minutes: 20  Untimed Charges Total Minutes: 25   Total Minutes: 45     Therapy Charges for Today     Code Description Service Date Service Provider Modifiers Qty    61473786423 HC OT THER PROC EA 15 MIN 7/14/2022 Shira Wong OTR GO 1    22783542886 HC OT EVAL MOD COMPLEXITY 2 7/14/2022 Shira Wong OTR GO 1                     RICH Day  7/14/2022

## 2022-07-19 ENCOUNTER — HOSPITAL ENCOUNTER (OUTPATIENT)
Dept: SPEECH THERAPY | Facility: HOSPITAL | Age: 72
Setting detail: THERAPIES SERIES
Discharge: HOME | End: 2022-07-19

## 2022-07-19 ENCOUNTER — HOSPITAL ENCOUNTER (OUTPATIENT)
Dept: PHYSICAL THERAPY | Facility: HOSPITAL | Age: 72
Setting detail: THERAPIES SERIES
Discharge: HOME | End: 2022-07-19

## 2022-07-19 ENCOUNTER — HOSPITAL ENCOUNTER (OUTPATIENT)
Dept: OCCUPATIONAL THERAPY | Facility: HOSPITAL | Age: 72
Setting detail: THERAPIES SERIES
Discharge: HOME | End: 2022-07-19

## 2022-07-19 ENCOUNTER — LAB (OUTPATIENT)
Dept: LAB | Facility: HOSPITAL | Age: 72
End: 2022-07-19

## 2022-07-19 DIAGNOSIS — I63.9 CEREBROVASCULAR ACCIDENT (CVA), UNSPECIFIED MECHANISM: ICD-10-CM

## 2022-07-19 DIAGNOSIS — I63.9 CEREBROVASCULAR ACCIDENT (CVA), UNSPECIFIED MECHANISM: Primary | ICD-10-CM

## 2022-07-19 DIAGNOSIS — R26.89 FUNCTIONAL GAIT ABNORMALITY: ICD-10-CM

## 2022-07-19 DIAGNOSIS — R29.898 WEAKNESS OF RIGHT ARM: ICD-10-CM

## 2022-07-19 DIAGNOSIS — R27.8 LOSS OF COORDINATION: ICD-10-CM

## 2022-07-19 DIAGNOSIS — R41.841 COGNITIVE COMMUNICATION DEFICIT: ICD-10-CM

## 2022-07-19 LAB
ALBUMIN SERPL-MCNC: 4.5 G/DL (ref 3.5–5.2)
ALBUMIN/GLOB SERPL: 1.9 G/DL
ALP SERPL-CCNC: 104 U/L (ref 39–117)
ALT SERPL W P-5'-P-CCNC: 28 U/L (ref 1–33)
ANION GAP SERPL CALCULATED.3IONS-SCNC: 15.1 MMOL/L (ref 5–15)
AST SERPL-CCNC: 26 U/L (ref 1–32)
BILIRUB SERPL-MCNC: 0.4 MG/DL (ref 0–1.2)
BUN SERPL-MCNC: 21 MG/DL (ref 8–23)
BUN/CREAT SERPL: 16.4 (ref 7–25)
CALCIUM SPEC-SCNC: 10.4 MG/DL (ref 8.6–10.5)
CHLORIDE SERPL-SCNC: 98 MMOL/L (ref 98–107)
CHOLEST SERPL-MCNC: 118 MG/DL (ref 0–200)
CO2 SERPL-SCNC: 23.9 MMOL/L (ref 22–29)
CREAT SERPL-MCNC: 1.28 MG/DL (ref 0.57–1)
EGFRCR SERPLBLD CKD-EPI 2021: 44.6 ML/MIN/1.73
GLOBULIN UR ELPH-MCNC: 2.4 GM/DL
GLUCOSE SERPL-MCNC: 186 MG/DL (ref 65–99)
HDLC SERPL-MCNC: 43 MG/DL (ref 40–60)
LDLC SERPL CALC-MCNC: 49 MG/DL (ref 0–100)
LDLC/HDLC SERPL: 1.02 {RATIO}
POTASSIUM SERPL-SCNC: 4.4 MMOL/L (ref 3.5–5.2)
PROT SERPL-MCNC: 6.9 G/DL (ref 6–8.5)
SODIUM SERPL-SCNC: 137 MMOL/L (ref 136–145)
TRIGL SERPL-MCNC: 155 MG/DL (ref 0–150)
VLDLC SERPL-MCNC: 26 MG/DL (ref 5–40)

## 2022-07-19 PROCEDURE — 97130 THER IVNTJ EA ADDL 15 MIN: CPT | Performed by: SPEECH-LANGUAGE PATHOLOGIST

## 2022-07-19 PROCEDURE — 80053 COMPREHEN METABOLIC PANEL: CPT

## 2022-07-19 PROCEDURE — 97112 NEUROMUSCULAR REEDUCATION: CPT

## 2022-07-19 PROCEDURE — 80061 LIPID PANEL: CPT

## 2022-07-19 PROCEDURE — 97110 THERAPEUTIC EXERCISES: CPT

## 2022-07-19 PROCEDURE — 97129 THER IVNTJ 1ST 15 MIN: CPT | Performed by: SPEECH-LANGUAGE PATHOLOGIST

## 2022-07-19 PROCEDURE — 97530 THERAPEUTIC ACTIVITIES: CPT | Performed by: OCCUPATIONAL THERAPIST

## 2022-07-19 PROCEDURE — 97110 THERAPEUTIC EXERCISES: CPT | Performed by: OCCUPATIONAL THERAPIST

## 2022-07-19 PROCEDURE — 36415 COLL VENOUS BLD VENIPUNCTURE: CPT

## 2022-07-19 NOTE — THERAPY TREATMENT NOTE
Outpatient Speech Language Pathology   Adult Speech Language Cognitive Treatment Note  [facility]     Patient Name: Maryellen Crump  : 1950  MRN: [id number]  Today's Date: 2022         Visit Date: 2022   Patient Active Problem List   Diagnosis   • Family history of colon cancer   • Iron deficiency anemia   • Diarrhea of presumed infectious origin   • Type 2 diabetes mellitus with kidney complication, without long-term current use of insulin (HCC)   • Hypertension   • Hyperlipidemia   • Stroke-like symptoms   • Acute CVA (cerebrovascular accident) (HCC)   • Acute lacunar stroke (HCC)   • Vitamin B12 deficiency   • CVA (cerebral vascular accident) (HCC)   • CKD (chronic kidney disease) stage 3, GFR 30-59 ml/min ([facility])          Visit Dx:    ICD-10-CM ICD-9-CM   1. Cerebrovascular accident (CVA), unspecified mechanism (HCC)  I63.9 434.91   2. Cognitive communication deficit  R41.841 799.52                              SLP OP Goals     Row Name 22 1600          Subjective Comments    Subjective Comments Patient is pleasant and cooperative  -KA            Written Language Comprehension Goals    Patient will improve comprehension of written language skills by answering written questions related to home management/social/work tasks (bills, recipes, tv guide, emails, procedures) 90%:;without cues  -KA     Status: Patient will improve comprehension of written language skills by answering written questions related to home management/social/work tasks (bills, recipes, tv guide, emails, procedures) New  -KA     Comments: Patient will improve comprehension of written language skills by answering written questions related to home management/social/work tasks (bills, recipes, tv guide, emails, procedures) Attention to detail on itinerary task requiring time calculations 80% without cues. Following written directions 100% without cues  -KA            Memory Goals    Patient’s memory skills will be  enhanced as reported by patient by utilizing internal memory strategies to recall up to 3 pieces of information after a 5- minute delay 90%:;without cues  -KA     Status: Patient’s memory skills will be enhanced as reported by patient by utilizing internal memory strategies to recall up to 3 pieces of information after a 5- minute delay New  -KA     Comments: Patient’s memory skills will be enhanced as reported by patient by utilizing internal memory strategies to recall up to 3 pieces of information after a 5- minute delay delayed recall of three unrelated words after 15 minute delay 100% without cues  -KA            Problem Solving Goals    Patient will improve ability to analyze problems and determine solutions by using reasoning skills to complete activities of independence 90%:;without cues  -KA     Status: Patient will improve ability to analyze problems and determine solutions by using reasoning skills to complete activities of independence New  -KA     Comments: Patient will improve ability to analyze problems and determine solutions by using reasoning skills to complete activities of independence 80% without cues with moderate complexity deductive reasoning puzzle  -KA     Patient will improve ability to analyze problems and determine solutions by completing functional math problems 90%:;without cues  -KA     Status: Patient will improve ability to analyze problems and determine solutions by completing functional math problems New  -KA     Comments: Patient will improve ability to analyze problems and determine solutions by completing functional math problems 100% with functional math word problems  -KA           User Key  (r) = Recorded By, (t) = Taken By, (c) = Cosigned By    Initials Name Provider Type    Taqueria Pop MA,CCC-SLP Speech and Language Pathologist                       Time Calculation:   SLP Start Time: 1430  SLP Stop Time: 1515  SLP Time Calculation (min): 45 min  Timed  Charges  31516-ER Dev of Cogn Skills Initial Minutes: 15  76977-AE Dev of Cogn Skills Add Minutes: 30  Total Minutes  Timed Charges Total Minutes: 45   Total Minutes: 45    Therapy Charges for Today     Code Description Service Date Service Provider Modifiers Qty    56391168993 HC ST DEV OF COGN SKILLS INITIAL 15 MIN 7/19/2022 Taqueria Olvera MA,CCC-SLP  1    29421438416 HC ST DEV OF COGN SKILLS EACH ADDT'L 15 MIN 7/19/2022 Taqueria Olvera MA,CCC-SLP  2                   Taqueria Olvera MA,CCC-SLP  7/19/2022

## 2022-07-19 NOTE — THERAPY TREATMENT NOTE
Outpatient Physical Therapy Neuro Treatment Note  [facility]     Patient Name: Maryellen Crump  : 1950  MRN: [id number]  Today's Date: 2022      Visit Date: 2022    Visit Dx:    ICD-10-CM ICD-9-CM   1. Cerebrovascular accident (CVA), unspecified mechanism (HCC)  I63.9 434.91   2. Functional gait abnormality  R26.89 781.2       Patient Active Problem List   Diagnosis   • Family history of colon cancer   • Iron deficiency anemia   • Diarrhea of presumed infectious origin   • Type 2 diabetes mellitus with kidney complication, without long-term current use of insulin (HCC)   • Hypertension   • Hyperlipidemia   • Stroke-like symptoms   • Acute CVA (cerebrovascular accident) ([facility])   • Acute lacunar stroke (HCC)   • Vitamin B12 deficiency   • CVA (cerebral vascular accident) ([facility])   • CKD (chronic kidney disease) stage 3, GFR 30-59 ml/min ([facility])            PT Neuro     Row Name 22 1341             Subjective Comments    Subjective Comments Pt states that she has been walking in home without walker as she runs into objects manuvering tight spaces  -DP              Precautions and Contraindications    Precautions/Limitations fall precautions  -DP              Subjective Pain    Able to rate subjective pain? yes  -DP      Pre-Treatment Pain Level 0  -DP      Post-Treatment Pain Level 0  -DP              Cognition    Overall Cognitive Status WFL  -DP      Arousal/Alertness Appropriate responses to stimuli  -DP      Memory Appears intact  -DP      Orientation Level Oriented X4  -DP      Safety Judgment Good awareness of safety precautions  -DP              Posture/Observations    Posture/Observations Comments pt walked up to unit with spouse using a FWW  -DP              Transfers    Sit-Stand [place] (Transfers) supervision;modified independence  -DP      Stand-Sit [place] (Transfers) supervision;modified independence  -DP      Transfers, Sit-Stand-Sit, Assist Device rolling walker   -DP              Gait/Stairs (Locomotion)    [place] Level (Gait) supervision;standby assist  -DP      Assistive Device (Gait) walker, front-wheeled  -DP      Distance in Feet (Gait) 40 feet, 100x2 while walking on grass outside on incline/decline surface  -DP      Pattern (Gait) step-through  -DP      Deviations/Abnormal Patterns (Gait) bilateral deviations;base of support, narrow;gait speed decreased;stride length decreased  -DP      Comment, (Gait/Stairs) Pt performed ambulation of grass on incline/decline surface with and without walker. Pt was CGA while walking on grass.  -DP              Balance Skills Training    06111 -  PT Neuromuscular Reeducation Minutes 30  -DP      Standing-Level of Assistance Contact guard;Close supervision  -DP      Static Standing Balance Support wilma bar  -DP      Standing-Balance Activities Weight Shift A-P;Weight Shift R-L;Feet together;Standing on foam roll;Standing on 1/2 roll  -DP      Gait Balance Activities --  pt ambulated on on flat surface weaving in and out of cones with varying distances apart without using an AD for 60 feet  -DP      Balance Comments Pt performed wt shifting A-P and L-R on blue foam square with feet apart and feet together. Pt stood on half foam with rounded part up and down with 15 seconds with normal CHEYANNE and 3 seconds with wide CHEYANNE respectively.  -DP            User Key  (r) = Recorded By, (t) = Taken By, (c) = Cosigned By    Initials Name Provider Type    Nikolay Cervantes PT Physical Therapist                         PT Assessment/Plan     Row Name 07/19/22 4218          PT Assessment    Functional Limitations Decreased safety during functional activities;Impaired gait;Limitation in home management;Limitations in community activities;Performance in leisure activities;Performance in self-care ADL  -DP     Impairments Balance;Coordination;Endurance;Gait;Muscle strength  -DP     Assessment Comments Today pt performed hip strengthening  demonstrating hip abductor strength compensation on BLE. Pt -showed decreased balance on unstable surfaces especially on half foam demonstrating posterior LOB requiring Yany to recover. Pt attempted ambulating outside today on grassy surface with and without AD at CGA from PT.  -DP     Please refer to paper survey for additional self-reported information Yes  -DP     Rehab Potential Good  -DP     Patient/caregiver participated in establishment of treatment plan and goals Yes  -DP     Patient would benefit from skilled therapy intervention Yes  -DP           User Key  (r) = Recorded By, (t) = Taken By, (c) = Cosigned By    Initials Name Provider Type    Nikolay Cervantes PT Physical Therapist                    OP Exercises     Row Name 07/19/22 1341             Subjective Comments    Subjective Comments Pt states that she has been walking in home without walker as she runs into objects manuvering tight spaces  -DP              Subjective Pain    Able to rate subjective pain? yes  -DP      Pre-Treatment Pain Level 0  -DP      Post-Treatment Pain Level 0  -DP              Total Minutes    81027 - PT Therapeutic Exercise Minutes 15  -DP      79774 -  PT Neuromuscular Reeducation Minutes 30  -DP              Exercise 1    Exercise Name 1 Standing abduction with 1.5lbs  -DP      Cueing 1 Verbal;Demo  -DP      Sets 1 2  -DP      Reps 1 10  -DP              Exercise 2    Exercise Name 2 Standing flexion w/ 1.5lbs  -DP      Cueing 2 Verbal;Demo  -DP      Sets 2 2  -DP      Reps 2 10  -DP              Exercise 3    Exercise Name 3 Mini Squats  -DP      Cueing 3 Verbal;Demo  -DP      Sets 3 1  -DP      Reps 3 10  -DP      Additional Comments using chair and mirror  -DP            User Key  (r) = Recorded By, (t) = Taken By, (c) = Cosigned By    Initials Name Provider Type    Nikolay Cervantes PT Physical Therapist                                Therapy Education  Education Details: educated pt and  that pt can go to  garden as long as the  is with her as it involves a downward grassy slope  Given: Fall prevention and home safety  Program: New  How Provided: Verbal  Provided to: Patient              Time Calculation:   Start Time: 1515  Stop Time: 1600  Time Calculation (min): 45 min  Total Timed Code Minutes- PT: 45 minute(s)  Timed Charges  85558 - PT Therapeutic Exercise Minutes: 15  28399 -  PT Neuromuscular Reeducation Minutes: 30  Total Minutes  Timed Charges Total Minutes: 45   Total Minutes: 45   Therapy Charges for Today     Code Description Service Date Service Provider Modifiers Qty    83629374285 HC PT NEUROMUSC RE EDUCATION EA 15 MIN 7/19/2022 Nikolay Sequeira, PT GP 2    09277862855 HC PT THER PROC EA 15 MIN 7/19/2022 Nikolay Sequeira, PT GP 1              Patient was wearing a face mask during this therapy encounter. Therapist used appropriate personal protective equipment including mask and gloves.  Mask used was standard procedure mask. Appropriate PPE was worn during the entire therapy session. Hand hygiene was completed before and after therapy session. Patient is not in enhanced droplet precautions.         Nikolay Sequeira PT  7/19/2022

## 2022-07-21 ENCOUNTER — HOSPITAL ENCOUNTER (OUTPATIENT)
Dept: PHYSICAL THERAPY | Facility: HOSPITAL | Age: 72
Setting detail: THERAPIES SERIES
Discharge: HOME | End: 2022-07-21

## 2022-07-21 ENCOUNTER — HOSPITAL ENCOUNTER (OUTPATIENT)
Dept: OCCUPATIONAL THERAPY | Facility: HOSPITAL | Age: 72
Setting detail: THERAPIES SERIES
Discharge: HOME | End: 2022-07-21

## 2022-07-21 ENCOUNTER — HOSPITAL ENCOUNTER (OUTPATIENT)
Dept: SPEECH THERAPY | Facility: HOSPITAL | Age: 72
Setting detail: THERAPIES SERIES
Discharge: HOME | End: 2022-07-21

## 2022-07-21 DIAGNOSIS — R26.89 FUNCTIONAL GAIT ABNORMALITY: ICD-10-CM

## 2022-07-21 DIAGNOSIS — R41.841 COGNITIVE COMMUNICATION DEFICIT: ICD-10-CM

## 2022-07-21 DIAGNOSIS — I63.9 CEREBROVASCULAR ACCIDENT (CVA), UNSPECIFIED MECHANISM: Primary | ICD-10-CM

## 2022-07-21 DIAGNOSIS — R27.8 LOSS OF COORDINATION: ICD-10-CM

## 2022-07-21 DIAGNOSIS — R53.1 ACUTE RIGHT-SIDED WEAKNESS: ICD-10-CM

## 2022-07-21 DIAGNOSIS — R29.898 WEAKNESS OF RIGHT ARM: ICD-10-CM

## 2022-07-21 PROCEDURE — 97130 THER IVNTJ EA ADDL 15 MIN: CPT | Performed by: SPEECH-LANGUAGE PATHOLOGIST

## 2022-07-21 PROCEDURE — 97530 THERAPEUTIC ACTIVITIES: CPT | Performed by: OCCUPATIONAL THERAPIST

## 2022-07-21 PROCEDURE — 97110 THERAPEUTIC EXERCISES: CPT | Performed by: OCCUPATIONAL THERAPIST

## 2022-07-21 PROCEDURE — 97110 THERAPEUTIC EXERCISES: CPT

## 2022-07-21 PROCEDURE — 97129 THER IVNTJ 1ST 15 MIN: CPT | Performed by: SPEECH-LANGUAGE PATHOLOGIST

## 2022-07-21 PROCEDURE — 97112 NEUROMUSCULAR REEDUCATION: CPT

## 2022-07-21 NOTE — THERAPY TREATMENT NOTE
Outpatient Occupational Therapy Neuro Treatment Note  [facility]     Patient Name: Maryellen Crump  : 1950  MRN: [id number]  Today's Date: 2022       Visit Date: 2022    Patient Active Problem List   Diagnosis   • Family history of colon cancer   • Iron deficiency anemia   • Diarrhea of presumed infectious origin   • Type 2 diabetes mellitus with kidney complication, without long-term current use of insulin (HCC)   • Hypertension   • Hyperlipidemia   • Stroke-like symptoms   • Acute CVA (cerebrovascular accident) (HCC)   • Acute lacunar stroke (HCC)   • Vitamin B12 deficiency   • CVA (cerebral vascular accident) (HCC)   • CKD (chronic kidney disease) stage 3, GFR 30-59 ml/min (HCC)        Past Medical History:   Diagnosis Date   • Diabetes mellitus (HCC)    • Hyperlipidemia    • Hypertension         Past Surgical History:   Procedure Laterality Date   • COLONOSCOPY N/A 2018    Procedure: COLONOSCOPY TO CECUM;  Surgeon: Josh Muñoz MD;  Location: [facility] ENDOSCOPY;  Service: General   • DILATION AND CURETTAGE, DIAGNOSTIC / THERAPEUTIC     • ENDOSCOPY N/A 2018    Procedure: ESOPHAGOGASTRODUODENOSCOPY WITH BIOPSIES;  Surgeon: Josh Muñoz MD;  Location: [facility] ENDOSCOPY;  Service: General   • ROTATOR CUFF REPAIR Right          Visit Dx:    ICD-10-CM ICD-9-CM   1. Cerebrovascular accident (CVA), unspecified mechanism (HCC)  I63.9 434.91   2. Weakness of right arm  R29.898 729.89   3. Loss of coordination  R27.8 781.3                    OT Assessment/Plan     Row Name 22 1215          OT Assessment    Assessment Comments Pt does well today with strength tasks seated at doorway. Pt able to perform weighted reaching task with RUE for multiple reps. Handwriting activity is difficult to day with repetitive movement, issued handwriting HEP to do over the weekend.  -SG           User Key  (r) = Recorded By, (t) = Taken By, (c) = Cosigned By    Initials Name Provider Type    SG  Shira Wong OTR Occupational Therapist                           Therapy Education  Education Details: Handwriting HEP, cont shoulder strength exercise at home  Given: HEP  Program: New  How Provided: Verbal  Provided to: Patient  Level of Understanding: Verbalized       OT Exercises     Row Name 07/21/22 1107             Exercise 1    Exercise Name 1 Seated row  -SG      Cueing 1 Verbal;Tactile;Demo;Auditory  -SG      Equipment 1 Theraband  -SG      Resistance 1 Yellow  -SG      Sets 1 3  -SG      Reps 1 10  -SG              Exercise 2    Exercise Name 2 Seated shoulder extension  -SG      Cueing 2 Verbal;Tactile;Demo;Auditory  -SG      Equipment 2 Theraband  -SG      Resistance 2 Yellow  -SG      Sets 2 3  -SG      Reps 2 10  -SG              Exercise 3    Exercise Name 3 RUE reaching placing cards on verticle surface, R wrist weight 1.5# for added resistance  -SG      Cueing 3 Verbal;Tactile;Demo;Auditory  -SG      Reps 3 30  -SG              Exercise 4    Exercise Name 4 3pt pinch task using clothespin to  moving object, fatigue noted with R hand after multiple reps  -SG      Cueing 4 Verbal;Tactile;Demo;Auditory  -SG              Exercise 5    Exercise Name 5 Repetitive handwriting HEP requiring smooth movements of fingers and wrist. Fatigue signifiacntly noted after 3-4 reps.  -SG      Cueing 5 Verbal;Tactile;Demo;Auditory  -SG            User Key  (r) = Recorded By, (t) = Taken By, (c) = Cosigned By    Initials Name Provider Type    SG Shira Wong OTR Occupational Therapist                            Time Calculation:   OT Start Time: 1101  OT Stop Time: 1145  OT Time Calculation (min): 44 min  Total Timed Code Minutes- OT: 44 minute(s)  Timed Charges  81721 - OT Therapeutic Exercise Minutes: 20  15672 - OT Therapeutic Activity Minutes: 24  Total Minutes  Timed Charges Total Minutes: 44   Total Minutes: 44     Therapy Charges for Today     Code Description Service Date Service Provider  Modifiers Qty    31749829409 HC OT THER PROC EA 15 MIN 7/21/2022 Shira Wong OTR GO 1    47160053961 HC OT THERAPEUTIC ACT EA 15 MIN 7/21/2022 Shira Wong OTR GO 2                    RICH Day  7/21/2022

## 2022-07-21 NOTE — THERAPY TREATMENT NOTE
Outpatient Physical Therapy Neuro Treatment Note  [facility]     Patient Name: Maryellen Crump  : 1950  MRN: [id number]  Today's Date: 2022      Visit Date: 2022    Visit Dx:    ICD-10-CM ICD-9-CM   1. Cerebrovascular accident (CVA), unspecified mechanism ([facility])  I63.9 434.91   2. Functional gait abnormality  R26.89 781.2   3. Acute right-sided weakness  R53.1 728.87       Patient Active Problem List   Diagnosis   • Family history of colon cancer   • Iron deficiency anemia   • Diarrhea of presumed infectious origin   • Type 2 diabetes mellitus with kidney complication, without long-term current use of insulin ([facility])   • Hypertension   • Hyperlipidemia   • Stroke-like symptoms   • Acute CVA (cerebrovascular accident) ([facility])   • Acute lacunar stroke ([facility])   • Vitamin B12 deficiency   • CVA (cerebral vascular accident) ([facility])   • CKD (chronic kidney disease) stage 3, GFR 30-59 ml/min ([facility])            PT Neuro     Row Name 22 1035             Subjective Comments    Subjective Comments Pt stated that yesteray she went to the pool with her  and performed several pool exercises. She says that the pool had steep steps and her  stood in front of her while descending stairs.  -DP              Precautions and Contraindications    Precautions/Limitations fall precautions  -DP              Subjective Pain    Able to rate subjective pain? yes  -DP      Pre-Treatment Pain Level 0  -DP      Post-Treatment Pain Level 0  -DP              Cognition    Overall Cognitive Status WFL  -DP      Arousal/Alertness Appropriate responses to stimuli  -DP      Memory Appears intact  -DP      Orientation Level Oriented X4  -DP      Safety Judgment Good awareness of safety precautions  -DP              Transfers    Sit-Stand [place] (Transfers) supervision;modified independence  -DP      Stand-Sit [place] (Transfers) supervision;modified independence  -DP      Transfers, Sit-Stand-Sit, Assist  Device rolling walker  -DP      Comment, (Transfers) pt performed 2 trials of STS without FWW with SV  -DP              Gait/Stairs (Locomotion)    [place] Level (Gait) supervision  -DP      Assistive Device (Gait) walker, front-wheeled  -DP      Distance in Feet (Gait) 150'x1  -DP      Pattern (Gait) step-through  -DP      Deviations/Abnormal Patterns (Gait) bilateral deviations;base of support, narrow;gait speed decreased;stride length decreased  -DP      Right Sided Gait Deviations heel strike decreased  -DP      Comment, (Gait/Stairs) Pt ambulated with FWW from sunroom to PT gym with SV  -DP              Balance Skills Training    87786 -  PT Neuromuscular Reeducation Minutes 10  -DP      Balance Comments Pt stood on half foam with rounded part up with 15 seconds with normal CHEYANNE and 15 seconds with narrow CHEYANNE respectively. When pt atmpeted same shoulder width stance and narrow stance with rounded part down the pt required Yany and modA respectively. Pt also performed SLS on blue foam pad with 10 seconds on the right and 1-2 seconds on the right using Yany intermittenly.  -DP            User Key  (r) = Recorded By, (t) = Taken By, (c) = Cosigned By    Initials Name Provider Type    DP Nikolay Sequeira PT Physical Therapist                         PT Assessment/Plan     Row Name 07/21/22 1122          PT Assessment    Functional Limitations Decreased safety during functional activities;Impaired gait;Limitation in home management;Limitations in community activities;Performance in leisure activities;Performance in self-care ADL  -DP     Impairments Balance;Coordination;Endurance;Gait;Muscle strength  -DP     Assessment Comments Pt performed hip strengthening exercises well with minimal compensation after intial verbal cuing. Pt did have moderate challenges SLS on foam mat and with unstable surfaces. Pt toelrated added NuStep work today without using UE's and focusing on LE strengthening and endurance.  -DP      Please refer to paper survey for additional self-reported information Yes  -DP     Rehab Potential Good  -DP     Patient/caregiver participated in establishment of treatment plan and goals Yes  -DP     Patient would benefit from skilled therapy intervention Yes  -DP           User Key  (r) = Recorded By, (t) = Taken By, (c) = Cosigned By    Initials Name Provider Type    DP Nikolay Sequeira, PT Physical Therapist                    OP Exercises     Row Name 07/21/22 1035             Precautions    Existing Precautions/Restrictions fall  -DP              Subjective Comments    Subjective Comments Pt stated that yesteray she went to the pool with her  and performed several pool exercises. She says that the pool had steep steps and her  stood in front of her while descending stairs.  -DP              Subjective Pain    Able to rate subjective pain? yes  -DP      Pre-Treatment Pain Level 0  -DP      Post-Treatment Pain Level 0  -DP              Total Minutes    96015 - PT Therapeutic Exercise Minutes 30  -DP      71398 -  PT Neuromuscular Reeducation Minutes 10  -DP              Exercise 1    Exercise Name 1 Standing abduction  -DP      Cueing 1 Verbal;Demo  -DP      Sets 1 2  -DP      Reps 1 10  -DP      Additional Comments 2.5lbs  -DP              Exercise 2    Exercise Name 2 Standing flexion  -DP      Cueing 2 Verbal;Demo  -DP      Sets 2 2  -DP      Reps 2 10  -DP      Additional Comments 2.5lbs  -DP              Exercise 4    Exercise Name 4 Standing extension  -DP      Cueing 4 Verbal;Demo  -DP      Sets 4 2  -DP      Reps 4 10  -DP      Additional Comments 2.5lbs  -DP              Exercise 5    Exercise Name 5 Standing knee flexion  -DP      Cueing 5 Verbal;Demo  -DP      Sets 5 2  -DP      Reps 5 10  -DP      Additional Comments 2.5lbs  -DP              Exercise 6    Exercise Name 6 Standing on half foam  -DP      Cueing 6 Verbal;Tactile  -DP      Additional Comments Both sides with narow and  shoulder width stance  -DP              Exercise 7    Exercise Name 7 NuStep  -DP      Cueing 7 Verbal  -DP      Time 7 8 mins  -DP      Additional Comments Workload 4 w/out UE's  -DP            User Key  (r) = Recorded By, (t) = Taken By, (c) = Cosigned By    Initials Name Provider Type    DP Nikolay Sequeira PT Physical Therapist                                Therapy Education  Education Details: Pt educated on correct form and avoiding muscular compensation during exercises and when perofrming HEP  Given: HEP  Program: New  How Provided: Verbal  Provided to: Patient  Level of Understanding: Verbalized              Time Calculation:   Start Time: 1020  Stop Time: 1100  Time Calculation (min): 40 min  Total Timed Code Minutes- PT: 40 minute(s)  Timed Charges  86639 - PT Therapeutic Exercise Minutes: 30  56637 -  PT Neuromuscular Reeducation Minutes: 10  Total Minutes  Timed Charges Total Minutes: 40   Total Minutes: 40   Therapy Charges for Today     Code Description Service Date Service Provider Modifiers Qty    71716736945 HC PT NEUROMUSC RE EDUCATION EA 15 MIN 7/21/2022 Nikolay Sequeira, PT GP 1    15263620066 HC PT THER PROC EA 15 MIN 7/21/2022 Nikolay Sequeira, PT GP 2            Patient was wearing a face mask during this therapy encounter. Therapist used appropriate personal protective equipment including mask and gloves.  Mask used was standard procedure mask. Appropriate PPE was worn during the entire therapy session. Hand hygiene was completed before and after therapy session. Patient is not in enhanced droplet precautions.           Nikolay Sequeira PT  7/21/2022

## 2022-07-21 NOTE — THERAPY TREATMENT NOTE
Outpatient Speech Language Pathology   Adult Speech Language Cognitive Treatment Note  [facility]     Patient Name: Maryellen Crump  : 1950  MRN: [id number]  Today's Date: 2022         Visit Date: 2022   Patient Active Problem List   Diagnosis   • Family history of colon cancer   • Iron deficiency anemia   • Diarrhea of presumed infectious origin   • Type 2 diabetes mellitus with kidney complication, without long-term current use of insulin (HCC)   • Hypertension   • Hyperlipidemia   • Stroke-like symptoms   • Acute CVA (cerebrovascular accident) (HCC)   • Acute lacunar stroke (HCC)   • Vitamin B12 deficiency   • CVA (cerebral vascular accident) (HCC)   • CKD (chronic kidney disease) stage 3, GFR 30-59 ml/min ([facility])          Visit Dx:    ICD-10-CM ICD-9-CM   1. Cerebrovascular accident (CVA), unspecified mechanism (HCC)  I63.9 434.91   2. Cognitive communication deficit  R41.841 799.52                              SLP OP Goals     Row Name 22 1200          Subjective Comments    Subjective Comments Patient is pleasant and cooperative  -KA            Subjective Pain    Able to rate subjective pain? yes  -KA     Pre-Treatment Pain Level 0  -KA     Post-Treatment Pain Level 0  -KA            Written Language Comprehension Goals    Patient will improve comprehension of written language skills by answering written questions related to home management/social/work tasks (bills, recipes, tv guide, emails, procedures) 90%:;without cues  -KA     Status: Patient will improve comprehension of written language skills by answering written questions related to home management/social/work tasks (bills, recipes, tv guide, emails, procedures) Progressing as expected  -KA     Comments: Patient will improve comprehension of written language skills by answering written questions related to home management/social/work tasks (bills, recipes, tv guide, emails, procedures) Answering questions on bus schedule 80%  without cues and 100% with min cues  -KA            Memory Goals    Patient’s memory skills will be enhanced as reported by patient by utilizing internal memory strategies to recall up to 3 pieces of information after a 5- minute delay 90%:;without cues  -KA     Status: Patient’s memory skills will be enhanced as reported by patient by utilizing internal memory strategies to recall up to 3 pieces of information after a 5- minute delay Progressing as expected  -KA     Comments: Patient’s memory skills will be enhanced as reported by patient by utilizing internal memory strategies to recall up to 3 pieces of information after a 5- minute delay delayed recall of four unrelated words after 15 minute delay 100% without cues  -KA     Patient will demonstrate improved ability to recall information by listening to paragraph and answering yes/no questions 90%:;without cues  -KA     Status: Patient will demonstrate improved ability to recall information by listening to paragraph and answering yes/no questions New  -KA     Comments: Patient will demonstrate improved ability to recall information by listening to paragraph and answering yes/no questions 100% with recall for detail at the paragraph level  -KA            Problem Solving Goals    Patient will improve ability to analyze problems and determine solutions by using reasoning skills to complete activities of independence 90%:;without cues  -KA     Status: Patient will improve ability to analyze problems and determine solutions by using reasoning skills to complete activities of independence Progressing as expected  -KA     Comments: Patient will improve ability to analyze problems and determine solutions by using reasoning skills to complete activities of independence 100% without cues with moderate complexity deductive reasoning puzzle  -KA     Patient will improve ability to analyze problems and determine solutions by completing functional math problems 90%:;without  cues  -ISA     Status: Patient will improve ability to analyze problems and determine solutions by completing functional math problems Progressing as expected  -ISA     Comments: Patient will improve ability to analyze problems and determine solutions by completing functional math problems 100% with functional math simple  word problems  -KA           User Key  (r) = Recorded By, (t) = Taken By, (c) = Cosigned By    Initials Name Provider Type    Taqueria Pop MA,CCC-SLP Speech and Language Pathologist                       Time Calculation:   SLP Start Time: 0930  SLP Stop Time: 1015  SLP Time Calculation (min): 45 min  Timed Charges  30766-ZX Dev of Cogn Skills Initial Minutes: 15  97060-IJ Dev of Cogn Skills Add Minutes: 30  Total Minutes  Timed Charges Total Minutes: 45   Total Minutes: 45    Therapy Charges for Today     Code Description Service Date Service Provider Modifiers Qty    75510663998 HC ST DEV OF COGN SKILLS INITIAL 15 MIN 7/21/2022 Taqueria Olvera MA,CCC-SLP  1    22157809580 HC ST DEV OF COGN SKILLS EACH ADDT'L 15 MIN 7/21/2022 Taqueria Olvera MA,CCC-SLP  2                   Taqueria Olvera MA,CCC-SLP  7/21/2022

## 2022-07-26 ENCOUNTER — HOSPITAL ENCOUNTER (OUTPATIENT)
Dept: OCCUPATIONAL THERAPY | Facility: HOSPITAL | Age: 72
Setting detail: THERAPIES SERIES
Discharge: HOME | End: 2022-07-26

## 2022-07-26 ENCOUNTER — HOSPITAL ENCOUNTER (OUTPATIENT)
Dept: PHYSICAL THERAPY | Facility: HOSPITAL | Age: 72
Setting detail: THERAPIES SERIES
Discharge: HOME | End: 2022-07-26

## 2022-07-26 ENCOUNTER — HOSPITAL ENCOUNTER (OUTPATIENT)
Dept: SPEECH THERAPY | Facility: HOSPITAL | Age: 72
Setting detail: THERAPIES SERIES
Discharge: HOME | End: 2022-07-26

## 2022-07-26 DIAGNOSIS — I63.9 CEREBROVASCULAR ACCIDENT (CVA), UNSPECIFIED MECHANISM: Primary | ICD-10-CM

## 2022-07-26 DIAGNOSIS — R53.1 ACUTE RIGHT-SIDED WEAKNESS: ICD-10-CM

## 2022-07-26 DIAGNOSIS — R41.841 COGNITIVE COMMUNICATION DEFICIT: ICD-10-CM

## 2022-07-26 DIAGNOSIS — R26.89 FUNCTIONAL GAIT ABNORMALITY: ICD-10-CM

## 2022-07-26 PROCEDURE — 97110 THERAPEUTIC EXERCISES: CPT

## 2022-07-26 PROCEDURE — 97112 NEUROMUSCULAR REEDUCATION: CPT

## 2022-07-26 PROCEDURE — 97130 THER IVNTJ EA ADDL 15 MIN: CPT | Performed by: SPEECH-LANGUAGE PATHOLOGIST

## 2022-07-26 PROCEDURE — 92507 TX SP LANG VOICE COMM INDIV: CPT | Performed by: SPEECH-LANGUAGE PATHOLOGIST

## 2022-07-26 PROCEDURE — 97129 THER IVNTJ 1ST 15 MIN: CPT | Performed by: SPEECH-LANGUAGE PATHOLOGIST

## 2022-07-26 NOTE — THERAPY TREATMENT NOTE
"Outpatient Occupational Therapy Neuro Treatment Note  [facility]     Patient Name: Maryellen Crump  : 1950  MRN: [id number]  Today's Date: 2022       Visit Date: 2022    Patient Active Problem List   Diagnosis   • Family history of colon cancer   • Iron deficiency anemia   • Diarrhea of presumed infectious origin   • Type 2 diabetes mellitus with kidney complication, without long-term current use of insulin (HCC)   • Hypertension   • Hyperlipidemia   • Stroke-like symptoms   • Acute CVA (cerebrovascular accident) (HCC)   • Acute lacunar stroke (HCC)   • Vitamin B12 deficiency   • CVA (cerebral vascular accident) (HCC)   • CKD (chronic kidney disease) stage 3, GFR 30-59 ml/min (HCC)        Past Medical History:   Diagnosis Date   • Diabetes mellitus (HCC)    • Hyperlipidemia    • Hypertension         Past Surgical History:   Procedure Laterality Date   • COLONOSCOPY N/A 2018    Procedure: COLONOSCOPY TO CECUM;  Surgeon: Josh Muñoz MD;  Location: [facility] ENDOSCOPY;  Service: General   • DILATION AND CURETTAGE, DIAGNOSTIC / THERAPEUTIC     • ENDOSCOPY N/A 2018    Procedure: ESOPHAGOGASTRODUODENOSCOPY WITH BIOPSIES;  Surgeon: Josh Muñoz MD;  Location: [facility] ENDOSCOPY;  Service: General   • ROTATOR CUFF REPAIR Right          Visit Dx:  No diagnosis found.     OT Neuro     Row Name 22 1300             Subjective Comments    Subjective Comments \"I can I feel my arm getting stronger, but my hand is still weak.\"  -JATINDER              Precautions and Contraindications    Precautions/Limitations fall precautions  -JATINDER              Subjective Pain    Able to rate subjective pain? yes  -JATINDER            User Key  (r) = Recorded By, (t) = Taken By, (c) = Cosigned By    Initials Name Provider Type    Denia Philippe OT Occupational Therapist                         OT Assessment/Plan     Row Name 22 1510          OT Assessment    Functional Limitations Limitations in " functional capacity and performance;Performance in leisure activities;Limitation in home management;Limitations in community activities;Performance in self-care ADL  -JATINDER     Impairments Coordination;Dexterity;Muscle strength  -JATINDER     Assessment Comments Pt tolerated generalized strengthening, coordination and repetitive resistive hand writing. Pt demonstrates moderate proximal strengthening development, but continues to require extra time and effort for distal coordination/ in hand manipulation. Pt will continue to benefit from skilled OT services.  -JATINDER     OT Rehab Potential Good  -JATINDER     Patient/caregiver participated in establishment of treatment plan and goals Yes  -JATINDER     Patient would benefit from skilled therapy intervention Yes  -JATINDER            OT Plan    OT Frequency 2x/week  -JATINDER     Planned CPT's? OT EVAL MOD COMPLEXITY: 95501;OT THER ACT EA 15 MIN: 87901UF;OT THER PROC EA 15 MIN: 56937IK;OT NEUROMUSC RE EDUCATION EA 15 MIN: 91159;OT SELF CARE/MGMT/TRAIN 15 MIN: 82038  -JATINDER     Planned Therapy Interventions (Optional Details) home exercise program;motor coordination training;stretching;strengthening;postural re-education;patient/family education;neuromuscular re-education  -           User Key  (r) = Recorded By, (t) = Taken By, (c) = Cosigned By    Initials Name Provider Type    Denia Philippe OT Occupational Therapist                           Therapy Education  Given: HEP  Program: New  How Provided: Verbal  Provided to: Patient  Level of Understanding: Verbalized       OT Exercises     Row Name 07/26/22 1300             Exercise 1    Exercise Name 1 Seated row  -JATINDER      Cueing 1 Verbal;Tactile;Demo;Auditory  -JATINDER      Equipment 1 Theraband  -JATINDER      Resistance 1 Yellow  -JATINDER      Sets 1 3  -JATINDER      Reps 1 10  -JATINDER              Exercise 2    Exercise Name 2 Seated shoulder flexion  -JATINDER      Cueing 2 Verbal;Tactile;Demo;Auditory  -JATINDER      Equipment 2 Dowel  -JATINDER      Weights/Plates 2 2  -JATINDER       Sets 2 3  -JATINDER      Reps 2 10  -JATINDER              Exercise 3    Exercise Name 3 Tricep raises  -JATINDER      Cueing 3 Verbal;Tactile;Demo;Auditory  -JATINDER      Equipment 3 Dowel  -JATINDER      Weights/Plates 3 2  -JATINDER      Sets 3 3  -JATINDER      Reps 3 10  -JATINDER              Exercise 4    Exercise Name 4 Theraputty repetative writing tasks for endurance  -JATINDER      Cueing 4 Verbal;Tactile;Demo;Auditory  -JATINDER      Equipment 4 Theraputty  -JATINDER      Resistance 4 Yellow  -JATINDER              Exercise 5    Exercise Name 5 Intrinsic marble pickup and release to various targets  -JATINDER      Cueing 5 Verbal;Tactile;Demo;Auditory  -JATINDER              Exercise 6    Exercise Name 6 [place] pickup, pocket f/u by in hand manipulation  -JATINDER      Cueing 6 Verbal;Tactile  -JATINDER              Exercise 7    Exercise Name 7 Digit grippers for general in hand strengthening  -JATINDER      Cueing 7 Verbal;Tactile  -JATINDER      Equipment 7 Hand Gripper  -JATINDER      Resistance 7 Blue;Green;Red  -JATINDER            User Key  (r) = Recorded By, (t) = Taken By, (c) = Cosigned By    Initials Name Provider Type    Denia Philippe OT Occupational Therapist                            Time Calculation:   OT Start Time: 1345  OT Stop Time: 1430  OT Time Calculation (min): 45 min  Total Timed Code Minutes- OT: 45 minute(s)  Timed Charges  89063 - OT Therapeutic Exercise Minutes: 30  02519 -  OT Neuromuscular Reeducation Minutes: 15  Total Minutes  Timed Charges Total Minutes: 45   Total Minutes: 45     Therapy Charges for Today     Code Description Service Date Service Provider Modifiers Qty    54789844549 HC OT NEUROMUSC RE EDUCATION EA 15 MIN 7/26/2022 Denia Goodman OT GO 1    79005366925 HC OT THER PROC EA 15 MIN 7/26/2022 Denia Goodman OT GO 2                    Denia Goodman OT  7/26/2022

## 2022-07-26 NOTE — THERAPY TREATMENT NOTE
Outpatient Physical Therapy Neuro Treatment Note  [facility]     Patient Name: Maryellen Crump  : 1950  MRN: [id number]  Today's Date: 2022      Visit Date: 2022    Visit Dx:    ICD-10-CM ICD-9-CM   1. Cerebrovascular accident (CVA), unspecified mechanism ([facility])  I63.9 434.91   2. Functional gait abnormality  R26.89 781.2   3. Acute right-sided weakness  R53.1 728.87       Patient Active Problem List   Diagnosis   • Family history of colon cancer   • Iron deficiency anemia   • Diarrhea of presumed infectious origin   • Type 2 diabetes mellitus with kidney complication, without long-term current use of insulin ([facility])   • Hypertension   • Hyperlipidemia   • Stroke-like symptoms   • Acute CVA (cerebrovascular accident) ([facility])   • Acute lacunar stroke ([facility])   • Vitamin B12 deficiency   • CVA (cerebral vascular accident) ([facility])   • CKD (chronic kidney disease) stage 3, GFR 30-59 ml/min ([facility])            PT Neuro     Row Name 22 1600             Subjective Comments    Subjective Comments I want to go downstairs into my basement.  -LB              Precautions and Contraindications    Precautions/Limitations fall precautions  -LB              Subjective Pain    Able to rate subjective pain? yes  -LB      Pre-Treatment Pain Level 0  -LB      Post-Treatment Pain Level 0  -LB              Cognition    Overall Cognitive Status WFL  -LB              Posture/Observations    Posture/Observations Comments pt walked up to unit with FWW spouse present  -LB              Bed Mobility    Rolling Left [place] (Bed Mobility) independent  -LB      Rolling Right [place] (Bed Mobility) independent  -LB      Supine-Sit-Supine [place] (Bed Mobility) independent  -LB              Transfers    Sit-Stand [place] (Transfers) supervision;modified independence  -LB      Stand-Sit [place] (Transfers) supervision;modified independence  -LB              Gait/Stairs (Locomotion)    [place] Level  (Gait) contact guard  -LB      Assistive Device (Gait) --  no AD  -LB      Distance in Feet (Gait) 100 x 2; 160  -LB      Pattern (Gait) step-through  -LB      Deviations/Abnormal Patterns (Gait) bilateral deviations;base of support, narrow;gait speed decreased;stride length decreased  -LB      Bilateral Gait Deviations decreased arm swing  -LB      Right Sided Gait Deviations heel strike decreased  -LB      [place] Level (Stairs) contact guard;stand by assist  -LB      Handrail Location (Stairs) both sides  -LB      Number of Steps (Stairs) 12; 4  -LB      Ascending Technique (Stairs) step-over-step  -LB      Descending Technique (Stairs) step-over-step  -LB              Balance Skills Training    Gait Balance-Level of Assistance Contact guard;Minimum assistance  -LB      Gait Balance Support No upper extremity supported  -LB      Gait Balance Activities uneven surface;backwards  -LB      Gait Balance # of Minutes While walking on mat, reached to floor to  several items with SBA/CGA, no LOB noted.  Instructed spouse on how to assist pt with backwards walking for balance and hip strengthening  -LB            User Key  (r) = Recorded By, (t) = Taken By, (c) = Cosigned By    Initials Name Provider Type    Nora Arroyo, PT Physical Therapist                         PT Assessment/Plan     Row Name 07/26/22 1640          PT Assessment    Assessment Comments Reinforced to patient and spouse the need to work on proximal hip strengthening especially with hip abductors and extensors.  Reeducated on proper technique for sidelying exercises and eccentric control for sit to stand to help with strengthening.  Pt did well on stairs with use of handrails.  Pt does display decreased heelstrike on right compared to left during gait with and without FWW.  -LB           User Key  (r) = Recorded By, (t) = Taken By, (c) = Cosigned By    Initials Name Provider Type    Nora Arroyo, PT Physical Therapist                     OP Exercises     Row Name 07/26/22 1643 07/26/22 1600          Subjective Comments    Subjective Comments -- I want to go downstairs into my basement.  -LB            Subjective Pain    Able to rate subjective pain? -- yes  -LB     Pre-Treatment Pain Level -- 0  -LB     Post-Treatment Pain Level -- 0  -LB            Total Minutes    76815 - PT Therapeutic Exercise Minutes 30  -LB --     51756 -  PT Neuromuscular Reeducation Minutes 15  -LB --            Exercise 8    Exercise Name 8 -- sidelying clams and hip abduction  -LB     Sets 8 -- 2  -LB     Reps 8 -- 10  -LB     Additional Comments -- cues to decrease hip flexion during hip abduction  -LB            Exercise 9    Exercise Name 9 -- bridges/1/2 bridge  -LB     Sets 9 -- 1  -LB     Reps 9 -- 10  -LB            Exercise 10    Exercise Name 10 -- backward step up  -LB     Reps 10 -- 10  -LB            Exercise 11    Exercise Name 11 -- sit to stand  -LB     Sets 11 -- 2  -LB     Reps 11 -- 5  -LB     Additional Comments -- cues for eccentric control  -LB           User Key  (r) = Recorded By, (t) = Taken By, (c) = Cosigned By    Initials Name Provider Type    LB Nora Guillory, PT Physical Therapist                                Therapy Education  Education Details: Discussed not walking in the yard without assist of spouse.  Discussed how basement steps are steeper and the patient may need to perform step to technique.  Given: Fall prevention and home safety, Mobility training  Program: Reinforced  How Provided: Verbal  Provided to: Patient, Caregiver  Level of Understanding: Verbalized, Teach back education performed              Time Calculation:   Start Time: 1515  Stop Time: 1600  Time Calculation (min): 45 min  Timed Charges  23321 - PT Therapeutic Exercise Minutes: 30  99374 -  PT Neuromuscular Reeducation Minutes: 15  Total Minutes  Timed Charges Total Minutes: 45   Total Minutes: 45   Therapy Charges for Today     Code Description Service Date  Service Provider Modifiers Qty    38420873754  PT NEUROMUSC RE EDUCATION EA 15 MIN 7/26/2022 Nora Guillory, PT GP 1    43908160769 HC PT THER PROC EA 15 MIN 7/26/2022 Nora Guillory, PT GP 2        Patient was wearing a face mask during this therapy encounter. Therapist used appropriate personal protective equipment including mask and gloves.  Mask used was standard procedure mask. Appropriate PPE was worn during the entire therapy session. Hand hygiene was completed before and after therapy session. Patient is not in enhanced droplet precautions.                 Nora Guillory, PT  7/26/2022

## 2022-07-26 NOTE — THERAPY TREATMENT NOTE
Outpatient Speech Language Pathology   Adult Speech Language Cognitive Treatment Note  [facility]     Patient Name: Maryellen Crump  : 1950  MRN: [id number]  Today's Date: 2022         Visit Date: 2022   Patient Active Problem List   Diagnosis   • Family history of colon cancer   • Iron deficiency anemia   • Diarrhea of presumed infectious origin   • Type 2 diabetes mellitus with kidney complication, without long-term current use of insulin ([facility])   • Hypertension   • Hyperlipidemia   • Stroke-like symptoms   • Acute CVA (cerebrovascular accident) (HCC)   • Acute lacunar stroke (HCC)   • Vitamin B12 deficiency   • CVA (cerebral vascular accident) (HCC)   • CKD (chronic kidney disease) stage 3, GFR 30-59 ml/min ([facility])          Visit Dx:    ICD-10-CM ICD-9-CM   1. Cerebrovascular accident (CVA), unspecified mechanism (HCC)  I63.9 434.91   2. Cognitive communication deficit  R41.841 799.52                              SLP OP Goals     Row Name 22 1600          Subjective Comments    Subjective Comments Patient is pleasant and cooperative. Discussed anticipate upcoming discharge  -KA            Subjective Pain    Able to rate subjective pain? yes  -KA     Pre-Treatment Pain Level 0  -KA     Post-Treatment Pain Level 0  -KA            Written Language Comprehension Goals    Patient will improve comprehension of written language skills by answering written questions related to home management/social/work tasks (bills, recipes, tv guide, emails, procedures) 90%:;without cues  -KA     Status: Patient will improve comprehension of written language skills by answering written questions related to home management/social/work tasks (bills, recipes, tv guide, emails, procedures) Progressing as expected  -KA     Comments: Patient will improve comprehension of written language skills by answering written questions related to home management/social/work tasks (bills, recipes, tv guide, emails, procedures)  Answering questions on email requiring time calculations 100% without cues `  -KA            Memory Goals    Patient’s memory skills will be enhanced as reported by patient by utilizing internal memory strategies to recall up to 3 pieces of information after a 5- minute delay 90%:;without cues  -KA     Status: Patient’s memory skills will be enhanced as reported by patient by utilizing internal memory strategies to recall up to 3 pieces of information after a 5- minute delay Progressing as expected  -KA     Comments: Patient’s memory skills will be enhanced as reported by patient by utilizing internal memory strategies to recall up to 3 pieces of information after a 5- minute delay delayed recall of four unrelated words after 15 minute delay 100% without cues  -KA     Patient will demonstrate improved ability to recall information by listening to paragraph and answering yes/no questions 90%:;without cues  -KA     Status: Patient will demonstrate improved ability to recall information by listening to paragraph and answering yes/no questions Progressing as expected  -KA     Comments: Patient will demonstrate improved ability to recall information by listening to paragraph and answering yes/no questions 100% with recall for detail at the paragraph level  -KA            Problem Solving Goals    Patient will improve ability to analyze problems and determine solutions by using reasoning skills to complete activities of independence 90%:;without cues  -KA     Status: Patient will improve ability to analyze problems and determine solutions by using reasoning skills to complete activities of independence Progressing as expected  -KA     Comments: Patient will improve ability to analyze problems and determine solutions by using reasoning skills to complete activities of independence 67% without cues with moderate complexity deductive reasoning puzzle and 100% with min cues  -KA     Patient will improve ability to analyze  problems and determine solutions by completing functional math problems 90%:;without cues  -ISA     Status: Patient will improve ability to analyze problems and determine solutions by completing functional math problems Progressing as expected  -ISA     Comments: Patient will improve ability to analyze problems and determine solutions by completing functional math problems 100% with functional math simple  word problems  -ISA           User Key  (r) = Recorded By, (t) = Taken By, (c) = Cosigned By    Initials Name Provider Type    Taqureia Pop MA,CCC-SLP Speech and Language Pathologist                       Time Calculation:   SLP Start Time: 1430  SLP Stop Time: 1515  SLP Time Calculation (min): 45 min  Timed Charges  32543-UJ Dev of Cogn Skills Initial Minutes: 15  76524-QB Dev of Cogn Skills Add Minutes: 30  Total Minutes  Timed Charges Total Minutes: 45   Total Minutes: 45    Therapy Charges for Today     Code Description Service Date Service Provider Modifiers Qty    83620014282 HC ST DEV OF COGN SKILLS INITIAL 15 MIN 7/26/2022 Taqueria Olvera MA,CCC-SLP  1    93678452765 HC ST DEV OF COGN SKILLS EACH ADDT'L 15 MIN 7/26/2022 Taqueria Olvera MA,CCC-SLP  2                   Taqueria Olvera MA,CCC-SLP  7/26/2022

## 2022-07-28 ENCOUNTER — HOSPITAL ENCOUNTER (OUTPATIENT)
Dept: PHYSICAL THERAPY | Facility: HOSPITAL | Age: 72
Setting detail: THERAPIES SERIES
Discharge: HOME | End: 2022-07-28

## 2022-07-28 ENCOUNTER — HOSPITAL ENCOUNTER (OUTPATIENT)
Dept: SPEECH THERAPY | Facility: HOSPITAL | Age: 72
Setting detail: THERAPIES SERIES
Discharge: HOME | End: 2022-07-28

## 2022-07-28 ENCOUNTER — HOSPITAL ENCOUNTER (OUTPATIENT)
Dept: OCCUPATIONAL THERAPY | Facility: HOSPITAL | Age: 72
Setting detail: THERAPIES SERIES
Discharge: HOME | End: 2022-07-28

## 2022-07-28 DIAGNOSIS — R41.841 COGNITIVE COMMUNICATION DEFICIT: ICD-10-CM

## 2022-07-28 DIAGNOSIS — R29.898 WEAKNESS OF RIGHT ARM: ICD-10-CM

## 2022-07-28 DIAGNOSIS — I63.9 CEREBROVASCULAR ACCIDENT (CVA), UNSPECIFIED MECHANISM: Primary | ICD-10-CM

## 2022-07-28 DIAGNOSIS — R26.89 FUNCTIONAL GAIT ABNORMALITY: ICD-10-CM

## 2022-07-28 DIAGNOSIS — R27.8 LOSS OF COORDINATION: ICD-10-CM

## 2022-07-28 DIAGNOSIS — R53.1 ACUTE RIGHT-SIDED WEAKNESS: ICD-10-CM

## 2022-07-28 PROCEDURE — 97530 THERAPEUTIC ACTIVITIES: CPT | Performed by: OCCUPATIONAL THERAPIST

## 2022-07-28 PROCEDURE — 97112 NEUROMUSCULAR REEDUCATION: CPT

## 2022-07-28 PROCEDURE — 97110 THERAPEUTIC EXERCISES: CPT

## 2022-07-28 PROCEDURE — 97110 THERAPEUTIC EXERCISES: CPT | Performed by: OCCUPATIONAL THERAPIST

## 2022-07-28 PROCEDURE — 97130 THER IVNTJ EA ADDL 15 MIN: CPT | Performed by: SPEECH-LANGUAGE PATHOLOGIST

## 2022-07-28 PROCEDURE — 97129 THER IVNTJ 1ST 15 MIN: CPT | Performed by: SPEECH-LANGUAGE PATHOLOGIST

## 2022-07-28 NOTE — THERAPY TREATMENT NOTE
Outpatient Occupational Therapy Neuro Treatment Note  [facility]     Patient Name: Maryellen Crump  : 1950  MRN: [id number]  Today's Date: 2022       Visit Date: 2022    Patient Active Problem List   Diagnosis   • Family history of colon cancer   • Iron deficiency anemia   • Diarrhea of presumed infectious origin   • Type 2 diabetes mellitus with kidney complication, without long-term current use of insulin (HCC)   • Hypertension   • Hyperlipidemia   • Stroke-like symptoms   • Acute CVA (cerebrovascular accident) (HCC)   • Acute lacunar stroke (HCC)   • Vitamin B12 deficiency   • CVA (cerebral vascular accident) (HCC)   • CKD (chronic kidney disease) stage 3, GFR 30-59 ml/min (HCC)        Past Medical History:   Diagnosis Date   • Diabetes mellitus (HCC)    • Hyperlipidemia    • Hypertension         Past Surgical History:   Procedure Laterality Date   • COLONOSCOPY N/A 2018    Procedure: COLONOSCOPY TO CECUM;  Surgeon: Josh Muñoz MD;  Location: [facility] ENDOSCOPY;  Service: General   • DILATION AND CURETTAGE, DIAGNOSTIC / THERAPEUTIC     • ENDOSCOPY N/A 2018    Procedure: ESOPHAGOGASTRODUODENOSCOPY WITH BIOPSIES;  Surgeon: Josh Muñoz MD;  Location: [facility] ENDOSCOPY;  Service: General   • ROTATOR CUFF REPAIR Right          Visit Dx:    ICD-10-CM ICD-9-CM   1. Cerebrovascular accident (CVA), unspecified mechanism (HCC)  I63.9 434.91   2. Weakness of right arm  R29.898 729.89   3. Loss of coordination  R27.8 781.3                    OT Assessment/Plan     Row Name 22 1246          OT Assessment    Assessment Comments Pt progressing with strengthening exercises, works on richsaw machine for 4 sets of 20#. Reports her goal being to improve the atrophy in her forearm and has been doing her wrist strengthening exercises.  -MALCOLM           User Key  (r) = Recorded By, (t) = Taken By, (c) = Cosigned By    Initials Name Provider Type    Shira Link OTR Occupational  "Therapist                           Therapy Education  Education Details: Cont DB exercises at home       OT Exercises     Row Name 07/28/22 1100             Subjective Comments    Subjective Comments \"I was really tired after last session, I slept a lot yesterday.\"  -SG              Subjective Pain    Able to rate subjective pain? yes  -SG              Exercise 1    Exercise Name 1 Standing  -SG      Cueing 1 Verbal  -SG      Equipment 1 UE Ergometer  -SG      Time (Minutes) 1 5  -SG              Exercise 2    Exercise Name 2 Seated rickshaw  -SG      Cueing 2 Verbal;Tactile;Demo;Auditory  -SG      Weights/Plates 2 20  -SG      Sets 2 4  -SG      Reps 2 10  -SG              Exercise 3    Exercise Name 3 Standing front raise  -SG      Cueing 3 Verbal;Tactile;Demo;Auditory  -SG      Equipment 3 Dumbell  -SG      Weights/Plates 3 2  -SG      Sets 3 3  -SG      Reps 3 10  -SG              Exercise 4    Exercise Name 4 Standing resistive PNF patterns bilaterally  -SG      Cueing 4 Verbal;Tactile;Demo;Auditory  -SG      Equipment 4 Dumbell  -SG      Weights/Plates 4 2  -SG      Sets 4 3  -SG      Reps 4 10  -SG              Exercise 5    Exercise Name 5 Pt locates specific colored beads in container with R hand to string together, very mild difficulty  -SG      Cueing 5 Verbal;Tactile;Demo;Auditory  -SG      Reps 5 20  -SG              Exercise 6    Exercise Name 6 2pt pinch manipulation with grooved pegboard, difficulty pocketing 3 at a time to remove  -SG      Cueing 6 Verbal;Tactile;Demo;Auditory  -SG            User Key  (r) = Recorded By, (t) = Taken By, (c) = Cosigned By    Initials Name Provider Type    Shira Link OTR Occupational Therapist                            Time Calculation:   OT Start Time: 1101  OT Stop Time: 1142  OT Time Calculation (min): 41 min  Total Timed Code Minutes- OT: 41 minute(s)  Timed Charges  58594 - OT Therapeutic Exercise Minutes: 26  35063 - OT Therapeutic Activity Minutes: " 15  Total Minutes  Timed Charges Total Minutes: 41   Total Minutes: 41     Therapy Charges for Today     Code Description Service Date Service Provider Modifiers Qty    [id number]  OT THER PROC EA 15 MIN 7/28/2022 Shira Wong OTR GO 2    [id number]  OT THERAPEUTIC ACT EA 15 MIN 7/28/2022 Shira Wong OTR GO 1                    RICH Day  7/28/2022

## 2022-07-28 NOTE — THERAPY TREATMENT NOTE
Outpatient Speech Language Pathology   Adult Speech Language Cognitive Treatment Note  [facility]     Patient Name: Maryellen Crump  : 1950  MRN: [id number]  Today's Date: 2022         Visit Date: 2022   Patient Active Problem List   Diagnosis   • Family history of colon cancer   • Iron deficiency anemia   • Diarrhea of presumed infectious origin   • Type 2 diabetes mellitus with kidney complication, without long-term current use of insulin (HCC)   • Hypertension   • Hyperlipidemia   • Stroke-like symptoms   • Acute CVA (cerebrovascular accident) (HCC)   • Acute lacunar stroke (HCC)   • Vitamin B12 deficiency   • CVA (cerebral vascular accident) (HCC)   • CKD (chronic kidney disease) stage 3, GFR 30-59 ml/min ([facility])          Visit Dx:    ICD-10-CM ICD-9-CM   1. Cerebrovascular accident (CVA), unspecified mechanism (HCC)  I63.9 434.91   2. Cognitive communication deficit  R41.841 799.52                              SLP OP Goals     Row Name 22 1000          Subjective Comments    Subjective Comments Patient is pleasant and cooperative. Planned d/c next Tuesday  -KA            Subjective Pain    Able to rate subjective pain? yes  -KA     Pre-Treatment Pain Level 0  -KA     Post-Treatment Pain Level 0  -KA            Written Language Comprehension Goals    Patient will improve comprehension of written language skills by answering written questions related to home management/social/work tasks (bills, recipes, tv guide, emails, procedures) 90%:;without cues  -KA     Status: Patient will improve comprehension of written language skills by answering written questions related to home management/social/work tasks (bills, recipes, tv guide, emails, procedures) Progressing as expected  -KA     Comments: Patient will improve comprehension of written language skills by answering written questions related to home management/social/work tasks (bills, recipes, tv guide, emails, procedures) Answering  questions on newspaper subscription requiring attention to detail and time calculations 100% without cues Inferences about paragraph 100% without cues  -KA            Memory Goals    Patient’s memory skills will be enhanced as reported by patient by utilizing internal memory strategies to recall up to 3 pieces of information after a 5- minute delay 90%:;without cues  -KA     Status: Patient’s memory skills will be enhanced as reported by patient by utilizing internal memory strategies to recall up to 3 pieces of information after a 5- minute delay Progressing as expected  -KA     Comments: Patient’s memory skills will be enhanced as reported by patient by utilizing internal memory strategies to recall up to 3 pieces of information after a 5- minute delay delayed recall of four unrelated words after 30 minute delay 100% without cues  -KA     Patient will demonstrate improved ability to recall information by listening to paragraph and answering yes/no questions 90%:;without cues  -KA     Status: Patient will demonstrate improved ability to recall information by listening to paragraph and answering yes/no questions Progressing as expected  -KA     Comments: Patient will demonstrate improved ability to recall information by listening to paragraph and answering yes/no questions Patient read detailed multiparagraph news article and able to summarize and recall all detail with 100% accuracy  -KA            Problem Solving Goals    Patient will improve ability to analyze problems and determine solutions by using reasoning skills to complete activities of independence 90%:;without cues  -KA     Status: Patient will improve ability to analyze problems and determine solutions by using reasoning skills to complete activities of independence Progressing as expected  -KA     Comments: Patient will improve ability to analyze problems and determine solutions by using reasoning skills to complete activities of independence Patient  completed two complex deductive reasoning puzzles with 100% without cues  -ISA           User Key  (r) = Recorded By, (t) = Taken By, (c) = Cosigned By    Initials Name Provider Type    Taqueria Pop MA,CCC-SLP Speech and Language Pathologist                       Time Calculation:   SLP Start Time: 0930  SLP Stop Time: 1015  SLP Time Calculation (min): 45 min  Timed Charges  94742-OP Dev of Cogn Skills Initial Minutes: 15  55315-EY Dev of Cogn Skills Add Minutes: 30  Total Minutes  Timed Charges Total Minutes: 45   Total Minutes: 45    Therapy Charges for Today     Code Description Service Date Service Provider Modifiers Qty    10643929669 HC ST DEV OF COGN SKILLS INITIAL 15 MIN 7/28/2022 Taqueria Olvera MA,CCC-SLP  1    [id number] HC ST DEV OF COGN SKILLS EACH ADDT'L 15 MIN 7/28/2022 Taqueria Olvera MA,CCC-SLP  2                   Taqueria Olvera MA,CCC-SLP  7/28/2022

## 2022-07-28 NOTE — THERAPY TREATMENT NOTE
Outpatient Physical Therapy Neuro Treatment Note  [facility]     Patient Name: Maryellen Crump  : 1950  MRN: [id number]  Today's Date: 2022      Visit Date: 2022    Visit Dx:    ICD-10-CM ICD-9-CM   1. Cerebrovascular accident (CVA), unspecified mechanism ([facility])  I63.9 434.91   2. Functional gait abnormality  R26.89 781.2   3. Acute right-sided weakness  R53.1 728.87       Patient Active Problem List   Diagnosis   • Family history of colon cancer   • Iron deficiency anemia   • Diarrhea of presumed infectious origin   • Type 2 diabetes mellitus with kidney complication, without long-term current use of insulin ([facility])   • Hypertension   • Hyperlipidemia   • Stroke-like symptoms   • Acute CVA (cerebrovascular accident) ([facility])   • Acute lacunar stroke ([facility])   • Vitamin B12 deficiency   • CVA (cerebral vascular accident) ([facility])   • CKD (chronic kidney disease) stage 3, GFR 30-59 ml/min ([facility])            PT Neuro     Row Name 22 1155             Subjective Comments    Subjective Comments I have been so tired especially yesterday  -LB              Precautions and Contraindications    Precautions/Limitations fall precautions  -LB              Subjective Pain    Able to rate subjective pain? yes  -LB      Pre-Treatment Pain Level 0  -LB      Post-Treatment Pain Level 0  -LB              Cognition    Overall Cognitive Status WFL  -LB              Posture/Observations    Posture/Observations Comments pt walked up to unit with FWW spouse present  -LB              Bed Mobility    Rolling Left [place] (Bed Mobility) independent  -LB      Rolling Right [place] (Bed Mobility) independent  -LB      Supine-Sit-Supine [place] (Bed Mobility) independent  -LB              Transfers    Sit-Stand [place] (Transfers) supervision;modified independence  -LB      Stand-Sit [place] (Transfers) supervision;modified independence  -LB      Transfers, Sit-Stand-Sit, Assist Device rolling walker   -LB      Transfer, Impairments impaired balance  -LB      Comment, (Transfers) decreased weight shift forward  -LB              Gait/Stairs (Locomotion)    [place] Level (Gait) contact guard  -LB      Assistive Device (Gait) --  no AD  -LB      Distance in Feet (Gait) 200; 50; 75  -LB      Pattern (Gait) step-through  -LB      Deviations/Abnormal Patterns (Gait) bilateral deviations;base of support, narrow;gait speed decreased;stride length decreased  -LB      Bilateral Gait Deviations decreased arm swing  -LB      Right Sided Gait Deviations heel strike decreased  right UE pulls slightly into flexion  -LB              Balance Skills Training    Standing-Level of Assistance Contact guard;Minimum assistance  -LB      Static Standing Balance Support No upper extremity supported  -LB      Standing-Balance Activities Foam square;Reaching for weighted objects  -LB      Standing Balance # of Minutes While standing on foam square, pt held onto a 2# ball.  Reaching forward and backward for 10 reps, diagonal from up to side times 10 reps.  Pt experienced 2 LOB requiring assist to maintain balance.  -LB      Gait Balance-Level of Assistance Contact guard;Minimum assistance  -LB      Gait Balance Support Right upper extremity supported;Left upper extremity supported  -LB      Gait Balance Activities backwards;side-stepping  yellow Tband tied around thighs.  -LB            User Key  (r) = Recorded By, (t) = Taken By, (c) = Cosigned By    Initials Name Provider Type    Nora Arroyo, PT Physical Therapist                         PT Assessment/Plan     Row Name 07/28/22 0426          PT Assessment    Assessment Comments The patient did have a LOB with statcic standing while standing on compliant surface and required assist to maintain but did display appropriate balance reactions.  Improved heelstrike without use of Fww noted.  Pt reporting fatigued and reassured that limited endurance is common following a stroke  -LB            User Key  (r) = Recorded By, (t) = Taken By, (c) = Cosigned By    Initials Name Provider Type    Nora Arroyo PT Physical Therapist                    OP Exercises     Row Name 07/28/22 1245 07/28/22 1155          Subjective Comments    Subjective Comments -- I have been so tired especially yesterday  -LB            Subjective Pain    Able to rate subjective pain? -- yes  -LB     Pre-Treatment Pain Level -- 0  -LB     Post-Treatment Pain Level -- 0  -LB            Total Minutes    79988 - PT Therapeutic Exercise Minutes 24  -LB --     86846 -  PT Neuromuscular Reeducation Minutes 21  -LB --            Exercise 9    Exercise Name 9 -- bridges on therapy ball  -LB     Sets 9 -- 1  -LB     Reps 9 -- 10  -LB            Exercise 11    Exercise Name 11 -- sit to stand  -LB     Sets 11 -- 1  -LB     Reps 11 -- 5  -LB     Additional Comments -- cues for eccentric control  -LB            Exercise 12    Exercise Name 12 -- sidelying hip abduction  -LB     Reps 12 -- 1  -LB     Time 12 -- 10  -LB            Exercise 13    Exercise Name 13 -- prone knee flexion  -LB     Sets 13 -- 2  -LB     Reps 13 -- 10  -LB            Exercise 14    Exercise Name 14 -- LTR and DKTC on LEs on therapy ball  -LB     Sets 14 -- 1  -LB     Reps 14 -- 10  -LB            Exercise 15    Exercise Name 15 -- [place] alternating shoulder flexion and hip extension  -LB     Sets 15 -- 1  -LB     Reps 15 -- 10  -LB            Exercise 16    Exercise Name 16 -- 1/4 turns with 1 step  -LB     Reps 16 -- 5  -LB     Additional Comments -- one set to each direction  -LB           User Key  (r) = Recorded By, (t) = Taken By, (c) = Cosigned By    Initials Name Provider Type    Nora Arroyo PT Physical Therapist                                               Time Calculation:   Start Time: 1015  Stop Time: 1100  Time Calculation (min): 45 min  Timed Charges  39324 - PT Therapeutic Exercise Minutes: 24  32091 -  PT Neuromuscular Reeducation  Minutes: 21  Total Minutes  Timed Charges Total Minutes: 45   Total Minutes: 45   Therapy Charges for Today     Code Description Service Date Service Provider Modifiers Qty    83096257753 HC PT NEUROMUSC RE EDUCATION EA 15 MIN 7/28/2022 Nora Guillory, PT GP 1    47927170095 HC PT THER PROC EA 15 MIN 7/28/2022 Nora Guillory, PT GP 2            Patient was wearing a face mask during this therapy encounter. Therapist used appropriate personal protective equipment including mask and gloves.  Mask used was standard procedure mask. Appropriate PPE was worn during the entire therapy session. Hand hygiene was completed before and after therapy session. Patient is not in enhanced droplet precautions.                 Nora Guillory, TABITHA  7/28/2022

## 2022-08-02 ENCOUNTER — APPOINTMENT (OUTPATIENT)
Dept: PHYSICAL THERAPY | Facility: HOSPITAL | Age: 72
End: 2022-08-02

## 2022-08-02 ENCOUNTER — APPOINTMENT (OUTPATIENT)
Dept: OCCUPATIONAL THERAPY | Facility: HOSPITAL | Age: 72
End: 2022-08-02

## 2022-08-02 ENCOUNTER — APPOINTMENT (OUTPATIENT)
Dept: SPEECH THERAPY | Facility: HOSPITAL | Age: 72
End: 2022-08-02

## 2022-08-04 ENCOUNTER — APPOINTMENT (OUTPATIENT)
Dept: SPEECH THERAPY | Facility: HOSPITAL | Age: 72
End: 2022-08-04

## 2022-08-04 ENCOUNTER — APPOINTMENT (OUTPATIENT)
Dept: PHYSICAL THERAPY | Facility: HOSPITAL | Age: 72
End: 2022-08-04

## 2022-08-04 ENCOUNTER — APPOINTMENT (OUTPATIENT)
Dept: OCCUPATIONAL THERAPY | Facility: HOSPITAL | Age: 72
End: 2022-08-04

## 2022-08-09 ENCOUNTER — HOSPITAL ENCOUNTER (OUTPATIENT)
Dept: PHYSICAL THERAPY | Facility: HOSPITAL | Age: 72
Setting detail: THERAPIES SERIES
Discharge: HOME | End: 2022-08-09

## 2022-08-09 ENCOUNTER — HOSPITAL ENCOUNTER (OUTPATIENT)
Dept: SPEECH THERAPY | Facility: HOSPITAL | Age: 72
Setting detail: THERAPIES SERIES
Discharge: HOME | End: 2022-08-09

## 2022-08-09 ENCOUNTER — HOSPITAL ENCOUNTER (OUTPATIENT)
Dept: OCCUPATIONAL THERAPY | Facility: HOSPITAL | Age: 72
Setting detail: THERAPIES SERIES
Discharge: HOME | End: 2022-08-09

## 2022-08-09 DIAGNOSIS — R26.89 FUNCTIONAL GAIT ABNORMALITY: ICD-10-CM

## 2022-08-09 DIAGNOSIS — R41.841 COGNITIVE COMMUNICATION DEFICIT: ICD-10-CM

## 2022-08-09 DIAGNOSIS — R29.898 WEAKNESS OF RIGHT ARM: ICD-10-CM

## 2022-08-09 DIAGNOSIS — I63.9 CEREBROVASCULAR ACCIDENT (CVA), UNSPECIFIED MECHANISM: Primary | ICD-10-CM

## 2022-08-09 DIAGNOSIS — R27.8 LOSS OF COORDINATION: ICD-10-CM

## 2022-08-09 PROCEDURE — 97110 THERAPEUTIC EXERCISES: CPT | Performed by: OCCUPATIONAL THERAPIST

## 2022-08-09 PROCEDURE — 97112 NEUROMUSCULAR REEDUCATION: CPT

## 2022-08-09 PROCEDURE — 97130 THER IVNTJ EA ADDL 15 MIN: CPT | Performed by: SPEECH-LANGUAGE PATHOLOGIST

## 2022-08-09 PROCEDURE — 97129 THER IVNTJ 1ST 15 MIN: CPT | Performed by: SPEECH-LANGUAGE PATHOLOGIST

## 2022-08-09 PROCEDURE — 97530 THERAPEUTIC ACTIVITIES: CPT | Performed by: OCCUPATIONAL THERAPIST

## 2022-08-09 NOTE — THERAPY DISCHARGE NOTE
Outpatient Speech Language Pathology   Adult Speech Language Cognitive Treatment Note/Discharge Summary  [facility]     Patient Name: Maryellen Crump  : 1950  MRN: [id number]  Today's Date: 2022         Visit Date: 2022   Patient Active Problem List   Diagnosis   • Family history of colon cancer   • Iron deficiency anemia   • Diarrhea of presumed infectious origin   • Type 2 diabetes mellitus with kidney complication, without long-term current use of insulin ([facility])   • Hypertension   • Hyperlipidemia   • Stroke-like symptoms   • Acute CVA (cerebrovascular accident) ([facility])   • Acute lacunar stroke ([facility])   • Vitamin B12 deficiency   • CVA (cerebral vascular accident) ([facility])   • CKD (chronic kidney disease) stage 3, GFR 30-59 ml/min ([facility])          Visit Dx:    ICD-10-CM ICD-9-CM   1. Cerebrovascular accident (CVA), unspecified mechanism ([facility])  I63.9 434.91   2. Cognitive communication deficit  R41.841 799.52        OP SLP Assessment/Plan - 22 1614        SLP Assessment    Functional Problems Speech Language- Adult/Cognition  -KA    Clinical Impression: Speech Language-Adult/Congnition Cognitive Communication WFL  -KA          User Key  (r) = Recorded By, (t) = Taken By, (c) = Cosigned By    Initials Name Provider Type    Taqueria Pop MA,CCC-SLP Speech and Language Pathologist                                 SLP OP Goals     Row Name 22 1600          Subjective Comments    Subjective Comments Patient has  met goals and d/c today  -KA            Subjective Pain    Able to rate subjective pain? yes  -KA     Pre-Treatment Pain Level 0  -KA     Post-Treatment Pain Level 0  -KA            Written Language Comprehension Goals    Status: Patient will be able to comprehend written material in social/avocational/work setting Achieved  -KA     Patient will improve comprehension of written language skills by answering written questions related to home management/social/work tasks (bills,  recipes, tv guide, emails, procedures) 90%:;without cues  -KA     Status: Patient will improve comprehension of written language skills by answering written questions related to home management/social/work tasks (bills, recipes, tv guide, emails, procedures) Achieved  -KA     Comments: Patient will improve comprehension of written language skills by answering written questions related to home management/social/work tasks (bills, recipes, tv guide, emails, procedures) Inferences about paragraph=90% without cues, answering questions on ferry schedule 100% without cues  -KA            Verbal Expression Goals    Status: Patient will be able to use verbal expressive language skills to communicate effectively in all situations with unfamiliar listener Achieved  -KA     Comments: Patient will be able to use verbal expressive language skills to communicate effectively in all situations with unfamiliar listener Verbal expression goals not targeted patietn demonstrates functional word finding at the conversation level  -KA     Patient will improve verbal expressive language skills by completing divergent naming tasks 90%:;without cues  -KA     Status: Patient will improve verbal expressive language skills by completing divergent naming tasks Discontinued  -KA     Status: Patient will improve verbal expressive language skills by expressing complex concepts and ideas Discontinued  -KA            Memory Goals    Status: Patient will be able to remember information needed to participate in avocational activities Achieved  -KA     Patient’s memory skills will be enhanced as reported by patient by utilizing internal memory strategies to recall up to 3 pieces of information after a 5- minute delay 90%:;without cues  -KA     Status: Patient’s memory skills will be enhanced as reported by patient by utilizing internal memory strategies to recall up to 3 pieces of information after a 5- minute delay Achieved  -KA     Comments:  Patient’s memory skills will be enhanced as reported by patient by utilizing internal memory strategies to recall up to 3 pieces of information after a 5- minute delay delayed recall of four unrelated words after 30 minute delay 100% without cues  -KA     Patient will demonstrate improved ability to recall information by listening to paragraph and answering yes/no questions 90%:;without cues  -KA     Status: Patient will demonstrate improved ability to recall information by listening to paragraph and answering yes/no questions Achieved  -KA     Comments: Patient will demonstrate improved ability to recall information by listening to paragraph and answering yes/no questions recall of detail from fpctoioyu=295% without cues  -KA            Problem Solving Goals    Patient will be able to engage in avocational activities requiring high level cognitive skills Independently  -KA     Status: Patient will be able to engage in avocational activities requiring high level cognitive skills Achieved  -KA     Patient will improve ability to analyze problems and determine solutions by using reasoning skills to complete activities of independence 90%:;without cues  -KA     Comments: Patient will improve ability to analyze problems and determine solutions by using reasoning skills to complete activities of independence Patient completed two complex deductive reasoning puzzles with 100% without cues  -KA     Patient will improve ability to analyze problems and determine solutions by completing functional math problems 90%:;without cues  -KA     Status: Patient will improve ability to analyze problems and determine solutions by completing functional math problems Achieved  -KA     Comments: Patient will improve ability to analyze problems and determine solutions by completing functional math problems 100% with functional math simple  word problems  -KA           User Key  (r) = Recorded By, (t) = Taken By, (c) = Cosigned By     Initials Name Provider Type    Taqueria Pop MA,CCC-SLP Speech and Language Pathologist                 OP SLP Education     Row Name 08/09/22 1614       Education    Assessed Learning needs;Learning motivation  -ISA    Learning Motivation Strong  -ISA    Learning Method Explanation  -ISA    Teaching Response Verbalized understanding  -ISA          User Key  (r) = Recorded By, (t) = Taken By, (c) = Cosigned By    Initials Name Effective Dates    Taqueria Pop MA,CCC-SLP 06/02/22 -                          Time Calculation:   SLP Start Time: 1430  SLP Stop Time: 1515  SLP Time Calculation (min): 45 min  Timed Charges  46554-VJ Dev of Cogn Skills Initial Minutes: 15  95037-AM Dev of Cogn Skills Add Minutes: 30  Total Minutes  Timed Charges Total Minutes: 45   Total Minutes: 45    Therapy Charges for Today     Code Description Service Date Service Provider Modifiers Qty    74413314097 HC ST DEV OF COGN SKILLS INITIAL 15 MIN 8/9/2022 Taqueria Olvera MA,CCC-SLP  1    78238255082 HC ST DEV OF COGN SKILLS EACH ADDT'L 15 MIN 8/9/2022 Taqueria Olvera MA,CCC-SLP  2                 OP SLP Discharge Summary  Date of Discharge: 08/09/22  Reason for Discharge: all goals and outcomes met, no further needs identified  Progress Toward Achieving Short/long Term Goals: all goals met within established timelines  Discharge Destination: home  Discharge Instructions: Home exercise program provided      Taqueria Olvera MA,CCC-SLP  8/9/2022

## 2022-08-09 NOTE — THERAPY TREATMENT NOTE
Outpatient Physical Therapy Neuro Treatment Note  [facility]     Patient Name: Maryellen Crump  : 1950  MRN: [id number]  Today's Date: 2022      Visit Date: 2022    Visit Dx:    ICD-10-CM ICD-9-CM   1. Cerebrovascular accident (CVA), unspecified mechanism ([facility])  I63.9 434.91   2. Functional gait abnormality  R26.89 781.2       Patient Active Problem List   Diagnosis   • Family history of colon cancer   • Iron deficiency anemia   • Diarrhea of presumed infectious origin   • Type 2 diabetes mellitus with kidney complication, without long-term current use of insulin ([facility])   • Hypertension   • Hyperlipidemia   • Stroke-like symptoms   • Acute CVA (cerebrovascular accident) ([facility])   • Acute lacunar stroke ([facility])   • Vitamin B12 deficiency   • CVA (cerebral vascular accident) ([facility])   • CKD (chronic kidney disease) stage 3, GFR 30-59 ml/min ([facility])            PT Neuro     Row Name 22 1400             Precautions and Contraindications    Precautions/Limitations fall precautions  -AE              Subjective Pain    Able to rate subjective pain? yes  -AE      Pre-Treatment Pain Level 0  -AE      Post-Treatment Pain Level 0  -AE              Cognition    Overall Cognitive Status WFL  -AE      Arousal/Alertness Appropriate responses to stimuli  -AE      Memory Appears intact  -AE      Orientation Level Oriented X4  -AE      Safety Judgment Good awareness of safety precautions  -AE              Transfers    Sit-Stand [place] (Transfers) supervision;modified independence  -AE      Stand-Sit [place] (Transfers) supervision;modified independence  -AE      Transfers, Sit-Stand-Sit, Assist Device other (see comments)  no AD today  -AE      Transfer, Impairments impaired balance  -AE              Gait/Stairs (Locomotion)    [place] Level (Gait) standby assist  -AE      Distance in Feet (Gait) 160ft x 2 no AD, 160ft with treking poles  -AE      Pattern (Gait) step-through  -AE       "Deviations/Abnormal Patterns (Gait) bilateral deviations;base of support, narrow;gait speed decreased;stride length decreased  -AE      Bilateral Gait Deviations decreased arm swing  -AE      Right Sided Gait Deviations heel strike decreased  RUE pulls slightly into flexion  -AE              Balance Skills Training    Standing-Level of Assistance Contact guard;Minimum assistance  -AE      Static Standing Balance Support No upper extremity supported  -AE      Standing-Balance Activities Single Limb Stance;Tandem Stance  -AE      Standing Balance # of Minutes Pt performed 5 x 10 sec increments tandem stance with CGA and cues for hip strategy to correct postural sway, 3 x 3sec SLS with Yany in // bars  -AE      Gait Balance-Level of Assistance Contact guard;Minimum assistance  -AE      Gait Balance Support Right upper extremity supported;Left upper extremity supported  -AE      Gait Balance Activities backwards  -AE      Balance Comments while walking backwards 40ft x 3 with therapist in front, emphasis on long strides, reciprocal strides, and toe touch for inital strike  -AE            User Key  (r) = Recorded By, (t) = Taken By, (c) = Cosigned By    Initials Name Provider Type    AE Leydi Villagomez, PT Physical Therapist                                OP Exercises     Row Name 08/09/22 1455 08/09/22 1400          Precautions    Existing Precautions/Restrictions -- fall  -AE            Subjective Comments    Subjective Comments -- Pt states she has been doing well and would like to stop bringing her FWW. She does not ambulate with walker in home or community, occasionally uses her  as her \"walker\" however only brings her FWW to PT visits only  Pt stated she wanted to begin jogging outside (was running 2 miles/day before hospitalization) deferred remainder mat therex today due to trial of treadmill training  -AE            Subjective Pain    Able to rate subjective pain? -- yes  -AE     Pre-Treatment Pain " Level -- 0  -AE     Post-Treatment Pain Level -- 0  -AE            Total Minutes    14793 -  PT Neuromuscular Reeducation Minutes 40  -AE --            Exercise 17    Exercise Name 17 -- Treadmill training  -AE     Time 17 -- 7.46 total time  ~5 min walking at ~1.5-2 mph, jogged x 1-1.5 min at 2.9mph  -AE     Additional Comments -- total distance .19, stride length significantly improved including heel->toe and improved RLE knee extension with faster treadmill speed.  patient stated she really enjoyed the treadmill but agreed she is not ready to jog out in the neighborhood  -AE           User Key  (r) = Recorded By, (t) = Taken By, (c) = Cosigned By    Initials Name Provider Type    Leydi Olivia PT Physical Therapist                                Therapy Education  Education Details: Educated on gait mechanics including incorporating arm swing as well as importance of improving mechanics of gait/balance in clinic prior to initiating running progression in community setting  Given: Fall prevention and home safety, Mobility training  Program: Reinforced  How Provided: Verbal  Provided to: Patient  Level of Understanding: Verbalized, Teach back education performed  58768 - PT Self Care/Mgmt Minutes: 5              Time Calculation:   Start Time: 1300  Stop Time: 1345  Time Calculation (min): 45 min  Total Timed Code Minutes- PT: 40 minute(s)  Timed Charges  85903 -  PT Neuromuscular Reeducation Minutes: 40  96312 - PT Self Care/Mgmt Minutes: 5  Total Minutes  Timed Charges Total Minutes: 5   Total Minutes: 5   Therapy Charges for Today     Code Description Service Date Service Provider Modifiers Qty    39816316217 HC PT NEUROMUSC RE EDUCATION EA 15 MIN 8/9/2022 Leydi Villagomez, TABITHA GP 3                    Leydi Villagomez PT  8/9/2022

## 2022-08-09 NOTE — THERAPY TREATMENT NOTE
Outpatient Occupational Therapy Neuro Treatment Note  [facility]     Patient Name: Maryellen Crump  : 1950  MRN: [id number]  Today's Date: 2022       Visit Date: 2022    Patient Active Problem List   Diagnosis   • Family history of colon cancer   • Iron deficiency anemia   • Diarrhea of presumed infectious origin   • Type 2 diabetes mellitus with kidney complication, without long-term current use of insulin (HCC)   • Hypertension   • Hyperlipidemia   • Stroke-like symptoms   • Acute CVA (cerebrovascular accident) (HCC)   • Acute lacunar stroke (HCC)   • Vitamin B12 deficiency   • CVA (cerebral vascular accident) (HCC)   • CKD (chronic kidney disease) stage 3, GFR 30-59 ml/min (HCC)        Past Medical History:   Diagnosis Date   • Diabetes mellitus (HCC)    • Hyperlipidemia    • Hypertension         Past Surgical History:   Procedure Laterality Date   • COLONOSCOPY N/A 2018    Procedure: COLONOSCOPY TO CECUM;  Surgeon: Josh Muñoz MD;  Location: [facility] ENDOSCOPY;  Service: General   • DILATION AND CURETTAGE, DIAGNOSTIC / THERAPEUTIC     • ENDOSCOPY N/A 2018    Procedure: ESOPHAGOGASTRODUODENOSCOPY WITH BIOPSIES;  Surgeon: Josh Muñoz MD;  Location: [facility] ENDOSCOPY;  Service: General   • ROTATOR CUFF REPAIR Right          Visit Dx:    ICD-10-CM ICD-9-CM   1. Cerebrovascular accident (CVA), unspecified mechanism (HCC)  I63.9 434.91   2. Weakness of right arm  R29.898 729.89   3. Loss of coordination  R27.8 781.3                    OT Assessment/Plan     Row Name 22 1550          OT Assessment    Assessment Comments Pt doing extremely well with fine motor tasks in clinic, she has been using her putty at home consistently. Progressing to small fine motor tasks and dynamic sitting strengthening tasks.  -MALCOLM           User Key  (r) = Recorded By, (t) = Taken By, (c) = Cosigned By    Initials Name Provider Type    Shira Link OTR Occupational Therapist                                    OT Exercises     Row Name 08/09/22 1401             Subjective Comments    Subjective Comments Pt quarantined last week due to exposure.  -SG              Subjective Pain    Able to rate subjective pain? yes  -SG      Pre-Treatment Pain Level 0  -SG              Exercise 1    Exercise Name 1 Standing  -SG      Cueing 1 Verbal  -SG      Equipment 1 UE Ergometer  -SG      Time (Minutes) 1 5, 2  -SG              Exercise 2    Exercise Name 2 Standing shoulder extension  -SG      Cueing 2 Verbal;Tactile;Demo;Auditory  -SG      Weights/Plates 2 3  -SG      Sets 2 3  -SG      Reps 2 10  -SG              Exercise 3    Exercise Name 3 R hand  strengthening grasping marbles with tennis ball and placing into target  -SG      Cueing 3 Verbal;Tactile;Demo;Auditory  -SG      Reps 3 20  -SG              Exercise 4    Exercise Name 4 2pt pinch picking up and placing square pegs into pegboard, very mild difficulty with rotation of pieces  -SG      Cueing 4 Verbal;Tactile;Demo;Auditory  -SG      Sets 4 3  -SG      Reps 4 10  -SG              Exercise 5    Exercise Name 5 Pt performs purdue pegboard placing rings, washers and bushings into peg holes  -SG      Cueing 5 Verbal;Tactile;Demo;Auditory  -SG              Exercise 6    Exercise Name 6 Long sitting shoulder press  -SG      Cueing 6 Verbal;Tactile;Demo;Auditory  -SG      Equipment 6 [place]  -SG      Weights/Plates 6 2  -SG      Sets 6 3  -SG      Reps 6 10  -SG              Exercise 7    Exercise Name 7 Quadruped positioning using one hand to stabilize and performing shoulder flexion with 2# DB with other hand  -SG      Cueing 7 Verbal;Tactile;Demo;Auditory  -SG      Equipment 7 [place]  -SG      Weights/Plates 7 2  -SG      Sets 7 2  -SG      Reps 7 10  -SG            User Key  (r) = Recorded By, (t) = Taken By, (c) = Cosigned By    Initials Name Provider Type    Shira Link, OTR Occupational Therapist                            Time  Calculation:   OT Start Time: 1345  OT Stop Time: 1430  OT Time Calculation (min): 45 min  Total Timed Code Minutes- OT: 45 minute(s)  Timed Charges  99027 - OT Therapeutic Exercise Minutes: 15  30934 - OT Therapeutic Activity Minutes: 30  Total Minutes  Timed Charges Total Minutes: 45   Total Minutes: 45     Therapy Charges for Today     Code Description Service Date Service Provider Modifiers Qty    09692585278 HC OT THER PROC EA 15 MIN 8/9/2022 Shira Wong OTR GO 1    63772782362 HC OT THERAPEUTIC ACT EA 15 MIN 8/9/2022 Shira Wong OTR GO 2                    RICH Day  8/9/2022

## 2022-08-09 NOTE — THERAPY TREATMENT NOTE
Outpatient Physical Therapy Neuro Treatment Note  [facility]     Patient Name: Maryellen Crump  : 1950  MRN: [id number]  Today's Date: 2022      Visit Date: 2022    Visit Dx:    ICD-10-CM ICD-9-CM   1. Cerebrovascular accident (CVA), unspecified mechanism (HCC)  I63.9 434.91   2. Functional gait abnormality  R26.89 781.2       Patient Active Problem List   Diagnosis   • Family history of colon cancer   • Iron deficiency anemia   • Diarrhea of presumed infectious origin   • Type 2 diabetes mellitus with kidney complication, without long-term current use of insulin (HCC)   • Hypertension   • Hyperlipidemia   • Stroke-like symptoms   • Acute CVA (cerebrovascular accident) (HCC)   • Acute lacunar stroke (HCC)   • Vitamin B12 deficiency   • CVA (cerebral vascular accident) (HCC)   • CKD (chronic kidney disease) stage 3, GFR 30-59 ml/min ([facility])            PT Neuro     Row Name 22 1400             Precautions and Contraindications    Precautions/Limitations fall precautions  -AE              Subjective Pain    Able to rate subjective pain? yes  -AE      Pre-Treatment Pain Level 0  -AE      Post-Treatment Pain Level 0  -AE              Cognition    Overall Cognitive Status WFL  -AE      Arousal/Alertness Appropriate responses to stimuli  -AE      Memory Appears intact  -AE      Orientation Level Oriented X4  -AE      Safety Judgment Good awareness of safety precautions  -AE              Transfers    Sit-Stand [place] (Transfers) supervision;modified independence  -AE      Stand-Sit [place] (Transfers) supervision;modified independence  -AE      Transfers, Sit-Stand-Sit, Assist Device other (see comments)  no AD today  -AE      Transfer, Impairments impaired balance  -AE              Gait/Stairs (Locomotion)    [place] Level (Gait) standby assist  -AE      Distance in Feet (Gait) 160ft x 2 no AD, 160ft with treking poles  -AE      Pattern (Gait) step-through  -AE       Deviations/Abnormal Patterns (Gait) bilateral deviations;base of support, narrow;gait speed decreased;stride length decreased  -AE      Bilateral Gait Deviations decreased arm swing  -AE      Right Sided Gait Deviations heel strike decreased  RUE pulls slightly into flexion  -AE              Balance Skills Training    Standing-Level of Assistance Contact guard;Minimum assistance  -AE      Static Standing Balance Support No upper extremity supported  -AE      Standing-Balance Activities Single Limb Stance;Tandem Stance  -AE      Standing Balance # of Minutes Pt performed 5 x 10 sec increments tandem stance with CGA and cues for hip strategy to correct postural sway, 3 x 3sec SLS with Yany in // bars  -AE      Gait Balance-Level of Assistance Contact guard;Minimum assistance  -AE      Gait Balance Support Right upper extremity supported;Left upper extremity supported  -AE      Gait Balance Activities backwards  -AE      Balance Comments while walking backwards 40ft x 3 with therapist in front, emphasis on long strides, reciprocal strides, and toe touch for inital strike  -AE            User Key  (r) = Recorded By, (t) = Taken By, (c) = Cosigned By    Initials Name Provider Type    AE Leydi Villagomez, TABITHA Physical Therapist                         PT Assessment/Plan     Row Name 08/09/22 1501          PT Assessment    Functional Limitations Decreased safety during functional activities;Impaired gait;Limitation in home management;Limitations in community activities;Performance in leisure activities;Performance in self-care ADL  -AE     Impairments Balance;Coordination;Endurance;Gait;Muscle strength  -AE     Assessment Comments Performed higher level gait training including trekking poles for increased R arm swing as well as treadmill training at increased speed for longer stride length, heel->toe at walking speeds, as well as trial as return to run progression at light jog for ~1.5 min duration. Patient tolerated well  "with some fatigue but no increase in pain and agreed to not begin return to run progression in community setting until released by therapist.  -AE     Please refer to paper survey for additional self-reported information Yes  -AE     Rehab Potential Good  -AE     Patient/caregiver participated in establishment of treatment plan and goals Yes  -AE     Patient would benefit from skilled therapy intervention Yes  -AE           User Key  (r) = Recorded By, (t) = Taken By, (c) = Cosigned By    Initials Name Provider Type    AE Leydi Villagomez, PT Physical Therapist                    OP Exercises     Row Name 08/09/22 1455 08/09/22 1400          Precautions    Existing Precautions/Restrictions -- fall  -AE            Subjective Comments    Subjective Comments -- Pt states she has been doing well and would like to stop bringing her FWW. She does not ambulate with walker in home or community, occasionally uses her  as her \"walker\" however only brings her FWW to PT visits only  Pt stated she wanted to begin jogging outside (was running 2 miles/day before hospitalization) deferred remainder mat therex today due to trial of treadmill training  -AE            Subjective Pain    Able to rate subjective pain? -- yes  -AE     Pre-Treatment Pain Level -- 0  -AE     Post-Treatment Pain Level -- 0  -AE            Total Minutes    43436 -  PT Neuromuscular Reeducation Minutes 40  -AE --            Exercise 17    Exercise Name 17 -- Treadmill training  -AE     Time 17 -- 7.46 total time  ~5 min walking at ~1.5-2 mph, jogged x 1-1.5 min at 2.9mph  -AE     Additional Comments -- total distance .19, stride length significantly improved including heel->toe and improved RLE knee extension with faster treadmill speed.  patient stated she really enjoyed the treadmill but agreed she is not ready to jog out in the neighborhood  -AE           User Key  (r) = Recorded By, (t) = Taken By, (c) = Cosigned By    Initials Name Provider Type "    AE Leydi Villagomez, PT Physical Therapist                                Therapy Education  Education Details: Educated on gait mechanics including incorporating arm swing as well as importance of improving mechanics of gait/balance in clinic prior to initiating running progression in community setting  Given: Fall prevention and home safety, Mobility training  Program: Reinforced  How Provided: Verbal  Provided to: Patient  Level of Understanding: Verbalized, Teach back education performed  77953 - PT Self Care/Mgmt Minutes: 5              Time Calculation:   Start Time: 1300  Stop Time: 1345  Time Calculation (min): 45 min  Total Timed Code Minutes- PT: 40 minute(s)  Timed Charges  58320 -  PT Neuromuscular Reeducation Minutes: 40  10348 - PT Self Care/Mgmt Minutes: 5  Total Minutes  Timed Charges Total Minutes: 5   Total Minutes: 5   Therapy Charges for Today     Code Description Service Date Service Provider Modifiers Qty    31403664954 HC PT NEUROMUSC RE EDUCATION EA 15 MIN 8/9/2022 Leydi Villagomez, PT GP 3                    Leydi Villagomez PT  8/9/2022

## 2022-08-11 ENCOUNTER — HOSPITAL ENCOUNTER (OUTPATIENT)
Dept: OCCUPATIONAL THERAPY | Facility: HOSPITAL | Age: 72
Setting detail: THERAPIES SERIES
Discharge: HOME | End: 2022-08-11

## 2022-08-11 ENCOUNTER — APPOINTMENT (OUTPATIENT)
Dept: SPEECH THERAPY | Facility: HOSPITAL | Age: 72
End: 2022-08-11

## 2022-08-11 ENCOUNTER — HOSPITAL ENCOUNTER (OUTPATIENT)
Dept: PHYSICAL THERAPY | Facility: HOSPITAL | Age: 72
Setting detail: THERAPIES SERIES
Discharge: HOME | End: 2022-08-11

## 2022-08-11 DIAGNOSIS — I63.9 CEREBROVASCULAR ACCIDENT (CVA), UNSPECIFIED MECHANISM: Primary | ICD-10-CM

## 2022-08-11 DIAGNOSIS — R26.89 FUNCTIONAL GAIT ABNORMALITY: ICD-10-CM

## 2022-08-11 DIAGNOSIS — R27.8 LOSS OF COORDINATION: ICD-10-CM

## 2022-08-11 DIAGNOSIS — R29.898 WEAKNESS OF RIGHT ARM: ICD-10-CM

## 2022-08-11 PROCEDURE — 97530 THERAPEUTIC ACTIVITIES: CPT

## 2022-08-11 PROCEDURE — 97530 THERAPEUTIC ACTIVITIES: CPT | Performed by: OCCUPATIONAL THERAPIST

## 2022-08-11 PROCEDURE — 97110 THERAPEUTIC EXERCISES: CPT | Performed by: OCCUPATIONAL THERAPIST

## 2022-08-11 NOTE — THERAPY TREATMENT NOTE
Outpatient Occupational Therapy Neuro Treatment Note  [facility]     Patient Name: Maryellen Crump  : 1950  MRN: [id number]  Today's Date: 2022       Visit Date: 2022    Patient Active Problem List   Diagnosis   • Family history of colon cancer   • Iron deficiency anemia   • Diarrhea of presumed infectious origin   • Type 2 diabetes mellitus with kidney complication, without long-term current use of insulin (HCC)   • Hypertension   • Hyperlipidemia   • Stroke-like symptoms   • Acute CVA (cerebrovascular accident) (HCC)   • Acute lacunar stroke (HCC)   • Vitamin B12 deficiency   • CVA (cerebral vascular accident) (HCC)   • CKD (chronic kidney disease) stage 3, GFR 30-59 ml/min (HCC)        Past Medical History:   Diagnosis Date   • Diabetes mellitus (HCC)    • Hyperlipidemia    • Hypertension         Past Surgical History:   Procedure Laterality Date   • COLONOSCOPY N/A 2018    Procedure: COLONOSCOPY TO CECUM;  Surgeon: Josh Muñoz MD;  Location: [facility] ENDOSCOPY;  Service: General   • DILATION AND CURETTAGE, DIAGNOSTIC / THERAPEUTIC     • ENDOSCOPY N/A 2018    Procedure: ESOPHAGOGASTRODUODENOSCOPY WITH BIOPSIES;  Surgeon: Josh Muñoz MD;  Location: [facility] ENDOSCOPY;  Service: General   • ROTATOR CUFF REPAIR Right          Visit Dx:    ICD-10-CM ICD-9-CM   1. Cerebrovascular accident (CVA), unspecified mechanism (HCC)  I63.9 434.91   2. Weakness of right arm  R29.898 729.89   3. Loss of coordination  R27.8 781.3                    OT Assessment/Plan     Row Name 22 1246          OT Assessment    Assessment Comments Pt tolerates dynamic standing and strengthening tasks simultaneously, does demo mild RUE weakness at the end of her sets. Pt also working on resistive handwriting and increasing resistance with theraputty.  -SG           User Key  (r) = Recorded By, (t) = Taken By, (c) = Cosigned By    Initials Name Provider Type    Shira Link OTR Occupational  "Therapist                                   OT Exercises     Row Name 08/11/22 1100             Subjective Comments    Subjective Comments \"Is there anything else I can do for my hand?\"  -SG              Subjective Pain    Able to rate subjective pain? yes  -SG      Pre-Treatment Pain Level 0  -SG              Exercise 1    Exercise Name 1 Increasing/decreasing intensity  -SG      Cueing 1 Verbal  -SG      Equipment 1 UE Ergometer  -SG      Time (Minutes) 1 5  -SG              Exercise 2    Exercise Name 2 Tandem stance BUE front raise  -SG      Cueing 2 Verbal;Tactile;Demo;Auditory  -SG      Equipment 2 Theraband  -SG      Resistance 2 Yellow  -SG      Sets 2 3  -SG      Reps 2 10  -SG              Exercise 3    Exercise Name 3 Close stance lateral raise  -SG      Cueing 3 Verbal;Tactile;Demo;Auditory  -SG      Equipment 3 Theraband  -SG      Resistance 3 Yellow  -SG      Sets 3 3  -SG      Reps 3 10  -SG              Exercise 4    Exercise Name 4 Tandem stance protraction punches  -SG      Cueing 4 Verbal;Tactile;Demo;Auditory  -SG              Exercise 5    Exercise Name 5 Resistive handwriting with small peg in yellow putty  -SG      Cueing 5 Verbal;Tactile;Demo;Auditory  -SG              Exercise 6    Exercise Name 6 Pinch and pull green putty, roll into ball  -SG      Cueing 6 Verbal;Tactile;Demo;Auditory  -SG            User Key  (r) = Recorded By, (t) = Taken By, (c) = Cosigned By    Initials Name Provider Type    Shira Link OTR Occupational Therapist                            Time Calculation:   OT Start Time: 1103  OT Stop Time: 1145  OT Time Calculation (min): 42 min  Total Timed Code Minutes- OT: 42 minute(s)  Timed Charges  44852 - OT Therapeutic Exercise Minutes: 24  15733 - OT Therapeutic Activity Minutes: 18  Total Minutes  Timed Charges Total Minutes: 42   Total Minutes: 42     Therapy Charges for Today     Code Description Service Date Service Provider Modifiers Qty    33229754972 HC OT " THER PROC EA 15 MIN 8/11/2022 Shira Wong, RICH GO 2    [id number]  OT THERAPEUTIC ACT EA 15 MIN 8/11/2022 Shira Wong OTR GO 1                    RICH Day  8/11/2022

## 2022-08-11 NOTE — THERAPY TREATMENT NOTE
"    Outpatient Physical Therapy Neuro Treatment Note  [facility]     Patient Name: Maryellen Crump  : 1950  MRN: [id number]  Today's Date: 2022      Visit Date: 2022    Visit Dx:    ICD-10-CM ICD-9-CM   1. Cerebrovascular accident (CVA), unspecified mechanism ([facility])  I63.9 434.91   2. Functional gait abnormality  R26.89 781.2       Patient Active Problem List   Diagnosis   • Family history of colon cancer   • Iron deficiency anemia   • Diarrhea of presumed infectious origin   • Type 2 diabetes mellitus with kidney complication, without long-term current use of insulin ([facility])   • Hypertension   • Hyperlipidemia   • Stroke-like symptoms   • Acute CVA (cerebrovascular accident) ([facility])   • Acute lacunar stroke ([facility])   • Vitamin B12 deficiency   • CVA (cerebral vascular accident) ([facility])   • CKD (chronic kidney disease) stage 3, GFR 30-59 ml/min ([facility])            PT Neuro     Row Name 22 1000             Subjective Comments    Subjective Comments Pt states she said goodbye to her walker, she put it in a closet tues after PT and hasnt used it since. Pt reports RLE fatigue at end of day and has to make an effort to \"pick it up\", denies trips of falls.  -LH              Precautions and Contraindications    Precautions/Limitations fall precautions  -              Subjective Pain    Able to rate subjective pain? yes  -LH      Pre-Treatment Pain Level 0  -LH      Post-Treatment Pain Level 0  -LH              Cognition    Overall Cognitive Status WFL  -      Arousal/Alertness Appropriate responses to stimuli  -      Memory Appears intact  -      Orientation Level Oriented X4  -LH      Safety Judgment Good awareness of safety precautions  -              Posture/Observations    Posture/Observations Comments Pt ambulated to clinic no AD  -LH              Bed Mobility    Comment, (Bed Mobility) NT  -LH              Transfers    Sit-Stand [place] (Transfers) supervision;modified independence  " "-      Stand-Sit [place] (Transfers) supervision;modified independence  -      Transfer, Safety Issues step length decreased  -              Gait/Stairs (Locomotion)    [place] Level (Gait) supervision  -      Distance in Feet (Gait) 160  -      Pattern (Gait) step-through  -      Deviations/Abnormal Patterns (Gait) right sided deviations;stride length decreased;weight shifting decreased;base of support, narrow  -      Bilateral Gait Deviations forward flexed posture  -      [place] Level (Stairs) stand by assist;verbal cues  -      Handrail Location (Stairs) none  -      Number of Steps (Stairs) 4  -      Ascending Technique (Stairs) step-to-step  -      Descending Technique (Stairs) step-to-step  -      Stairs, Safety Issues weight-shifting ability decreased  -      Comment, (Gait/Stairs) --  -              Balance Skills Training    Standing-Level of Assistance Contact guard  -      Static Standing Balance Support No upper extremity supported  single UE support required for foam pad  -      Standing-Balance Activities Single Limb Stance;Foam square  -      Standing Balance # of Minutes inc difficulty RLE approx 5 sec  -      Gait Balance-Level of Assistance Contact guard  -      Gait Balance Support Right upper extremity supported;wilma bar  -      Gait Balance Activities backwards;side-stepping  resisted fwd, side and backwards walking YTB  -            User Key  (r) = Recorded By, (t) = Taken By, (c) = Cosigned By    Initials Name Provider Type     Renetta Harrison, PT Physical Therapist                         PT Assessment/Plan     Row Name 08/11/22 2060          PT Assessment    Assessment Comments Pt now ambulating w/o AD household and community distances, pt reports she can feel her RLE fatigue at the end of the day however denies falls or trips, she has to make \"more of a conscious effort to pick it up\" to not let if drag. Pt tolerated resisted " "therex today using YTB focusing on strengthening RLE- had pt perform step ups fwd and laterally initiating w RLE for strength training. When pt ascends w LLE 1st, she performs quick step to avoid SLS moment on RLE. Pt also perform SLS balance training RLE on static and dynamic- significantly short tolerance RLE, requiring UE support on dynamic surface to maintain balance.  -     Please refer to paper survey for additional self-reported information Yes  -     Rehab Potential Excellent  -     Patient/caregiver participated in establishment of treatment plan and goals Yes  -     Patient would benefit from skilled therapy intervention Yes  -            PT Plan    PT Frequency 2x/week  -           User Key  (r) = Recorded By, (t) = Taken By, (c) = Cosigned By    Initials Name Provider Type     Renetta Harrison, PT Physical Therapist                    OP Exercises     Row Name 08/11/22 1037 08/11/22 1000          Subjective Comments    Subjective Comments -- Pt states she said goodbye to her walker, she put it in a closet tues after PT and hasnt used it since. Pt reports RLE fatigue at end of day and has to make an effort to \"pick it up\", denies trips of falls.  -            Subjective Pain    Able to rate subjective pain? -- yes  -     Pre-Treatment Pain Level -- 0  -     Post-Treatment Pain Level -- 0  -            Total Minutes    57473 - PT Therapeutic Activity Minutes 45  -LH --            Exercise 3    Exercise Name 3 -- resisted crab walking- fwd and laterally  -     Cueing 3 -- Verbal;Demo  -     Sets 3 -- 2  -LH     Reps 3 -- 10  -LH     Additional Comments -- YTB hemibars  -            Exercise 6    Exercise Name 6 -- clock draws  -     Cueing 6 -- Verbal;Demo  -     Sets 6 -- 1  -LH     Reps 6 -- 10  -LH     Additional Comments -- RLE to colored floor discs  -            Exercise 7    Exercise Name 7 -- NuStep  -     Cueing 7 -- Verbal  -     Time 7 -- 5 min  -     " "Additional Comments -- L4 UE/LEs  -            Exercise 18    Exercise Name 18 -- fwd and lateral step ups  -     Cueing 18 -- Verbal;Demo  -     Sets 18 -- 2  -LH     Reps 18 -- 10  -LH     Additional Comments -- 6\" aerobic step, 2nd set YTB  -            Exercise 19    Exercise Name 19 -- alt heel and toe taps  -     Cueing 19 -- Verbal  -     Sets 19 -- 1  -LH     Reps 19 -- 20  -LH     Additional Comments -- 6\" aerobic step  -            Exercise 20    Exercise Name 20 -- single LE squat  -     Cueing 20 -- Verbal;Demo  -     Sets 20 -- 1  -LH     Reps 20 -- 10  -LH           User Key  (r) = Recorded By, (t) = Taken By, (c) = Cosigned By    Initials Name Provider Type     Renetta Harrison, PT Physical Therapist                                Therapy Education  Education Details: Pt to practice camper steps w spouse present in prep for camping trip (3, no HR, approx 6\"). ENcouraged pt to perform non reciprocally, gait belt, spouse providing CGA for fall prevention.  Given: Mobility training  Program: New  How Provided: Verbal, Demonstration  Provided to: Patient  Level of Understanding: Verbalized              Time Calculation:   Start Time: 1015  Stop Time: 1100  Time Calculation (min): 45 min  Total Timed Code Minutes- PT: 45 minute(s)  Timed Charges  06416 - PT Therapeutic Activity Minutes: 45  Total Minutes  Timed Charges Total Minutes: 45   Total Minutes: 45   Therapy Charges for Today     Code Description Service Date Service Provider Modifiers Qty    [id number]  PT THERAPEUTIC ACT EA 15 MIN 8/11/2022 Renetta Harrison, PT GP 3        .Patient was wearing a face mask during this therapy encounter. Therapist used appropriate personal protective equipment including eye protection, mask, and gloves.  Mask used was standard procedure mask. Appropriate PPE was worn during the entire therapy session. Hand hygiene was completed before and after therapy session. Patient is not in enhanced droplet " precautions.                 Renetta Harrison, PT  8/11/2022

## 2022-08-16 ENCOUNTER — OFFICE VISIT (OUTPATIENT)
Dept: CARDIOLOGY | Facility: CLINIC | Age: 72
End: 2022-08-16

## 2022-08-16 ENCOUNTER — HOSPITAL ENCOUNTER (OUTPATIENT)
Dept: OCCUPATIONAL THERAPY | Facility: HOSPITAL | Age: 72
Setting detail: THERAPIES SERIES
Discharge: HOME | End: 2022-08-16

## 2022-08-16 ENCOUNTER — HOSPITAL ENCOUNTER (OUTPATIENT)
Dept: PHYSICAL THERAPY | Facility: HOSPITAL | Age: 72
Setting detail: THERAPIES SERIES
Discharge: HOME | End: 2022-08-16

## 2022-08-16 ENCOUNTER — APPOINTMENT (OUTPATIENT)
Dept: SPEECH THERAPY | Facility: HOSPITAL | Age: 72
End: 2022-08-16

## 2022-08-16 VITALS
DIASTOLIC BLOOD PRESSURE: 80 MMHG | BODY MASS INDEX: 20.02 KG/M2 | SYSTOLIC BLOOD PRESSURE: 126 MMHG | HEIGHT: 63 IN | OXYGEN SATURATION: 99 % | WEIGHT: 113 LBS | HEART RATE: 78 BPM

## 2022-08-16 DIAGNOSIS — I10 PRIMARY HYPERTENSION: Primary | ICD-10-CM

## 2022-08-16 DIAGNOSIS — E78.2 MIXED HYPERLIPIDEMIA: ICD-10-CM

## 2022-08-16 DIAGNOSIS — Q21.12 PFO (PATENT FORAMEN OVALE): ICD-10-CM

## 2022-08-16 DIAGNOSIS — R26.89 FUNCTIONAL GAIT ABNORMALITY: ICD-10-CM

## 2022-08-16 DIAGNOSIS — R53.1 ACUTE RIGHT-SIDED WEAKNESS: ICD-10-CM

## 2022-08-16 DIAGNOSIS — I63.9 CEREBROVASCULAR ACCIDENT (CVA), UNSPECIFIED MECHANISM: Primary | ICD-10-CM

## 2022-08-16 PROCEDURE — 97112 NEUROMUSCULAR REEDUCATION: CPT

## 2022-08-16 PROCEDURE — 93000 ELECTROCARDIOGRAM COMPLETE: CPT | Performed by: INTERNAL MEDICINE

## 2022-08-16 PROCEDURE — 99204 OFFICE O/P NEW MOD 45 MIN: CPT | Performed by: INTERNAL MEDICINE

## 2022-08-16 NOTE — PROGRESS NOTES
PATIENTINFORMATION    Date of Office Visit: 2022  Encounter Provider: Austen Do MD  Place of Service: [facility] CARDIOLOGY  Patient Name: Maryellen Crump  : 1950    Subjective:     Encounter Date:2022      Patient ID: Maryellen Crump is a 71 y.o. female.    Chief Complaint   Patient presents with   • PFO   • Right arm pain     HPI  Ms. Crump is a pleasant 72 yo lady who was admitted and treated for lacunar stroke in 2022 after presenting with right upper and lower extremity weakness.  She was incidentally noted to have a small PFO not prompted referred to cardiology clinic.  She was admitted to inpatient rehab after stroke diagnosis that she completed successfully.  Currently right upper and lower extremity weakness have completely resolved and she is able to walk without any cane or walker and back to baseline.  She has been on dual antiplatelet therapy, statin since after discharge.  She has good blood pressure and sugar control.    No known prior coronary artery disease, CHF or arrhythmia.    No tobacco, recreational drug use or alcohol abuse.    ROS  All systems reviewed and negative except as noted in HPI.    Past Medical History:   Diagnosis Date   • Diabetes mellitus (HCC)    • Hyperlipidemia    • Hypertension        Past Surgical History:   Procedure Laterality Date   • COLONOSCOPY N/A 2018    Procedure: COLONOSCOPY TO CECUM;  Surgeon: Josh Muñoz MD;  Location: [facility] ENDOSCOPY;  Service: General   • DILATION AND CURETTAGE, DIAGNOSTIC / THERAPEUTIC     • ENDOSCOPY N/A 2018    Procedure: ESOPHAGOGASTRODUODENOSCOPY WITH BIOPSIES;  Surgeon: Josh Muñoz MD;  Location: [facility] ENDOSCOPY;  Service: General   • ROTATOR CUFF REPAIR Right        Social History     Socioeconomic History   • Marital status:    Tobacco Use   • Smoking status: Never Smoker   • Smokeless tobacco: Never Used   • Tobacco comment: caffeine use 2 cups coffee  "daily   Substance and Sexual Activity   • Alcohol use: Not Currently     Comment: daily glass of wine   • Drug use: No   • Sexual activity: Defer       Family History   Problem Relation Age of Onset   • Stroke Father    • Colon polyps Sister    • Cancer Brother    • Colon cancer Brother            ECG 12 Lead    Date/Time: 8/16/2022 10:07 AM  Performed by: Austen Do MD  Authorized by: Austen Do MD   Comparison: compared with previous ECG from 6/29/2022  Similar to previous ECG  Rhythm: sinus rhythm  Rate: normal  Conduction: conduction normal  ST Segments: ST segments normal  T Waves: T waves normal  QRS axis: normal  Other: no other findings    Clinical impression: normal ECG               Objective:     /80 (BP Location: Left arm)   Pulse 78   Ht 160 cm (63\")   Wt 51.3 kg (113 lb)   SpO2 99%   BMI 20.02 kg/m²  Body mass index is 20.02 kg/m².     Constitutional:       General: Not in acute distress.     Appearance: Well-developed. Not diaphoretic.   Eyes:      Pupils: Pupils are equal, round, and reactive to light.   HENT:      Head: Normocephalic and atraumatic.   Neck:      Thyroid: No thyromegaly.   Pulmonary:      Effort: Pulmonary effort is normal. No respiratory distress.      Breath sounds: Normal breath sounds. No wheezing. No rales.   Chest:      Chest wall: Not tender to palpatation.   Cardiovascular:      Normal rate. Regular rhythm.      No gallop.   Pulses:     Intact distal pulses.   Edema:     Peripheral edema absent.   Abdominal:      General: Bowel sounds are normal. There is no distension.      Palpations: Abdomen is soft.      Tenderness: There is no guarding.   Musculoskeletal: Normal range of motion.         General: No deformity.      Cervical back: Normal range of motion and neck supple. Skin:     General: Skin is warm and dry.      Findings: No rash.   Neurological:      Mental Status: Alert and oriented to person, place, and time.      Cranial Nerves: No " cranial nerve deficit.      Deep Tendon Reflexes: Reflexes are normal and symmetric.   Psychiatric:         Judgment: Judgment normal.         Review Of Data: I have reviewed pertinent recent labs, images and documents and pertinent findings included in HPI or assessment below.    Lipid Panel    Lipid Panel 6/30/22 7/19/22   Total Cholesterol 130 118   Triglycerides 148 155 (A)   HDL Cholesterol 54 43   VLDL Cholesterol 25 26   LDL Cholesterol  51 49   LDL/HDL Ratio 0.86 1.02   (A) Abnormal value                Assessment/Plan:         1.  Lacunar stroke involving left subcortical area and coronary radiata with right upper and lower extremity weakness that resolved.  2.  Incidentally noted small PFO-I have reviewed neurology note and I agree the PFO was noted culprit for stroke.  Patient currently on dual antiplatelet therapy, statin.  She has good blood pressure and blood sugar control.  Continue same care and follow-up with neurology.  3.  Essential hypertension that is well controlled on current regimen with hydrochlorothiazide and ramipril.  4.  Hypercholesterolemia on high intensity statin with atorvastatin 80 mg p.o. daily.  Most recent lipid panel is at goal.  5.  Type 2 diabetes mellitus on metformin and Sitagliptin  6.  CKD stage III: Stable    Patient will call with any recurrent TIA or strokelike symptoms.  Defer any intervention on PFO for now.    Patient will return to clinic in 6 months or sooner with any concerning symptoms.    Diagnosis and plan of care discussed with patient and verbalized understanding.            Your medication list          Accurate as of August 16, 2022 10:15 AM. If you have any questions, ask your nurse or doctor.            CHANGE how you take these medications      Instructions Last Dose Given Next Dose Due   Januvia 50 MG tablet  Generic drug: SITagliptin  What changed: how much to take      Take 1 tablet by mouth Daily.       metFORMIN 500 MG tablet  Commonly known as:  GLUCOPHAGE  What changed:   · how much to take  · additional instructions      Take 1 tablet by mouth 2 (Two) Times a Day With Meals.          CONTINUE taking these medications      Instructions Last Dose Given Next Dose Due   Adult Aspirin Regimen 81 MG EC tablet  Generic drug: aspirin      Take 1 tablet by mouth Daily.       atorvastatin 80 MG tablet  Commonly known as: LIPITOR      Take 1 tablet by mouth Every Night.       clopidogrel 75 MG tablet  Commonly known as: PLAVIX      Take 1 tablet by mouth Daily. For three months total       ferrous sulfate 325 (65 FE) MG tablet      Take 1 tablet by mouth Every Other Day.       hydroCHLOROthiazide 12.5 MG capsule  Commonly known as: MICROZIDE      Take 12.5 mg by mouth Daily.       pantoprazole 40 MG EC tablet  Commonly known as: PROTONIX      Take 1 tablet by mouth Every Morning.       polyethylene glycol 17 GM/SCOOP powder  Commonly known as: MIRALAX      Take 17 g by mouth Daily.       ramipril 10 MG capsule  Commonly known as: ALTACE      Take 1 capsule by mouth Daily.       vitamin B-12 1000 MCG tablet  Commonly known as: CYANOCOBALAMIN      Take 1 tablet by mouth Daily.                  Austen Do MD  08/16/22  10:15 EDT

## 2022-08-16 NOTE — THERAPY TREATMENT NOTE
"Outpatient Occupational Therapy Neuro Treatment Note  [facility]     Patient Name: Maryellen Crump  : 1950  MRN: [id number]  Today's Date: 2022       Visit Date: 2022    Patient Active Problem List   Diagnosis   • Family history of colon cancer   • Iron deficiency anemia   • Diarrhea of presumed infectious origin   • Type 2 diabetes mellitus with kidney complication, without long-term current use of insulin (HCC)   • Hypertension   • Hyperlipidemia   • Stroke-like symptoms   • Acute CVA (cerebrovascular accident) (HCC)   • Acute lacunar stroke (HCC)   • Vitamin B12 deficiency   • CVA (cerebral vascular accident) (HCC)   • CKD (chronic kidney disease) stage 3, GFR 30-59 ml/min (HCC)   • PFO (patent foramen ovale)        Past Medical History:   Diagnosis Date   • Diabetes mellitus (HCC)    • Hyperlipidemia    • Hypertension         Past Surgical History:   Procedure Laterality Date   • COLONOSCOPY N/A 2018    Procedure: COLONOSCOPY TO CECUM;  Surgeon: Josh Muñoz MD;  Location: [facility] ENDOSCOPY;  Service: General   • DILATION AND CURETTAGE, DIAGNOSTIC / THERAPEUTIC     • ENDOSCOPY N/A 2018    Procedure: ESOPHAGOGASTRODUODENOSCOPY WITH BIOPSIES;  Surgeon: Josh Muñoz MD;  Location: [facility] ENDOSCOPY;  Service: General   • ROTATOR CUFF REPAIR Right          Visit Dx:  No diagnosis found.     OT Neuro     Row Name 22 1400             Subjective Comments    Subjective Comments \"I am excited to be going to [place] soon.\"  -JATINDER              Subjective Pain    Able to rate subjective pain? yes  -JATINDER            User Key  (r) = Recorded By, (t) = Taken By, (c) = Cosigned By    Initials Name Provider Type    Denia Philippe OT Occupational Therapist                         OT Assessment/Plan     Row Name 22 1524          OT Assessment    Functional Limitations Limitations in functional capacity and performance;Performance in leisure activities;Limitation in home " management;Limitations in community activities;Performance in self-care ADL  -JATINDER     Impairments Coordination;Dexterity;Muscle strength  -JATINDER     Assessment Comments Pt continues to demonstrate good progression of strengthening and coordination tasks, with mild weakness with increased reps. Pt educated on additional hand writing resources to use on her upcoming trip (Sept) to maintain gains and functional endurance. Reviewed some continued HEP including coordination task of throwing tennis ball and catching with affected UE. Pt tolerated proprioceptive weighted throwing task against trampoline, catching with same affected extremity.  -JATINDER     Please refer to paper survey for additional self-reported information Yes  -JATINDER     OT Rehab Potential Good  -JATINDER     Patient/caregiver participated in establishment of treatment plan and goals Yes  -JATINDER     Patient would benefit from skilled therapy intervention Yes  -JATINDER            OT Plan    OT Frequency 2x/week  -JATINDER     Planned CPT's? OT EVAL MOD COMPLEXITY: 02433;OT THER ACT EA 15 MIN: 16023DM;OT THER PROC EA 15 MIN: 33141ZP;OT NEUROMUSC RE EDUCATION EA 15 MIN: 18511;OT SELF CARE/MGMT/TRAIN 15 MIN: 83988  -JATINDER     Planned Therapy Interventions (Optional Details) home exercise program;motor coordination training;stretching;strengthening;postural re-education;patient/family education;neuromuscular re-education  -JATINDER           User Key  (r) = Recorded By, (t) = Taken By, (c) = Cosigned By    Initials Name Provider Type    Denia Philippe OT Occupational Therapist                           Therapy Education  Given: HEP, Symptoms/condition management, Fall prevention and home safety, Posture/body mechanics  Program: Reinforced  How Provided: Verbal, Demonstration  Provided to: Patient  Level of Understanding: Verbalized       OT Exercises     Row Name 08/16/22 1400             Exercise 1    Exercise Name 1 Increasing/decreasing intensity  -JATINDER      Cueing 1 Verbal  -JATINDER       Equipment 1 UE Ergometer  -JATINDER      Time (Minutes) 1 5  -JATINDER              Exercise 2    Exercise Name 2 Resistive Hand Writing for dominant hand  -JATINDER      Cueing 2 Verbal;Tactile;Demo;Auditory  -JATINDER      Equipment 2 Theraputty  -JATINDER      Resistance 2 Yellow  -JATINDER              Exercise 3    Exercise Name 3 Close stance lateral raise  -JATINDER      Cueing 3 Verbal;Tactile;Demo;Auditory  -JATINDER              Exercise 4    Exercise Name 4 Standing weighted throw against angled trampoline for proprioceptive input re-education  -JATINDER      Cueing 4 Verbal;Tactile;Demo;Auditory  -JATINDER      Sets 4 3  -JATINDER      Reps 4 10  -JATINDER              Exercise 7    Exercise Name 7 Quadruped positioning using one hand to stabilize and performing shoulder flexion with 2# DB with other hand  -JATINDER      Cueing 7 Verbal;Tactile;Demo;Auditory  -JATINDER      Equipment 7 Dumbell  -JATINDER      Weights/Plates 7 2  -JATINDER      Sets 7 2  -JATINDER      Reps 7 10  -JATINDER            User Key  (r) = Recorded By, (t) = Taken By, (c) = Cosigned By    Initials Name Provider Type    Denia Philippe OT Occupational Therapist                            Time Calculation:   OT Start Time: 1430  OT Stop Time: 1515  OT Time Calculation (min): 45 min  Total Timed Code Minutes- OT: 45 minute(s)  Timed Charges  07073 -  OT Neuromuscular Reeducation Minutes: 45  Total Minutes  Timed Charges Total Minutes: 45   Total Minutes: 45     Therapy Charges for Today     Code Description Service Date Service Provider Modifiers Qty    47585202560  OT NEUROMUSC RE EDUCATION EA 15 MIN 8/16/2022 Denia Goodman OT GO 3                    Denia Goodman OT  8/16/2022

## 2022-08-16 NOTE — THERAPY PROGRESS REPORT/RE-CERT
Outpatient Physical Therapy Neuro Progress Note  [facility]     Patient Name: Maryellen Crump  : 1950  MRN: [id number]  Today's Date: 2022      Visit Date: 2022    Visit Dx:    ICD-10-CM ICD-9-CM   1. Cerebrovascular accident (CVA), unspecified mechanism ([facility])  I63.9 434.91   2. Functional gait abnormality  R26.89 781.2   3. Acute right-sided weakness  R53.1 728.87       Patient Active Problem List   Diagnosis   • Family history of colon cancer   • Iron deficiency anemia   • Diarrhea of presumed infectious origin   • Type 2 diabetes mellitus with kidney complication, without long-term current use of insulin ([facility])   • Hypertension   • Hyperlipidemia   • Stroke-like symptoms   • Acute CVA (cerebrovascular accident) ([facility])   • Acute lacunar stroke ([facility])   • Vitamin B12 deficiency   • CVA (cerebral vascular accident) ([facility])   • CKD (chronic kidney disease) stage 3, GFR 30-59 ml/min ([facility])   • PFO (patent foramen ovale)            PT Neuro     Row Name 22 1600             Subjective Comments    Subjective Comments I do know that I am doing better.  I am walking about a mile a day and go to my garden easily  -LB              Precautions and Contraindications    Precautions/Limitations fall precautions  -LB              Subjective Pain    Able to rate subjective pain? yes  -LB      Pre-Treatment Pain Level 0  -LB      Post-Treatment Pain Level 0  -LB              Cognition    Overall Cognitive Status WFL  -LB              Posture/Observations    Posture/Observations Comments Pt ambulated to clinic no AD  -LB              Transfers    Sit-Stand [place] (Transfers) independent  -LB      Stand-Sit [place] (Transfers) independent  -LB              Gait/Stairs (Locomotion)    [place] Level (Gait) supervision  -LB      Assistive Device (Gait) --  no AD  -LB      Distance in Feet (Gait) 120; 200  -LB      Pattern (Gait) step-through  -LB      Deviations/Abnormal Patterns (Gait) right  sided deviations;stride length decreased;weight shifting decreased;base of support, narrow  -LB      Bilateral Gait Deviations forward flexed posture;decreased arm swing  -LB      Right Sided Gait Deviations heel strike decreased  slight UE flexion  -LB      [place] Level (Stairs) stand by assist  -LB      Handrail Location (Stairs) right side (ascending);left side (descending)  -LB      Number of Steps (Stairs) 4 x 2  -LB      Ascending Technique (Stairs) step-over-step  -LB      Descending Technique (Stairs) step-over-step  -LB      Comment, (Gait/Stairs) Negogiated steps while carrying a bag with SBA and 1 HR  -LB              Curb Negotiation (Mobility)    [place], Curb Negotiation standby assist  -LB      Comment, Curb Negotiation (Mobility) no AD  -LB              Balance Skills Training    Standing-Level of Assistance Contact guard  -LB      Static Standing Balance Support Left upper extremity supported;No upper extremity supported  -LB      Standing-Balance Activities Weight Shift R-L;Foam square;Retrieve object from floor;Single Limb Stance;Compliant surfaces;Semi-tandum;Eyes open/closed  -LB      Standing Balance # of Minutes Added UE activity with trunk rotation  -LB      Gait Balance-Level of Assistance Contact guard  -LB      Gait Balance Support No upper extremity supported  -LB      Gait Balance Activities backwards;side-stepping;stepping over object;floor ladder  -LB      Gait Balance # of Minutes Forward manual resistive ambulation for 50 ft times 2  -LB      Stepping Strategy Assessment (Balance) Ambulated with 5# ankle weight on right with HHA on left, encouraging increase step and leg clearance.  -LB            User Key  (r) = Recorded By, (t) = Taken By, (c) = Cosigned By    Initials Name Provider Type    Nora Arroyo, PT Physical Therapist                         PT Assessment/Plan     Row Name 08/16/22 6479          PT Assessment    Assessment Comments The patient has made good  progress from starting from walker to assistive device. Short term goals were mostly met and the patient is performing HEP and walking outside of the home.  Long term goals are still appropriate but set a shorter time frame to reach the goals.  The patient has greater difficultly maintaining balance with SLS on the right when stepping over objects.  Discussed safety especially when fatigued and right LE does not clear the floor as well.  Pt is pleased with progress  -LB           User Key  (r) = Recorded By, (t) = Taken By, (c) = Cosigned By    Initials Name Provider Type    Nora Arroyo PT Physical Therapist                    OP Exercises     Row Name 08/16/22 1640 08/16/22 1600          Subjective Comments    Subjective Comments -- I do know that I am doing better.  I am walking about a mile a day and go to my garden easily  -LB            Subjective Pain    Able to rate subjective pain? -- yes  -LB     Pre-Treatment Pain Level -- 0  -LB     Post-Treatment Pain Level -- 0  -LB            Total Minutes    10778 -  PT Neuromuscular Reeducation Minutes 40  -LB --            Exercise 11    Exercise Name 11 -- sit to stand  -LB     Sets 11 -- 2  -LB     Reps 11 -- 5  -LB           User Key  (r) = Recorded By, (t) = Taken By, (c) = Cosigned By    Initials Name Provider Type    Nora Arroyo PT Physical Therapist                             PT OP Goals     Row Name 08/16/22 1600          PT Short Term Goals    STG 1 Pt will be indep with HEP for strengthening to maintain progress in therapy  -LB     STG 1 Progress Met  -LB     STG 2 Pt will come to stand from various chairs Independently with increased weight shift forward  -LB     STG 2 Progress Met  -LB     STG 3 Pt will ambulate 200 ft with least restrictive device and SBA  -LB     STG 3 Progress Met  -LB     STG 4 Pt will negogiate 4 steps with 1 HR, step over pattern and SBA  -LB     STG 4 Progress Met  -LB     STG 5 Pt will perform floor transfer with Mod  indep  -LB     STG 5 Progress Not Met;Ongoing  -LB            Long Term Goals    LTG Date to Achieve 09/13/22  -LB     LTG 1 Pt will be indep with HEP for balance to maintain progress in therapy  -LB     LTG 1 Progress Ongoing  -LB     LTG 2 Pt will improve FGA to to indicate less risk for falls  -LB     LTG 2 Progress Ongoing  -LB     LTG 3 Pt will improve DAMON to to indicate less risk for falls and demonstrate improved balance.  -LB     LTG 3 Progress Ongoing  -LB     LTG 4 Pt will negogiate a flight of stairs step over pattern  Mod Indep  -LB     LTG 4 Progress Ongoing  -LB     LTG 5 Pt will ambulate 300 ft with AAD if needed Indep  -LB     LTG 5 Progress Ongoing  -LB     LTG 6 Pt will perform semi-tandem for 30 secs  -LB     LTG 6 Progress Ongoing  -LB     LTG 7 Pt will ambulate over uneven surfaces with Mod Indep  -LB     LTG 7 Progress Ongoing  -LB            Time Calculation    PT Goal Re-Cert Due Date 09/13/22  -LB           User Key  (r) = Recorded By, (t) = Taken By, (c) = Cosigned By    Initials Name Provider Type     Nora Guillory, PT Physical Therapist                Therapy Education  Education Details: Stair climbing while holding objects, encouraged bag to not block vision  Given: Mobility training  Program: New  How Provided: Verbal  Provided to: Patient  Level of Understanding: Teach back education performed, Verbalized              Time Calculation:   Start Time: 1517  Stop Time: 1600  Time Calculation (min): 43 min  Timed Charges  38588 -  PT Neuromuscular Reeducation Minutes: 40  Total Minutes  Timed Charges Total Minutes: 40   Total Minutes: 40   Therapy Charges for Today     Code Description Service Date Service Provider Modifiers Qty    61063881181 HC PT NEUROMUSC RE EDUCATION EA 15 MIN 8/16/2022 Nora Guillory, PT GP 3                Patient was wearing a face mask during this therapy encounter. Therapist used appropriate personal protective equipment including mask and gloves.  Mask used  was standard procedure mask. Appropriate PPE was worn during the entire therapy session. Hand hygiene was completed before and after therapy session. Patient is not in enhanced droplet precautions.         Nora Guillory, PT  8/16/2022

## 2022-08-17 DIAGNOSIS — I63.9 CEREBROVASCULAR ACCIDENT (CVA), UNSPECIFIED MECHANISM: Primary | ICD-10-CM

## 2022-08-18 ENCOUNTER — APPOINTMENT (OUTPATIENT)
Dept: SPEECH THERAPY | Facility: HOSPITAL | Age: 72
End: 2022-08-18

## 2022-08-18 ENCOUNTER — HOSPITAL ENCOUNTER (OUTPATIENT)
Dept: OCCUPATIONAL THERAPY | Facility: HOSPITAL | Age: 72
Setting detail: THERAPIES SERIES
Discharge: HOME | End: 2022-08-18

## 2022-08-18 ENCOUNTER — HOSPITAL ENCOUNTER (OUTPATIENT)
Dept: PHYSICAL THERAPY | Facility: HOSPITAL | Age: 72
Setting detail: THERAPIES SERIES
Discharge: HOME | End: 2022-08-18

## 2022-08-18 ENCOUNTER — LAB (OUTPATIENT)
Dept: LAB | Facility: HOSPITAL | Age: 72
End: 2022-08-18

## 2022-08-18 DIAGNOSIS — R27.8 LOSS OF COORDINATION: ICD-10-CM

## 2022-08-18 DIAGNOSIS — I63.9 CEREBROVASCULAR ACCIDENT (CVA), UNSPECIFIED MECHANISM: ICD-10-CM

## 2022-08-18 DIAGNOSIS — R26.89 FUNCTIONAL GAIT ABNORMALITY: ICD-10-CM

## 2022-08-18 DIAGNOSIS — R29.898 WEAKNESS OF RIGHT ARM: ICD-10-CM

## 2022-08-18 DIAGNOSIS — R53.1 ACUTE RIGHT-SIDED WEAKNESS: ICD-10-CM

## 2022-08-18 DIAGNOSIS — I63.9 CEREBROVASCULAR ACCIDENT (CVA), UNSPECIFIED MECHANISM: Primary | ICD-10-CM

## 2022-08-18 LAB
ALBUMIN SERPL-MCNC: 4.2 G/DL (ref 3.5–5.2)
ALBUMIN/GLOB SERPL: 1.8 G/DL
ALP SERPL-CCNC: 104 U/L (ref 39–117)
ALT SERPL W P-5'-P-CCNC: 41 U/L (ref 1–33)
ANION GAP SERPL CALCULATED.3IONS-SCNC: 11.6 MMOL/L (ref 5–15)
AST SERPL-CCNC: 32 U/L (ref 1–32)
BILIRUB SERPL-MCNC: 0.5 MG/DL (ref 0–1.2)
BUN SERPL-MCNC: 21 MG/DL (ref 8–23)
BUN/CREAT SERPL: 19.1 (ref 7–25)
CALCIUM SPEC-SCNC: 10.2 MG/DL (ref 8.6–10.5)
CHLORIDE SERPL-SCNC: 98 MMOL/L (ref 98–107)
CHOLEST SERPL-MCNC: 103 MG/DL (ref 0–200)
CO2 SERPL-SCNC: 27.4 MMOL/L (ref 22–29)
CREAT SERPL-MCNC: 1.1 MG/DL (ref 0.57–1)
EGFRCR SERPLBLD CKD-EPI 2021: 53.5 ML/MIN/1.73
GLOBULIN UR ELPH-MCNC: 2.3 GM/DL
GLUCOSE SERPL-MCNC: 145 MG/DL (ref 65–99)
HDLC SERPL-MCNC: 46 MG/DL (ref 40–60)
LDLC SERPL CALC-MCNC: 40 MG/DL (ref 0–100)
LDLC/HDLC SERPL: 0.88 {RATIO}
POTASSIUM SERPL-SCNC: 4.4 MMOL/L (ref 3.5–5.2)
PROT SERPL-MCNC: 6.5 G/DL (ref 6–8.5)
SODIUM SERPL-SCNC: 137 MMOL/L (ref 136–145)
TRIGL SERPL-MCNC: 83 MG/DL (ref 0–150)
VLDLC SERPL-MCNC: 17 MG/DL (ref 5–40)

## 2022-08-18 PROCEDURE — 97530 THERAPEUTIC ACTIVITIES: CPT | Performed by: OCCUPATIONAL THERAPIST

## 2022-08-18 PROCEDURE — 97112 NEUROMUSCULAR REEDUCATION: CPT

## 2022-08-18 PROCEDURE — 80061 LIPID PANEL: CPT

## 2022-08-18 PROCEDURE — 97110 THERAPEUTIC EXERCISES: CPT | Performed by: OCCUPATIONAL THERAPIST

## 2022-08-18 PROCEDURE — 80053 COMPREHEN METABOLIC PANEL: CPT

## 2022-08-18 PROCEDURE — 36415 COLL VENOUS BLD VENIPUNCTURE: CPT

## 2022-08-18 NOTE — THERAPY TREATMENT NOTE
Outpatient Physical Therapy Neuro Treatment Note  [facility]     Patient Name: Maryellen Crump  : 1950  MRN: [id number]  Today's Date: 2022      Visit Date: 2022    Visit Dx:    ICD-10-CM ICD-9-CM   1. Cerebrovascular accident (CVA), unspecified mechanism ([facility])  I63.9 434.91   2. Functional gait abnormality  R26.89 781.2   3. Acute right-sided weakness  R53.1 728.87       Patient Active Problem List   Diagnosis   • Family history of colon cancer   • Iron deficiency anemia   • Diarrhea of presumed infectious origin   • Type 2 diabetes mellitus with kidney complication, without long-term current use of insulin ([facility])   • Hypertension   • Hyperlipidemia   • Stroke-like symptoms   • Acute CVA (cerebrovascular accident) ([facility])   • Acute lacunar stroke ([facility])   • Vitamin B12 deficiency   • CVA (cerebral vascular accident) ([facility])   • CKD (chronic kidney disease) stage 3, GFR 30-59 ml/min ([facility])   • PFO (patent foramen ovale)            PT Neuro     Row Name 22 1031             Subjective Comments    Subjective Comments I walked 20 mins the other night.  I had some leg cramps  -LB              Precautions and Contraindications    Precautions/Limitations fall precautions  -LB              Subjective Pain    Able to rate subjective pain? yes  -LB      Pre-Treatment Pain Level 0  -LB      Post-Treatment Pain Level 0  -LB              Cognition    Overall Cognitive Status WFL  -LB      Safety Judgment Good awareness of safety precautions  -LB              Posture/Observations    Posture/Observations Comments Pt ambulated to clinic no AD  -LB              Transfers    Sit-Stand [place] (Transfers) independent  -LB      Stand-Sit [place] (Transfers) independent  -LB              Gait/Stairs (Locomotion)    [place] Level (Gait) supervision  -LB      Distance in Feet (Gait) 200; 150  -LB      Deviations/Abnormal Patterns (Gait) right sided deviations;stride length decreased;weight shifting  decreased;base of support, narrow  -LB      Bilateral Gait Deviations forward flexed posture;decreased arm swing  -LB      Right Sided Gait Deviations heel strike decreased  -LB      Gait Assessment/Intervention Pt ambulated on treadmill for 4 mins for 300ft and SBA with UE support.  Added 5 lbs on forefoot to encouraged increased heelstrike and ankle strengthening.  Pt displayed asymetrical step length with shorted left step ~25% of the time.  -LB      Negotiation (Ramp) ramp independence  -LB      [place] Level (Ramp) independent  -LB              Balance Skills Training    Standing-Level of Assistance Contact guard;Minimum assistance  -LB      Static Standing Balance Support Left upper extremity supported;No upper extremity supported  -LB      Standing-Balance Activities Weight Shift R-L;Foam square;Feet together;Compliant surfaces;Standing on 1/2 roll  -LB      Gait Balance-Level of Assistance Contact guard;Minimum assistance  -LB      Gait Balance Support Left upper extremity supported;No upper extremity supported  -LB      Gait Balance Activities hurdles;stepping over object;tandem;uneven surface;foam beam;side-stepping;backwards  -LB      Gait Balance # of Minutes Without UE support, pt needed Min A for balance on foam beam.  One minor LOB when stepping over hurdles maintaining balance on right LE  -LB            User Key  (r) = Recorded By, (t) = Taken By, (c) = Cosigned By    Initials Name Provider Type    Nora Arroyo PT Physical Therapist                         PT Assessment/Plan     Row Name 08/18/22 2381          PT Assessment    Assessment Comments Improved performance on higher level balance activities noted today especially on compliant surfaces.  Demonstrating improved balance reactions for example the patient performing weight shifting while on 1/2 roll without verbal or tactile cues provided by therapist.  Pt aware of balance issues and agrees the need to continue skilled therapy to  improve safety.  -LB           User Key  (r) = Recorded By, (t) = Taken By, (c) = Cosigned By    Initials Name Provider Type    Nora Arroyo PT Physical Therapist                    OP Exercises     Row Name 08/18/22 1235 08/18/22 1031          Subjective Comments    Subjective Comments -- I walked 20 mins the other night.  I had some leg cramps  -LB            Subjective Pain    Able to rate subjective pain? -- yes  -LB     Pre-Treatment Pain Level -- 0  -LB     Post-Treatment Pain Level -- 0  -LB            Total Minutes    32037 -  PT Neuromuscular Reeducation Minutes 43  -LB --           User Key  (r) = Recorded By, (t) = Taken By, (c) = Cosigned By    Initials Name Provider Type    Nora Arroyo PT Physical Therapist                                Therapy Education  Education Details: With ambulation, longer step length with left LE  Given: Mobility training  Program: Reinforced  How Provided: Verbal, Demonstration  Provided to: Patient  Level of Understanding: Verbalized, Teach back education performed              Time Calculation:   Start Time: 1016  Stop Time: 1100  Time Calculation (min): 44 min  Timed Charges  65757 -  PT Neuromuscular Reeducation Minutes: 43  Total Minutes  Timed Charges Total Minutes: 43   Total Minutes: 43   Therapy Charges for Today     Code Description Service Date Service Provider Modifiers Qty    46488623646  PT NEUROMUSC RE EDUCATION EA 15 MIN 8/18/2022 Nora Guillory PT GP 3            Patient was wearing a face mask during this therapy encounter. Therapist used appropriate personal protective equipment including mask and gloves.  Mask used was standard procedure mask. Appropriate PPE was worn during the entire therapy session. Hand hygiene was completed before and after therapy session. Patient is not in enhanced droplet precautions.             Nora Guillory PT  8/18/2022

## 2022-08-18 NOTE — THERAPY TREATMENT NOTE
Outpatient Occupational Therapy Neuro Treatment Note  [facility]     Patient Name: Maryellen Crump  : 1950  MRN: [id number]  Today's Date: 2022       Visit Date: 2022    Patient Active Problem List   Diagnosis   • Family history of colon cancer   • Iron deficiency anemia   • Diarrhea of presumed infectious origin   • Type 2 diabetes mellitus with kidney complication, without long-term current use of insulin (HCC)   • Hypertension   • Hyperlipidemia   • Stroke-like symptoms   • Acute CVA (cerebrovascular accident) (HCC)   • Acute lacunar stroke (HCC)   • Vitamin B12 deficiency   • CVA (cerebral vascular accident) (HCC)   • CKD (chronic kidney disease) stage 3, GFR 30-59 ml/min (HCC)   • PFO (patent foramen ovale)        Past Medical History:   Diagnosis Date   • Diabetes mellitus (HCC)    • Hyperlipidemia    • Hypertension         Past Surgical History:   Procedure Laterality Date   • COLONOSCOPY N/A 2018    Procedure: COLONOSCOPY TO CECUM;  Surgeon: Josh Muñoz MD;  Location: [facility] ENDOSCOPY;  Service: General   • DILATION AND CURETTAGE, DIAGNOSTIC / THERAPEUTIC     • ENDOSCOPY N/A 2018    Procedure: ESOPHAGOGASTRODUODENOSCOPY WITH BIOPSIES;  Surgeon: Josh Muñoz MD;  Location: [facility] ENDOSCOPY;  Service: General   • ROTATOR CUFF REPAIR Right          Visit Dx:    ICD-10-CM ICD-9-CM   1. Cerebrovascular accident (CVA), unspecified mechanism (HCC)  I63.9 434.91   2. Weakness of right arm  R29.898 729.89   3. Loss of coordination  R27.8 781.3                    OT Assessment/Plan     Row Name 22 1221          OT Assessment    Assessment Comments Pt reporting she feels liker her arm strength is improving but still having some difficulty holding objects without spilling them and fine motor tasks. She is working on her handwriting and did purchase a book on handwriting and has been working on that. She does still have some difficulty with manipulating objects in hand and  "to fingertips.  -SG           User Key  (r) = Recorded By, (t) = Taken By, (c) = Cosigned By    Initials Name Provider Type    Shira Link OTR Occupational Therapist                           Therapy Education  Education Details: Rubberband ex, highlighter ex  Given: HEP  Program: New  How Provided: Verbal, Demonstration  Provided to: Patient  Level of Understanding: Verbalized, Demonstrated       OT Exercises     Row Name 08/18/22 1113             Subjective Comments    Subjective Comments \"I'm still having some trouble with picking up small things and holding my coffee cup.\"  -SG              Subjective Pain    Able to rate subjective pain? yes  -SG      Pre-Treatment Pain Level 0  -SG              Exercise 1    Exercise Name 1 Standing  -SG      Cueing 1 Verbal  -SG      Equipment 1 UE Ergometer  -SG      Time (Minutes) 1 5  -SG              Exercise 2    Exercise Name 2 Standing front raise  -SG      Cueing 2 Verbal;Tactile;Demo;Auditory  -SG      Equipment 2 Pulley  -SG      Weights/Plates 2 2  -SG      Sets 2 3  -SG      Reps 2 10  -SG              Exercise 3    Exercise Name 3 Standing upright row  -SG      Cueing 3 Verbal;Tactile;Demo;Auditory  -SG      Equipment 3 Pulley  -SG      Weights/Plates 3 2  -SG      Sets 3 3  -SG      Reps 3 10  -SG              Exercise 4    Exercise Name 4 R hand stone pickup grasping 10 in hand, mild difficulty  -SG      Cueing 4 Verbal;Tactile;Demo;Auditory  -SG              Exercise 5    Exercise Name 5 Fine motor task with purdue pegboard, good+ coordination noted with placement of small pieces  -SG      Cueing 5 Verbal;Tactile;Demo;Auditory  -SG              Exercise 6    Exercise Name 6 Pt performs several hand activites holding highlighter, rolling down into hook grasp and turning at fingertips  -SG      Cueing 6 Verbal;Tactile;Demo;Auditory  -SG              Exercise 7    Exercise Name 7 Rubber band hand strength: lumbrical positioning, finger abd  -SG      " Cueing 7 Verbal;Tactile;Demo;Auditory  -MALCOLM            User Key  (r) = Recorded By, (t) = Taken By, (c) = Cosigned By    Initials Name Provider Type    Shira Link OTR Occupational Therapist                            Time Calculation:   OT Start Time: 1103  OT Stop Time: 1145  OT Time Calculation (min): 42 min  Total Timed Code Minutes- OT: 42 minute(s)  Timed Charges  15880 - OT Therapeutic Exercise Minutes: 18  15990 - OT Therapeutic Activity Minutes: 24  Total Minutes  Timed Charges Total Minutes: 42   Total Minutes: 42     Therapy Charges for Today     Code Description Service Date Service Provider Modifiers Qty    85668341780 HC OT THER PROC EA 15 MIN 8/18/2022 Shira Wong OTR GO 1    [id number]  OT THERAPEUTIC ACT EA 15 MIN 8/18/2022 Shira Wong OTR GO 2                    RICH Day  8/18/2022

## 2022-08-23 ENCOUNTER — HOSPITAL ENCOUNTER (OUTPATIENT)
Dept: OCCUPATIONAL THERAPY | Facility: HOSPITAL | Age: 72
Setting detail: THERAPIES SERIES
Discharge: HOME | End: 2022-08-23

## 2022-08-23 ENCOUNTER — HOSPITAL ENCOUNTER (OUTPATIENT)
Dept: PHYSICAL THERAPY | Facility: HOSPITAL | Age: 72
Setting detail: THERAPIES SERIES
Discharge: HOME | End: 2022-08-23

## 2022-08-23 ENCOUNTER — APPOINTMENT (OUTPATIENT)
Dept: SPEECH THERAPY | Facility: HOSPITAL | Age: 72
End: 2022-08-23

## 2022-08-23 DIAGNOSIS — I63.9 CEREBROVASCULAR ACCIDENT (CVA), UNSPECIFIED MECHANISM: Primary | ICD-10-CM

## 2022-08-23 DIAGNOSIS — R29.898 WEAKNESS OF RIGHT ARM: ICD-10-CM

## 2022-08-23 DIAGNOSIS — R27.8 LOSS OF COORDINATION: ICD-10-CM

## 2022-08-23 DIAGNOSIS — R53.1 ACUTE RIGHT-SIDED WEAKNESS: ICD-10-CM

## 2022-08-23 DIAGNOSIS — R26.89 FUNCTIONAL GAIT ABNORMALITY: ICD-10-CM

## 2022-08-23 PROCEDURE — 97110 THERAPEUTIC EXERCISES: CPT

## 2022-08-23 PROCEDURE — 97112 NEUROMUSCULAR REEDUCATION: CPT

## 2022-08-23 PROCEDURE — 97530 THERAPEUTIC ACTIVITIES: CPT | Performed by: OCCUPATIONAL THERAPIST

## 2022-08-23 NOTE — THERAPY TREATMENT NOTE
"Outpatient Occupational Therapy Neuro Treatment Note  [facility]     Patient Name: Maryellen Crump  : 1950  MRN: [id number]  Today's Date: 2022       Visit Date: 2022    Patient Active Problem List   Diagnosis   • Family history of colon cancer   • Iron deficiency anemia   • Diarrhea of presumed infectious origin   • Type 2 diabetes mellitus with kidney complication, without long-term current use of insulin (HCC)   • Hypertension   • Hyperlipidemia   • Stroke-like symptoms   • Acute CVA (cerebrovascular accident) (HCC)   • Acute lacunar stroke (HCC)   • Vitamin B12 deficiency   • CVA (cerebral vascular accident) (HCC)   • CKD (chronic kidney disease) stage 3, GFR 30-59 ml/min (HCC)   • PFO (patent foramen ovale)        Past Medical History:   Diagnosis Date   • Diabetes mellitus (HCC)    • Hyperlipidemia    • Hypertension         Past Surgical History:   Procedure Laterality Date   • COLONOSCOPY N/A 2018    Procedure: COLONOSCOPY TO CECUM;  Surgeon: Josh Muñoz MD;  Location: [facility] ENDOSCOPY;  Service: General   • DILATION AND CURETTAGE, DIAGNOSTIC / THERAPEUTIC     • ENDOSCOPY N/A 2018    Procedure: ESOPHAGOGASTRODUODENOSCOPY WITH BIOPSIES;  Surgeon: Josh Muñoz MD;  Location: [facility] ENDOSCOPY;  Service: General   • ROTATOR CUFF REPAIR Right          Visit Dx:    ICD-10-CM ICD-9-CM   1. Cerebrovascular accident (CVA), unspecified mechanism (HCC)  I63.9 434.91   2. Weakness of right arm  R29.898 729.89   3. Loss of coordination  R27.8 781.3        OT Neuro     Row Name 22 1500 22 1400          Subjective Comments    Subjective Comments \"I've been doing my Handwriting for [employer] book.\"  -SG --            Precautions and Contraindications    Precautions/Limitations no known precautions/limitations  -SG --            MMT Right Upper Ext    Rt Shoulder Flexion MMT, Gross Movement -- (4+/5) good plus  -SG     Rt Shoulder Extension MMT, Gross Movement -- (4+/5) good " plus  -SG     Rt Shoulder ABduction MMT, Gross Movement -- (4+/5) good plus  -SG     Rt Shoulder ADduction MMT, Gross Movement -- (4+/5) good plus  -SG     Rt Shoulder Internal Rotation MMT, Gross Movement -- (4/5) good  -SG     Rt Shoulder External Rotation MMT, Gross Movement -- (4/5) good  -SG     Rt Elbow Flexion MMT, Gross Movement: -- (4+/5) good plus  -SG     Rt Elbow Extension MMT, Gross Movement: -- (4+/5) good plus  -SG     Rt Forearm Supination MMT, Gross Movement -- (4/5) good  -SG     Rt Forearm Pronation MMT, Gross Movement -- (4/5) good  -SG     Rt Wrist Flexion MMT, Gross Movement -- (4/5) good  -SG     Rt Wrist Extension MMT, Gross Movement -- (4/5) good  -SG           User Key  (r) = Recorded By, (t) = Taken By, (c) = Cosigned By    Initials Name Provider Type    Shira Link OTR Occupational Therapist                Hand Therapy (last 24 hours)     Hand Eval     Row Name 08/23/22 1400              Strength Right    # Reps 3  -SG      Right Rung 2  -SG      Right  Test 1 43  -SG      Right  Test 2 40  -SG      Right  Test 3 39  -SG       Strength Average Right 40.67  -SG               Strength Left    # Reps 3  -SG      Left Rung 2  -SG      Left  Test 1 44  -SG      Left  Test 2 42  -SG      Left  Test 3 40  -SG       Strength Average Left 42  -SG              Right Hand Strength - Pinch (lbs)    Lateral 15 lbs  -SG      Tip (2 point) 13 lbs  -SG              Left Hand Strength - Pinch (lbs)    Lateral 14 lbs  -SG      Tip (2 point) 9 lbs  -SG            User Key  (r) = Recorded By, (t) = Taken By, (c) = Cosigned By    Initials Name Provider Type    Shira Link OTR Occupational Therapist                         OT Goals     Row Name 08/23/22 1500          OT Short Term Goals    STG 1 Pt. to increase  strength to 40# to increase independence with functional tasks.  -SG     STG 1 Progress Met  -SG     STG 2 Pt. to be (I) with strengthening  HEP for theraband and DB to improve overall strength and endurance for functional tasks.  -SG     STG 2 Progress Met  -SG     STG 3 Patient will be instructed in RUE prewriting exercises to increase RUE writing legibility and control  -SG     STG 3 Progress Met  -SG            Long Term Goals    LTG 1 Pt. to increase L 2 point pinch to 10 to increase independence with fine motor skills.  -SG     LTG 1 Progress Met  -SG     LTG 2 Pt to increase overall RUE strength to 4/5.  -SG     LTG 2 Progress Met  -SG     LTG 3 Pt will increase box and blocks assessment score to 55 blocks with the RUE to show improvement in gross motor coordination.  -SG     LTG 3 Progress Met  -SG     LTG 4 Patient to improve RUE 9 hole peg test score to 28 sec to demonstrate increased independence with fine motor tasks.  -SG     LTG 4 Progress Met  -SG           User Key  (r) = Recorded By, (t) = Taken By, (c) = Cosigned By    Initials Name Provider Type    Shira Link OTR Occupational Therapist                       Therapy Education  Education Details: Issued lengthy HEP with FMC act that she can cont performing at home, several more UB DB  strengthening act  Given: HEP  Program: New, Reinforced  How Provided: Verbal, Written  Provided to: Patient  Level of Understanding: Verbalized       OT Exercises     Row Name 08/23/22 1500             Exercise 1    Exercise Name 1 Shuffling cards with BUE, no difficulty  -SG      Cueing 1 Verbal  -SG              Exercise 2    Exercise Name 2 Nuts and bolts task with BUE, good+ coordination  -SG      Cueing 2 Verbal  -SG            User Key  (r) = Recorded By, (t) = Taken By, (c) = Cosigned By    Initials Name Provider Type    Shira Link OTR Occupational Therapist                  9 Hole Peg  9-Hole Peg Left: 21  9-Hole Peg Right: 23  Box and Blocks  Box and Blocks Left: 59  Box and Blocks Right: 56         Time Calculation:   OT Start Time: 1430  OT Stop Time: 1515  OT Time  Calculation (min): 45 min  Total Timed Code Minutes- OT: 45 minute(s)  Timed Charges  80037 - OT Therapeutic Activity Minutes: 45  Total Minutes  Timed Charges Total Minutes: 45   Total Minutes: 45     Therapy Charges for Today     Code Description Service Date Service Provider Modifiers Qty    09320389236  OT THERAPEUTIC ACT EA 15 MIN 8/23/2022 Shira Wong OTR GO 3                    RICH Day  8/23/2022

## 2022-08-23 NOTE — THERAPY TREATMENT NOTE
Outpatient Physical Therapy Neuro Treatment Note  [facility]     Patient Name: Maryellen Crump  : 1950  MRN: [id number]  Today's Date: 2022      Visit Date: 2022    Visit Dx:    ICD-10-CM ICD-9-CM   1. Cerebrovascular accident (CVA), unspecified mechanism ([facility])  I63.9 434.91   2. Functional gait abnormality  R26.89 781.2   3. Acute right-sided weakness  R53.1 728.87       Patient Active Problem List   Diagnosis   • Family history of colon cancer   • Iron deficiency anemia   • Diarrhea of presumed infectious origin   • Type 2 diabetes mellitus with kidney complication, without long-term current use of insulin ([facility])   • Hypertension   • Hyperlipidemia   • Stroke-like symptoms   • Acute CVA (cerebrovascular accident) ([facility])   • Acute lacunar stroke ([facility])   • Vitamin B12 deficiency   • CVA (cerebral vascular accident) ([facility])   • CKD (chronic kidney disease) stage 3, GFR 30-59 ml/min ([facility])   • PFO (patent foramen ovale)            PT Neuro     Row Name 22 1600             Subjective Comments    Subjective Comments I want to finish up soon with PT  -LB              Precautions and Contraindications    Precautions/Limitations fall precautions  -LB              Subjective Pain    Able to rate subjective pain? yes  -LB      Pre-Treatment Pain Level 0  -LB      Post-Treatment Pain Level 0  -LB      Subjective Pain Comment pt does complain of left buttock pain but number  -LB              Cognition    Overall Cognitive Status WFL  -LB              Posture/Observations    Posture/Observations Comments Pt ambulated to clinic no AD  -LB              Transfers    Sit-Stand [place] (Transfers) independent  -LB      Stand-Sit [place] (Transfers) independent  -LB              Gait/Stairs (Locomotion)    [place] Level (Gait) independent  -LB      Distance in Feet (Gait) 160; 100  -LB      Deviations/Abnormal Patterns (Gait) right sided deviations;stride length decreased;weight shifting  decreased;base of support, narrow  -LB      Bilateral Gait Deviations forward flexed posture;decreased arm swing  -LB      Right Sided Gait Deviations heel strike decreased  -LB      [place] Level (Stairs) modified independence  -LB      Handrail Location (Stairs) right side (ascending);left side (descending)  -LB      Number of Steps (Stairs) 4  -LB      Ascending Technique (Stairs) step-over-step  -LB      Descending Technique (Stairs) step-over-step  -LB              Balance Skills Training    Standing-Level of Assistance Contact guard;Minimum assistance  -LB      Static Standing Balance Support No upper extremity supported  -LB      Standing-Balance Activities Semi-tandum;Single Limb Stance  -LB      Standing Balance # of Minutes Pt stood with forefoot on wedge, 2# on forefoot and lifted right foot up to top of wedge and back down, alternating toe taps with Min A due to LOB to the right  -LB      Gait Balance # of Minutes Manual resistive walking with 2 lbs taped to forefoot, cues for push off and heelstrike.  Walking with vertical and horizontal head turns with CGA  -LB      Ankle Strategy Assessment (Balance) 1/4 turns with 1 step each LE and UE support with SBA; CGA without UE support; semitandem stance in corner; Standing bird/dog with UE support and SBA; no UE with Min  -LB            User Key  (r) = Recorded By, (t) = Taken By, (c) = Cosigned By    Initials Name Provider Type    Nora Arroyo PT Physical Therapist                         PT Assessment/Plan     Row Name 08/23/22 9452          PT Assessment    Assessment Comments During ambulation, the patient has decreased heelstrike, step length and stance on right LE when fatigued.  The patient is more aware of issue when more fatigued at night.  Concentrated on ankle strenghtening during functional activities with weigths on the forefoot.  The patient reports right glut pain, advised to stop standing hip extension and bridges if the pain  persist.  -LB           User Key  (r) = Recorded By, (t) = Taken By, (c) = Cosigned By    Initials Name Provider Type    Nora Arroyo PT Physical Therapist                    OP Exercises     Row Name 08/23/22 1658 08/23/22 1600          Subjective Comments    Subjective Comments -- I want to finish up soon with PT  -LB            Subjective Pain    Able to rate subjective pain? -- yes  -LB     Pre-Treatment Pain Level -- 0  -LB     Post-Treatment Pain Level -- 0  -LB     Subjective Pain Comment -- pt does complain of left buttock pain but number  -LB            Total Minutes    08348 - PT Therapeutic Exercise Minutes 19  -LB --     80283 -  PT Neuromuscular Reeducation Minutes 25  -LB --           User Key  (r) = Recorded By, (t) = Taken By, (c) = Cosigned By    Initials Name Provider Type    Nora Arroyo PT Physical Therapist                                               Time Calculation:   Start Time: 1516  Stop Time: 1600  Time Calculation (min): 44 min  Timed Charges  75723 - PT Therapeutic Exercise Minutes: 19  75029 -  PT Neuromuscular Reeducation Minutes: 25  Total Minutes  Timed Charges Total Minutes: 44   Total Minutes: 44   Therapy Charges for Today     Code Description Service Date Service Provider Modifiers Qty    28415753880 HC PT NEUROMUSC RE EDUCATION EA 15 MIN 8/23/2022 Nora Guillory, PT GP 2    28367452201 HC PT THER PROC EA 15 MIN 8/23/2022 Nora Guillory PT GP 1              Patient was wearing a face mask during this therapy encounter. Therapist used appropriate personal protective equipment including mask and gloves.  Mask used was standard procedure mask. Appropriate PPE was worn during the entire therapy session. Hand hygiene was completed before and after therapy session. Patient is not in enhanced droplet precautions.           Nora Guillory PT  8/23/2022

## 2022-08-25 ENCOUNTER — APPOINTMENT (OUTPATIENT)
Dept: SPEECH THERAPY | Facility: HOSPITAL | Age: 72
End: 2022-08-25

## 2022-08-25 ENCOUNTER — HOSPITAL ENCOUNTER (OUTPATIENT)
Dept: PHYSICAL THERAPY | Facility: HOSPITAL | Age: 72
Setting detail: THERAPIES SERIES
Discharge: HOME | End: 2022-08-25

## 2022-08-25 ENCOUNTER — APPOINTMENT (OUTPATIENT)
Dept: OCCUPATIONAL THERAPY | Facility: HOSPITAL | Age: 72
End: 2022-08-25

## 2022-08-25 DIAGNOSIS — R26.89 FUNCTIONAL GAIT ABNORMALITY: ICD-10-CM

## 2022-08-25 DIAGNOSIS — I63.9 CEREBROVASCULAR ACCIDENT (CVA), UNSPECIFIED MECHANISM: Primary | ICD-10-CM

## 2022-08-25 DIAGNOSIS — R53.1 ACUTE RIGHT-SIDED WEAKNESS: ICD-10-CM

## 2022-08-25 PROCEDURE — 97110 THERAPEUTIC EXERCISES: CPT

## 2022-08-25 PROCEDURE — 97112 NEUROMUSCULAR REEDUCATION: CPT

## 2022-08-25 NOTE — THERAPY DISCHARGE NOTE
Outpatient Physical Therapy Neuro Treatment Note/Discharge Summary  [facility]     Patient Name: Maryellen Crump  : 1950  MRN: [id number]  Today's Date: 2022      Visit Date: 2022    Visit Dx:    ICD-10-CM ICD-9-CM   1. Cerebrovascular accident (CVA), unspecified mechanism ([facility])  I63.9 434.91   2. Functional gait abnormality  R26.89 781.2   3. Acute right-sided weakness  R53.1 728.87       Patient Active Problem List   Diagnosis   • Family history of colon cancer   • Iron deficiency anemia   • Diarrhea of presumed infectious origin   • Type 2 diabetes mellitus with kidney complication, without long-term current use of insulin ([facility])   • Hypertension   • Hyperlipidemia   • Stroke-like symptoms   • Acute CVA (cerebrovascular accident) ([facility])   • Acute lacunar stroke ([facility])   • Vitamin B12 deficiency   • CVA (cerebral vascular accident) ([facility])   • CKD (chronic kidney disease) stage 3, GFR 30-59 ml/min ([facility])   • PFO (patent foramen ovale)             PT Neuro     Row Name 22 1030             Subjective Comments    Subjective Comments I feel that I should be okay with finishing up with this week  -LB              Precautions and Contraindications    Precautions/Limitations fall precautions  -LB              Subjective Pain    Able to rate subjective pain? yes  -LB      Pre-Treatment Pain Level 0  -LB      Post-Treatment Pain Level 0  -LB              Cognition    Overall Cognitive Status WFL  -LB      Arousal/Alertness Appropriate responses to stimuli  -LB      Memory Appears intact  -LB      Orientation Level Oriented X4  -LB      Safety Judgment Good awareness of safety precautions  -LB              Posture/Observations    Forward Head Mild  -LB      Thoracic Kyphosis Mild  -LB      Posture/Observations Comments Pt ambulated to clinic no AD  -LB              Transfers    Sit-Stand [place] (Transfers) independent  -LB      Stand-Sit [place] (Transfers) independent  -LB       Transfer, Impairments impaired balance  -LB              Gait/Stairs (Locomotion)    [place] Level (Gait) independent  -LB      Distance in Feet (Gait) 160; 200  -LB      Pattern (Gait) step-through  -LB      Deviations/Abnormal Patterns (Gait) right sided deviations;base of support, narrow  -LB      Left Sided Gait Deviations --  quick step  -LB      Right Sided Gait Deviations heel strike decreased  -LB      [place] Level (Stairs) modified independence  -LB      Handrail Location (Stairs) right side (ascending);left side (descending)  -LB      Number of Steps (Stairs) 12  -LB      Ascending Technique (Stairs) step-over-step  -LB      Descending Technique (Stairs) step-over-step  -LB      Comment, (Gait/Stairs) Pt practiced walking with walking stick independently.  -LB              Curb Negotiation (Mobility)    [place], Curb Negotiation independent  -LB      Comment, Curb Negotiation (Mobility) no AD  -LB              Rough/Uneven Surface Gait Skills (Mobility)    [place], Gait on Rough/Uneven Surface (Mobility) independent  -LB              Balance Skills Training    Standing-Level of Assistance Independent  -LB      Static Standing Balance Support --  1 UE suppport to no UE support  -LB      Standing-Balance Activities Weight Shift R-L;Tandem Stance  -LB      Standing Balance # of Minutes 1/4 turns, standing bird dog; standing in corner NBOS, semitandem, step side to side and back.  Added to HEP  -LB      Gait Balance-Level of Assistance Close supervision  -LB      Gait Balance Support No upper extremity supported  -LB      Gait Balance # of Minutes Horizontal head turns while ambulating, side stepping on straight philomena; added to HEP  -LB            User Key  (r) = Recorded By, (t) = Taken By, (c) = Cosigned By    Initials Name Provider Type    Nora Arroyo, PT Physical Therapist                         PT Assessment/Plan     Row Name 08/25/22 7432          PT Assessment    Assessment  Comments The patient requested to end PT at this time, feeling good about progress.  The patient reports compliance with HEP and reports that she will continue to perform at home.  The patient did make progress as noted in her DAMON and FGA scores.  The patient has not had any falls since starting therapy.  An extensive HEP for strengthening and balance was provided.  Discussed follow-up at later time if not making progress as needed.  -LB           User Key  (r) = Recorded By, (t) = Taken By, (c) = Cosigned By    Initials Name Provider Type    Nora Arroyo PT Physical Therapist                      OP Exercises     Row Name 08/25/22 1243 08/25/22 1030          Subjective Comments    Subjective Comments -- I feel that I should be okay with finishing up with this week  -LB            Subjective Pain    Able to rate subjective pain? -- yes  -LB     Pre-Treatment Pain Level -- 0  -LB     Post-Treatment Pain Level -- 0  -LB            Total Minutes    84141 - PT Therapeutic Exercise Minutes 26  -LB --     84679 -  PT Neuromuscular Reeducation Minutes 19  -LB --            Exercise 1    Exercise Name 1 -- Standing abduction  -LB     Sets 1 -- 1  -LB     Reps 1 -- 10  -LB            Exercise 2    Exercise Name 2 -- Standing flexion  -LB     Sets 2 -- 1  -LB     Reps 2 -- 10  -LB            Exercise 5    Exercise Name 5 -- Standing knee flexion  -LB     Sets 5 -- 1  -LB     Reps 5 -- 10  -LB            Exercise 11    Exercise Name 11 -- sit to stand  -LB     Sets 11 -- 1  -LB     Reps 11 -- 5  -LB           User Key  (r) = Recorded By, (t) = Taken By, (c) = Cosigned By    Initials Name Provider Type    Nora Arroyo PT Physical Therapist                               PT OP Goals     Row Name 08/25/22 1200          PT Short Term Goals    STG 5 Pt will perform floor transfer with Mod indep  -LB     STG 5 Progress Not Met  -LB            Long Term Goals    LTG 1 Pt will be indep with HEP for balance to maintain  progress in therapy  -LB     LTG 1 Progress Met  -LB     LTG 2 Pt will improve FGA to 25/30to indicate less risk for falls  -LB     LTG 2 Progress Not Met  -LB     LTG 3 Pt will improve DAMON to 49/56to indicate less risk for falls and demonstrate improved balance.  -LB     LTG 3 Progress Met  -LB     LTG 4 Pt will negogiate a flight of stairs step over pattern  Mod Indep  -LB     LTG 4 Progress Met  -LB     LTG 5 Pt will ambulate 300 ft with AAD if needed Indep  -LB     LTG 5 Progress Met  -LB     LTG 6 Pt will perform semi-tandem for 30 secs  -LB     LTG 6 Progress Not Met  -LB     LTG 7 Pt will ambulate over uneven surfaces with Mod Indep  -LB     LTG 7 Progress Met  -LB           User Key  (r) = Recorded By, (t) = Taken By, (c) = Cosigned By    Initials Name Provider Type    Nora Arroyo, PT Physical Therapist                Therapy Education  Education Details: HEP  Given: HEP  Program: Reinforced, Progressed, Modified  How Provided: Verbal, Demonstration, Written  Provided to: Patient  Level of Understanding: Verbalized, Teach back education performed    Damon Balance Scale  Sitting to Standing: able to stand without using hands and stabilize independently  Standing Unsupported: able to stand safely for 2 minutes  Sitting with Back Unsupported but Feet Supported on Floor or on Stool: able to sit safely and securely for 2 minutes  Standing to Sitting: sits safely with minimal use of hands  Transfers: able to transfer safely with minor use of hands  Standing Unsupported with Eyes Closed: able to stand 10 seconds safely  Standing Unsupported with Feet Together: able to place feet together independently and stand 1 minute safely  Reaching Forward with Outstretched Arm While Standing: can reach forward 12 cm (5 inches)   Object From the Floor From a Standing Position: able to  object safely and easily  Turning to Look Behind Over Left and Right Shoulders While Standing: looks behind from both sides  and weight shifts well  Turn 360 Degrees: able to turn 360 degrees safely in 4 seconds or less  Place Alternate Foot on Step or Stool While Standing Unsupported: able to stand independently and complete 8 steps in > 20 seconds  Standing Unsupported with One Foot in Front: able to take small step independently and hold 30 seconds  Standing on One Leg: able to lift leg independently and hold >/equal to 3 seconds  Nagy Total Score: 50  Nagy Comments: 50/56  Functional Gait Assessment (FGA)  Gait Level Surface: Normal  Change in Gait Speed: Normal  Gait with Horizontal Head Turns: Mild Impairment  Gait with Vertical Head Turns: Normal  Gait and Pivot Turn: Normal  Step Over Obstacle: Normal  Gait with Narrow Base of Support: Moderate Impairment  Gait with Eyes Closed: Mild Impairment  Ambulating Backwards: Mild Impairment  Steps: Mild Impairment  FGA Total Score: 24  FGA Comments: 24/30    Time Calculation:   Start Time: 1015  Stop Time: 1100  Time Calculation (min): 45 min  Timed Charges  34660 - PT Therapeutic Exercise Minutes: 26  99358 -  PT Neuromuscular Reeducation Minutes: 19  Total Minutes  Timed Charges Total Minutes: 45   Total Minutes: 45     Therapy Charges for Today     Code Description Service Date Service Provider Modifiers Qty    26657348797 HC PT NEUROMUSC RE EDUCATION EA 15 MIN 8/25/2022 Nora Guillory, PT GP 1    56492914438 HC PT THER PROC EA 15 MIN 8/25/2022 Nora Guillory, PT GP 2          PT Yosef  Nagy Total Score: 50  FGA Total Score: 24     OP PT Discharge Summary  Reason for Discharge: Patient/Caregiver request  Outcomes Achieved: Patient able to partially acheive established goals  Discharge Destination: Home with home program    Patient was wearing a face mask during this therapy encounter. Therapist used appropriate personal protective equipment including mask and gloves.  Mask used was standard procedure mask. Appropriate PPE was worn during the entire therapy session. Hand hygiene was  completed before and after therapy session. Patient is not in enhanced droplet precautions.         Nora Guillory, PT  8/25/2022

## 2022-08-30 ENCOUNTER — APPOINTMENT (OUTPATIENT)
Dept: OCCUPATIONAL THERAPY | Facility: HOSPITAL | Age: 72
End: 2022-08-30

## 2022-08-30 ENCOUNTER — APPOINTMENT (OUTPATIENT)
Dept: SPEECH THERAPY | Facility: HOSPITAL | Age: 72
End: 2022-08-30

## 2022-08-30 ENCOUNTER — APPOINTMENT (OUTPATIENT)
Dept: PHYSICAL THERAPY | Facility: HOSPITAL | Age: 72
End: 2022-08-30

## 2022-09-01 ENCOUNTER — APPOINTMENT (OUTPATIENT)
Dept: PHYSICAL THERAPY | Facility: HOSPITAL | Age: 72
End: 2022-09-01

## 2022-09-01 ENCOUNTER — APPOINTMENT (OUTPATIENT)
Dept: SPEECH THERAPY | Facility: HOSPITAL | Age: 72
End: 2022-09-01

## 2022-09-01 ENCOUNTER — APPOINTMENT (OUTPATIENT)
Dept: OCCUPATIONAL THERAPY | Facility: HOSPITAL | Age: 72
End: 2022-09-01

## 2022-09-06 ENCOUNTER — APPOINTMENT (OUTPATIENT)
Dept: OCCUPATIONAL THERAPY | Facility: HOSPITAL | Age: 72
End: 2022-09-06

## 2022-09-06 ENCOUNTER — APPOINTMENT (OUTPATIENT)
Dept: PHYSICAL THERAPY | Facility: HOSPITAL | Age: 72
End: 2022-09-06

## 2022-09-06 ENCOUNTER — APPOINTMENT (OUTPATIENT)
Dept: SPEECH THERAPY | Facility: HOSPITAL | Age: 72
End: 2022-09-06

## 2022-09-08 ENCOUNTER — APPOINTMENT (OUTPATIENT)
Dept: OCCUPATIONAL THERAPY | Facility: HOSPITAL | Age: 72
End: 2022-09-08

## 2022-09-08 ENCOUNTER — APPOINTMENT (OUTPATIENT)
Dept: SPEECH THERAPY | Facility: HOSPITAL | Age: 72
End: 2022-09-08

## 2022-09-08 ENCOUNTER — APPOINTMENT (OUTPATIENT)
Dept: PHYSICAL THERAPY | Facility: HOSPITAL | Age: 72
End: 2022-09-08

## 2022-09-13 ENCOUNTER — APPOINTMENT (OUTPATIENT)
Dept: OCCUPATIONAL THERAPY | Facility: HOSPITAL | Age: 72
End: 2022-09-13

## 2022-09-13 ENCOUNTER — APPOINTMENT (OUTPATIENT)
Dept: PHYSICAL THERAPY | Facility: HOSPITAL | Age: 72
End: 2022-09-13

## 2022-09-13 ENCOUNTER — APPOINTMENT (OUTPATIENT)
Dept: SPEECH THERAPY | Facility: HOSPITAL | Age: 72
End: 2022-09-13

## 2022-09-15 ENCOUNTER — APPOINTMENT (OUTPATIENT)
Dept: SPEECH THERAPY | Facility: HOSPITAL | Age: 72
End: 2022-09-15

## 2022-09-15 ENCOUNTER — APPOINTMENT (OUTPATIENT)
Dept: PHYSICAL THERAPY | Facility: HOSPITAL | Age: 72
End: 2022-09-15

## 2022-09-15 ENCOUNTER — APPOINTMENT (OUTPATIENT)
Dept: OCCUPATIONAL THERAPY | Facility: HOSPITAL | Age: 72
End: 2022-09-15

## 2022-10-05 ENCOUNTER — TELEPHONE (OUTPATIENT)
Dept: NEUROLOGY | Facility: CLINIC | Age: 72
End: 2022-10-05

## 2022-10-24 ENCOUNTER — PREP FOR SURGERY (OUTPATIENT)
Dept: OTHER | Facility: HOSPITAL | Age: 72
End: 2022-10-24

## 2022-10-24 ENCOUNTER — OFFICE VISIT (OUTPATIENT)
Dept: SURGERY | Facility: CLINIC | Age: 72
End: 2022-10-24

## 2022-10-24 VITALS — HEIGHT: 63 IN | BODY MASS INDEX: 20.62 KG/M2 | WEIGHT: 116.4 LBS

## 2022-10-24 DIAGNOSIS — K21.9 GASTROESOPHAGEAL REFLUX DISEASE, UNSPECIFIED WHETHER ESOPHAGITIS PRESENT: ICD-10-CM

## 2022-10-24 DIAGNOSIS — D50.9 IRON DEFICIENCY ANEMIA, UNSPECIFIED IRON DEFICIENCY ANEMIA TYPE: ICD-10-CM

## 2022-10-24 DIAGNOSIS — R19.5 FECAL OCCULT BLOOD TEST POSITIVE: Primary | ICD-10-CM

## 2022-10-24 PROCEDURE — 99203 OFFICE O/P NEW LOW 30 MIN: CPT

## 2022-10-25 PROBLEM — R19.5 FECAL OCCULT BLOOD TEST POSITIVE: Status: ACTIVE | Noted: 2022-10-25

## 2022-10-25 NOTE — PROGRESS NOTES
SUMMARY (A/P):    71 year old female with positive fecal immunochemical test. Her recent hemoglobin in August was 10.7. Denies any blood in her stool, does complain of episodes of diarrhea. Family history of colon cancer in her brother. Will plan for EGD and colonoscopy for further evaluation. Patient states she has an appointment with her neurologist in a couple of weeks to discuss whether or not she is going to remain on her Plavix. Recommend if she is going to stay on it to discuss holding it 3 days prior to the procedure.     CC: positive fit test    HPI: Patient presents today for further evaluation after a positive FIT test. She reports she had labwork done that revealed she was anemic and thus underwent a FIT test for investigation; the result was positive. She denies any gross blood in her stool. She states last week she had episodes of diarrhea with urgency and associated crampy abdominal pain. She reports this stopped yesterday. She reports her normal bowel movements involve 1 episode of diarrhea per week. She states she was placed on a probiotic by GI and has had little improvement in her symptoms. She denies any nausea, vomiting, fever, or chills. She had a stroke in June 2021 and had an extensive hospital stay, at that time she was placed on Iron. She hasn't been taking it as she ran out of her prescription.     ENDOSCOPY:   • 9/12/2018 Colonoscopy/EGD: Mild antral gastritis with scattered gastric AVM, mild distal esophagitis, normal colon to cecum    RADIOLOGY:   ·  No recent imaging    LABS:    • 8/30/2022 positive fit test  • 8/18/2022 creatinine 1.10, ALT 41, GFR 53.5  • 8/12/2022 hemoglobin 10.7, hematocrit 33.9    PREVIOUS ABDOMINAL SURGERY    • Tubal abdominal ligation    OTHER SURGERY  • Bilateral cataract extraction, D&C, right rotator cuff repair    PMH:    • Hypertension, hyperlipidemia, type 2 diabetes, vitamin B12 deficiency, CKD stage III, iron deficiency anemia, CVA, DVT,  GERD    ALLERGIES:   • Penicillin-hives    MEDICATIONS:   • Aspirin 81 mg, atorvastatin, clopidogrel 75 mg, vitamin B12, iron, hydrochlorothiazide, metformin, pantoprazole, ramipril, sitagliptin    FAMILY HISTORY:    • Colorectal cancer: Brother    SOCIAL HISTORY:   • Denies tobacco use  • Occasional alcohol use    PHYSICAL EXAM:   • Constitutional: Well-developed, well-nourished, no acute distress  • Height 160 cm, weight 52.8 kg, BMI 20.62  • Eyes: Conjunctiva normal, sclera nonicteric  • ENMT: Hearing grossly normal, oral mucosa moist  • Respiratory: Normal inspiratory effort  • Cardiovascular: Regular rate, no peripheral edema, no jugular venous distention  • Gastrointestinal: Soft, nontender  • Skin: Warm, dry, no rash on visualized skin surfaces  • Musculoskeletal: Symmetric strength, normal gait  • Psychiatric: Alert and oriented ×3, normal affect     ROS:    Positive for diarrhea.  All other systems reviewed and negative other than presenting complaints.    Shital Kincaid PA-C  General Surgery     [facility]  [address], [address]  [place] [address]  P: [phone]  F: [phone]

## 2022-11-10 ENCOUNTER — OFFICE VISIT (OUTPATIENT)
Dept: NEUROLOGY | Facility: CLINIC | Age: 72
End: 2022-11-10

## 2022-11-10 VITALS
HEART RATE: 78 BPM | WEIGHT: 116.8 LBS | SYSTOLIC BLOOD PRESSURE: 110 MMHG | DIASTOLIC BLOOD PRESSURE: 68 MMHG | BODY MASS INDEX: 20.7 KG/M2 | HEIGHT: 63 IN | OXYGEN SATURATION: 98 %

## 2022-11-10 DIAGNOSIS — Q21.12 PFO (PATENT FORAMEN OVALE): ICD-10-CM

## 2022-11-10 DIAGNOSIS — E78.2 MIXED HYPERLIPIDEMIA: ICD-10-CM

## 2022-11-10 DIAGNOSIS — Z91.89 AT RISK FOR OBSTRUCTIVE SLEEP APNEA: ICD-10-CM

## 2022-11-10 DIAGNOSIS — E53.8 B12 DEFICIENCY: ICD-10-CM

## 2022-11-10 DIAGNOSIS — Z86.73 HISTORY OF STROKE: Primary | ICD-10-CM

## 2022-11-10 PROCEDURE — 99214 OFFICE O/P EST MOD 30 MIN: CPT | Performed by: NURSE PRACTITIONER

## 2022-11-10 RX ORDER — ATORVASTATIN CALCIUM 40 MG/1
40 TABLET, FILM COATED ORAL DAILY
Qty: 30 TABLET | Refills: 11 | Status: SHIPPED | OUTPATIENT
Start: 2022-11-10 | End: 2022-12-21 | Stop reason: SDUPTHER

## 2022-11-10 NOTE — PROGRESS NOTES
DOS: 11/10/2022  NAME: Maryellen Crump   : 1950  PCP: Juni Cortez MD    Chief Complaint   Patient presents with   • Stroke     Hospital f/u from 2022       Patient is unaccompanied to today's visit          Neurological Problem and Interval History:  71 y.o. right-handed female with a known history of pretension, hyperlipidemia, diabetes mellitus who I am seeing today in follow-up for recent hospitalization 2022 where patient presented with right-sided weakness and numbness upon awakening later found to have acute left frontoparietal infarcts with etiology felt to be secondary to small vessel disease.    As noted above patient presented 2022 with right sided weakness and numbness upon awakening.  Patient was out of the time treatment window as she was last known normal at 10 PM the night before although she did report she had some trouble holding her wine glass in her right hand that night initial CTA of the head and neck showed no evidence of high-grade stenosis and/or aneurysm.  MRI of the brain later showed acute left frontoparietal corona radiata and subcortical white matter infarcts (imaging above).  Along with diffuse atrophy and moderate small vessel disease and ventricular dilation somewhat out of proportion to the degree of atrophy.  TTE showed EF 59.2% normal LV size and volume.  Small PFO with positive saline test-recommended to see cardiology as an outpatient who does not feel this is the etiology of stroke as did we.  No surgical intervention indicated at this time.  Labs: A1c 6.90, TSH 1.630, free T4 1.40, LDL 51, B12 204-replacement initiated and folate 11.8.  Patient was discharged home on dual oral antiplatelets x90 days then transition to aspirin and high-dose statin alone along with B12 replacement.  Patient was discharged to rehab where she remained for approximately 8 weeks.  She continues to report some mild right hand weakness and right hip discomfort at times.  She  does not use cane or walker or other assistive devices. she denies any recent illnesses/hospitalizations and/or falls.  She monitors her blood pressure at home her systolic blood pressure is often approximately 150 and diastolic is often less than 90-systolic blood pressure goal less than 140.  She denies any issues with mood specifically no concern for depression and/or PBA.  Patient reports that she snores at night although wakes up feeling rested and naps frequently throughout the day-patient is at moderate risk for obstructive sleep apnea recommended RISA evaluation which patient was not agreeable to at this time.  Patient was on estrogen replacement at time of the event advised against any further estrogen replacement-patient aware of risk of reinitiation.  Patient does report some difficulty with writing and fatigue.  I will plan to see patient as needed moving forward other recommendations below.     Review of Systems:        Review of Systems   Constitutional: Positive for fatigue. Negative for activity change, appetite change and unexpected weight change.   HENT: Negative for ear pain, facial swelling, hearing loss, nosebleeds, tinnitus, trouble swallowing and voice change.    Eyes: Negative for photophobia, pain and visual disturbance.   Respiratory: Negative for choking, chest tightness, shortness of breath, wheezing and stridor.    Cardiovascular: Negative for chest pain and palpitations.   Endocrine: Negative for cold intolerance and heat intolerance.   Genitourinary: Negative for decreased urine volume, difficulty urinating, dysuria, frequency and urgency.   Musculoskeletal: Positive for gait problem (balance). Negative for neck pain and neck stiffness.   Skin: Negative for color change, pallor, rash and wound.   Allergic/Immunologic: Negative for food allergies.   Neurological: Positive for weakness (rt hand and leg). Negative for dizziness, tremors, facial asymmetry, speech difficulty,  "light-headedness, numbness and headaches.   Hematological: Does not bruise/bleed easily.   Psychiatric/Behavioral: Negative for confusion, decreased concentration and sleep disturbance. The patient is not nervous/anxious.          Current Outpatient Medications:   •  aspirin 81 MG EC tablet, Take 1 tablet by mouth Daily., Disp: 30 tablet, Rfl: 1  •  clopidogrel (PLAVIX) 75 MG tablet, Take 1 tablet (75 mg total) by mouth 1 (one) time each day., Disp: 30 tablet, Rfl: 1  •  cyanocobalamin (VITAMIN B-12) 1000 MCG tablet, Take 1 tablet by mouth Daily., Disp: 30 tablet, Rfl: 1  •  hydrochlorothiazide (MICROZIDE) 12.5 MG capsule, Take 12.5 mg by mouth Daily., Disp: , Rfl:   •  metFORMIN (GLUCOPHAGE) 500 MG tablet, Take 1 tablet by mouth 2 (Two) Times a Day With Meals. (Patient taking differently: Take 5 tablets by mouth 2 (Two) Times a Day With Meals. 1000 MG in the morning and 1500 in the evening), Disp: 60 tablet, Rfl: 1  •  pantoprazole (PROTONIX) 40 MG EC tablet, Take 1 tablet by mouth Every Morning., Disp: 30 tablet, Rfl: 1  •  ramipril (ALTACE) 10 MG capsule, Take 1 capsule by mouth Daily., Disp: 30 capsule, Rfl: 1  •  SITagliptin (JANUVIA) 100 MG tablet, Take 1 tablet by mouth 2 (Two) Times a Day., Disp: , Rfl:   •  atorvastatin (Lipitor) 40 MG tablet, Take 1 tablet by mouth Daily., Disp: 30 tablet, Rfl: 11  •  ferrous sulfate 325 (65 FE) MG tablet, Take 1 tablet by mouth Every Other Day., Disp: 30 tablet, Rfl: 1    \"The following portions of the patient's history were reviewed and updated as appropriate: allergies, current medications, past family history, past medical history, past social history, past surgical history and problem list.\"  Review and Interpretation of Imaging:  Imaging discussed above reviewed  Laboratory Results:             Lab Results   Component Value Date    HGBA1C 6.90 (H) 06/30/2022         Lab Results   Component Value Date    CHOL 103 08/18/2022    CHOL 118 07/19/2022    CHOL 130 " 06/30/2022         Lab Results   Component Value Date    HDL 46 08/18/2022    HDL 43 07/19/2022    HDL 54 06/30/2022         Lab Results   Component Value Date    LDL 40 08/18/2022    LDL 49 07/19/2022    LDL 51 06/30/2022         Lab Results   Component Value Date    TRIG 83 08/18/2022    TRIG 155 (H) 07/19/2022    TRIG 148 06/30/2022     No results found for: RPR  Lab Results   Component Value Date    TSH 4.630 (H) 06/30/2022     Lab Results   Component Value Date    VRJAGQFM90 204 (L) 07/01/2022       Physical Examination: mRS: 0  General Appearance:   Well developed, well nourished, well groomed, alert, and cooperative.  HEENT: Normocephalic.   Neck and Spine: Normal range of motion.  Normal alignment. No mass or tenderness. No bruits.  Cardiac: Regular rate and rhythm. No murmurs.  Peripheral Vasculature: Radial and pedal pulses are equal and symmetric.  Extremities:    No edema or deformities. Normal joint ROM.  Skin:    No rashes or birth marks.    Neurological examination:  Higher Integrative  Function: Oriented to time, place and person. Normal registration, recall, attention span and concentration. Normal language including comprehension, spontaneous speech, repetition, reading, writing, naming and vocabulary. No neglect with normal visual-spatial function and construction. Normal fund of knowledge and higher integrative function.  CN II: Pupils are equal, round, and reactive to light. Normal visual acuity and visual fields.    CN III IV VI: Extraocular movements are full without nystagmus.   CN V: Normal facial sensation and strength of muscles of mastication.  CN VII: Facial movements are symmetric. No weakness.  CN VIII:   Auditory acuity is normal.  CN IX & X:   Symmetric palatal movement.  CN XI: Sternocleidomastoid and trapezius are normal.  No weakness.  CN XII:   The tongue is midline.  No atrophy or fasciculations.  Motor: Normal muscle strength, bulk and tone in upper and lower extremities  stepped right hand 4 -/5.  No fasciculations, rigidity, spasticity, or abnormal movements.  Reflexes: Plantar responses are flexor.  Sensation: Normal to light touch, in arms and legs.  Station and Gait; mildly abnormal/unsteady gait and station.-Does not use assistive device  Coordination:  Finger to nose test shows no dysmetria.      Diagnoses:    1.  History of left frontoparietal infarcts etiology felt to be secondary to small vessel disease (June 2022)  2.  Abnormal TTE with presence of small PFO-not felt to be etiology of #1-cardiology in agreement no intervention indicated at this time although would recommend intervention if patient has recurrent events  3.  At risk for obstructive sleep apnea; declined RISA eval at this time  4.  B12 deficiency; serum level less than [place]  5.  Diabetes mellitus-type II  6.  Dyslipidemia  7.  Ventriculomegaly noted on MRI of the brain    Plan:   Continue aspirin 81 mg daily and decrease atorvastatin to 40 mg daily based on most recent LDL at  goal 40-70   Consider outpatient RISA evaluation-declined at this time    Avoid estrogen based products in the future   Blood pressure control to <130/80   Goal LDL <70-recommend high dose statins-    Serum glucose < 140   Call 911 for stroke any stroke symptoms   Follow-up with me as needed moving forward  Diagnoses and all orders for this visit:    1. History of stroke (Primary)    2. Mixed hyperlipidemia  -     atorvastatin (Lipitor) 40 MG tablet; Take 1 tablet by mouth Daily.  Dispense: 30 tablet; Refill: 11    3. PFO (patent foramen ovale)    4. B12 deficiency    5. At risk for obstructive sleep apnea

## 2022-11-30 ENCOUNTER — ANESTHESIA (OUTPATIENT)
Dept: GASTROENTEROLOGY | Facility: HOSPITAL | Age: 72
End: 2022-11-30

## 2022-11-30 ENCOUNTER — ANESTHESIA EVENT (OUTPATIENT)
Dept: GASTROENTEROLOGY | Facility: HOSPITAL | Age: 72
End: 2022-11-30

## 2022-11-30 ENCOUNTER — HOSPITAL ENCOUNTER (OUTPATIENT)
Facility: HOSPITAL | Age: 72
Setting detail: HOSPITAL OUTPATIENT SURGERY
Discharge: HOME | End: 2022-11-30
Attending: SURGERY | Admitting: SURGERY

## 2022-11-30 VITALS
BODY MASS INDEX: 20.37 KG/M2 | WEIGHT: 115 LBS | TEMPERATURE: 98.1 F | DIASTOLIC BLOOD PRESSURE: 69 MMHG | HEART RATE: 65 BPM | RESPIRATION RATE: 16 BRPM | SYSTOLIC BLOOD PRESSURE: 135 MMHG | OXYGEN SATURATION: 97 %

## 2022-11-30 LAB — GLUCOSE BLDC GLUCOMTR-MCNC: 88 MG/DL (ref 70–130)

## 2022-11-30 PROCEDURE — 82962 GLUCOSE BLOOD TEST: CPT

## 2022-11-30 PROCEDURE — S0260 H&P FOR SURGERY: HCPCS | Performed by: SURGERY

## 2022-11-30 PROCEDURE — 43235 EGD DIAGNOSTIC BRUSH WASH: CPT | Performed by: SURGERY

## 2022-11-30 PROCEDURE — 25010000002 PROPOFOL 10 MG/ML EMULSION: Performed by: ANESTHESIOLOGY

## 2022-11-30 PROCEDURE — G0105 COLORECTAL SCRN; HI RISK IND: HCPCS | Performed by: SURGERY

## 2022-11-30 RX ORDER — LIDOCAINE HYDROCHLORIDE 20 MG/ML
INJECTION, SOLUTION INFILTRATION; PERINEURAL AS NEEDED
Status: DISCONTINUED | OUTPATIENT
Start: 2022-11-30 | End: 2022-11-30 | Stop reason: SURG

## 2022-11-30 RX ORDER — PROPOFOL 10 MG/ML
VIAL (ML) INTRAVENOUS AS NEEDED
Status: DISCONTINUED | OUTPATIENT
Start: 2022-11-30 | End: 2022-11-30 | Stop reason: SURG

## 2022-11-30 RX ORDER — SODIUM CHLORIDE 9 MG/ML
30 INJECTION, SOLUTION INTRAVENOUS CONTINUOUS PRN
Status: DISCONTINUED | OUTPATIENT
Start: 2022-11-30 | End: 2022-11-30 | Stop reason: HOSPADM

## 2022-11-30 RX ORDER — PROPOFOL 10 MG/ML
VIAL (ML) INTRAVENOUS CONTINUOUS PRN
Status: DISCONTINUED | OUTPATIENT
Start: 2022-11-30 | End: 2022-11-30 | Stop reason: SURG

## 2022-11-30 RX ADMIN — LIDOCAINE HYDROCHLORIDE 60 MG: 20 INJECTION, SOLUTION INFILTRATION; PERINEURAL at 07:37

## 2022-11-30 RX ADMIN — Medication 120 MCG/KG/MIN: at 07:37

## 2022-11-30 RX ADMIN — PROPOFOL 150 MG: 10 INJECTION, EMULSION INTRAVENOUS at 07:37

## 2022-11-30 RX ADMIN — SODIUM CHLORIDE 30 ML/HR: 9 INJECTION, SOLUTION INTRAVENOUS at 07:34

## 2022-11-30 NOTE — ANESTHESIA PREPROCEDURE EVALUATION
Anesthesia Evaluation     NPO Solid Status: > 8 hours             Airway   Mallampati: II  Dental      Pulmonary    (-) sleep apnea, not a smoker    ROS comment: Negative patient screen for RISA    Cardiovascular     (+) hypertension, DVT, hyperlipidemia,       Neuro/Psych  (+) CVA,    GI/Hepatic/Renal/Endo    (+)  GERD,  renal disease CRI, diabetes mellitus type 2,     Musculoskeletal     Abdominal    Substance History      OB/GYN          Other                        Anesthesia Plan    ASA 3     MAC       Anesthetic plan, risks, benefits, and alternatives have been provided, discussed and informed consent has been obtained with: patient.        CODE STATUS:

## 2022-11-30 NOTE — ANESTHESIA POSTPROCEDURE EVALUATION
Patient: Maryellen Crump    Procedure Summary     Date: 11/30/22 Room / Location:  [facility]    Anesthesia Start: 0735 Anesthesia Stop: 0800    Procedures:       COLONOSCOPY TO CECUM AND TI      ESOPHAGOGASTRODUODENOSCOPY (Esophagus) Diagnosis:       Fecal occult blood test positive      (Fecal occult blood test positive [R19.5])    Surgeons: Duglas Zhang MD Provider: Sagar Krueger MD    Anesthesia Type: MAC ASA Status: 3          Anesthesia Type: MAC    Vitals  Vitals Value Taken Time   /66 11/30/22 0800   Temp     Pulse 69 11/30/22 0800   Resp 16 11/30/22 0800   SpO2 96 % 11/30/22 0800           Post Anesthesia Care and Evaluation    Pain management: adequate    Airway patency: patent  Anesthetic complications: No anesthetic complications    Cardiovascular status: acceptable  Respiratory status: acceptable  Hydration status: acceptable    Comments: /66 (BP Location: Left arm, Patient Position: Lying)   Pulse 69   Resp 16   Wt 52.2 kg (115 lb)   SpO2 96%   BMI 20.37 kg/m²         
No

## 2022-12-21 DIAGNOSIS — E78.2 MIXED HYPERLIPIDEMIA: ICD-10-CM

## 2022-12-21 RX ORDER — ATORVASTATIN CALCIUM 40 MG/1
40 TABLET, FILM COATED ORAL DAILY
Qty: 90 TABLET | Refills: 3 | Status: SHIPPED | OUTPATIENT
Start: 2022-12-21 | End: 2023-12-21

## 2023-07-05 ENCOUNTER — TELEPHONE (OUTPATIENT)
Dept: NEUROLOGY | Facility: CLINIC | Age: 73
End: 2023-07-05

## 2023-07-05 NOTE — TELEPHONE ENCOUNTER
Caller: Maryellen Crump    Relationship to patient: Self    Best call back number: [phone]    Chief complaint: HISTORY OF STROKE - SPASTICITY ISSUES    Type of visit: FOLLOW UP    Requested date: ASAP     If rescheduling, when is the original appointment: NA     Additional notes: PATIENT WOULD LIKE A FOLLOW UP IF POSSIBLE, SHE STATES SHE HAS BEEN GOING TO PT REGULARLY AND THEY HAVE NOT BEEN ABLE TO HELP HER AS MUCH AS SHE WOULD LIKE. PLEASE ADVISE, THANK YOU.

## 2023-08-02 ENCOUNTER — TRANSCRIBE ORDERS (OUTPATIENT)
Dept: ADMINISTRATIVE | Facility: HOSPITAL | Age: 73
End: 2023-08-02
Payer: MEDICARE

## 2023-08-02 DIAGNOSIS — M79.673 PAIN OF FOOT, UNSPECIFIED LATERALITY: ICD-10-CM

## 2023-08-02 DIAGNOSIS — M54.50 LOW BACK PAIN, UNSPECIFIED BACK PAIN LATERALITY, UNSPECIFIED CHRONICITY, UNSPECIFIED WHETHER SCIATICA PRESENT: ICD-10-CM

## 2023-08-02 DIAGNOSIS — R20.2 PARESTHESIA: ICD-10-CM

## 2023-11-14 NOTE — PROGRESS NOTES
LOS: 11 days   Patient Care Team:  Juni Cortez MD as PCP - General (Family Medicine)      ONEYDA BRANHAM  1950    Diagnoses    1. IMPAIRED FUNCTIONAL MOBILITY, BALANCE, GAIT, AND ENDURANCE       ADMITTING DIAGNOSIS:    Stroke    Subjective     Tolerating therapies.  Feels overall stronger. Ready for home      Objective     Vitals:    07/13/22 0500   BP: 109/68   Pulse: 76   Resp: 18   Temp: 97.8 °F (36.6 °C)   SpO2: 96%       PHYSICAL EXAM:   MENTAL STATUS -  AWAKE / ALERT  HEENT-  SCLERAE ANICTERIC, CONJUNCTIVAE PINK,    LUNGS - NORMAL RESPIRATIONS, CTA.   HEART- RRR,   ABD -  SOFT, NONDISTENDED  EXT - NO EDEMA OR CYANOSIS  NEURO - AWAKE, ALERT  MOTOR EXAM -takes resistance bilaterally.            MEDICATIONS  Scheduled Meds:  Continuous Infusions:No current facility-administered medications for this encounter.    PRN Meds:.      RESULTS  Glucose   Date/Time Value Ref Range Status   07/13/2022 1048 229 (H) 70 - 130 mg/dL Final     Comment:     Meter: [id number] : [id number] Karlene Cessli NA   07/13/2022 0623 142 (H) 70 - 130 mg/dL Final     Comment:     Meter: [id number] : [id number] [facility]   07/12/2022 2125 182 (H) 70 - 130 mg/dL Final     Comment:     Meter: [id number] : [id number] [facility]   07/12/2022 1612 152 (H) 70 - 130 mg/dL Final     Comment:     Meter: [id number] : [id number] Luna Cessli NA   07/12/2022 1117 176 (H) 70 - 130 mg/dL Final     Comment:     Meter: [id number] : [id number] Rolan Jazmine NA   07/12/2022 0624 138 (H) 70 - 130 mg/dL Final     Comment:     Meter: [id number] : [id number] Fran Mackey CNA   07/11/2022 2029 213 (H) 70 - 130 mg/dL Final     Comment:     Meter: [id number] : [id number] Fran Mackey CNA   07/11/2022 1612 135 (H) 70 - 130 mg/dL Final     Comment:     Meter: [id number] : [id number] Luna Cessli NA     Results from last 7 days   Lab Units 07/11/22  1435   WBC 10*3/mm3 7.41   HEMOGLOBIN g/dL 11.1*   HEMATOCRIT  Simvastatin passed protocol.  Terbinafine no protocol.   Ashleigh Quintero failed protocol (A1C.)    10/20/2023 Last office visit  Wt Readings from Last 1 Encounters:   10/20/23 (!) 149.7 kg (330 lb)        BP Readings from Last 2 Encounters:   10/20/23 (!) 158/70   07/20/23 (!) 144/78   ]    Lab Results   Component Value Date    SODIUM 142 07/15/2023    POTASSIUM 4.3 07/15/2023    CHLORIDE 109 07/15/2023    CO2 27 07/15/2023    BUN 15 07/15/2023    CREATININE 0.69 07/15/2023    GLUCOSE 164 (H) 07/15/2023     Hemoglobin A1C (%)   Date Value   10/14/2023 8.8 (H)     TSH (mcUnits/mL)   Date Value   12/31/2020 3.187     Lab Results   Component Value Date    CHOLESTEROL 132 04/15/2023    HDL 31 (L) 04/15/2023    CALCLDL 66 04/15/2023    TRIGLYCERIDE 174 (H) 04/15/2023     Lab Results   Component Value Date    AST 30 04/15/2023    GPT 51 04/15/2023    ALKPT 72 04/15/2023    BILIRUBIN 0.3 04/15/2023        % 34.1   PLATELETS 10*3/mm3 272     Results from last 7 days   Lab Units 07/11/22  1435   SODIUM mmol/L 135*   POTASSIUM mmol/L 3.8   CHLORIDE mmol/L 99   CO2 mmol/L 27.0   BUN mg/dL 19   CREATININE mg/dL 1.22*   CALCIUM mg/dL 10.1   BILIRUBIN mg/dL 0.4   ALK PHOS U/L 100   ALT (SGPT) U/L 18   AST (SGOT) U/L 14   GLUCOSE mg/dL 181*      Latest Reference Range & Units 07/01/22 04:54 07/05/22 09:42   Vitamin B-12 211 - 946 pg/mL 204 (L)    25 Hydroxy, Vitamin D 30.0 - 100.0 ng/ml  54.1      BRAIN MRI WITHOUT CONTRAST-June 29, 2022     HISTORY: Stroke, follow up     COMPARISON: 06/29/2022.     FINDINGS:  Multiplanar images of the head were obtained without  gadolinium. There is an area of restricted diffusion which is noted  within the left frontoparietal coronal radiata, measuring up to 1.1 cm  in size. An additional tiny focus is seen just superior to it within the  subcortical white matter. This measures approximately 4 mm in size. No  additional areas of restricted diffusion are seen. There is diffuse  atrophy. There is periventricular and deep white matter microangiopathic  change. Degree of ventricular dilatation may be somewhat out of  proportion to the degree of atrophy, and correlation with any evidence  of normal pressure hydrocephalus is recommended. There is no midline  shift or mass effect. Intracranial flow voids appear intact. No  abnormality is seen on gradient echo imaging There is mucosal thickening  noted within the ethmoid sinuses.     IMPRESSION:  1. Foci of restricted diffusion are noted within the left frontoparietal  corona radiata and subcortical white matter.  No additional areas of  acute infarction are identified.    ASSESSMENT and PLAN    Iron deficiency anemia    Type 2 diabetes mellitus with kidney complication, without long-term current use of insulin (HCC)    Hypertension    Hyperlipidemia    Acute CVA (cerebrovascular accident) (HCC)    Acute lacunar stroke (HCC)    Vitamin B12  deficiency    CVA (cerebral vascular accident) ([facility])    CKD (chronic kidney disease) stage 3, GFR 30-59 ml/min ([facility])    CVA 6/29/2022 with right-sided weakness-lacunar infarct left corona radiata region  Stroke prophylaxis-aspirin/Plavix/atorvastatin    DVT [id number] dual antiplatelet therapy    Diabetes mellitus-on metformin/Starlix at home.  Lantus  July 6-titrate up on Lantus to 14 units every morning.  Look at transitioning back to home regimen soon  July 7-plan to review with internal medicine timeframe of transitioning back to home regimen with metformin and Januvia  July 8-Januvia increased to 50 mg daily.  Metformin 500 mg twice daily    Vitamin B12 replacement    Hypertension-hydrochlorothiazide/ramipril    Impaired mobility  Impaired self-care  Impaired cognition    Leg cramps-took medication over-the-counter at home-patient to bring in the name    Functional status-July 5-Pt scored WNL for her age on CLQT in domains of  attention, memory, executive functions, language and visuospatial skills;  however, she exhibited mild difficulty with story retell task, alternating  attention and maze completion.  Upper body dressing contact-guard.  Lower body dressing minimum assist.  Transfers minimal-contact-guard assist.  Gait rolling walker 100 feet minimum assist.  30 feet with quad tip cane.    TEAM CONF - JULY 7 - POOR HAND WRITING, IMPAIRED MOTOR CONTROL. TRANSFERS CTG MIN. GAIT 160 FEET CTG -MIN RW UBD CTG. LBD  MIN. BATH MIN. TOILETING MIN. DEFICITS WITH DIVIDED ATTENTION AND WORKING MEMORY, DEDUCTIVE REASONING.   BNE (Active)  Att'n. - WNL  Exec. Fx. - Min Imp.  Rsng/Jgmnt - WNL  Arith - WNL  Visuospatial Skills - WNL  Visual Mem. - WNL  Verbal Mem. - Mod.Imp. for immediate recall, Min-Mildly Imp. for delayed recall  Emot - Pt denied dep/anx  CONTINENT BOWEL AND BLADDER. SKIN INTACT  ELOS - WED    Disposition - July 13 - home today. Meds and follow up reviewed.     REHAB - admit for comprehensive  acute inpatient rehabilitation .  This would be an interdisciplinary program with physical therapy 1 hour,  occupational therapy 1 hour, and speech therapy 1 hour, 5 days a week.  Rehabilitation nursing for carryover, monitoring of hypertension and neurologic   status, bowel and bladder, and skin  Ongoing physician follow-up.  Weekly team conferences.     Goal is for home with outpatient   therapies.  Barrier to discharge: Impaired mobility and self-care- work on balance, transfers, progressive ambulation, actives of daily living to overcome.         Tanmay Espinoza MD      During rounds, used appropriate personal protective equipment including mask and gloves.  Additional gown if indicated.  Mask used was standard procedure mask. Appropriate PPE was worn during the entire visit.  Hand hygiene was completed before and after.

## 2023-11-16 ENCOUNTER — HOSPITAL ENCOUNTER (OUTPATIENT)
Dept: MRI IMAGING | Facility: HOSPITAL | Age: 73
Discharge: HOME | End: 2023-11-16
Admitting: FAMILY MEDICINE
Payer: MEDICARE

## 2023-11-16 ENCOUNTER — HOSPITAL ENCOUNTER (OUTPATIENT)
Dept: MRI IMAGING | Facility: HOSPITAL | Age: 73
Discharge: HOME | End: 2023-11-16
Payer: MEDICARE

## 2023-11-16 DIAGNOSIS — M54.50 LOW BACK PAIN, UNSPECIFIED BACK PAIN LATERALITY, UNSPECIFIED CHRONICITY, UNSPECIFIED WHETHER SCIATICA PRESENT: ICD-10-CM

## 2023-11-16 DIAGNOSIS — R20.2 PARESTHESIA: ICD-10-CM

## 2023-11-16 DIAGNOSIS — M79.673 PAIN OF FOOT, UNSPECIFIED LATERALITY: ICD-10-CM

## 2023-11-16 PROCEDURE — 72195 MRI PELVIS W/O DYE: CPT

## 2023-11-16 PROCEDURE — 72148 MRI LUMBAR SPINE W/O DYE: CPT

## 2023-11-29 ENCOUNTER — HOSPITAL ENCOUNTER (OUTPATIENT)
Dept: INFUSION THERAPY | Facility: HOSPITAL | Age: 73
Discharge: HOME | End: 2023-11-29
Admitting: PSYCHIATRY & NEUROLOGY
Payer: MEDICARE

## 2023-11-29 DIAGNOSIS — R20.2 PARESTHESIA: ICD-10-CM

## 2023-11-29 PROCEDURE — 95886 MUSC TEST DONE W/N TEST COMP: CPT

## 2023-11-29 PROCEDURE — 95909 NRV CNDJ TST 5-6 STUDIES: CPT

## 2023-11-29 NOTE — PROCEDURES
EMG and Nerve Conduction Studies    I.      Instrument used: Neuromax 1002  II.     Please see data sheets for tabular summary of NCS and details on methods, temperatures and lab standards.   III.    EMG muscles tested for upper extremity studies include the deltoid, biceps, triceps, pronator teres, extensor digitorum communis, first dorsal interosseous and abductor pollicis brevis.    IV.   EMG muscles tested for lower extremity studies include the vastus lateralis, tibialis anterior, peroneus longus, medial gastrocnemius and extensor digitorum brevis.    V.    Additional muscles tested as needed.  Paraspinal muscles tested as needed.   VI.   Please see data sheets for tabular summary of EMG findings.   VII. The complete report includes the data sheets.      Indication: Abnormal sensation balls of feet  History: 73-year-old woman with 20-year history of diabetes who has a sensation of a balled up sock on the balls of her feet.  It is not painful.  Her diabetes is under good control.  Recent hemoglobin A1c was 6.6%.  She also has some right-sided sciatica.      Ht: 160 cm  Wt: 52.2 kg  HbA1C:   Lab Results   Component Value Date    HGBA1C 6.6 (H) 09/16/2023     TSH:   Lab Results   Component Value Date    TSH 2.330 09/16/2023       Technical summary:  Nerve conduction studies were obtained in the right leg with 1 comparison on the left.  Skin temperatures were a bit cool and temperature correction was used if indicated.  Needle examination was obtained on selected muscles in both legs.    Results:  1.  Prolonged right sural sensory distal latency with temperature correction at 4.8 ms with low amplitude of 4.7 µV.  2.  Prolonged right superficial peroneal sensory distal latency with temperature correction at 4.7 ms with low amplitude of 2.9 µV.  Prolonged left superficial peroneal sensory distal latency at 4.4 ms with low amplitude of 2.7 µV.  3.  Absent right peroneal motor potentials from proximal and distal  stimulation sites recorded over the extensor digitorum brevis.  The study was repeated recording over the tibialis anterior showing a slow conduction velocity of 35.3 m/s with normal amplitudes.  4.  Slow right tibial motor velocity at 37.6 m/s with a normal distal latency and amplitudes.  5.  Needle examination of selected muscles in both legs showed fibrillations and positive sharp waves in both extensor digitorum brevis muscles.  There were no motor units in the right extensor digitorum brevis and there were very few in the left with very rapid firing rates and reduced interference patterns.  The remaining muscles tested showed normal insertional activities, motor units and recruitment patterns.  Lumbar paraspinals at L5 showed no abnormality on either side.    Impression:  Abnormal study showing moderate peripheral neuropathy.  There were no additional findings for a lumbosacral radiculopathy on either side.  The needle exam changes were consistent with the nerve conduction findings.  Study results were discussed with the patient.    Ezequiel Shaikh M.D.              Dictated utilizing Dragon dictation.

## 2024-05-03 ENCOUNTER — HOSPITAL ENCOUNTER (INPATIENT)
Facility: HOSPITAL | Age: 74
LOS: 3 days | Discharge: HOME HEALTH | DRG: 522 | End: 2024-05-06
Attending: EMERGENCY MEDICINE | Admitting: INTERNAL MEDICINE
Payer: MEDICARE

## 2024-05-03 ENCOUNTER — HOSPITAL ENCOUNTER (OUTPATIENT)
Facility: HOSPITAL | Age: 74
Setting detail: SURGERY ADMIT
DRG: 522 | End: 2024-05-03
Attending: ORTHOPAEDIC SURGERY | Admitting: ORTHOPAEDIC SURGERY
Payer: MEDICARE

## 2024-05-03 ENCOUNTER — ANESTHESIA EVENT (OUTPATIENT)
Dept: PERIOP | Facility: HOSPITAL | Age: 74
End: 2024-05-03
Payer: MEDICARE

## 2024-05-03 ENCOUNTER — APPOINTMENT (OUTPATIENT)
Dept: GENERAL RADIOLOGY | Facility: HOSPITAL | Age: 74
DRG: 522 | End: 2024-05-03
Payer: MEDICARE
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
## 2024-05-03 DIAGNOSIS — S72.001A CLOSED DISPLACED FRACTURE OF RIGHT FEMORAL NECK: Primary | ICD-10-CM

## 2024-05-03 LAB
ABO GROUP BLD: NORMAL
ALBUMIN SERPL-MCNC: 5 G/DL (ref 3.5–5.2)
ALBUMIN/GLOB SERPL: 1.8 G/DL
ALP SERPL-CCNC: 142 U/L (ref 39–117)
ALT SERPL W P-5'-P-CCNC: 19 U/L (ref 1–33)
ANION GAP SERPL CALCULATED.3IONS-SCNC: 13 MMOL/L (ref 5–15)
AST SERPL-CCNC: 18 U/L (ref 1–32)
BASOPHILS # BLD AUTO: 0.06 10*3/MM3 (ref 0–0.2)
BASOPHILS NFR BLD AUTO: 0.7 % (ref 0–1.5)
BILIRUB SERPL-MCNC: 0.5 MG/DL (ref 0–1.2)
BLD GP AB SCN SERPL QL: NEGATIVE
BUN SERPL-MCNC: 30 MG/DL (ref 8–23)
BUN/CREAT SERPL: 19.4 (ref 7–25)
CALCIUM SPEC-SCNC: 10.4 MG/DL (ref 8.6–10.5)
CHLORIDE SERPL-SCNC: 106 MMOL/L (ref 98–107)
CO2 SERPL-SCNC: 21 MMOL/L (ref 22–29)
CREAT SERPL-MCNC: 1.55 MG/DL (ref 0.57–1)
DEPRECATED RDW RBC AUTO: 42.4 FL (ref 37–54)
EGFRCR SERPLBLD CKD-EPI 2021: 35 ML/MIN/1.73
EOSINOPHIL # BLD AUTO: 0.16 10*3/MM3 (ref 0–0.4)
EOSINOPHIL NFR BLD AUTO: 2 % (ref 0.3–6.2)
ERYTHROCYTE [DISTWIDTH] IN BLOOD BY AUTOMATED COUNT: 12.4 % (ref 12.3–15.4)
GLOBULIN UR ELPH-MCNC: 2.8 GM/DL
GLUCOSE BLDC GLUCOMTR-MCNC: 168 MG/DL (ref 70–130)
GLUCOSE SERPL-MCNC: 189 MG/DL (ref 65–99)
HCT VFR BLD AUTO: 35.8 % (ref 34–46.6)
HGB BLD-MCNC: 11.4 G/DL (ref 12–15.9)
IMM GRANULOCYTES # BLD AUTO: 0.04 10*3/MM3 (ref 0–0.05)
IMM GRANULOCYTES NFR BLD AUTO: 0.5 % (ref 0–0.5)
INR PPP: 1.04 (ref 0.9–1.1)
LYMPHOCYTES # BLD AUTO: 1.52 10*3/MM3 (ref 0.7–3.1)
LYMPHOCYTES NFR BLD AUTO: 18.8 % (ref 19.6–45.3)
MCH RBC QN AUTO: 30.1 PG (ref 26.6–33)
MCHC RBC AUTO-ENTMCNC: 31.8 G/DL (ref 31.5–35.7)
MCV RBC AUTO: 94.5 FL (ref 79–97)
MONOCYTES # BLD AUTO: 0.46 10*3/MM3 (ref 0.1–0.9)
MONOCYTES NFR BLD AUTO: 5.7 % (ref 5–12)
NEUTROPHILS NFR BLD AUTO: 5.84 10*3/MM3 (ref 1.7–7)
NEUTROPHILS NFR BLD AUTO: 72.3 % (ref 42.7–76)
NRBC BLD AUTO-RTO: 0 /100 WBC (ref 0–0.2)
PLATELET # BLD AUTO: 262 10*3/MM3 (ref 140–450)
PMV BLD AUTO: 10.2 FL (ref 6–12)
POTASSIUM SERPL-SCNC: 4.3 MMOL/L (ref 3.5–5.2)
PROT SERPL-MCNC: 7.8 G/DL (ref 6–8.5)
PROTHROMBIN TIME: 13.9 SECONDS (ref 11.7–14.2)
RBC # BLD AUTO: 3.79 10*6/MM3 (ref 3.77–5.28)
RH BLD: POSITIVE
SODIUM SERPL-SCNC: 140 MMOL/L (ref 136–145)
T&S EXPIRATION DATE: NORMAL
WBC NRBC COR # BLD AUTO: 8.08 10*3/MM3 (ref 3.4–10.8)

## 2024-05-03 PROCEDURE — 85610 PROTHROMBIN TIME: CPT | Performed by: EMERGENCY MEDICINE

## 2024-05-03 PROCEDURE — 85025 COMPLETE CBC W/AUTO DIFF WBC: CPT | Performed by: EMERGENCY MEDICINE

## 2024-05-03 PROCEDURE — 80053 COMPREHEN METABOLIC PANEL: CPT | Performed by: EMERGENCY MEDICINE

## 2024-05-03 PROCEDURE — 82948 REAGENT STRIP/BLOOD GLUCOSE: CPT

## 2024-05-03 PROCEDURE — 93005 ELECTROCARDIOGRAM TRACING: CPT | Performed by: EMERGENCY MEDICINE

## 2024-05-03 PROCEDURE — 86901 BLOOD TYPING SEROLOGIC RH(D): CPT | Performed by: EMERGENCY MEDICINE

## 2024-05-03 PROCEDURE — 63710000001 INSULIN LISPRO (HUMAN) PER 5 UNITS: Performed by: INTERNAL MEDICINE

## 2024-05-03 PROCEDURE — 99285 EMERGENCY DEPT VISIT HI MDM: CPT

## 2024-05-03 PROCEDURE — 25010000002 MORPHINE PER 10 MG: Performed by: EMERGENCY MEDICINE

## 2024-05-03 PROCEDURE — 71045 X-RAY EXAM CHEST 1 VIEW: CPT

## 2024-05-03 PROCEDURE — 73502 X-RAY EXAM HIP UNI 2-3 VIEWS: CPT

## 2024-05-03 PROCEDURE — 86900 BLOOD TYPING SEROLOGIC ABO: CPT | Performed by: EMERGENCY MEDICINE

## 2024-05-03 PROCEDURE — 25010000002 HYDROMORPHONE PER 4 MG: Performed by: EMERGENCY MEDICINE

## 2024-05-03 PROCEDURE — 25010000002 ONDANSETRON PER 1 MG: Performed by: EMERGENCY MEDICINE

## 2024-05-03 PROCEDURE — 86850 RBC ANTIBODY SCREEN: CPT | Performed by: EMERGENCY MEDICINE

## 2024-05-03 RX ORDER — UREA 10 %
3 LOTION (ML) TOPICAL NIGHTLY PRN
Status: DISCONTINUED | OUTPATIENT
Start: 2024-05-03 | End: 2024-05-06 | Stop reason: HOSPADM

## 2024-05-03 RX ORDER — PANTOPRAZOLE SODIUM 40 MG/1
40 TABLET, DELAYED RELEASE ORAL
Status: DISCONTINUED | OUTPATIENT
Start: 2024-05-04 | End: 2024-05-06 | Stop reason: HOSPADM

## 2024-05-03 RX ORDER — AMLODIPINE BESYLATE 10 MG/1
TABLET ORAL
COMMUNITY
Start: 2024-02-27

## 2024-05-03 RX ORDER — SODIUM CHLORIDE 9 MG/ML
75 INJECTION, SOLUTION INTRAVENOUS CONTINUOUS
Status: DISCONTINUED | OUTPATIENT
Start: 2024-05-03 | End: 2024-05-05

## 2024-05-03 RX ORDER — ONDANSETRON 2 MG/ML
4 INJECTION INTRAMUSCULAR; INTRAVENOUS ONCE
Status: COMPLETED | OUTPATIENT
Start: 2024-05-03 | End: 2024-05-03

## 2024-05-03 RX ORDER — ONDANSETRON 2 MG/ML
4 INJECTION INTRAMUSCULAR; INTRAVENOUS EVERY 6 HOURS PRN
Status: DISCONTINUED | OUTPATIENT
Start: 2024-05-03 | End: 2024-05-06 | Stop reason: HOSPADM

## 2024-05-03 RX ORDER — AMOXICILLIN 250 MG
2 CAPSULE ORAL 2 TIMES DAILY PRN
Status: DISCONTINUED | OUTPATIENT
Start: 2024-05-03 | End: 2024-05-06 | Stop reason: HOSPADM

## 2024-05-03 RX ORDER — IBUPROFEN 600 MG/1
1 TABLET ORAL
Status: DISCONTINUED | OUTPATIENT
Start: 2024-05-03 | End: 2024-05-06 | Stop reason: HOSPADM

## 2024-05-03 RX ORDER — POLYETHYLENE GLYCOL 3350 17 G/17G
17 POWDER, FOR SOLUTION ORAL DAILY PRN
Status: DISCONTINUED | OUTPATIENT
Start: 2024-05-03 | End: 2024-05-06 | Stop reason: HOSPADM

## 2024-05-03 RX ORDER — LISINOPRIL 40 MG/1
40 TABLET ORAL
Status: DISCONTINUED | OUTPATIENT
Start: 2024-05-04 | End: 2024-05-04

## 2024-05-03 RX ORDER — MORPHINE SULFATE 2 MG/ML
4 INJECTION, SOLUTION INTRAMUSCULAR; INTRAVENOUS ONCE
Status: COMPLETED | OUTPATIENT
Start: 2024-05-03 | End: 2024-05-03

## 2024-05-03 RX ORDER — HYDROMORPHONE HYDROCHLORIDE 1 MG/ML
0.5 INJECTION, SOLUTION INTRAMUSCULAR; INTRAVENOUS; SUBCUTANEOUS
Status: DISCONTINUED | OUTPATIENT
Start: 2024-05-03 | End: 2024-05-04 | Stop reason: SDUPTHER

## 2024-05-03 RX ORDER — ATORVASTATIN CALCIUM 80 MG/1
40 TABLET, FILM COATED ORAL NIGHTLY
COMMUNITY
Start: 2024-04-24

## 2024-05-03 RX ORDER — BISACODYL 5 MG/1
5 TABLET, DELAYED RELEASE ORAL DAILY PRN
Status: DISCONTINUED | OUTPATIENT
Start: 2024-05-03 | End: 2024-05-06 | Stop reason: HOSPADM

## 2024-05-03 RX ORDER — ASPIRIN 81 MG/1
81 TABLET ORAL DAILY
Status: DISCONTINUED | OUTPATIENT
Start: 2024-05-04 | End: 2024-05-04

## 2024-05-03 RX ORDER — ACETAMINOPHEN 325 MG/1
650 TABLET ORAL EVERY 4 HOURS PRN
Status: DISCONTINUED | OUTPATIENT
Start: 2024-05-03 | End: 2024-05-06 | Stop reason: HOSPADM

## 2024-05-03 RX ORDER — BISACODYL 10 MG
10 SUPPOSITORY, RECTAL RECTAL DAILY PRN
Status: DISCONTINUED | OUTPATIENT
Start: 2024-05-03 | End: 2024-05-06 | Stop reason: HOSPADM

## 2024-05-03 RX ORDER — DEXTROSE MONOHYDRATE 25 G/50ML
25 INJECTION, SOLUTION INTRAVENOUS
Status: DISCONTINUED | OUTPATIENT
Start: 2024-05-03 | End: 2024-05-06 | Stop reason: HOSPADM

## 2024-05-03 RX ORDER — NALOXONE HCL 0.4 MG/ML
0.4 VIAL (ML) INJECTION
Status: DISCONTINUED | OUTPATIENT
Start: 2024-05-03 | End: 2024-05-06 | Stop reason: HOSPADM

## 2024-05-03 RX ORDER — GLYBURIDE 5 MG/1
TABLET ORAL
COMMUNITY
Start: 2023-05-01

## 2024-05-03 RX ORDER — INSULIN LISPRO 100 [IU]/ML
2-7 INJECTION, SOLUTION INTRAVENOUS; SUBCUTANEOUS
Status: DISCONTINUED | OUTPATIENT
Start: 2024-05-03 | End: 2024-05-06 | Stop reason: HOSPADM

## 2024-05-03 RX ORDER — ONDANSETRON 4 MG/1
4 TABLET, ORALLY DISINTEGRATING ORAL EVERY 6 HOURS PRN
Status: DISCONTINUED | OUTPATIENT
Start: 2024-05-03 | End: 2024-05-06 | Stop reason: HOSPADM

## 2024-05-03 RX ORDER — HYDROMORPHONE HYDROCHLORIDE 1 MG/ML
0.5 INJECTION, SOLUTION INTRAMUSCULAR; INTRAVENOUS; SUBCUTANEOUS ONCE
Status: COMPLETED | OUTPATIENT
Start: 2024-05-03 | End: 2024-05-03

## 2024-05-03 RX ORDER — ATORVASTATIN CALCIUM 20 MG/1
40 TABLET, FILM COATED ORAL NIGHTLY
Status: DISCONTINUED | OUTPATIENT
Start: 2024-05-03 | End: 2024-05-06 | Stop reason: HOSPADM

## 2024-05-03 RX ORDER — CHOLECALCIFEROL (VITAMIN D3) 125 MCG
1000 CAPSULE ORAL DAILY
Status: DISCONTINUED | OUTPATIENT
Start: 2024-05-04 | End: 2024-05-06 | Stop reason: HOSPADM

## 2024-05-03 RX ORDER — HYDROCODONE BITARTRATE AND ACETAMINOPHEN 5; 325 MG/1; MG/1
1 TABLET ORAL EVERY 4 HOURS PRN
Status: DISCONTINUED | OUTPATIENT
Start: 2024-05-03 | End: 2024-05-04 | Stop reason: DRUGHIGH

## 2024-05-03 RX ORDER — SODIUM CHLORIDE 0.9 % (FLUSH) 0.9 %
10 SYRINGE (ML) INJECTION AS NEEDED
Status: DISCONTINUED | OUTPATIENT
Start: 2024-05-03 | End: 2024-05-06 | Stop reason: HOSPADM

## 2024-05-03 RX ORDER — NICOTINE POLACRILEX 4 MG
15 LOZENGE BUCCAL
Status: DISCONTINUED | OUTPATIENT
Start: 2024-05-03 | End: 2024-05-06 | Stop reason: HOSPADM

## 2024-05-03 RX ORDER — AMLODIPINE BESYLATE 10 MG/1
10 TABLET ORAL
Status: DISCONTINUED | OUTPATIENT
Start: 2024-05-04 | End: 2024-05-06 | Stop reason: HOSPADM

## 2024-05-03 RX ADMIN — MORPHINE SULFATE 4 MG: 2 INJECTION, SOLUTION INTRAMUSCULAR; INTRAVENOUS at 16:33

## 2024-05-03 RX ADMIN — ATORVASTATIN CALCIUM 40 MG: 20 TABLET, FILM COATED ORAL at 22:02

## 2024-05-03 RX ADMIN — ONDANSETRON 4 MG: 2 INJECTION INTRAMUSCULAR; INTRAVENOUS at 16:32

## 2024-05-03 RX ADMIN — SODIUM CHLORIDE 75 ML/HR: 9 INJECTION, SOLUTION INTRAVENOUS at 20:00

## 2024-05-03 RX ADMIN — INSULIN LISPRO 2 UNITS: 100 INJECTION, SOLUTION INTRAVENOUS; SUBCUTANEOUS at 22:17

## 2024-05-03 RX ADMIN — ONDANSETRON 4 MG: 2 INJECTION INTRAMUSCULAR; INTRAVENOUS at 17:37

## 2024-05-03 RX ADMIN — HYDROCODONE BITARTRATE AND ACETAMINOPHEN 1 TABLET: 5; 325 TABLET ORAL at 22:03

## 2024-05-03 RX ADMIN — HYDROMORPHONE HYDROCHLORIDE 0.5 MG: 1 INJECTION, SOLUTION INTRAMUSCULAR; INTRAVENOUS; SUBCUTANEOUS at 17:37

## 2024-05-04 ENCOUNTER — ANESTHESIA (OUTPATIENT)
Dept: PERIOP | Facility: HOSPITAL | Age: 74
End: 2024-05-04
Payer: MEDICARE

## 2024-05-04 ENCOUNTER — APPOINTMENT (OUTPATIENT)
Dept: GENERAL RADIOLOGY | Facility: HOSPITAL | Age: 74
DRG: 522 | End: 2024-05-04
Payer: MEDICARE

## 2024-05-04 PROBLEM — N18.32 CKD STAGE 3B, GFR 30-44 ML/MIN: Status: ACTIVE | Noted: 2022-07-05

## 2024-05-04 LAB
ANION GAP SERPL CALCULATED.3IONS-SCNC: 9.3 MMOL/L (ref 5–15)
BUN SERPL-MCNC: 27 MG/DL (ref 8–23)
BUN/CREAT SERPL: 18.5 (ref 7–25)
CALCIUM SPEC-SCNC: 10.1 MG/DL (ref 8.6–10.5)
CHLORIDE SERPL-SCNC: 107 MMOL/L (ref 98–107)
CO2 SERPL-SCNC: 24.7 MMOL/L (ref 22–29)
CREAT SERPL-MCNC: 1.46 MG/DL (ref 0.57–1)
DEPRECATED RDW RBC AUTO: 42.7 FL (ref 37–54)
EGFRCR SERPLBLD CKD-EPI 2021: 37.6 ML/MIN/1.73
ERYTHROCYTE [DISTWIDTH] IN BLOOD BY AUTOMATED COUNT: 12.3 % (ref 12.3–15.4)
GLUCOSE BLDC GLUCOMTR-MCNC: 182 MG/DL (ref 70–130)
GLUCOSE BLDC GLUCOMTR-MCNC: 220 MG/DL (ref 70–130)
GLUCOSE BLDC GLUCOMTR-MCNC: 233 MG/DL (ref 70–130)
GLUCOSE BLDC GLUCOMTR-MCNC: 99 MG/DL (ref 70–130)
GLUCOSE SERPL-MCNC: 72 MG/DL (ref 65–99)
HCT VFR BLD AUTO: 32.7 % (ref 34–46.6)
HGB BLD-MCNC: 10.4 G/DL (ref 12–15.9)
MCH RBC QN AUTO: 30.1 PG (ref 26.6–33)
MCHC RBC AUTO-ENTMCNC: 31.8 G/DL (ref 31.5–35.7)
MCV RBC AUTO: 94.5 FL (ref 79–97)
PLATELET # BLD AUTO: 244 10*3/MM3 (ref 140–450)
PMV BLD AUTO: 9.8 FL (ref 6–12)
POTASSIUM SERPL-SCNC: 3.9 MMOL/L (ref 3.5–5.2)
QT INTERVAL: 363 MS
QTC INTERVAL: 411 MS
RBC # BLD AUTO: 3.46 10*6/MM3 (ref 3.77–5.28)
SODIUM SERPL-SCNC: 141 MMOL/L (ref 136–145)
WBC NRBC COR # BLD AUTO: 8.03 10*3/MM3 (ref 3.4–10.8)

## 2024-05-04 PROCEDURE — 25010000002 CEFAZOLIN PER 500 MG: Performed by: ORTHOPAEDIC SURGERY

## 2024-05-04 PROCEDURE — 25010000002 MORPHINE PER 10 MG: Performed by: ORTHOPAEDIC SURGERY

## 2024-05-04 PROCEDURE — 0SR903Z REPLACEMENT OF RIGHT HIP JOINT WITH CERAMIC SYNTHETIC SUBSTITUTE, OPEN APPROACH: ICD-10-PCS | Performed by: ORTHOPAEDIC SURGERY

## 2024-05-04 PROCEDURE — 25010000002 EPINEPHRINE 1 MG/ML SOLUTION 30 ML VIAL: Performed by: ORTHOPAEDIC SURGERY

## 2024-05-04 PROCEDURE — 85027 COMPLETE CBC AUTOMATED: CPT | Performed by: INTERNAL MEDICINE

## 2024-05-04 PROCEDURE — 25010000002 HYDROMORPHONE PER 4 MG: Performed by: INTERNAL MEDICINE

## 2024-05-04 PROCEDURE — 25010000002 SUGAMMADEX 200 MG/2ML SOLUTION: Performed by: STUDENT IN AN ORGANIZED HEALTH CARE EDUCATION/TRAINING PROGRAM

## 2024-05-04 PROCEDURE — 25010000002 FENTANYL CITRATE (PF) 50 MCG/ML SOLUTION: Performed by: ANESTHESIOLOGY

## 2024-05-04 PROCEDURE — 25010000002 ONDANSETRON PER 1 MG: Performed by: STUDENT IN AN ORGANIZED HEALTH CARE EDUCATION/TRAINING PROGRAM

## 2024-05-04 PROCEDURE — C1776 JOINT DEVICE (IMPLANTABLE): HCPCS | Performed by: ORTHOPAEDIC SURGERY

## 2024-05-04 PROCEDURE — 25010000002 FENTANYL CITRATE (PF) 50 MCG/ML SOLUTION: Performed by: STUDENT IN AN ORGANIZED HEALTH CARE EDUCATION/TRAINING PROGRAM

## 2024-05-04 PROCEDURE — 25810000003 LACTATED RINGERS PER 1000 ML: Performed by: ANESTHESIOLOGY

## 2024-05-04 PROCEDURE — 80048 BASIC METABOLIC PNL TOTAL CA: CPT | Performed by: INTERNAL MEDICINE

## 2024-05-04 PROCEDURE — 76000 FLUOROSCOPY <1 HR PHYS/QHP: CPT

## 2024-05-04 PROCEDURE — 25010000002 ROPIVACAINE PER 1 MG: Performed by: ORTHOPAEDIC SURGERY

## 2024-05-04 PROCEDURE — 25010000002 PROPOFOL 10 MG/ML EMULSION: Performed by: STUDENT IN AN ORGANIZED HEALTH CARE EDUCATION/TRAINING PROGRAM

## 2024-05-04 PROCEDURE — 82948 REAGENT STRIP/BLOOD GLUCOSE: CPT

## 2024-05-04 PROCEDURE — 36415 COLL VENOUS BLD VENIPUNCTURE: CPT | Performed by: INTERNAL MEDICINE

## 2024-05-04 PROCEDURE — 25010000002 DEXAMETHASONE SODIUM PHOSPHATE 20 MG/5ML SOLUTION: Performed by: STUDENT IN AN ORGANIZED HEALTH CARE EDUCATION/TRAINING PROGRAM

## 2024-05-04 PROCEDURE — 25010000002 PHENYLEPHRINE 10 MG/ML SOLUTION: Performed by: STUDENT IN AN ORGANIZED HEALTH CARE EDUCATION/TRAINING PROGRAM

## 2024-05-04 PROCEDURE — 63710000001 INSULIN LISPRO (HUMAN) PER 5 UNITS: Performed by: ORTHOPAEDIC SURGERY

## 2024-05-04 DEVICE — IMPLANTABLE DEVICE
Type: IMPLANTABLE DEVICE | Site: HIP | Status: FUNCTIONAL
Brand: G7® VIVACIT-E®

## 2024-05-04 DEVICE — IMPLANTABLE DEVICE
Type: IMPLANTABLE DEVICE | Site: HIP | Status: FUNCTIONAL
Brand: G7® ACETABULAR SYSTEM

## 2024-05-04 DEVICE — CP HIP UPCHRG OSSEOTI LTD HL CUPS: Type: IMPLANTABLE DEVICE | Status: FUNCTIONAL

## 2024-05-04 DEVICE — IMPLANTABLE DEVICE: Type: IMPLANTABLE DEVICE | Site: HIP | Status: FUNCTIONAL

## 2024-05-04 DEVICE — BIOLOX® DELTA, CERAMIC FEMORAL HEAD, L, Ø 32/+3.5, TAPER 12/14
Type: IMPLANTABLE DEVICE | Site: HIP | Status: FUNCTIONAL
Brand: BIOLOX® DELTA

## 2024-05-04 DEVICE — TOTAL HIP PRIMARY: Type: IMPLANTABLE DEVICE | Status: FUNCTIONAL

## 2024-05-04 DEVICE — IMPLANTABLE DEVICE
Type: IMPLANTABLE DEVICE | Site: HIP | Status: FUNCTIONAL
Brand: AVENIR COMPLETE™

## 2024-05-04 DEVICE — DEV CONTRL TISS STRATAFIXSPIRALMNCRYL PLSPS2 REV3/0 45CM: Type: IMPLANTABLE DEVICE | Site: HIP | Status: FUNCTIONAL

## 2024-05-04 RX ORDER — HYDROCODONE BITARTRATE AND ACETAMINOPHEN 7.5; 325 MG/1; MG/1
2 TABLET ORAL EVERY 4 HOURS PRN
Status: DISCONTINUED | OUTPATIENT
Start: 2024-05-04 | End: 2024-05-06 | Stop reason: HOSPADM

## 2024-05-04 RX ORDER — ACETAMINOPHEN 500 MG
500 TABLET ORAL ONCE
Status: COMPLETED | OUTPATIENT
Start: 2024-05-04 | End: 2024-05-04

## 2024-05-04 RX ORDER — HYDRALAZINE HYDROCHLORIDE 20 MG/ML
5 INJECTION INTRAMUSCULAR; INTRAVENOUS
Status: DISCONTINUED | OUTPATIENT
Start: 2024-05-04 | End: 2024-05-04 | Stop reason: HOSPADM

## 2024-05-04 RX ORDER — MAGNESIUM HYDROXIDE 1200 MG/15ML
LIQUID ORAL AS NEEDED
Status: DISCONTINUED | OUTPATIENT
Start: 2024-05-04 | End: 2024-05-04 | Stop reason: HOSPADM

## 2024-05-04 RX ORDER — DOCUSATE SODIUM 100 MG/1
200 CAPSULE, LIQUID FILLED ORAL 2 TIMES DAILY
Status: DISCONTINUED | OUTPATIENT
Start: 2024-05-04 | End: 2024-05-06 | Stop reason: HOSPADM

## 2024-05-04 RX ORDER — NALOXONE HCL 0.4 MG/ML
0.1 VIAL (ML) INJECTION
Status: DISCONTINUED | OUTPATIENT
Start: 2024-05-04 | End: 2024-05-06 | Stop reason: HOSPADM

## 2024-05-04 RX ORDER — SODIUM CHLORIDE 0.9 % (FLUSH) 0.9 %
3-10 SYRINGE (ML) INJECTION AS NEEDED
Status: DISCONTINUED | OUTPATIENT
Start: 2024-05-04 | End: 2024-05-04 | Stop reason: HOSPADM

## 2024-05-04 RX ORDER — SODIUM CHLORIDE 0.9 % (FLUSH) 0.9 %
3 SYRINGE (ML) INJECTION EVERY 12 HOURS SCHEDULED
Status: DISCONTINUED | OUTPATIENT
Start: 2024-05-04 | End: 2024-05-04 | Stop reason: HOSPADM

## 2024-05-04 RX ORDER — FAMOTIDINE 10 MG/ML
20 INJECTION, SOLUTION INTRAVENOUS ONCE
Status: COMPLETED | OUTPATIENT
Start: 2024-05-04 | End: 2024-05-04

## 2024-05-04 RX ORDER — ONDANSETRON 4 MG/1
4 TABLET, ORALLY DISINTEGRATING ORAL EVERY 6 HOURS PRN
Status: DISCONTINUED | OUTPATIENT
Start: 2024-05-04 | End: 2024-05-06 | Stop reason: HOSPADM

## 2024-05-04 RX ORDER — ACETAMINOPHEN 500 MG
1000 TABLET ORAL EVERY 6 HOURS
Status: DISCONTINUED | OUTPATIENT
Start: 2024-05-04 | End: 2024-05-06 | Stop reason: HOSPADM

## 2024-05-04 RX ORDER — OXYCODONE AND ACETAMINOPHEN 7.5; 325 MG/1; MG/1
1 TABLET ORAL EVERY 4 HOURS PRN
Status: DISCONTINUED | OUTPATIENT
Start: 2024-05-04 | End: 2024-05-04 | Stop reason: HOSPADM

## 2024-05-04 RX ORDER — DROPERIDOL 2.5 MG/ML
0.62 INJECTION, SOLUTION INTRAMUSCULAR; INTRAVENOUS
Status: DISCONTINUED | OUTPATIENT
Start: 2024-05-04 | End: 2024-05-04 | Stop reason: HOSPADM

## 2024-05-04 RX ORDER — IPRATROPIUM BROMIDE AND ALBUTEROL SULFATE 2.5; .5 MG/3ML; MG/3ML
3 SOLUTION RESPIRATORY (INHALATION) ONCE AS NEEDED
Status: DISCONTINUED | OUTPATIENT
Start: 2024-05-04 | End: 2024-05-04 | Stop reason: HOSPADM

## 2024-05-04 RX ORDER — TRANEXAMIC ACID 100 MG/ML
INJECTION, SOLUTION INTRAVENOUS AS NEEDED
Status: DISCONTINUED | OUTPATIENT
Start: 2024-05-04 | End: 2024-05-04 | Stop reason: SURG

## 2024-05-04 RX ORDER — DEXAMETHASONE SODIUM PHOSPHATE 4 MG/ML
INJECTION, SOLUTION INTRA-ARTICULAR; INTRALESIONAL; INTRAMUSCULAR; INTRAVENOUS; SOFT TISSUE AS NEEDED
Status: DISCONTINUED | OUTPATIENT
Start: 2024-05-04 | End: 2024-05-04 | Stop reason: SURG

## 2024-05-04 RX ORDER — EPHEDRINE SULFATE 50 MG/ML
5 INJECTION, SOLUTION INTRAVENOUS ONCE AS NEEDED
Status: DISCONTINUED | OUTPATIENT
Start: 2024-05-04 | End: 2024-05-04 | Stop reason: HOSPADM

## 2024-05-04 RX ORDER — ONDANSETRON 2 MG/ML
INJECTION INTRAMUSCULAR; INTRAVENOUS AS NEEDED
Status: DISCONTINUED | OUTPATIENT
Start: 2024-05-04 | End: 2024-05-04 | Stop reason: SURG

## 2024-05-04 RX ORDER — HYDROCODONE BITARTRATE AND ACETAMINOPHEN 5; 325 MG/1; MG/1
1 TABLET ORAL ONCE AS NEEDED
Status: DISCONTINUED | OUTPATIENT
Start: 2024-05-04 | End: 2024-05-04 | Stop reason: HOSPADM

## 2024-05-04 RX ORDER — PROMETHAZINE HYDROCHLORIDE 25 MG/1
25 TABLET ORAL ONCE AS NEEDED
Status: DISCONTINUED | OUTPATIENT
Start: 2024-05-04 | End: 2024-05-04 | Stop reason: HOSPADM

## 2024-05-04 RX ORDER — POLYETHYLENE GLYCOL 3350 17 G/17G
17 POWDER, FOR SOLUTION ORAL DAILY
Status: DISCONTINUED | OUTPATIENT
Start: 2024-05-04 | End: 2024-05-06 | Stop reason: HOSPADM

## 2024-05-04 RX ORDER — SODIUM CHLORIDE, SODIUM LACTATE, POTASSIUM CHLORIDE, CALCIUM CHLORIDE 600; 310; 30; 20 MG/100ML; MG/100ML; MG/100ML; MG/100ML
9 INJECTION, SOLUTION INTRAVENOUS CONTINUOUS
Status: DISCONTINUED | OUTPATIENT
Start: 2024-05-04 | End: 2024-05-06 | Stop reason: HOSPADM

## 2024-05-04 RX ORDER — FLUMAZENIL 0.1 MG/ML
0.2 INJECTION INTRAVENOUS AS NEEDED
Status: DISCONTINUED | OUTPATIENT
Start: 2024-05-04 | End: 2024-05-04 | Stop reason: HOSPADM

## 2024-05-04 RX ORDER — LIDOCAINE HYDROCHLORIDE 20 MG/ML
INJECTION, SOLUTION INFILTRATION; PERINEURAL AS NEEDED
Status: DISCONTINUED | OUTPATIENT
Start: 2024-05-04 | End: 2024-05-04 | Stop reason: SURG

## 2024-05-04 RX ORDER — LIDOCAINE HYDROCHLORIDE 10 MG/ML
0.5 INJECTION, SOLUTION INFILTRATION; PERINEURAL ONCE AS NEEDED
Status: DISCONTINUED | OUTPATIENT
Start: 2024-05-04 | End: 2024-05-04 | Stop reason: HOSPADM

## 2024-05-04 RX ORDER — FENTANYL CITRATE 50 UG/ML
25 INJECTION, SOLUTION INTRAMUSCULAR; INTRAVENOUS
Status: DISCONTINUED | OUTPATIENT
Start: 2024-05-04 | End: 2024-05-05

## 2024-05-04 RX ORDER — HYDROMORPHONE HYDROCHLORIDE 1 MG/ML
0.5 INJECTION, SOLUTION INTRAMUSCULAR; INTRAVENOUS; SUBCUTANEOUS
Status: DISCONTINUED | OUTPATIENT
Start: 2024-05-04 | End: 2024-05-04 | Stop reason: HOSPADM

## 2024-05-04 RX ORDER — LABETALOL HYDROCHLORIDE 5 MG/ML
5 INJECTION, SOLUTION INTRAVENOUS
Status: DISCONTINUED | OUTPATIENT
Start: 2024-05-04 | End: 2024-05-04 | Stop reason: HOSPADM

## 2024-05-04 RX ORDER — ROCURONIUM BROMIDE 10 MG/ML
INJECTION, SOLUTION INTRAVENOUS AS NEEDED
Status: DISCONTINUED | OUTPATIENT
Start: 2024-05-04 | End: 2024-05-04 | Stop reason: SURG

## 2024-05-04 RX ORDER — HYDROCODONE BITARTRATE AND ACETAMINOPHEN 7.5; 325 MG/1; MG/1
1 TABLET ORAL EVERY 4 HOURS PRN
Status: DISCONTINUED | OUTPATIENT
Start: 2024-05-04 | End: 2024-05-06 | Stop reason: HOSPADM

## 2024-05-04 RX ORDER — UREA 10 %
1 LOTION (ML) TOPICAL NIGHTLY PRN
Status: DISCONTINUED | OUTPATIENT
Start: 2024-05-04 | End: 2024-05-06 | Stop reason: HOSPADM

## 2024-05-04 RX ORDER — ASPIRIN 81 MG/1
81 TABLET ORAL EVERY 12 HOURS SCHEDULED
Status: DISCONTINUED | OUTPATIENT
Start: 2024-05-05 | End: 2024-05-06 | Stop reason: HOSPADM

## 2024-05-04 RX ORDER — SODIUM CHLORIDE 0.9 % (FLUSH) 0.9 %
10 SYRINGE (ML) INJECTION EVERY 12 HOURS SCHEDULED
Status: DISCONTINUED | OUTPATIENT
Start: 2024-05-04 | End: 2024-05-06 | Stop reason: HOSPADM

## 2024-05-04 RX ORDER — NALOXONE HCL 0.4 MG/ML
0.2 VIAL (ML) INJECTION AS NEEDED
Status: DISCONTINUED | OUTPATIENT
Start: 2024-05-04 | End: 2024-05-04 | Stop reason: HOSPADM

## 2024-05-04 RX ORDER — PROPOFOL 10 MG/ML
VIAL (ML) INTRAVENOUS AS NEEDED
Status: DISCONTINUED | OUTPATIENT
Start: 2024-05-04 | End: 2024-05-04 | Stop reason: SURG

## 2024-05-04 RX ORDER — SODIUM CHLORIDE 9 MG/ML
40 INJECTION, SOLUTION INTRAVENOUS AS NEEDED
Status: DISCONTINUED | OUTPATIENT
Start: 2024-05-04 | End: 2024-05-06 | Stop reason: HOSPADM

## 2024-05-04 RX ORDER — HYDROMORPHONE HYDROCHLORIDE 1 MG/ML
0.5 INJECTION, SOLUTION INTRAMUSCULAR; INTRAVENOUS; SUBCUTANEOUS
Status: DISCONTINUED | OUTPATIENT
Start: 2024-05-04 | End: 2024-05-06 | Stop reason: HOSPADM

## 2024-05-04 RX ORDER — ONDANSETRON 2 MG/ML
4 INJECTION INTRAMUSCULAR; INTRAVENOUS ONCE AS NEEDED
Status: DISCONTINUED | OUTPATIENT
Start: 2024-05-04 | End: 2024-05-04 | Stop reason: HOSPADM

## 2024-05-04 RX ORDER — ONDANSETRON 2 MG/ML
4 INJECTION INTRAMUSCULAR; INTRAVENOUS EVERY 6 HOURS PRN
Status: DISCONTINUED | OUTPATIENT
Start: 2024-05-04 | End: 2024-05-06 | Stop reason: HOSPADM

## 2024-05-04 RX ORDER — DIPHENHYDRAMINE HYDROCHLORIDE 50 MG/ML
12.5 INJECTION INTRAMUSCULAR; INTRAVENOUS
Status: DISCONTINUED | OUTPATIENT
Start: 2024-05-04 | End: 2024-05-04 | Stop reason: HOSPADM

## 2024-05-04 RX ORDER — LISINOPRIL 40 MG/1
40 TABLET ORAL
Status: DISCONTINUED | OUTPATIENT
Start: 2024-05-05 | End: 2024-05-06 | Stop reason: HOSPADM

## 2024-05-04 RX ORDER — PROMETHAZINE HYDROCHLORIDE 25 MG/1
25 SUPPOSITORY RECTAL ONCE AS NEEDED
Status: DISCONTINUED | OUTPATIENT
Start: 2024-05-04 | End: 2024-05-04 | Stop reason: HOSPADM

## 2024-05-04 RX ORDER — PHENYLEPHRINE HYDROCHLORIDE 10 MG/ML
INJECTION INTRAVENOUS AS NEEDED
Status: DISCONTINUED | OUTPATIENT
Start: 2024-05-04 | End: 2024-05-04 | Stop reason: SURG

## 2024-05-04 RX ORDER — SODIUM CHLORIDE, SODIUM LACTATE, POTASSIUM CHLORIDE, CALCIUM CHLORIDE 600; 310; 30; 20 MG/100ML; MG/100ML; MG/100ML; MG/100ML
100 INJECTION, SOLUTION INTRAVENOUS CONTINUOUS
Status: DISCONTINUED | OUTPATIENT
Start: 2024-05-04 | End: 2024-05-05

## 2024-05-04 RX ORDER — SODIUM CHLORIDE 0.9 % (FLUSH) 0.9 %
10 SYRINGE (ML) INJECTION AS NEEDED
Status: DISCONTINUED | OUTPATIENT
Start: 2024-05-04 | End: 2024-05-06 | Stop reason: HOSPADM

## 2024-05-04 RX ORDER — FENTANYL CITRATE 50 UG/ML
50 INJECTION, SOLUTION INTRAMUSCULAR; INTRAVENOUS
Status: DISCONTINUED | OUTPATIENT
Start: 2024-05-04 | End: 2024-05-04 | Stop reason: HOSPADM

## 2024-05-04 RX ORDER — AMOXICILLIN 250 MG
1 CAPSULE ORAL 2 TIMES DAILY
Status: DISCONTINUED | OUTPATIENT
Start: 2024-05-04 | End: 2024-05-06 | Stop reason: HOSPADM

## 2024-05-04 RX ORDER — FENTANYL CITRATE 50 UG/ML
INJECTION, SOLUTION INTRAMUSCULAR; INTRAVENOUS AS NEEDED
Status: DISCONTINUED | OUTPATIENT
Start: 2024-05-04 | End: 2024-05-04 | Stop reason: SURG

## 2024-05-04 RX ADMIN — LIDOCAINE HYDROCHLORIDE 100 MG: 20 INJECTION, SOLUTION INFILTRATION; PERINEURAL at 10:45

## 2024-05-04 RX ADMIN — PROPOFOL 150 MG: 10 INJECTION, EMULSION INTRAVENOUS at 10:45

## 2024-05-04 RX ADMIN — ACETAMINOPHEN 500 MG: 500 TABLET ORAL at 10:06

## 2024-05-04 RX ADMIN — INSULIN LISPRO 3 UNITS: 100 INJECTION, SOLUTION INTRAVENOUS; SUBCUTANEOUS at 16:50

## 2024-05-04 RX ADMIN — ONDANSETRON 4 MG: 2 INJECTION INTRAMUSCULAR; INTRAVENOUS at 12:32

## 2024-05-04 RX ADMIN — HYDROMORPHONE HYDROCHLORIDE 0.5 MG: 1 INJECTION, SOLUTION INTRAMUSCULAR; INTRAVENOUS; SUBCUTANEOUS at 02:23

## 2024-05-04 RX ADMIN — Medication 10 ML: at 21:00

## 2024-05-04 RX ADMIN — SENNOSIDES AND DOCUSATE SODIUM 1 TABLET: 50; 8.6 TABLET ORAL at 21:20

## 2024-05-04 RX ADMIN — PHENYLEPHRINE HYDROCHLORIDE 200 MCG: 10 INJECTION INTRAVENOUS at 10:58

## 2024-05-04 RX ADMIN — ATORVASTATIN CALCIUM 40 MG: 20 TABLET, FILM COATED ORAL at 21:20

## 2024-05-04 RX ADMIN — SODIUM CHLORIDE 2 G: 900 INJECTION INTRAVENOUS at 16:05

## 2024-05-04 RX ADMIN — Medication 3 MG: at 23:37

## 2024-05-04 RX ADMIN — PANTOPRAZOLE SODIUM 40 MG: 40 TABLET, DELAYED RELEASE ORAL at 17:05

## 2024-05-04 RX ADMIN — FAMOTIDINE 20 MG: 10 INJECTION INTRAVENOUS at 10:07

## 2024-05-04 RX ADMIN — SODIUM CHLORIDE 2000 MG: 900 INJECTION INTRAVENOUS at 10:36

## 2024-05-04 RX ADMIN — ROCURONIUM BROMIDE 50 MG: 10 INJECTION, SOLUTION INTRAVENOUS at 10:46

## 2024-05-04 RX ADMIN — DEXAMETHASONE SODIUM PHOSPHATE 8 MG: 4 INJECTION, SOLUTION INTRAMUSCULAR; INTRAVENOUS at 11:14

## 2024-05-04 RX ADMIN — ACETAMINOPHEN 1000 MG: 500 TABLET ORAL at 21:20

## 2024-05-04 RX ADMIN — FENTANYL CITRATE 25 MCG: 50 INJECTION, SOLUTION INTRAMUSCULAR; INTRAVENOUS at 10:07

## 2024-05-04 RX ADMIN — SODIUM CHLORIDE, POTASSIUM CHLORIDE, SODIUM LACTATE AND CALCIUM CHLORIDE 9 ML/HR: 600; 310; 30; 20 INJECTION, SOLUTION INTRAVENOUS at 10:08

## 2024-05-04 RX ADMIN — ACETAMINOPHEN 1000 MG: 500 TABLET ORAL at 16:05

## 2024-05-04 RX ADMIN — DOCUSATE SODIUM 200 MG: 100 CAPSULE, LIQUID FILLED ORAL at 21:20

## 2024-05-04 RX ADMIN — TRANEXAMIC ACID 1000 MG: 100 INJECTION INTRAVENOUS at 11:15

## 2024-05-04 RX ADMIN — SODIUM CHLORIDE 2 G: 900 INJECTION INTRAVENOUS at 23:34

## 2024-05-04 RX ADMIN — SUGAMMADEX 200 MG: 100 INJECTION, SOLUTION INTRAVENOUS at 12:38

## 2024-05-04 RX ADMIN — HYDROMORPHONE HYDROCHLORIDE 0.5 MG: 1 INJECTION, SOLUTION INTRAMUSCULAR; INTRAVENOUS; SUBCUTANEOUS at 06:33

## 2024-05-04 RX ADMIN — INSULIN LISPRO 3 UNITS: 100 INJECTION, SOLUTION INTRAVENOUS; SUBCUTANEOUS at 21:20

## 2024-05-04 RX ADMIN — LIDOCAINE HYDROCHLORIDE 60 MG: 20 INJECTION, SOLUTION INFILTRATION; PERINEURAL at 12:37

## 2024-05-04 RX ADMIN — FENTANYL CITRATE 50 MCG: 50 INJECTION, SOLUTION INTRAMUSCULAR; INTRAVENOUS at 11:08

## 2024-05-04 NOTE — ANESTHESIA POSTPROCEDURE EVALUATION
Patient: Maryellen Crump    Procedure Summary       Date: 05/04/24 Room / Location: [facility] OR  / [facility] MAIN OR    Anesthesia Start: 1042 Anesthesia Stop: 1253    Procedure: TOTAL HIP ARTHROPLASTY ANTERIOR WITH HANA TABLE (Right: Hip) Diagnosis:     Surgeons: Kike Lake MD Provider: Cecile Crain MD    Anesthesia Type: general ASA Status: 3            Anesthesia Type: general    Vitals  Vitals Value Taken Time   /68 05/04/24 1345   Temp 36.3 °C (97.4 °F) 05/04/24 1345   Pulse 89 05/04/24 1350   Resp 12 05/04/24 1258   SpO2 97 % 05/04/24 1350   Vitals shown include unfiled device data.        Post Anesthesia Care and Evaluation    Patient location during evaluation: bedside  Patient participation: complete - patient participated  Level of consciousness: awake and alert  Pain management: adequate    Airway patency: patent  Anesthetic complications: No anesthetic complications  PONV Status: controlled  Cardiovascular status: blood pressure returned to baseline and acceptable  Respiratory status: acceptable  Hydration status: acceptable

## 2024-05-04 NOTE — ANESTHESIA PREPROCEDURE EVALUATION
" Anesthesia Evaluation     Patient summary reviewed and Nursing notes reviewed   NPO Solid Status: > 8 hours  NPO Liquid Status: > 2 hours           Airway   Mallampati: II  TM distance: >3 FB  Neck ROM: full  No difficulty expected  Dental          Pulmonary - normal exam   Cardiovascular - normal exam  Exercise tolerance: good (4-7 METS)    ECG reviewed    (+) hypertension, DVT, hyperlipidemia  (-) angina, orthopnea, PND, CARDONA    ROS comment: EF 59%. Small PFO on echo    Neuro/Psych  (+) CVA (minimal right sided weakness, 2022)  GI/Hepatic/Renal/Endo    (+) GERD, renal disease- CRI, diabetes mellitus type 2    Musculoskeletal     Abdominal    Substance History   (+) alcohol use (4/week)     OB/GYN          Other   blood dyscrasia anemia,                 Anesthesia Plan    ASA 3     general     (I have reviewed the patient's history and chart with the patient, including all pertinent laboratory results and imaging. I have explained the risks of anesthesia including but not limited to dental damage, corneal abrasion, nerve injury, MI, stroke, aspiration, and death. Patient has agreed to proceed.      /85 (BP Location: Right arm, Patient Position: Lying)   Pulse 77   Temp 36.8 °C (98.2 °F) (Oral)   Resp 16   Ht 160 cm (63\")   Wt 59 kg (130 lb)   SpO2 97%   BMI 23.03 kg/m²   )  intravenous induction     Anesthetic plan, risks, benefits, and alternatives have been provided, discussed and informed consent has been obtained with: patient.    CODE STATUS:    Code Status (Patient has no pulse and is not breathing): CPR (Attempt to Resuscitate)  Medical Interventions (Patient has pulse or is breathing): Full Support      "

## 2024-05-04 NOTE — ANESTHESIA PROCEDURE NOTES
Airway  Urgency: elective    Date/Time: 5/4/2024 10:49 AM  Airway not difficult    General Information and Staff    Patient location during procedure: OR  Anesthesiologist: Cecile Crain MD  CRNA/CAA: Josué Tinsley CRNA    Indications and Patient Condition  Indications for airway management: airway protection    Preoxygenated: yes  MILS not maintained throughout  Mask difficulty assessment: 1 - vent by mask    Final Airway Details  Final airway type: endotracheal airway      Successful airway: ETT  Cuffed: yes   Successful intubation technique: direct laryngoscopy  Endotracheal tube insertion site: oral  Blade: Antoine  Blade size: 3  ETT size (mm): 7.0  Cormack-Lehane Classification: grade IIa - partial view of glottis  Placement verified by: chest auscultation and capnometry   Cuff volume (mL): 7  Measured from: lips  ETT/EBT  to lips (cm): 20  Number of attempts at approach: 1  Assessment: lips, teeth, and gum same as pre-op and atraumatic intubation

## 2024-05-05 PROBLEM — D62 ACUTE BLOOD LOSS ANEMIA: Status: ACTIVE | Noted: 2024-05-05

## 2024-05-05 LAB
ANION GAP SERPL CALCULATED.3IONS-SCNC: 8 MMOL/L (ref 5–15)
BUN SERPL-MCNC: 25 MG/DL (ref 8–23)
BUN/CREAT SERPL: 16.1 (ref 7–25)
CALCIUM SPEC-SCNC: 8.6 MG/DL (ref 8.6–10.5)
CHLORIDE SERPL-SCNC: 105 MMOL/L (ref 98–107)
CO2 SERPL-SCNC: 21 MMOL/L (ref 22–29)
CREAT SERPL-MCNC: 1.55 MG/DL (ref 0.57–1)
DEPRECATED RDW RBC AUTO: 40.5 FL (ref 37–54)
EGFRCR SERPLBLD CKD-EPI 2021: 35 ML/MIN/1.73
ERYTHROCYTE [DISTWIDTH] IN BLOOD BY AUTOMATED COUNT: 11.8 % (ref 12.3–15.4)
GLUCOSE BLDC GLUCOMTR-MCNC: 159 MG/DL (ref 70–130)
GLUCOSE BLDC GLUCOMTR-MCNC: 211 MG/DL (ref 70–130)
GLUCOSE BLDC GLUCOMTR-MCNC: 213 MG/DL (ref 70–130)
GLUCOSE BLDC GLUCOMTR-MCNC: 231 MG/DL (ref 70–130)
GLUCOSE SERPL-MCNC: 257 MG/DL (ref 65–99)
HCT VFR BLD AUTO: 24.6 % (ref 34–46.6)
HGB BLD-MCNC: 7.8 G/DL (ref 12–15.9)
MCH RBC QN AUTO: 30 PG (ref 26.6–33)
MCHC RBC AUTO-ENTMCNC: 31.7 G/DL (ref 31.5–35.7)
MCV RBC AUTO: 94.6 FL (ref 79–97)
PLATELET # BLD AUTO: 190 10*3/MM3 (ref 140–450)
PMV BLD AUTO: 10.3 FL (ref 6–12)
POTASSIUM SERPL-SCNC: 4.7 MMOL/L (ref 3.5–5.2)
RBC # BLD AUTO: 2.6 10*6/MM3 (ref 3.77–5.28)
SODIUM SERPL-SCNC: 134 MMOL/L (ref 136–145)
WBC NRBC COR # BLD AUTO: 8.63 10*3/MM3 (ref 3.4–10.8)

## 2024-05-05 PROCEDURE — 85027 COMPLETE CBC AUTOMATED: CPT | Performed by: ORTHOPAEDIC SURGERY

## 2024-05-05 PROCEDURE — 82948 REAGENT STRIP/BLOOD GLUCOSE: CPT

## 2024-05-05 PROCEDURE — 63710000001 INSULIN LISPRO (HUMAN) PER 5 UNITS: Performed by: ORTHOPAEDIC SURGERY

## 2024-05-05 PROCEDURE — 97162 PT EVAL MOD COMPLEX 30 MIN: CPT

## 2024-05-05 PROCEDURE — 80048 BASIC METABOLIC PNL TOTAL CA: CPT | Performed by: ORTHOPAEDIC SURGERY

## 2024-05-05 PROCEDURE — 97530 THERAPEUTIC ACTIVITIES: CPT

## 2024-05-05 RX ORDER — CYCLOBENZAPRINE HCL 10 MG
10 TABLET ORAL 3 TIMES DAILY PRN
Status: DISCONTINUED | OUTPATIENT
Start: 2024-05-05 | End: 2024-05-06 | Stop reason: HOSPADM

## 2024-05-05 RX ADMIN — INSULIN LISPRO 3 UNITS: 100 INJECTION, SOLUTION INTRAVENOUS; SUBCUTANEOUS at 13:37

## 2024-05-05 RX ADMIN — ASPIRIN 81 MG: 81 TABLET, COATED ORAL at 09:30

## 2024-05-05 RX ADMIN — DOCUSATE SODIUM 200 MG: 100 CAPSULE, LIQUID FILLED ORAL at 20:59

## 2024-05-05 RX ADMIN — LISINOPRIL 40 MG: 40 TABLET ORAL at 09:30

## 2024-05-05 RX ADMIN — HYDROCODONE BITARTRATE AND ACETAMINOPHEN 2 TABLET: 7.5; 325 TABLET ORAL at 20:59

## 2024-05-05 RX ADMIN — INSULIN LISPRO 2 UNITS: 100 INJECTION, SOLUTION INTRAVENOUS; SUBCUTANEOUS at 16:46

## 2024-05-05 RX ADMIN — SENNOSIDES AND DOCUSATE SODIUM 1 TABLET: 50; 8.6 TABLET ORAL at 09:30

## 2024-05-05 RX ADMIN — AMLODIPINE BESYLATE 10 MG: 10 TABLET ORAL at 09:30

## 2024-05-05 RX ADMIN — INSULIN LISPRO 3 UNITS: 100 INJECTION, SOLUTION INTRAVENOUS; SUBCUTANEOUS at 09:32

## 2024-05-05 RX ADMIN — Medication 10 ML: at 09:29

## 2024-05-05 RX ADMIN — ASPIRIN 81 MG: 81 TABLET, COATED ORAL at 20:59

## 2024-05-05 RX ADMIN — PANTOPRAZOLE SODIUM 40 MG: 40 TABLET, DELAYED RELEASE ORAL at 06:36

## 2024-05-05 RX ADMIN — HYDROCODONE BITARTRATE AND ACETAMINOPHEN 1 TABLET: 7.5; 325 TABLET ORAL at 16:49

## 2024-05-05 RX ADMIN — Medication 10 ML: at 21:00

## 2024-05-05 RX ADMIN — INSULIN LISPRO 3 UNITS: 100 INJECTION, SOLUTION INTRAVENOUS; SUBCUTANEOUS at 22:00

## 2024-05-05 RX ADMIN — ACETAMINOPHEN 1000 MG: 500 TABLET ORAL at 09:30

## 2024-05-05 RX ADMIN — Medication 1000 MCG: at 09:30

## 2024-05-05 RX ADMIN — ACETAMINOPHEN 1000 MG: 500 TABLET ORAL at 20:59

## 2024-05-05 RX ADMIN — SENNOSIDES AND DOCUSATE SODIUM 1 TABLET: 50; 8.6 TABLET ORAL at 20:59

## 2024-05-05 RX ADMIN — DOCUSATE SODIUM 200 MG: 100 CAPSULE, LIQUID FILLED ORAL at 09:29

## 2024-05-05 RX ADMIN — ATORVASTATIN CALCIUM 40 MG: 20 TABLET, FILM COATED ORAL at 20:59

## 2024-05-06 ENCOUNTER — HOME HEALTH ADMISSION (OUTPATIENT)
Dept: HOME HEALTH SERVICES | Facility: HOME HEALTHCARE | Age: 74
End: 2024-05-06
Payer: MEDICARE

## 2024-05-06 ENCOUNTER — TRANSCRIBE ORDERS (OUTPATIENT)
Dept: HOME HEALTH SERVICES | Facility: HOME HEALTHCARE | Age: 74
End: 2024-05-06
Payer: MEDICARE

## 2024-05-06 ENCOUNTER — READMISSION MANAGEMENT (OUTPATIENT)
Dept: CALL CENTER | Facility: HOSPITAL | Age: 74
End: 2024-05-06
Payer: MEDICARE

## 2024-05-06 ENCOUNTER — DOCUMENTATION (OUTPATIENT)
Dept: HOME HEALTH SERVICES | Facility: HOME HEALTHCARE | Age: 74
End: 2024-05-06
Payer: MEDICARE

## 2024-05-06 VITALS
SYSTOLIC BLOOD PRESSURE: 110 MMHG | WEIGHT: 130 LBS | TEMPERATURE: 98.1 F | HEART RATE: 97 BPM | BODY MASS INDEX: 23.04 KG/M2 | RESPIRATION RATE: 18 BRPM | OXYGEN SATURATION: 96 % | DIASTOLIC BLOOD PRESSURE: 69 MMHG | HEIGHT: 63 IN

## 2024-05-06 DIAGNOSIS — Z96.641 S/P TOTAL RIGHT HIP ARTHROPLASTY: Primary | ICD-10-CM

## 2024-05-06 LAB
ANION GAP SERPL CALCULATED.3IONS-SCNC: 8.8 MMOL/L (ref 5–15)
BASOPHILS # BLD AUTO: 0.03 10*3/MM3 (ref 0–0.2)
BASOPHILS NFR BLD AUTO: 0.4 % (ref 0–1.5)
BUN SERPL-MCNC: 24 MG/DL (ref 8–23)
BUN/CREAT SERPL: 15.5 (ref 7–25)
CALCIUM SPEC-SCNC: 9.6 MG/DL (ref 8.6–10.5)
CHLORIDE SERPL-SCNC: 107 MMOL/L (ref 98–107)
CO2 SERPL-SCNC: 23.2 MMOL/L (ref 22–29)
CREAT SERPL-MCNC: 1.55 MG/DL (ref 0.57–1)
DEPRECATED RDW RBC AUTO: 45.6 FL (ref 37–54)
EGFRCR SERPLBLD CKD-EPI 2021: 35 ML/MIN/1.73
EOSINOPHIL # BLD AUTO: 0.09 10*3/MM3 (ref 0–0.4)
EOSINOPHIL NFR BLD AUTO: 1.1 % (ref 0.3–6.2)
ERYTHROCYTE [DISTWIDTH] IN BLOOD BY AUTOMATED COUNT: 12.4 % (ref 12.3–15.4)
GLUCOSE BLDC GLUCOMTR-MCNC: 103 MG/DL (ref 70–130)
GLUCOSE BLDC GLUCOMTR-MCNC: 253 MG/DL (ref 70–130)
GLUCOSE SERPL-MCNC: 91 MG/DL (ref 65–99)
HCT VFR BLD AUTO: 24.2 % (ref 34–46.6)
HGB BLD-MCNC: 7.7 G/DL (ref 12–15.9)
IMM GRANULOCYTES # BLD AUTO: 0.03 10*3/MM3 (ref 0–0.05)
IMM GRANULOCYTES NFR BLD AUTO: 0.4 % (ref 0–0.5)
LYMPHOCYTES # BLD AUTO: 2.05 10*3/MM3 (ref 0.7–3.1)
LYMPHOCYTES NFR BLD AUTO: 24.6 % (ref 19.6–45.3)
MCH RBC QN AUTO: 31.2 PG (ref 26.6–33)
MCHC RBC AUTO-ENTMCNC: 31.8 G/DL (ref 31.5–35.7)
MCV RBC AUTO: 98 FL (ref 79–97)
MONOCYTES # BLD AUTO: 0.75 10*3/MM3 (ref 0.1–0.9)
MONOCYTES NFR BLD AUTO: 9 % (ref 5–12)
NEUTROPHILS NFR BLD AUTO: 5.38 10*3/MM3 (ref 1.7–7)
NEUTROPHILS NFR BLD AUTO: 64.5 % (ref 42.7–76)
NRBC BLD AUTO-RTO: 0 /100 WBC (ref 0–0.2)
PLATELET # BLD AUTO: 182 10*3/MM3 (ref 140–450)
PMV BLD AUTO: 10.4 FL (ref 6–12)
POTASSIUM SERPL-SCNC: 4.4 MMOL/L (ref 3.5–5.2)
RBC # BLD AUTO: 2.47 10*6/MM3 (ref 3.77–5.28)
SODIUM SERPL-SCNC: 139 MMOL/L (ref 136–145)
WBC NRBC COR # BLD AUTO: 8.33 10*3/MM3 (ref 3.4–10.8)

## 2024-05-06 PROCEDURE — 82948 REAGENT STRIP/BLOOD GLUCOSE: CPT

## 2024-05-06 PROCEDURE — 63710000001 INSULIN LISPRO (HUMAN) PER 5 UNITS: Performed by: ORTHOPAEDIC SURGERY

## 2024-05-06 PROCEDURE — 97530 THERAPEUTIC ACTIVITIES: CPT

## 2024-05-06 PROCEDURE — 80048 BASIC METABOLIC PNL TOTAL CA: CPT | Performed by: ORTHOPAEDIC SURGERY

## 2024-05-06 PROCEDURE — 97110 THERAPEUTIC EXERCISES: CPT

## 2024-05-06 PROCEDURE — 85025 COMPLETE CBC W/AUTO DIFF WBC: CPT | Performed by: ORTHOPAEDIC SURGERY

## 2024-05-06 RX ORDER — PSEUDOEPHEDRINE HCL 30 MG
200 TABLET ORAL 2 TIMES DAILY
Qty: 28 CAPSULE | Refills: 0 | Status: SHIPPED | OUTPATIENT
Start: 2024-05-06

## 2024-05-06 RX ORDER — ASPIRIN 81 MG/1
81 TABLET ORAL EVERY 12 HOURS SCHEDULED
Qty: 60 TABLET | Refills: 0 | Status: SHIPPED | OUTPATIENT
Start: 2024-05-06

## 2024-05-06 RX ORDER — HYDROCODONE BITARTRATE AND ACETAMINOPHEN 7.5; 325 MG/1; MG/1
1 TABLET ORAL EVERY 4 HOURS PRN
Qty: 18 TABLET | Refills: 0 | Status: SHIPPED | OUTPATIENT
Start: 2024-05-06 | End: 2024-05-09

## 2024-05-06 RX ORDER — FERROUS GLUCONATE 324(38)MG
324 TABLET ORAL
Qty: 30 TABLET | Refills: 0 | Status: SHIPPED | OUTPATIENT
Start: 2024-05-06

## 2024-05-06 RX ADMIN — AMLODIPINE BESYLATE 10 MG: 10 TABLET ORAL at 08:54

## 2024-05-06 RX ADMIN — DOCUSATE SODIUM 200 MG: 100 CAPSULE, LIQUID FILLED ORAL at 08:57

## 2024-05-06 RX ADMIN — Medication 10 ML: at 08:56

## 2024-05-06 RX ADMIN — ACETAMINOPHEN 1000 MG: 500 TABLET ORAL at 12:58

## 2024-05-06 RX ADMIN — PANTOPRAZOLE SODIUM 40 MG: 40 TABLET, DELAYED RELEASE ORAL at 06:51

## 2024-05-06 RX ADMIN — LISINOPRIL 40 MG: 40 TABLET ORAL at 08:54

## 2024-05-06 RX ADMIN — SENNOSIDES AND DOCUSATE SODIUM 1 TABLET: 50; 8.6 TABLET ORAL at 08:52

## 2024-05-06 RX ADMIN — INSULIN LISPRO 4 UNITS: 100 INJECTION, SOLUTION INTRAVENOUS; SUBCUTANEOUS at 12:58

## 2024-05-06 RX ADMIN — ASPIRIN 81 MG: 81 TABLET, COATED ORAL at 08:51

## 2024-05-06 RX ADMIN — HYDROCODONE BITARTRATE AND ACETAMINOPHEN 1 TABLET: 7.5; 325 TABLET ORAL at 08:52

## 2024-05-06 RX ADMIN — HYDROCODONE BITARTRATE AND ACETAMINOPHEN 1 TABLET: 7.5; 325 TABLET ORAL at 14:52

## 2024-05-06 RX ADMIN — Medication 1000 MCG: at 08:51

## 2024-05-07 ENCOUNTER — READMISSION MANAGEMENT (OUTPATIENT)
Dept: CALL CENTER | Facility: HOSPITAL | Age: 74
End: 2024-05-07
Payer: MEDICARE

## 2024-05-07 ENCOUNTER — HOME CARE VISIT (OUTPATIENT)
Dept: HOME HEALTH SERVICES | Facility: HOME HEALTHCARE | Age: 74
End: 2024-05-07
Payer: MEDICARE

## 2024-05-07 VITALS
DIASTOLIC BLOOD PRESSURE: 58 MMHG | HEART RATE: 81 BPM | TEMPERATURE: 98.9 F | OXYGEN SATURATION: 95 % | RESPIRATION RATE: 16 BRPM | SYSTOLIC BLOOD PRESSURE: 104 MMHG

## 2024-05-07 PROCEDURE — G0151 HHCP-SERV OF PT,EA 15 MIN: HCPCS

## 2024-05-09 ENCOUNTER — HOME CARE VISIT (OUTPATIENT)
Dept: HOME HEALTH SERVICES | Facility: HOME HEALTHCARE | Age: 74
End: 2024-05-09
Payer: MEDICARE

## 2024-05-09 VITALS
HEART RATE: 81 BPM | RESPIRATION RATE: 16 BRPM | DIASTOLIC BLOOD PRESSURE: 64 MMHG | SYSTOLIC BLOOD PRESSURE: 122 MMHG | OXYGEN SATURATION: 100 % | TEMPERATURE: 98.3 F

## 2024-05-09 PROCEDURE — G0151 HHCP-SERV OF PT,EA 15 MIN: HCPCS

## 2024-05-13 ENCOUNTER — HOME CARE VISIT (OUTPATIENT)
Dept: HOME HEALTH SERVICES | Facility: HOME HEALTHCARE | Age: 74
End: 2024-05-13
Payer: MEDICARE

## 2024-05-13 VITALS
SYSTOLIC BLOOD PRESSURE: 118 MMHG | OXYGEN SATURATION: 99 % | DIASTOLIC BLOOD PRESSURE: 64 MMHG | TEMPERATURE: 97.1 F | HEART RATE: 78 BPM | RESPIRATION RATE: 17 BRPM

## 2024-05-13 PROCEDURE — G0151 HHCP-SERV OF PT,EA 15 MIN: HCPCS

## 2024-05-16 ENCOUNTER — HOME CARE VISIT (OUTPATIENT)
Dept: HOME HEALTH SERVICES | Facility: HOME HEALTHCARE | Age: 74
End: 2024-05-16
Payer: MEDICARE

## 2024-05-16 VITALS
TEMPERATURE: 98.4 F | SYSTOLIC BLOOD PRESSURE: 122 MMHG | DIASTOLIC BLOOD PRESSURE: 66 MMHG | HEART RATE: 73 BPM | OXYGEN SATURATION: 98 % | RESPIRATION RATE: 16 BRPM

## 2024-05-16 PROCEDURE — G0151 HHCP-SERV OF PT,EA 15 MIN: HCPCS

## 2024-05-20 ENCOUNTER — HOME CARE VISIT (OUTPATIENT)
Dept: HOME HEALTH SERVICES | Facility: HOME HEALTHCARE | Age: 74
End: 2024-05-20
Payer: MEDICARE

## 2024-05-20 VITALS
SYSTOLIC BLOOD PRESSURE: 136 MMHG | RESPIRATION RATE: 16 BRPM | HEART RATE: 71 BPM | OXYGEN SATURATION: 99 % | TEMPERATURE: 98.6 F | DIASTOLIC BLOOD PRESSURE: 64 MMHG

## 2024-05-20 PROCEDURE — G0151 HHCP-SERV OF PT,EA 15 MIN: HCPCS

## 2024-05-22 ENCOUNTER — HOME CARE VISIT (OUTPATIENT)
Dept: HOME HEALTH SERVICES | Facility: HOME HEALTHCARE | Age: 74
End: 2024-05-22
Payer: MEDICARE

## 2024-05-22 VITALS
DIASTOLIC BLOOD PRESSURE: 66 MMHG | OXYGEN SATURATION: 96 % | HEART RATE: 70 BPM | TEMPERATURE: 98.1 F | SYSTOLIC BLOOD PRESSURE: 118 MMHG | RESPIRATION RATE: 16 BRPM

## 2024-05-22 PROCEDURE — G0151 HHCP-SERV OF PT,EA 15 MIN: HCPCS

## 2024-05-28 ENCOUNTER — HOME CARE VISIT (OUTPATIENT)
Dept: HOME HEALTH SERVICES | Facility: HOME HEALTHCARE | Age: 74
End: 2024-05-28
Payer: MEDICARE

## 2024-05-28 VITALS
OXYGEN SATURATION: 97 % | RESPIRATION RATE: 16 BRPM | TEMPERATURE: 98.5 F | DIASTOLIC BLOOD PRESSURE: 66 MMHG | SYSTOLIC BLOOD PRESSURE: 124 MMHG | HEART RATE: 65 BPM

## 2024-05-28 PROCEDURE — G0151 HHCP-SERV OF PT,EA 15 MIN: HCPCS

## 2024-05-28 NOTE — HOME HEALTH
Patient reports no falls or changes in medication since last visit.    Plan for next visit:  -transfer training  -gait training   -education on HEP with progressions as appropriate  -standing balance exercises    PT discharge next visit.

## 2024-05-29 ENCOUNTER — READMISSION MANAGEMENT (OUTPATIENT)
Dept: CALL CENTER | Facility: HOSPITAL | Age: 74
End: 2024-05-29
Payer: MEDICARE

## 2024-05-29 NOTE — OUTREACH NOTE
Total Joint Month 1 Survey      Flowsheet Row Responses   [facility] facility patient discharged from? [place]   Does the patient have one of the following disease processes/diagnoses(primary or secondary)? Total Joint Replacement   Joint surgery performed? Hip   Month 1 attempt successful? No   Unsuccessful attempts Attempt 1            Negrita SILVA - Registered Nurse

## 2024-05-30 ENCOUNTER — HOME CARE VISIT (OUTPATIENT)
Dept: HOME HEALTH SERVICES | Facility: HOME HEALTHCARE | Age: 74
End: 2024-05-30
Payer: MEDICARE

## 2024-05-30 VITALS
HEART RATE: 72 BPM | SYSTOLIC BLOOD PRESSURE: 124 MMHG | DIASTOLIC BLOOD PRESSURE: 66 MMHG | OXYGEN SATURATION: 99 % | TEMPERATURE: 98.5 F | RESPIRATION RATE: 16 BRPM

## 2024-05-30 PROCEDURE — G0151 HHCP-SERV OF PT,EA 15 MIN: HCPCS

## 2024-05-30 NOTE — Clinical Note
PT/agency discharge on 11/29/24, goals met.  Patient to start with outpatient therapy services next week.

## 2024-05-31 NOTE — THERAPY TREATMENT NOTE
Nikki Lang (:  1949) is a 74 y.o. female.    Subjective   SUBJECTIVE/OBJECTIVE:  HPI  Location: [facility].  Room 322  Nursing and progress notes reviewed.    Patient states she is physically okay however she cannot find a channel that she likes on TV and this is driving or not.  She otherwise feels okay.     No concerns per nursing.    Past Medical History:   Diagnosis Date    Depression     Duodenitis     Gastritis     Multiple sclerosis (HCC)     Osteoarthritis     Osteoporosis     Thyroid disease        No Known Allergies     ROS: as per HPI       Objective   Physical Exam  Constitutional:       General: She is not in acute distress.     Appearance: Normal appearance. She is well-developed. She is not ill-appearing.   HENT:      Head: Normocephalic and atraumatic.      Right Ear: External ear normal.      Left Ear: External ear normal.      Nose: Nose normal.      Mouth/Throat:      Pharynx: Oropharynx is clear.   Eyes:      Extraocular Movements: Extraocular movements intact.      Conjunctiva/sclera: Conjunctivae normal.      Pupils: Pupils are equal, round, and reactive to light.   Cardiovascular:      Rate and Rhythm: Normal rate and regular rhythm.      Pulses: Normal pulses.      Heart sounds: Normal heart sounds. No murmur heard.  Pulmonary:      Effort: Pulmonary effort is normal. No respiratory distress.      Breath sounds: Normal breath sounds. No wheezing.   Abdominal:      General: Abdomen is flat. Bowel sounds are normal.      Palpations: Abdomen is soft.   Musculoskeletal:         General: Normal range of motion.      Cervical back: Normal range of motion.   Skin:     General: Skin is warm and dry.   Neurological:      Mental Status: She is alert and oriented to person, place, and time. She is confused.      Motor: Weakness present.      Gait: Gait abnormal.      Comments:  strength is equal bilaterally and 4 out of 5   Psychiatric:         Mood and Affect:  Outpatient Occupational Therapy Neuro Treatment Note  [facility]     Patient Name: Maryellen Crump  : 1950  MRN: [id number]  Today's Date: 2022       Visit Date: 2022    Patient Active Problem List   Diagnosis   • Family history of colon cancer   • Iron deficiency anemia   • Diarrhea of presumed infectious origin   • Type 2 diabetes mellitus with kidney complication, without long-term current use of insulin (HCC)   • Hypertension   • Hyperlipidemia   • Stroke-like symptoms   • Acute CVA (cerebrovascular accident) (HCC)   • Acute lacunar stroke (HCC)   • Vitamin B12 deficiency   • CVA (cerebral vascular accident) (HCC)   • CKD (chronic kidney disease) stage 3, GFR 30-59 ml/min (HCC)        Past Medical History:   Diagnosis Date   • Diabetes mellitus (HCC)    • Hyperlipidemia    • Hypertension         Past Surgical History:   Procedure Laterality Date   • COLONOSCOPY N/A 2018    Procedure: COLONOSCOPY TO CECUM;  Surgeon: Josh Muñoz MD;  Location: [facility] ENDOSCOPY;  Service: General   • DILATION AND CURETTAGE, DIAGNOSTIC / THERAPEUTIC     • ENDOSCOPY N/A 2018    Procedure: ESOPHAGOGASTRODUODENOSCOPY WITH BIOPSIES;  Surgeon: Josh Muñoz MD;  Location: [facility] ENDOSCOPY;  Service: General   • ROTATOR CUFF REPAIR Right          Visit Dx:    ICD-10-CM ICD-9-CM   1. Cerebrovascular accident (CVA), unspecified mechanism (HCC)  I63.9 434.91   2. Weakness of right arm  R29.898 729.89   3. Loss of coordination  R27.8 781.3                    OT Assessment/Plan     Row Name 22 1531          OT Assessment    Assessment Comments Pt seen for first tx following inital eval. She does feel very fatigued at home and feels her R hand is not improving as quickly as her R leg. We discussed the recovery process and that this will take time to improve. She is using her theraputty at home daily and we will continue to work on strength and coordination.  -SG           User Key  (r) = Recorded  "By, (t) = Taken By, (c) = Cosigned By    Initials Name Provider Type    Shira Link OTR Occupational Therapist                                   OT Exercises     Row Name 07/19/22 1300             Subjective Comments    Subjective Comments \"I can't believe how tired I get.\" \"My arm isn't improving as fast as I want it to.\" \"Sometimes I run into the doorframe.\" \"I was able to write a letter.\"  -SG              Subjective Pain    Able to rate subjective pain? yes  -SG      Pre-Treatment Pain Level 0  -SG              Exercise 1    Exercise Name 1 Isolated shoulder flexion  -SG      Cueing 1 Verbal;Tactile;Demo;Auditory  -SG      Equipment 1 Dumbell  -SG      Weights/Plates 1 2  -SG      Sets 1 3  -SG      Reps 1 10  -SG              Exercise 2    Exercise Name 2 Bilateral shoulder flexion to 90'  -SG      Cueing 2 Verbal;Tactile;Demo;Auditory  -SG      Equipment 2 Dumbell  -SG      Weights/Plates 2 2  -SG      Sets 2 3  -SG      Reps 2 10  -SG              Exercise 3    Exercise Name 3 Lateral raise  -SG      Cueing 3 Verbal;Tactile;Demo;Auditory  -SG      Equipment 3 Dumbell  -SG      Weights/Plates 3 2  -SG      Sets 3 3  -SG      Reps 3 10  -SG              Exercise 4    Exercise Name 4 [place] manual dexterity activity, unable to pinch with thumb and middle finger and turn with index finger; performed in 3:29  -SG      Cueing 4 Verbal;Tactile;Demo;Auditory  -SG              Exercise 5    Exercise Name 5 3pt pinch grasp picking up marbles with tennis ball, needs rest breaks throughout  -SG      Cueing 5 Verbal;Tactile;Demo;Auditory  -SG            User Key  (r) = Recorded By, (t) = Taken By, (c) = Cosigned By    Initials Name Provider Type    Shira Link OTR Occupational Therapist                            Time Calculation:   OT Start Time: 1345  OT Stop Time: 1430  OT Time Calculation (min): 45 min  Total Timed Code Minutes- OT: 45 minute(s)  Timed Charges  74267 - OT Therapeutic Exercise " Minutes: 20  68234 - OT Therapeutic Activity Minutes: 25  Total Minutes  Timed Charges Total Minutes: 45   Total Minutes: 45     Therapy Charges for Today     Code Description Service Date Service Provider Modifiers Qty    [id number]  OT THER PROC EA 15 MIN 7/19/2022 Shira Wong OTR GO 1    [id number]  OT THERAPEUTIC ACT EA 15 MIN 7/19/2022 Shira Wong OTR GO 2                    RICH Day  7/19/2022

## 2024-06-04 ENCOUNTER — LAB (OUTPATIENT)
Dept: LAB | Facility: HOSPITAL | Age: 74
End: 2024-06-04
Payer: MEDICARE

## 2024-06-04 ENCOUNTER — OFFICE VISIT (OUTPATIENT)
Dept: NEUROLOGY | Facility: CLINIC | Age: 74
End: 2024-06-04
Payer: MEDICARE

## 2024-06-04 VITALS
DIASTOLIC BLOOD PRESSURE: 76 MMHG | WEIGHT: 122.4 LBS | HEIGHT: 63 IN | HEART RATE: 75 BPM | BODY MASS INDEX: 21.69 KG/M2 | OXYGEN SATURATION: 99 % | SYSTOLIC BLOOD PRESSURE: 138 MMHG | RESPIRATION RATE: 18 BRPM

## 2024-06-04 DIAGNOSIS — R94.131 ABNORMAL EMG: ICD-10-CM

## 2024-06-04 DIAGNOSIS — R20.0 NUMBNESS AND TINGLING OF LOWER EXTREMITY: ICD-10-CM

## 2024-06-04 DIAGNOSIS — R20.2 NUMBNESS AND TINGLING OF LOWER EXTREMITY: ICD-10-CM

## 2024-06-04 DIAGNOSIS — Z91.81 AT MODERATE RISK FOR FALL: ICD-10-CM

## 2024-06-04 DIAGNOSIS — G60.9 IDIOPATHIC NEUROPATHY: Primary | ICD-10-CM

## 2024-06-04 DIAGNOSIS — R79.89 OTHER SPECIFIED ABNORMAL FINDINGS OF BLOOD CHEMISTRY: ICD-10-CM

## 2024-06-04 DIAGNOSIS — E11.42 DIABETIC POLYNEUROPATHY ASSOCIATED WITH TYPE 2 DIABETES MELLITUS: ICD-10-CM

## 2024-06-04 LAB
CRP SERPL-MCNC: <0.3 MG/DL (ref 0–0.5)
FOLATE SERPL-MCNC: >20 NG/ML (ref 4.78–24.2)
IRON 24H UR-MRATE: 234 MCG/DL (ref 37–145)
IRON SATN MFR SERPL: 70 % (ref 20–50)
T3 SERPL-MCNC: 73.7 NG/DL (ref 80–200)
T4 SERPL-MCNC: 7.23 MCG/DL (ref 4.5–11.7)
TIBC SERPL-MCNC: 334 MCG/DL (ref 298–536)
TRANSFERRIN SERPL-MCNC: 224 MG/DL (ref 200–360)
TSH SERPL DL<=0.05 MIU/L-ACNC: 1.66 UIU/ML (ref 0.27–4.2)

## 2024-06-04 PROCEDURE — 99214 OFFICE O/P EST MOD 30 MIN: CPT | Performed by: NURSE PRACTITIONER

## 2024-06-04 PROCEDURE — 1160F RVW MEDS BY RX/DR IN RCRD: CPT | Performed by: NURSE PRACTITIONER

## 2024-06-04 PROCEDURE — 86140 C-REACTIVE PROTEIN: CPT

## 2024-06-04 PROCEDURE — 84165 PROTEIN E-PHORESIS SERUM: CPT

## 2024-06-04 PROCEDURE — 84155 ASSAY OF PROTEIN SERUM: CPT

## 2024-06-04 PROCEDURE — 84443 ASSAY THYROID STIM HORMONE: CPT

## 2024-06-04 PROCEDURE — 84480 ASSAY TRIIODOTHYRONINE (T3): CPT

## 2024-06-04 PROCEDURE — 36415 COLL VENOUS BLD VENIPUNCTURE: CPT

## 2024-06-04 PROCEDURE — 84466 ASSAY OF TRANSFERRIN: CPT

## 2024-06-04 PROCEDURE — 83825 ASSAY OF MERCURY: CPT

## 2024-06-04 PROCEDURE — 3078F DIAST BP <80 MM HG: CPT | Performed by: NURSE PRACTITIONER

## 2024-06-04 PROCEDURE — 82175 ASSAY OF ARSENIC: CPT

## 2024-06-04 PROCEDURE — 84436 ASSAY OF TOTAL THYROXINE: CPT

## 2024-06-04 PROCEDURE — 83655 ASSAY OF LEAD: CPT

## 2024-06-04 PROCEDURE — 1159F MED LIST DOCD IN RCRD: CPT | Performed by: NURSE PRACTITIONER

## 2024-06-04 PROCEDURE — 3075F SYST BP GE 130 - 139MM HG: CPT | Performed by: NURSE PRACTITIONER

## 2024-06-04 PROCEDURE — 82525 ASSAY OF COPPER: CPT

## 2024-06-04 PROCEDURE — 82746 ASSAY OF FOLIC ACID SERUM: CPT

## 2024-06-04 PROCEDURE — 82595 ASSAY OF CRYOGLOBULIN: CPT

## 2024-06-04 PROCEDURE — 83540 ASSAY OF IRON: CPT

## 2024-06-04 PROCEDURE — 86038 ANTINUCLEAR ANTIBODIES: CPT

## 2024-06-04 RX ORDER — BLOOD SUGAR DIAGNOSTIC
STRIP MISCELLANEOUS
COMMUNITY
Start: 2024-02-08

## 2024-06-04 NOTE — PROGRESS NOTES
"DOS: 2024  NAME: Maryellen Crump   : 1950  PCP: Juni Cortez MD    Chief Complaint   Patient presents with    Peripheral Neuropathy      Patient is unaccompanied to today's visit    Neurological Problem and Interval History:  73 y.o.  female with a with past medical history of hypertension, hyperlipidemia, diabetes mellitus, prior left frontal parietal infarcts 2022 etiology felt to be secondary to small vessel disease who I am seeing today for abnormal EMG showing moderate peripheral neuropathy 2023.  Patient reports that she has been having a pins/needles/itching sensation for several months and bilateral feet primarily forefoot which she reported to PCP and later referred for EMG.  Patient reports sensation of \"rolled up sock\" feeling in her feet.  She was seen by podiatry and received injections for questionable Ma's neuroma-pain initially improved and then worsened.  Patient had MRI lumbar spine 2023 which showed age-related changes and  moderate to severe right foraminal narrowing with spurs and bulging disc material abutting the anterior inferior medial aspect of the right L5 nerve root within the right foramen and lateral to the foramen to the far right laterally at L5-S1.  She denies any prior history of toxic drug ingestion/chemotherapy/radiation. Last A1c on file 6.6 patient reports most recent was approximately 7 that she had some steroids which elevated blood sugars.  Patient recently sustained a fall in May which required surgery due to displaced fracture.  He has been followed by pain management due to arthritis in her back.  Has received steroids with planned ablation which has been delayed due to right hip fracture-hoping to proceed with this in the future.  Patient was previously started on gabapentin 300 mg 3 times daily which she reports she \"did not tolerate\"; side effects likely secondary to higher dose with initiation: Patient agreeable to trying again should " "she need this.    Neurologically, stable.  She denies any stroke.  Patient.  She has decreased sensation/pinprick/vibration primarily forefoot with right being greater than left.  Will recommend she treatable cause peripheral neuropathy labs to further evaluate.  Offered gabapentin versus use of Rx alternatives topical cream which patient would prefer to start with.  I will plan to see patient back in 2 to 4 months; sooner if needed.        \"The following portions of the patient's history were reviewed and updated as appropriate: allergies, current medications, past family history, past medical history, past social history, past surgical history and problem list.\"  Review and Interpretation of Imaging:  Imaging discussed above reviewed independently     Laboratory Results:             Lab Results   Component Value Date    HGBA1C 6.6 (H) 09/16/2023         Lab Results   Component Value Date    CHOL 103 08/18/2022    CHOL 118 07/19/2022    CHOL 130 06/30/2022         Lab Results   Component Value Date    HDL 46 08/18/2022    HDL 43 07/19/2022    HDL 54 06/30/2022         Lab Results   Component Value Date    LDL 40 08/18/2022    LDL 49 07/19/2022    LDL 51 06/30/2022         Lab Results   Component Value Date    TRIG 83 08/18/2022    TRIG 155 (H) 07/19/2022    TRIG 148 06/30/2022     No results found for: \"RPR\"  Lab Results   Component Value Date    TSH 1.660 06/04/2024     Lab Results   Component Value Date    SDEFWPIA35 204 (L) 07/01/2022       Physical Examination: NIHSS: 0 mRS: 0  General Appearance:   Well developed, well nourished, well groomed, alert, and cooperative.  HEENT:   Normocephalic.    Neck and Spine:  Normal range of motion.  Normal alignment. No mass or tenderness. No bruits.  Cardiac: Regular rate and rhythm. No murmurs.  Peripheral Vasculature: Radial and pedal pulses are equal and symmetric.   Extremities:    No edema or deformities. Normal joint ROM.  Skin:    No rashes or birth " marks.    Neurological examination:  Higher Integrative  Function:   Oriented to time, place and person. Normal registration, recall, attention span and concentration. Normal language including comprehension, spontaneous speech, repetition, reading, writing, naming and vocabulary. No neglect with normal visual-spatial function and construction. Normal fund of knowledge and higher integrative function.  CN II:    Pupils are equal, round, and reactive to light. Normal visual acuity and visual fields.    CN III IV VI:   Extraocular movements are full without nystagmus.   CN V:    Normal facial sensation and strength of muscles of mastication.  CN VII:   Facial movements are symmetric. No weakness.  CN VIII:   Auditory acuity is normal.  CN IX & X:   Symmetric palatal movement.  CN XI:    Sternocleidomastoid and trapezius are normal.  No weakness.  CN XII:   The tongue is midline.  No atrophy or fasciculations.  Motor:    Normal muscle strength, bulk and tone in upper and lower extremities.  No fasciculations, rigidity, spasticity, or abnormal movements.    Sensation:   Normal to light touch, pinprick, vibration, temperature, and proprioception in arms and legs except bilateral forefoot R> L.  Station and Gait:  Normal gait and station; mildly antalgic     Coordination:  Finger to nose test shows no dysmetria.        Diagnoses:    1.  Peripheral neuropathy; idiopathic likely secondary to uncontrolled diabetes although recommend treatable cause peripheral neuropathy labs to be completed to further exclude other treatable causes-Rx alternatives topical pain cream if this does not help with symptoms can consider adding low-dose gabapentin   2.  Diabetes mellitus  3.  Hyperlipidemia  4.  History of prior stroke    Plan:   Consider repeating EMG if symptoms worsen or change   Treatable cause peripheral neuropathy   Continue PT   Consider neurosurgery referral: moderate to severe right foraminal narrowing with spurs and  bulging disc material    Rx alternatives topical pain cream; if this does not work consider adding low-dose gabapentin-previously not tolerated at 300 mg 3 times daily   Continue aspirin and statin for secondary stroke prevention   Blood pressure control to <130/80   Goal LDL <70   Serum glucose < 140   Call 911 for stroke any stroke symptoms   Follow-up in 3 to 6 months  Diagnoses and all orders for this visit:    1. Idiopathic neuropathy (Primary)    2. Diabetic polyneuropathy associated with type 2 diabetes mellitus  -     Discontinue: Ibuprofen 3 %, Gabapentin 10 %, Baclofen 2 %, lidocaine 4 %, Ketamine HCl 10 %; Apply 1-2 g topically to the appropriate area as directed 3 (Three) to 4 (Four) times daily.  Dispense: 90 g; Refill: 5  -     Ibuprofen 3 %, Gabapentin 10 %, Baclofen 2 %, lidocaine 4 %, Ketamine HCl 10 %; Apply 1-2 g topically to the appropriate area as directed 3 (Three) to 4 (Four) times daily.  Dispense: 90 g; Refill: 5    3. Numbness and tingling of lower extremity  -     Iron Profile; Future  -     Protein Elec + Interp, Serum; Future  -     Cryoglobulin; Future  -     Copper, Serum; Future  -     TSH; Future  -     Folate; Future  -     ORTEGA; Future  -     C-reactive Protein; Future  -     T4; Future  -     T3; Future  -     Heavy Metals, Blood; Future    4. Abnormal EMG  -     Iron Profile; Future  -     Protein Elec + Interp, Serum; Future  -     Cryoglobulin; Future  -     Copper, Serum; Future  -     TSH; Future  -     Folate; Future  -     ORTEGA; Future  -     C-reactive Protein; Future  -     T4; Future  -     T3; Future  -     Heavy Metals, Blood; Future    5. Other specified abnormal findings of blood chemistry  -     Iron Profile; Future    6. At moderate risk for fall

## 2024-06-05 ENCOUNTER — READMISSION MANAGEMENT (OUTPATIENT)
Dept: CALL CENTER | Facility: HOSPITAL | Age: 74
End: 2024-06-05
Payer: MEDICARE

## 2024-06-05 LAB
ALBUMIN SERPL ELPH-MCNC: 4.1 G/DL (ref 2.9–4.4)
ALBUMIN/GLOB SERPL: 1.4 {RATIO} (ref 0.7–1.7)
ALPHA1 GLOB SERPL ELPH-MCNC: 0.3 G/DL (ref 0–0.4)
ALPHA2 GLOB SERPL ELPH-MCNC: 0.8 G/DL (ref 0.4–1)
ANA SER QL: NEGATIVE
B-GLOBULIN SERPL ELPH-MCNC: 0.9 G/DL (ref 0.7–1.3)
GAMMA GLOB SERPL ELPH-MCNC: 1 G/DL (ref 0.4–1.8)
GLOBULIN SER CALC-MCNC: 3 G/DL (ref 2.2–3.9)
LABORATORY COMMENT REPORT: NORMAL
M PROTEIN SERPL ELPH-MCNC: NORMAL G/DL
PROT PATTERN SERPL ELPH-IMP: NORMAL
PROT SERPL-MCNC: 7.1 G/DL (ref 6–8.5)

## 2024-06-05 NOTE — PATIENT INSTRUCTIONS
Fall Prevention in the Home, Adult  Falls can cause injuries and affect people of all ages. There are many simple things that you can do to make your home safe and to help prevent falls.  If you need it, ask for help making these changes.  What actions can I take to prevent falls?  General information  Use good lighting in all rooms. Make sure to:  Replace any light bulbs that burn out.  Turn on lights if it is dark and use night-lights.  Keep items that you use often in easy-to-reach places. Lower the shelves around your home if needed.  Move furniture so that there are clear paths around it.  Do not keep throw rugs or other things on the floor that can make you trip.  If any of your floors are uneven, fix them.  Add color or contrast paint or tape to clearly zuleyka and help you see:  Grab bars or handrails.  First and last steps of staircases.  Where the edge of each step is.  If you use a ladder or stepladder:  Make sure that it is fully opened. Do not climb a closed ladder.  Make sure the sides of the ladder are locked in place.  Have someone hold the ladder while you use it.  Know where your pets are as you move through your home.  What can I do in the bathroom?         Keep the floor dry. Clean up any water that is on the floor right away.  Remove soap buildup in the bathtub or shower. Buildup makes bathtubs and showers slippery.  Use non-skid mats or decals on the floor of the bathtub or shower.  Attach bath mats securely with double-sided, non-slip rug tape.  If you need to sit down while you are in the shower, use a non-slip stool.  Install grab bars by the toilet and in the bathtub and shower. Do not use towel bars as grab bars.  What can I do in the bedroom?  Make sure that you have a light by your bed that is easy to reach.  Do not use any sheets or blankets on your bed that hang to the floor.  Have a firm bench or chair with side arms that you can use for support when you get dressed.  What can I do in  the kitchen?  Clean up any spills right away.  If you need to reach something above you, use a sturdy step stool that has a grab bar.  Keep electrical cables out of the way.  Do not use floor polish or wax that makes floors slippery.  What can I do with my stairs?  Do not leave anything on the stairs.  Make sure that you have a light switch at the top and the bottom of the stairs. Have them installed if you do not have them.  Make sure that there are handrails on both sides of the stairs. Fix handrails that are broken or loose. Make sure that handrails are as long as the staircases.  Install non-slip stair treads on all stairs in your home if they do not have carpet.  Avoid having throw rugs at the top or bottom of stairs, or secure the rugs with carpet tape to prevent them from moving.  Choose a carpet design that does not hide the edge of steps on the stairs. Make sure that carpet is firmly attached to the stairs. Fix any carpet that is loose or worn.  What can I do on the outside of my home?  Use bright outdoor lighting.  Repair the edges of walkways and driveways and fix any cracks. Clear paths of anything that can make you trip, such as tools or rocks.  Add color or contrast paint or tape to clearly zuleyka and help you see high doorway thresholds.  Trim any bushes or trees on the main path into your home.  Check that handrails are securely fastened and in good repair. Both sides of all steps should have handrails.  Install guardrails along the edges of any raised decks or porches.  Have leaves, snow, and ice cleared regularly. Use sand, salt, or ice melt on walkways during winter months if you live where there is ice and snow.  In the garage, clean up any spills right away, including grease or oil spills.  What other actions can I take?  Review your medicines with your health care provider. Some medicines can make you confused or feel dizzy. This can increase your chance of falling.  Wear closed-toe shoes that  fit well and support your feet. Wear shoes that have rubber soles and low heels.  Use a cane, walker, scooter, or crutches that help you move around if needed.  Talk with your provider about other ways that you can decrease your risk of falls. This may include seeing a physical therapist to learn to do exercises to improve movement and strength.  Where to find more information  Centers for Disease Control and Prevention, JONATHAN: cdc.gov  [employer] [place] on Aging: michelle.nih.gov  [employer] [place] on [employer]ging: michelle.nih.gov  Contact a health care provider if:  You are afraid of falling at home.  You feel weak, drowsy, or dizzy at home.  You fall at home.  Get help right away if you:  Lose consciousness or have trouble moving after a fall.  Have a fall that causes a head injury.  These symptoms may be an emergency. Get help right away. Call 911.  Do not wait to see if the symptoms will go away.  Do not drive yourself to the hospital.  This information is not intended to replace advice given to you by your health care provider. Make sure you discuss any questions you have with your health care provider.  Document Revised: 08/21/2023 Document Reviewed: 08/21/2023  Elsevier Patient Education © 2024 [employer]

## 2024-06-05 NOTE — OUTREACH NOTE
Total Joint Month 1 Survey      Flowsheet Row Responses   [facility] patient discharged from? [facility]   Does the patient have one of the following disease processes/diagnoses(primary or secondary)? Total Joint Replacement   Joint surgery performed? Hip   Month 1 attempt successful? Yes   Call start time 1707   Call end time 1708   Person spoke with today (if not patient) and relationship patient   Is the patient taking all medications as directed (includes completed medication regime)? Yes   Comments regarding appointments Has seen surgeon since DC   Has the patient kept scheduled appointments due by today? Yes   What is the patient's perception of their functional status since discharge? Improving   Is the patient able to teach back signs and symptoms of infection? Temp >100.4 for 24h or longer, Incisional drainage, Blisters around incision, Increased swelling or redness around incision (not associated with surgical edema), Shortness of breath or chest pain   Is the patient/caregiver able to teach back the hierarchy of who to call/visit for symptoms/problems? PCP, Specialist, Home health nurse, Urgent Care, ED, 911 Yes   Month 1 call completed? Yes   Graduated Yes   Wrap up additional comments Patient doing well no s/s of infection noted. No concerns or questions   Call end time 1708            Cecile MICHAEL - Registered Nurse

## 2024-06-07 LAB — COPPER SERPL-MCNC: 108 UG/DL (ref 80–158)

## 2024-06-08 LAB
ARSENIC BLD-MCNC: 2 UG/L (ref 0–9)
LEAD BLDV-MCNC: 2 UG/DL (ref 0–3.4)
MERCURY BLD-MCNC: <1 UG/L (ref 0–14.9)

## 2024-06-10 LAB — CRYOGLOB SER QL 1D COLD INC: NORMAL

## 2024-06-19 ENCOUNTER — TELEPHONE (OUTPATIENT)
Dept: NEUROLOGY | Facility: CLINIC | Age: 74
End: 2024-06-19
Payer: MEDICARE

## 2024-06-19 NOTE — TELEPHONE ENCOUNTER
Per Nilda Sims APRN:    can you call patient in 2 weeks to see how topical cream is working and see if she needs oral gabapentin starting at 100mg (low dose)           Called patient to assess how topical cream was working.  Left message to call back.

## 2024-06-19 NOTE — TELEPHONE ENCOUNTER
"Patient returned call.  She states that the topical cream is \"working good, especially at night.\"  She is interested in starting low dose Gabapentin.  She would like it sent to the [facility] in [place], [place].  I added that pharmacy to her chart.    Thank you!    "

## 2024-06-24 DIAGNOSIS — E11.42 DIABETIC POLYNEUROPATHY ASSOCIATED WITH TYPE 2 DIABETES MELLITUS: Primary | ICD-10-CM

## 2024-06-24 RX ORDER — GABAPENTIN 100 MG/1
CAPSULE ORAL
Qty: 60 CAPSULE | Refills: 1 | Status: SHIPPED | OUTPATIENT
Start: 2024-06-24

## 2024-12-03 ENCOUNTER — OFFICE VISIT (OUTPATIENT)
Dept: ENDOCRINOLOGY | Age: 74
End: 2024-12-03
Payer: MEDICARE

## 2024-12-03 VITALS
TEMPERATURE: 97.3 F | HEIGHT: 63 IN | BODY MASS INDEX: 22.18 KG/M2 | DIASTOLIC BLOOD PRESSURE: 80 MMHG | SYSTOLIC BLOOD PRESSURE: 132 MMHG | OXYGEN SATURATION: 100 % | WEIGHT: 125.2 LBS | HEART RATE: 84 BPM

## 2024-12-03 DIAGNOSIS — E11.29 TYPE 2 DIABETES MELLITUS WITH OTHER DIABETIC KIDNEY COMPLICATION, WITHOUT LONG-TERM CURRENT USE OF INSULIN: Primary | ICD-10-CM

## 2024-12-03 PROCEDURE — 3079F DIAST BP 80-89 MM HG: CPT | Performed by: INTERNAL MEDICINE

## 2024-12-03 PROCEDURE — 99204 OFFICE O/P NEW MOD 45 MIN: CPT | Performed by: INTERNAL MEDICINE

## 2024-12-03 PROCEDURE — 3075F SYST BP GE 130 - 139MM HG: CPT | Performed by: INTERNAL MEDICINE

## 2024-12-03 PROCEDURE — 95251 CONT GLUC MNTR ANALYSIS I&R: CPT | Performed by: INTERNAL MEDICINE

## 2024-12-03 RX ORDER — ORAL SEMAGLUTIDE 7 MG/1
7 TABLET ORAL DAILY
Qty: 30 TABLET | Refills: 1 | Status: SHIPPED | OUTPATIENT
Start: 2024-12-03

## 2024-12-03 RX ORDER — ORAL SEMAGLUTIDE 3 MG/1
3 TABLET ORAL DAILY
COMMUNITY
Start: 2024-11-07

## 2024-12-03 NOTE — PATIENT INSTRUCTIONS
Stop Januvia 100 mg daily   Stop Glyburide 5 mg daily ,  Start Jardiance 25 mg daily   Rybelsus 3 mg daily for another 2 weeks and then start 7 mg

## 2024-12-03 NOTE — PROGRESS NOTES
Chief complaint   Chief Complaint   Patient presents with    Diabetes          Subjective     History of Present Illness:          This is a 74 year old female I am seeing for type 2 DM   referred by PCP     other medical history is   1- HTN   2- HLD   3- CKD   4- H/o Stroke   Pt had T2DM many years ago   Current home medication for diabetes     Januvia 100 mg daily   Glyburide 5 mg daily , she is taking it 2 times   Rybelsus 3 mg daily    the A1c was 7.3 in   Checks blood sugar at home using finger Haydee   Checks blood sugar 1-4 times per day   SBMG:   pt states that she is having better BG now , trying his best with dietary Compliance, in regards to Exercise, not on a daily basis, twice a week  and his Weight has been the same and there is No Hypoglycemic episodes, there is no AM Headaches /Nightmares, no Polyuria / Polydipsia, and there os no Blurring of Vision  Last Dilated Pupil Exam, in  , no DM in the eye   NoTingling / numbness in feet, No Dizziness / lightheadedness, No Falls  No Nausea, vomiting / Diarrhea  retired.      Family History   Problem Relation Age of Onset    Early death Mother          from aneurysym    Stroke Father     Colon polyps Sister     Diabetes Sister     Hypertension Sister     Colon cancer Brother     Malig Hyperthermia Neg Hx      Social History     Socioeconomic History    Marital status:    Tobacco Use    Smoking status: Never     Passive exposure: Never    Smokeless tobacco: Never    Tobacco comments:     caffeine use 2 cups coffee daily   Vaping Use    Vaping status: Never Used   Substance and Sexual Activity    Alcohol use: Yes     Alcohol/week: 4.0 standard drinks of alcohol     Types: 4 Glasses of wine per week     Comment: daily glass of wine    Drug use: No    Sexual activity: Not Currently     Partners: Male     Past Medical History:   Diagnosis Date    Anemia     CKD (chronic kidney disease)     Deep vein thrombosis     Diabetes mellitus     GERD  (gastroesophageal reflux disease)     Hyperlipidemia     Hypertension     Neuropathy in diabetes 2023    Rectal bleeding 08/25/2022    FIT test performed at PCP    Stroke 06/29/2022    patient states she feels some right side weakness from stroke, not noticable to others.     Past Surgical History:   Procedure Laterality Date    CATARACT EXTRACTION Bilateral 06/2021    COLONOSCOPY N/A 09/12/2018    Procedure: COLONOSCOPY TO CECUM;  Surgeon: Josh Muñoz MD;  [facility];  Service: General    COLONOSCOPY N/A 11/30/2022    Procedure: COLONOSCOPY TO CECUM AND TI;  Surgeon: Duglas Zhang MD;  [facility] END[facility];  Service: General;  Laterality: N/A;  POSITIVE FIT TEST  --DIVERTICULOSIS     DILATION AND CURETTAGE, DIAGNOSTIC / THERAPEUTIC N/A 2018    ENDOSCOPY N/A 09/12/2018    Procedure: ESOPHAGOGASTRODUODENOSCOPY WITH BIOPSIES;  Surgeon: Josh Muñoz MD;  Location: [facility] ENDOSCOPY;  Service: General    ENDOSCOPY N/A 11/30/2022    Procedure: ESOPHAGOGASTRODUODENOSCOPY;  Surgeon: Duglas Zhang MD;  Location: [facility] ENDOSCOPY;  Service: General;  Laterality: N/A;  NORMAL     ROTATOR CUFF REPAIR Right     TOTAL HIP ARTHROPLASTY Right 05/04/2024    Procedure: TOTAL HIP ARTHROPLASTY ANTERIOR WITH HANA TABLE;  Surgeon: Kike Lake MD;  Location: [facility] MAIN OR;  Service: Orthopedics;  Laterality: Right;    TUBAL ABDOMINAL LIGATION         Current Outpatient Medications:     Accu-Chek Jaycee Plus test strip, , Disp: , Rfl:     amLODIPine (NORVASC) 10 MG tablet, TAKE 1 TABLET ONE TIME DAILY, Disp: , Rfl:     aspirin 81 MG EC tablet, Take 1 tablet by mouth Every 12 (Twelve) Hours. Indications: VTE Prophylaxis, Disp: 60 tablet, Rfl: 0    atorvastatin (LIPITOR) 80 MG tablet, Take 0.5 tablets by mouth Every Night., Disp: , Rfl:     Continuous Glucose  (FreeStyle Haydee 2 [place]) device, , Disp: , Rfl:     Continuous Glucose Sensor (FreeStyle Haydee 2 Sensor) misc, , Disp: , Rfl:      cyanocobalamin (VITAMIN B-12) 1000 MCG tablet, Take 1 tablet by mouth Daily., Disp: 30 tablet, Rfl: 1    glyburide (DIAbeta) 5 MG tablet, TAKE 1 TABLET ONE TIME DAILY WITH BREAKFAST, Disp: , Rfl:     Ibuprofen 3 %, Gabapentin 10 %, Baclofen 2 %, lidocaine 4 %, Ketamine HCl 10 %, Apply 1-2 g topically to the appropriate area as directed 3 (Three) to 4 (Four) times daily., Disp: 90 g, Rfl: 5    multivitamins-minerals (PRESERVISION AREDS 2) capsule capsule, Take 2 capsules by mouth Daily., Disp: , Rfl:     pantoprazole (PROTONIX) 40 MG EC tablet, Take 1 tablet by mouth Every Morning., Disp: 30 tablet, Rfl: 1    ramipril (ALTACE) 10 MG capsule, Take 1 capsule by mouth Daily. (Patient taking differently: Take 1 capsule by mouth 2 (Two) Times a Day.), Disp: 30 capsule, Rfl: 1    Rybelsus 3 MG tablet, Take 1 tablet by mouth Daily., Disp: , Rfl:     SITagliptin (JANUVIA) 100 MG tablet, Take 1 tablet by mouth Daily. Indications: Type 2 Diabetes, Disp: , Rfl:     empagliflozin (Jardiance) 25 MG tablet tablet, Take 1 tablet by mouth Daily., Disp: 30 tablet, Rfl: 2    gabapentin (NEURONTIN) 100 MG capsule, 100 mg nightly x 7 days if tolerated increase to 200 mg nightly thereafter (Patient not taking: Reported on 12/3/2024), Disp: 60 capsule, Rfl: 1    Semaglutide (Rybelsus) 7 MG tablet, Take 7 mg by mouth Daily., Disp: 30 tablet, Rfl: 1  Penicillins    Objective   Vitals:    12/03/24 1102   BP: 132/80   Pulse: 84   Temp: 97.3 °F (36.3 °C)   SpO2: 100%          Physical Exam  Neurological:      General: No focal deficit present.      Mental Status: She is alert and oriented to person, place, and time.               Diagnoses and all orders for this visit:    1. Type 2 diabetes mellitus with other diabetic kidney complication, without long-term current use of insulin (Primary)  -     Semaglutide (Rybelsus) 7 MG tablet; Take 7 mg by mouth Daily.  Dispense: 30 tablet; Refill: 1  -     empagliflozin (Jardiance) 25 MG tablet  tablet; Take 1 tablet by mouth Daily.  Dispense: 30 tablet; Refill: 2         Assessment:     1- DM, Type 2  Uncontrolled with High risk   has CKD , follows with nephrology     labs on file , last GFR was 39 in   We discussed the medications  Hypoglycemia and its importance was reviewed   Labs where shown to the patient   2. Hypertension, on Amlodipine    3. Hyperlipidemia, on a statin           Plan:    1. Diet, exercise and weight management  2. Consistent food intake to avoid low blood sugars  3. Home medication includes for diabetes     Stop Januvia 100 mg daily as it is better not to be taken with GLP-1   Stop Glyburide 5 mg daily ,  Start Jardiance 25 mg daily to help with CKD   Rybelsus 3 mg daily for another 2 weeks and then start 7 mg     4. Consistent checking of blood sugars using Haydee   5. I reviewed the last progress note  6. Annual eye exam  7. Return in 1 months , she is going to [place] after [place]   8.  Rx sent to the pharmacy  10. Labs : A1c every 90 days       I discussed with the patient/legal representative the risks and the benefits associated with the medications.  The patient/legal representative has been given the opportunity to ask questions.  Alternatives to the proposed treatment (s) were discussed, including the likely results of no treatment.  The patient/legal representative wishes to proceed.       Alvaro Ivory MD   12/03/24  11:54 EST

## 2024-12-05 ENCOUNTER — PATIENT ROUNDING (BHMG ONLY) (OUTPATIENT)
Dept: ENDOCRINOLOGY | Age: 74
End: 2024-12-05
Payer: MEDICARE

## 2024-12-13 ENCOUNTER — LAB (OUTPATIENT)
Dept: LAB | Facility: HOSPITAL | Age: 74
End: 2024-12-13
Payer: MEDICARE

## 2024-12-13 ENCOUNTER — OFFICE VISIT (OUTPATIENT)
Dept: NEUROLOGY | Facility: CLINIC | Age: 74
End: 2024-12-13
Payer: MEDICARE

## 2024-12-13 VITALS
DIASTOLIC BLOOD PRESSURE: 70 MMHG | SYSTOLIC BLOOD PRESSURE: 118 MMHG | OXYGEN SATURATION: 96 % | HEART RATE: 60 BPM | BODY MASS INDEX: 21.23 KG/M2 | WEIGHT: 119.8 LBS | HEIGHT: 63 IN

## 2024-12-13 DIAGNOSIS — G60.9 IDIOPATHIC PERIPHERAL NEUROPATHY: ICD-10-CM

## 2024-12-13 DIAGNOSIS — G60.9 IDIOPATHIC PERIPHERAL NEUROPATHY: Primary | ICD-10-CM

## 2024-12-13 DIAGNOSIS — R94.131 ABNORMAL EMG: ICD-10-CM

## 2024-12-13 DIAGNOSIS — R20.0 NUMBNESS AND TINGLING OF LOWER EXTREMITY: ICD-10-CM

## 2024-12-13 DIAGNOSIS — G60.9 IDIOPATHIC NEUROPATHY: ICD-10-CM

## 2024-12-13 DIAGNOSIS — Z86.73 HISTORY OF STROKE: ICD-10-CM

## 2024-12-13 DIAGNOSIS — E11.42 DIABETIC POLYNEUROPATHY ASSOCIATED WITH TYPE 2 DIABETES MELLITUS: ICD-10-CM

## 2024-12-13 DIAGNOSIS — R20.2 NUMBNESS AND TINGLING OF LOWER EXTREMITY: ICD-10-CM

## 2024-12-13 PROCEDURE — 3078F DIAST BP <80 MM HG: CPT | Performed by: NURSE PRACTITIONER

## 2024-12-13 PROCEDURE — 86255 FLUORESCENT ANTIBODY SCREEN: CPT

## 2024-12-13 PROCEDURE — 1159F MED LIST DOCD IN RCRD: CPT | Performed by: NURSE PRACTITIONER

## 2024-12-13 PROCEDURE — 3074F SYST BP LT 130 MM HG: CPT | Performed by: NURSE PRACTITIONER

## 2024-12-13 PROCEDURE — 83516 IMMUNOASSAY NONANTIBODY: CPT

## 2024-12-13 PROCEDURE — 99214 OFFICE O/P EST MOD 30 MIN: CPT | Performed by: NURSE PRACTITIONER

## 2024-12-13 PROCEDURE — 36415 COLL VENOUS BLD VENIPUNCTURE: CPT

## 2024-12-13 PROCEDURE — 1160F RVW MEDS BY RX/DR IN RCRD: CPT | Performed by: NURSE PRACTITIONER

## 2024-12-13 RX ORDER — GABAPENTIN 100 MG/1
CAPSULE ORAL
Qty: 60 CAPSULE | Refills: 1 | Status: CANCELLED | OUTPATIENT
Start: 2024-12-13

## 2024-12-13 RX ORDER — GABAPENTIN 100 MG/1
CAPSULE ORAL
Qty: 390 CAPSULE | Refills: 1 | Status: SHIPPED | OUTPATIENT
Start: 2024-12-13

## 2024-12-13 NOTE — PROGRESS NOTES
"  Notes by LPN:  Patient presents for follow up neuropathy. Patient reports she has ran out of everything and is getting worse.           CC: History of stroke/peripheral neuropathy    HPI:  Maryellen Crump is a  74 y.o. female with known past medical history of hypertension, hyperlipidemia, diabetes mellitus, prior left frontal parietal infarcts (June 2022) etiology felt to be secondary to small vessel disease I am seeing today in follow-up for stroke and peripheral neuropathy with the EMG showing moderate peripheral neuropathy.  Unfortunately since last evaluation patient reports she \"ran out of medication\" although topical cream was helping and gabapentin was making symptoms tolerable.  Discussed resuming medications and slow titration back up on gabapentin to more effectively improve neuropathy symptoms.  Since last evaluation she denies any falls/illnesses and/or hospitalizations.  Patient had treatable cause peripheral neuropathy labs completed which were unremarkable/unrevealing.  She reports since last evaluation she started seeing endocrinologist in an effort to better control her blood glucose; last A1c 1 month ago 7.3.  Guarding stroke, patient denies any new signs and/or symptoms of stroke.  Today blood pressure 118/70 heart rate of 60.  She does not report frequent monitoring of blood pressure at home.  Denies any issues with mood specifically no concern for PBA and or depression.  No concern for sleep apnea.      Neurologically, stable.  Continue decree sensation bilateral lower extremities.  She denies any other complaints and or concerns on my exam.  I will plan to see her back in 6 months time; sooner if needed.      Past Medical History:   Diagnosis Date    Anemia     CKD (chronic kidney disease)     Deep vein thrombosis     Diabetes mellitus     GERD (gastroesophageal reflux disease)     Hyperlipidemia     Hypertension     Neuropathy in diabetes 2023    Peripheral neuropathy     Rectal bleeding " 08/25/2022    FIT test performed at PCP    Stroke 06/29/2022    patient states she feels some right side weakness from stroke, not noticable to others.         Past Surgical History:   Procedure Laterality Date    CATARACT EXTRACTION Bilateral 06/2021    COLONOSCOPY N/A 09/12/2018    Procedure: COLONOSCOPY TO CECUM;  Surgeon: Josh Muñoz MD;  Location: [facility] ENDOSCOPY;  Service: General    COLONOSCOPY N/A 11/30/2022    Procedure: COLONOSCOPY TO CECUM AND TI;  Surgeon: Duglas Zhang MD;  Location: [facility] ENDOSCOPY;  Service: General;  Laterality: N/A;  POSITIVE FIT TEST  --DIVERTICULOSIS     DILATION AND CURETTAGE, DIAGNOSTIC / THERAPEUTIC N/A 2018    ENDOSCOPY N/A 09/12/2018    Procedure: ESOPHAGOGASTRODUODENOSCOPY WITH BIOPSIES;  Surgeon: Josh Muñoz MD;  Location: [facility] ENDOSCOPY;  Service: General    ENDOSCOPY N/A 11/30/2022    Procedure: ESOPHAGOGASTRODUODENOSCOPY;  Surgeon: Duglas Zhang MD;  Location: [facility] ENDOSCOPY;  Service: General;  Laterality: N/A;  NORMAL     ROTATOR CUFF REPAIR Right     TOTAL HIP ARTHROPLASTY Right 05/04/2024    Procedure: TOTAL HIP ARTHROPLASTY ANTERIOR WITH HANA TABLE;  Surgeon: Kike Lake MD;  Location: [facility] MAIN OR;  Service: Orthopedics;  Laterality: Right;    TUBAL ABDOMINAL LIGATION             Current Outpatient Medications:     gabapentin (NEURONTIN) 100 MG capsule, 200 mg in a.m., 200 mg midday and 300 mg nightly if tolerated, Disp: 390 capsule, Rfl: 1    Ibuprofen 3 %, Gabapentin 10 %, Baclofen 2 %, lidocaine 4 %, Ketamine HCl 10 %, Apply 1-2 g topically to the appropriate area as directed 3 (Three) to 4 (Four) times daily., Disp: 90 g, Rfl: 5    Accu-Chek Jaycee Plus test strip, , Disp: , Rfl:     amLODIPine (NORVASC) 10 MG tablet, TAKE 1 TABLET ONE TIME DAILY, Disp: , Rfl:     aspirin 81 MG EC tablet, Take 1 tablet by mouth Every 12 (Twelve) Hours. Indications: VTE Prophylaxis, Disp: 60 tablet, Rfl: 0    atorvastatin (LIPITOR) 80 MG  tablet, Take 0.5 tablets by mouth Every Night., Disp: , Rfl:     Continuous Glucose  (FreeStyle Haydee 2 [place]) device, , Disp: , Rfl:     Continuous Glucose Sensor (FreeStyle Haydee 2 Sensor) misc, , Disp: , Rfl:     cyanocobalamin (VITAMIN B-12) 1000 MCG tablet, Take 1 tablet by mouth Daily., Disp: 30 tablet, Rfl: 1    empagliflozin (Jardiance) 25 MG tablet tablet, Take 1 tablet by mouth Daily., Disp: 30 tablet, Rfl: 2    multivitamins-minerals (PRESERVISION AREDS 2) capsule capsule, Take 2 capsules by mouth Daily., Disp: , Rfl:     pantoprazole (PROTONIX) 40 MG EC tablet, Take 1 tablet by mouth Every Morning., Disp: 30 tablet, Rfl: 1    ramipril (ALTACE) 10 MG capsule, Take 1 capsule by mouth Daily. (Patient taking differently: Take 1 capsule by mouth 2 (Two) Times a Day.), Disp: 30 capsule, Rfl: 1    Rybelsus 3 MG tablet, Take 1 tablet by mouth Daily., Disp: , Rfl:     Semaglutide (Rybelsus) 7 MG tablet, Take 7 mg by mouth Daily., Disp: 30 tablet, Rfl: 1      Family History   Problem Relation Age of Onset    Early death Mother          from aneurysym    Stroke Father     Colon polyps Sister     Diabetes Sister     Hypertension Sister     Colon cancer Brother     Malig Hyperthermia Neg Hx          Social History     Socioeconomic History    Marital status:    Tobacco Use    Smoking status: Never     Passive exposure: Never    Smokeless tobacco: Never    Tobacco comments:     caffeine use 2 cups coffee daily   Vaping Use    Vaping status: Never Used   Substance and Sexual Activity    Alcohol use: Yes     Alcohol/week: 4.0 standard drinks of alcohol     Types: 4 Glasses of wine per week     Comment: daily glass of wine    Drug use: No    Sexual activity: Not Currently     Partners: Male     Birth control/protection: Tubal ligation         Allergies   Allergen Reactions    Penicillins Hives and Unknown (See Comments)       Physical Exam:  Vitals:    24 1108   BP: 118/70   BP Location: Left  "arm   Patient Position: Sitting   Cuff Size: Adult   Pulse: 60   SpO2: 96%   Weight: 54.3 kg (119 lb 12.8 oz)   Height: 160 cm (62.99\")     Body mass index is 21.23 kg/m².          Results:      Lab Results   Component Value Date    GLUCOSE 91 05/06/2024    BUN 24 (H) 05/06/2024    CREATININE 1.55 (H) 05/06/2024    BCR 15.5 05/06/2024    CO2 23.2 05/06/2024    CALCIUM 9.6 05/06/2024    PROTENTOTREF 7.1 06/04/2024    ALBUMIN 4.1 06/04/2024    LABIL2 1.4 06/04/2024    AST 18 05/03/2024    ALT 19 05/03/2024       Lab Results   Component Value Date    WBC 7.2 10/24/2024    HGB 11 (L) 10/24/2024    HCT 32.8 (L) 10/24/2024    MCV 92.3 10/24/2024     10/24/2024         .No results found for: \"RPR\"      Lab Results   Component Value Date    TSH 1.660 06/04/2024    U4NTENQ 73.7 (L) 06/04/2024    Z7XNTTO 7.23 06/04/2024         Lab Results   Component Value Date    ZEVNXWOY57 204 (L) 07/01/2022         Lab Results   Component Value Date    FOLATE >20.00 06/04/2024         Lab Results   Component Value Date    HGBA1C 6.6 (H) 09/16/2023       Head: Head is erect and midline: skull is normocephalic, symmetrical and smooth without deformity. Facial features are symmetrical;   Neck:  Trachea is midline; no JVD.   SEyes: conjunctivae pink; sclerae white; PERRL. No tearing noted. Pupils constricted 4mm to 2mm   Ears:  Normal position.  Chest:  The trachea is midline. The chest is normal in appearance. The chest is clear to percussion and auscultation.  Heart:  HR 60 beats per minute, S1 and S2 noted with regular rhythm. /70. No abnormal jugular venous distention. No rubs, murmurs, or gallops noted upon auscultation.   Musculoskeletal:  The extremities are normal in color, size, and temperature. All pulses in the upper and lower extremities are grade II and equal. No clubbing, cyanosis, or edema is present.   Neurological:  Alert, relaxed, cooperative. Thought process coherent. Oriented to person, place and time. CN II- " XII intact. Good muscle bulk and tone. Strength 5/5 throughout. Cerebellar: Rapid alternating movements:finger-to-nose intact. Gait with normal base. Sensory: Intact to light touch/temperature/vibration bilateral upper extremity; decreased light touch/temperature/vibration bilateral lower extremities.        Diagnoses:    1.  Peripheral neuropathy; idiopathic likely secondary to uncontrolled diabetes; treatable cause peripheral neuropathy labs unremarkable will recommend motor or sensory neuropathy panel and better control of A1c-use of Rx alternatives topical pain cream and gabapentin  2.  Diabetes mellitus  3.  Hyperlipidemia  4.  History of prior stroke           Plan:  Motor or sensory neuropathy pain  Rx alternatives topical pain cream  Slow titration up on gabapentin to 200 in AM-200 midday and 300 nightly starting with only nightly if able to tolerate previously struggled with tolerating 300 mg 3 times daily discussed options for lower dose with each increment  Follow-up with me in 6: Sooner if needed        Diagnoses and all orders for this visit:    1. Idiopathic peripheral neuropathy (Primary)  -     Motor and Sensory Neuropathy Evaluation w Reflex; Future    2. Diabetic polyneuropathy associated with type 2 diabetes mellitus  -     Ibuprofen 3 %, Gabapentin 10 %, Baclofen 2 %, lidocaine 4 %, Ketamine HCl 10 %; Apply 1-2 g topically to the appropriate area as directed 3 (Three) to 4 (Four) times daily.  Dispense: 90 g; Refill: 5  -     Motor and Sensory Neuropathy Evaluation w Reflex; Future  -     gabapentin (NEURONTIN) 100 MG capsule; 200 mg in a.m., 200 mg midday and 300 mg nightly if tolerated  Dispense: 390 capsule; Refill: 1    3. Numbness and tingling of lower extremity    4. Abnormal EMG    5. Idiopathic neuropathy    6. History of stroke                                    Dictated utilizing Dragon dictation.

## 2024-12-16 LAB — SPECIMEN STATUS: NORMAL

## 2024-12-26 LAB — REF LAB TEST METHOD: NORMAL

## 2025-01-02 ENCOUNTER — OFFICE VISIT (OUTPATIENT)
Dept: ENDOCRINOLOGY | Age: 75
End: 2025-01-02
Payer: MEDICARE

## 2025-01-02 VITALS
DIASTOLIC BLOOD PRESSURE: 76 MMHG | HEART RATE: 64 BPM | BODY MASS INDEX: 21.4 KG/M2 | OXYGEN SATURATION: 98 % | WEIGHT: 120.8 LBS | SYSTOLIC BLOOD PRESSURE: 132 MMHG | HEIGHT: 63 IN

## 2025-01-02 DIAGNOSIS — E11.29 TYPE 2 DIABETES MELLITUS WITH OTHER DIABETIC KIDNEY COMPLICATION, WITHOUT LONG-TERM CURRENT USE OF INSULIN: Primary | ICD-10-CM

## 2025-01-02 LAB — HBA1C MFR BLD: 8.2 % (ref 4.8–5.6)

## 2025-01-02 RX ORDER — PIOGLITAZONE 45 MG/1
45 TABLET ORAL DAILY
Qty: 90 TABLET | Refills: 0 | Status: SHIPPED | OUTPATIENT
Start: 2025-01-02 | End: 2026-01-02

## 2025-01-02 RX ORDER — ORAL SEMAGLUTIDE 7 MG/1
7 TABLET ORAL DAILY
Qty: 90 TABLET | Refills: 0 | Status: SHIPPED | OUTPATIENT
Start: 2025-01-02

## 2025-01-02 NOTE — PROGRESS NOTES
Chief complaint   Chief Complaint   Patient presents with    Diabetes          Subjective     History of Present Illness:          This is a 74 year old female I am seeing for type 2 DM   referred by PCP   Last OV was 4 weeks ago   She is here for a follow up   other medical history is   1- HTN   2- HLD   3- CKD   4- H/o Stroke   Pt had T2DM many years ago   Current home medication for diabetes     Off Januvia and Glyburide 5 mg daily , she is taking it 2 times   Jardiance 25 mg daily to help with CKD   Rybelsus 7 mg daily    the A1c was 7.3 in   Checks blood sugar at home using finger Haydee   Checks blood sugar 1-4 times per day   SBMG: ave is 203 , 45 % in range, the rest is high   pt states that she is having higher BG now , trying his best with dietary Compliance, in regards to Exercise, not on a daily basis, twice a week  and his Weight has been the same and there is No Hypoglycemic episodes, there is no AM Headaches /Nightmares, no Polyuria / Polydipsia, and there os no Blurring of Vision  Last Dilated Pupil Exam, in  , no DM in the eye   NoTingling / numbness in feet, No Dizziness / lightheadedness, No Falls  No Nausea, vomiting / Diarrhea  retired.        Family History   Problem Relation Age of Onset    Early death Mother          from aneurysym    Stroke Father     Colon polyps Sister     Diabetes Sister     Hypertension Sister     Cancer Sister     Colon cancer Brother     Diabetes Brother     Kidney disease Brother     Malig Hyperthermia Neg Hx      Social History     Socioeconomic History    Marital status:    Tobacco Use    Smoking status: Never     Passive exposure: Never    Smokeless tobacco: Never    Tobacco comments:     caffeine use 2 cups coffee daily   Vaping Use    Vaping status: Never Used   Substance and Sexual Activity    Alcohol use: Yes     Alcohol/week: 4.0 standard drinks of alcohol     Types: 4 Glasses of wine per week     Comment: daily glass of wine    Drug  use: No    Sexual activity: Not Currently     Partners: Male     Birth control/protection: Tubal ligation     Past Medical History:   Diagnosis Date    Anemia     CKD (chronic kidney disease)     Deep vein thrombosis     Diabetes mellitus     GERD (gastroesophageal reflux disease)     Hyperlipidemia     Hypertension     Neuropathy in diabetes 2023    Peripheral neuropathy     Rectal bleeding 08/25/2022    FIT test performed at [facility]    Stroke 06/29/2022    patient states she feels some right side weakness from stroke, not noticable to others.    Type 2 diabetes mellitus      Past Surgical History:   Procedure Laterality Date    CATARACT EXTRACTION Bilateral 06/2021    COLONOSCOPY N/A 09/12/2018    Procedure: COLONOSCOPY TO CECUM;  Surgeon: Josh Muñoz MD;  Location: [facility] ENDOSCOPY;  Service: General    COLONOSCOPY N/A 11/30/2022    Procedure: COLONOSCOPY TO CECUM AND TI;  Surgeon: Duglas Zhang MD;  Location: [facility] ENDOSCOPY;  Service: General;  Laterality: N/A;  POSITIVE FIT TEST  --DIVERTICULOSIS     DILATION AND CURETTAGE, DIAGNOSTIC / THERAPEUTIC N/A 2018    ENDOSCOPY N/A 09/12/2018    Procedure: ESOPHAGOGASTRODUODENOSCOPY WITH BIOPSIES;  Surgeon: Josh Muñoz MD;  Location: [facility] ENDOSCOPY;  Service: General    ENDOSCOPY N/A 11/30/2022    Procedure: ESOPHAGOGASTRODUODENOSCOPY;  Surgeon: Duglas Zhang MD;  Location: [facility] ENDOSCOPY;  Service: General;  Laterality: N/A;  NORMAL     ROTATOR CUFF REPAIR Right     TOTAL HIP ARTHROPLASTY Right 05/04/2024    Procedure: TOTAL HIP ARTHROPLASTY ANTERIOR WITH HANA TABLE;  Surgeon: Kike Lake MD;  Location: [facility] MAIN OR;  Service: Orthopedics;  Laterality: Right;    TUBAL ABDOMINAL LIGATION         Current Outpatient Medications:     Accu-Chek Jaycee Plus test strip, , Disp: , Rfl:     amLODIPine (NORVASC) 10 MG tablet, TAKE 1 TABLET ONE TIME DAILY, Disp: , Rfl:     aspirin 81 MG EC tablet, Take 1 tablet by mouth Every 12 (Twelve) Hours.  Indications: VTE Prophylaxis, Disp: 60 tablet, Rfl: 0    atorvastatin (LIPITOR) 80 MG tablet, Take 0.5 tablets by mouth Every Night., Disp: , Rfl:     Continuous Glucose  (FreeStyle Haydee 2 [place]) device, , Disp: , Rfl:     Continuous Glucose Sensor (FreeStyle Haydee 2 Sensor) misc, Inject 2 Units under the skin into the appropriate area as directed Every 14 (Fourteen) Days., Disp: 6 each, Rfl: 0    cyanocobalamin (VITAMIN B-12) 1000 MCG tablet, Take 1 tablet by mouth Daily., Disp: 30 tablet, Rfl: 1    empagliflozin (Jardiance) 25 MG tablet tablet, Take 1 tablet by mouth Daily., Disp: 90 tablet, Rfl: 0    gabapentin (NEURONTIN) 100 MG capsule, 200 mg in a.m., 200 mg midday and 300 mg nightly if tolerated, Disp: 390 capsule, Rfl: 1    Ibuprofen 3 %, Gabapentin 10 %, Baclofen 2 %, lidocaine 4 %, Ketamine HCl 10 %, Apply 1-2 g topically to the appropriate area as directed 3 (Three) to 4 (Four) times daily., Disp: 90 g, Rfl: 5    multivitamins-minerals (PRESERVISION AREDS 2) capsule capsule, Take 2 capsules by mouth Daily., Disp: , Rfl:     pantoprazole (PROTONIX) 40 MG EC tablet, Take 1 tablet by mouth Every Morning., Disp: 30 tablet, Rfl: 1    ramipril (ALTACE) 10 MG capsule, Take 1 capsule by mouth Daily. (Patient taking differently: Take 1 capsule by mouth 2 (Two) Times a Day.), Disp: 30 capsule, Rfl: 1    Semaglutide (Rybelsus) 7 MG tablet, Take 7 mg by mouth Daily., Disp: 90 tablet, Rfl: 0    pioglitazone (Actos) 45 MG tablet, Take 1 tablet by mouth Daily., Disp: 90 tablet, Rfl: 0  Penicillins    Objective   Vitals:    01/02/25 0902   BP: 132/76   Pulse: 64   SpO2: 98%            Physical Exam  Neurological:      General: No focal deficit present.      Mental Status: She is alert and oriented to person, place, and time.               Diagnoses and all orders for this visit:    1. Type 2 diabetes mellitus with other diabetic kidney complication, without long-term current use of insulin (Primary)  -      Continuous Glucose Sensor (FreeStyle Haydee 2 Sensor) misc; Inject 2 Units under the skin into the appropriate area as directed Every 14 (Fourteen) Days.  Dispense: 6 each; Refill: 0  -     empagliflozin (Jardiance) 25 MG tablet tablet; Take 1 tablet by mouth Daily.  Dispense: 90 tablet; Refill: 0  -     Semaglutide (Rybelsus) 7 MG tablet; Take 7 mg by mouth Daily.  Dispense: 90 tablet; Refill: 0  -     pioglitazone (Actos) 45 MG tablet; Take 1 tablet by mouth Daily.  Dispense: 90 tablet; Refill: 0  -     Hemoglobin A1c           Assessment:     1- DM, Type 2  Uncontrolled with High risk   has CKD , follows with nephrology     labs on file , last GFR was 39 in   We discussed the medications  Hypoglycemia and its importance was reviewed   Labs where shown to the patient   2. Hypertension, on Amlodipine    3. Hyperlipidemia, on a statin           Plan:    1. Diet, exercise and weight management  2. Consistent food intake to avoid low blood sugars  3. Home medication includes for diabetes      Jardiance 25 mg daily to help with CKD   Rybelsus 7 mg daily   Start Actos 45 mg daily   Will consider insulin if she is high at the next visit, she is leaving to [place] for now     4. Consistent checking of blood sugars using Haydee   5. I reviewed the last progress note  6. Annual eye exam  7. Return in 3 months   8.  Rx sent to the pharmacy  10. Labs : A1c every 90 days       I discussed with the patient/legal representative the risks and the benefits associated with the medications.  The patient/legal representative has been given the opportunity to ask questions.  Alternatives to the proposed treatment (s) were discussed, including the likely results of no treatment.  The patient/legal representative wishes to proceed.       Alvaro Ivory MD   01/02/25  09:17 EST

## 2025-01-31 ENCOUNTER — TELEPHONE (OUTPATIENT)
Dept: ENDOCRINOLOGY | Age: 75
End: 2025-01-31
Payer: MEDICARE

## 2025-02-05 ENCOUNTER — TELEPHONE (OUTPATIENT)
Dept: ENDOCRINOLOGY | Age: 75
End: 2025-02-05
Payer: MEDICARE

## 2025-03-21 ENCOUNTER — TELEPHONE (OUTPATIENT)
Dept: NEUROLOGY | Facility: CLINIC | Age: 75
End: 2025-03-21
Payer: MEDICARE

## 2025-03-21 NOTE — TELEPHONE ENCOUNTER
LVM FOR PT TO RETURN CALL TO RESCHEDULE APPOINTMENT WITH PRISCILA MCGHEE ON 6/13/25 DUE TO PROVIDER OUT OF THE OFFICE.

## 2025-04-16 ENCOUNTER — OFFICE VISIT (OUTPATIENT)
Dept: ENDOCRINOLOGY | Age: 75
End: 2025-04-16
Payer: MEDICARE

## 2025-04-16 VITALS
BODY MASS INDEX: 21.09 KG/M2 | SYSTOLIC BLOOD PRESSURE: 130 MMHG | TEMPERATURE: 97.5 F | HEART RATE: 69 BPM | OXYGEN SATURATION: 97 % | WEIGHT: 119 LBS | DIASTOLIC BLOOD PRESSURE: 72 MMHG

## 2025-04-16 DIAGNOSIS — E11.29 TYPE 2 DIABETES MELLITUS WITH OTHER DIABETIC KIDNEY COMPLICATION, WITHOUT LONG-TERM CURRENT USE OF INSULIN: Primary | ICD-10-CM

## 2025-04-16 RX ORDER — LIDOCAINE 50 MG/G
PATCH TOPICAL
COMMUNITY
Start: 2025-04-08

## 2025-04-16 RX ORDER — ORAL SEMAGLUTIDE 7 MG/1
7 TABLET ORAL DAILY
Qty: 90 TABLET | Refills: 0 | Status: SHIPPED | OUTPATIENT
Start: 2025-04-16

## 2025-04-16 RX ORDER — PIOGLITAZONE 45 MG/1
45 TABLET ORAL DAILY
Qty: 90 TABLET | Refills: 0 | Status: SHIPPED | OUTPATIENT
Start: 2025-04-16 | End: 2026-04-16

## 2025-04-16 NOTE — PROGRESS NOTES
Chief complaint   Chief Complaint   Patient presents with    Type 2 diabetes mellitus with other diabetic kidney complic          Subjective     History of Present Illness:          This is a 74 year old female I am seeing for type 2 DM   referred by PCP   Last OV was 12 weeks ago   She is here for a follow up   other medical history is   1- HTN   2- HLD   3- CKD   4- H/o Stroke   Pt had T2DM many years ago   Current home medication for diabetes     Off Januvia and Glyburide 5 mg daily , she is taking it 2 times   Jardiance 25 mg daily to help with CKD   Rybelsus 7 mg daily  Actos 45 mg daily     the A1c was 7.3 in   the A1c was 8.2 in   Checks blood sugar at home using finger stick   Checks blood sugar 1-4 times per day   SBMG: ave is 187   pt states that she is having higher BG now , did not have her sensor last few months trying his best with dietary Compliance, in regards to Exercise, not on a daily basis, twice a week  and his Weight has been the same and there is No Hypoglycemic episodes, there is no AM Headaches /Nightmares, no Polyuria / Polydipsia, and there os no Blurring of Vision  Last Dilated Pupil Exam, in  , no DM in the eye   NoTingling / numbness in feet, No Dizziness / lightheadedness, No Falls  No Nausea, vomiting / Diarrhea  retired.        Family History   Problem Relation Age of Onset    Early death Mother          from aneurysym    Stroke Father     Colon polyps Sister     Diabetes Sister     Hypertension Sister     Cancer Sister     Colon cancer Brother     Diabetes Brother     Kidney disease Brother     Malig Hyperthermia Neg Hx      Social History     Socioeconomic History    Marital status:    Tobacco Use    Smoking status: Never     Passive exposure: Never    Smokeless tobacco: Never    Tobacco comments:     caffeine use 2 cups coffee daily   Vaping Use    Vaping status: Never Used   Substance and Sexual Activity    Alcohol use: Yes     Alcohol/week: 4.0  standard drinks of alcohol     Types: 4 Glasses of wine per week     Comment: daily glass of wine    Drug use: No    Sexual activity: Not Currently     Partners: Male     Birth control/protection: Tubal ligation     Past Medical History:   Diagnosis Date    Anemia     CKD (chronic kidney disease)     Deep vein thrombosis     Diabetes mellitus     GERD (gastroesophageal reflux disease)     Hyperlipidemia     Hypertension     Neuropathy in diabetes 2023    Peripheral neuropathy     Rectal bleeding 08/25/2022    FIT test performed at [facility]    Stroke 06/29/2022    patient states she feels some right side weakness from stroke, not noticable to others.    Type 2 diabetes mellitus      Past Surgical History:   Procedure Laterality Date    CATARACT EXTRACTION Bilateral 06/2021    COLONOSCOPY N/A 09/12/2018    Procedure: COLONOSCOPY TO CECUM;  Surgeon: Josh Muñoz MD;  Location: [facility] ENDOSCOPY;  Service: General    COLONOSCOPY N/A 11/30/2022    Procedure: COLONOSCOPY TO CECUM AND TI;  Surgeon: Duglas Zhang MD;  Location: [facility] ENDOSCOPY;  Service: General;  Laterality: N/A;  POSITIVE FIT TEST  --DIVERTICULOSIS     DILATION AND CURETTAGE, DIAGNOSTIC / THERAPEUTIC N/A 2018    ENDOSCOPY N/A 09/12/2018    Procedure: ESOPHAGOGASTRODUODENOSCOPY WITH BIOPSIES;  Surgeon: Josh Muñoz MD;  Location: [facility] ENDOSCOPY;  Service: General    ENDOSCOPY N/A 11/30/2022    Procedure: ESOPHAGOGASTRODUODENOSCOPY;  Surgeon: Duglas Zhang MD;  Location: [facility] ENDOSCOPY;  Service: General;  Laterality: N/A;  NORMAL     ROTATOR CUFF REPAIR Right     TOTAL HIP ARTHROPLASTY Right 05/04/2024    Procedure: TOTAL HIP ARTHROPLASTY ANTERIOR WITH HANA TABLE;  Surgeon: Kike Lake MD;  Location: [facility] MAIN OR;  Service: Orthopedics;  Laterality: Right;    TUBAL ABDOMINAL LIGATION         Current Outpatient Medications:     Accu-Chek Jaycee Plus test strip, , Disp: , Rfl:     amLODIPine (NORVASC) 10 MG tablet, TAKE 1 TABLET ONE  TIME DAILY, Disp: , Rfl:     aspirin 81 MG EC tablet, Take 1 tablet by mouth Every 12 (Twelve) Hours. Indications: VTE Prophylaxis, Disp: 60 tablet, Rfl: 0    atorvastatin (LIPITOR) 80 MG tablet, Take 0.5 tablets by mouth Every Night., Disp: , Rfl:     cyanocobalamin (VITAMIN B-12) 1000 MCG tablet, Take 1 tablet by mouth Daily., Disp: 30 tablet, Rfl: 1    empagliflozin (Jardiance) 25 MG tablet tablet, Take 1 tablet by mouth Daily., Disp: 90 tablet, Rfl: 0    gabapentin (NEURONTIN) 100 MG capsule, 200 mg in a.m., 200 mg midday and 300 mg nightly if tolerated, Disp: 390 capsule, Rfl: 1    Ibuprofen 3 %, Gabapentin 10 %, Baclofen 2 %, lidocaine 4 %, Ketamine HCl 10 %, Apply 1-2 g topically to the appropriate area as directed 3 (Three) to 4 (Four) times daily., Disp: 90 g, Rfl: 5    lidocaine (LIDODERM) 5 %, PLACE 1 PATCH ONTO THE SKIN DAILY. 12/HRS A DAY, Disp: , Rfl:     multivitamins-minerals (PRESERVISION AREDS 2) capsule capsule, Take 2 capsules by mouth Daily., Disp: , Rfl:     pantoprazole (PROTONIX) 40 MG EC tablet, Take 1 tablet by mouth Every Morning., Disp: 30 tablet, Rfl: 1    pioglitazone (Actos) 45 MG tablet, Take 1 tablet by mouth Daily., Disp: 90 tablet, Rfl: 0    ramipril (ALTACE) 10 MG capsule, Take 1 capsule by mouth Daily. (Patient taking differently: Take 1 capsule by mouth 2 (Two) Times a Day.), Disp: 30 capsule, Rfl: 1    Semaglutide (Rybelsus) 7 MG tablet, Take 7 mg by mouth Daily., Disp: 90 tablet, Rfl: 0  Penicillins    Objective   Vitals:    04/16/25 0947   BP: 130/72   Pulse: 69   Temp: 97.5 °F (36.4 °C)   SpO2: 97%              Physical Exam  Neurological:      General: No focal deficit present.      Mental Status: She is alert and oriented to person, place, and time.               Diagnoses and all orders for this visit:    1. Type 2 diabetes mellitus with other diabetic kidney complication, without long-term current use of insulin (Primary)  -     Hemoglobin A1c  -     Microalbumin /  Creatinine Urine Ratio - Urine, Clean Catch  -     Basic Metabolic Panel             Assessment:     1- DM, Type 2  Uncontrolled with High risk   has CKD , follows with nephrology     labs on file , last GFR was 39 in   We discussed the medications  Hypoglycemia and its importance was reviewed   Labs where shown to the patient   2. Hypertension, on Amlodipine    3. Hyperlipidemia, on a statin           Plan:    1. Diet, exercise and weight management  2. Consistent food intake to avoid low blood sugars  3. Home medication includes for diabetes      Jardiance 25 mg daily to help with CKD   Rybelsus 7 mg daily    Actos 45 mg daily   Will consider insulin if she is high today   Then will send a Rx for Tresiba, needles and sesnor    4. Consistent checking of blood sugars using Haydee   5. I reviewed the last progress note  6. Annual eye exam  7. Return in 1 months   8.  Rx sent to the pharmacy  10. Labs : A1c every 90 days       I discussed with the patient/legal representative the risks and the benefits associated with the medications.  The patient/legal representative has been given the opportunity to ask questions.  Alternatives to the proposed treatment (s) were discussed, including the likely results of no treatment.  The patient/legal representative wishes to proceed.       Alvaro Ivory MD   04/16/25  09:57 EDT

## 2025-04-17 LAB
ALBUMIN/CREAT UR: 38 MG/G CREAT (ref 0–29)
BUN SERPL-MCNC: 32 MG/DL (ref 8–23)
BUN/CREAT SERPL: 18.9 (ref 7–25)
CALCIUM SERPL-MCNC: 10.5 MG/DL (ref 8.6–10.5)
CHLORIDE SERPL-SCNC: 102 MMOL/L (ref 98–107)
CO2 SERPL-SCNC: 24.1 MMOL/L (ref 22–29)
CREAT SERPL-MCNC: 1.69 MG/DL (ref 0.57–1)
CREAT UR-MCNC: 21.3 MG/DL
EGFRCR SERPLBLD CKD-EPI 2021: 31.4 ML/MIN/1.73
GLUCOSE SERPL-MCNC: 223 MG/DL (ref 65–99)
HBA1C MFR BLD: 8.6 % (ref 4.8–5.6)
MICROALBUMIN UR-MCNC: 8 UG/ML
POTASSIUM SERPL-SCNC: 4.6 MMOL/L (ref 3.5–5.2)
SODIUM SERPL-SCNC: 138 MMOL/L (ref 136–145)

## 2025-04-21 ENCOUNTER — TELEPHONE (OUTPATIENT)
Dept: ENDOCRINOLOGY | Age: 75
End: 2025-04-21
Payer: MEDICARE

## 2025-04-21 DIAGNOSIS — E11.29 TYPE 2 DIABETES MELLITUS WITH OTHER DIABETIC KIDNEY COMPLICATION, WITHOUT LONG-TERM CURRENT USE OF INSULIN: Primary | ICD-10-CM

## 2025-04-21 DIAGNOSIS — E11.29 TYPE 2 DIABETES MELLITUS WITH OTHER DIABETIC KIDNEY COMPLICATION, WITHOUT LONG-TERM CURRENT USE OF INSULIN: ICD-10-CM

## 2025-04-21 RX ORDER — ACYCLOVIR 800 MG/1
1 TABLET ORAL
Qty: 2 EACH | Refills: 0 | Status: SHIPPED | OUTPATIENT
Start: 2025-04-21 | End: 2025-04-21 | Stop reason: SDUPTHER

## 2025-04-21 RX ORDER — PEN NEEDLE, DIABETIC 32GX 5/32"
1 NEEDLE, DISPOSABLE MISCELLANEOUS DAILY
Qty: 100 EACH | Refills: 0 | Status: SHIPPED | OUTPATIENT
Start: 2025-04-21

## 2025-04-21 NOTE — TELEPHONE ENCOUNTER
Pt called stating based on her blood work from her last appt on 4/16/25  an rx would be called in for her. Pt was inquiring if there is anything she needs to do.

## 2025-04-21 NOTE — TELEPHONE ENCOUNTER
Rx Refill Note  Requested Prescriptions     Pending Prescriptions Disp Refills    Continuous Glucose Sensor (FreeStyle Haydee 3 Sensor) misc 3 each 0     Sig: Use 1 Units Every 14 (Fourteen) Days for 30 days.      Last office visit with prescribing clinician: 4/16/2025   Last telemedicine visit with prescribing clinician: Visit date not found   Next office visit with prescribing clinician: 5/15/2025                         Would you like a call back once the refill request has been completed: [] Yes [] No    If the office needs to give you a call back, can they leave a voicemail: [] Yes [] No    Kaylene Goyal MA  04/21/25, 15:38 EDT

## 2025-04-22 RX ORDER — ACYCLOVIR 800 MG/1
1 TABLET ORAL
Qty: 3 EACH | Refills: 0 | Status: SHIPPED | OUTPATIENT
Start: 2025-04-22 | End: 2025-05-22

## 2025-05-15 ENCOUNTER — OFFICE VISIT (OUTPATIENT)
Dept: ENDOCRINOLOGY | Age: 75
End: 2025-05-15
Payer: MEDICARE

## 2025-05-15 VITALS
DIASTOLIC BLOOD PRESSURE: 70 MMHG | OXYGEN SATURATION: 80 % | WEIGHT: 117 LBS | SYSTOLIC BLOOD PRESSURE: 126 MMHG | BODY MASS INDEX: 20.73 KG/M2 | HEART RATE: 70 BPM | TEMPERATURE: 97.9 F

## 2025-05-15 DIAGNOSIS — E11.29 TYPE 2 DIABETES MELLITUS WITH OTHER DIABETIC KIDNEY COMPLICATION, WITHOUT LONG-TERM CURRENT USE OF INSULIN: ICD-10-CM

## 2025-05-15 RX ORDER — HYDROCHLOROTHIAZIDE 12.5 MG/1
CAPSULE ORAL
Qty: 2 EACH | Refills: 2 | Status: SHIPPED | OUTPATIENT
Start: 2025-05-15 | End: 2025-05-15

## 2025-05-15 RX ORDER — HYDROCHLOROTHIAZIDE 12.5 MG/1
CAPSULE ORAL
Qty: 2 EACH | Refills: 2 | Status: SHIPPED | OUTPATIENT
Start: 2025-05-15

## 2025-05-15 RX ORDER — ORAL SEMAGLUTIDE 7 MG/1
7 TABLET ORAL DAILY
Qty: 90 TABLET | Refills: 0 | Status: SHIPPED | OUTPATIENT
Start: 2025-05-15

## 2025-05-15 RX ORDER — PEN NEEDLE, DIABETIC 32GX 5/32"
1 NEEDLE, DISPOSABLE MISCELLANEOUS DAILY
Qty: 100 EACH | Refills: 0 | Status: SHIPPED | OUTPATIENT
Start: 2025-05-15 | End: 2025-05-15

## 2025-05-15 RX ORDER — PEN NEEDLE, DIABETIC 32GX 5/32"
1 NEEDLE, DISPOSABLE MISCELLANEOUS DAILY
Qty: 100 EACH | Refills: 0 | Status: SHIPPED | OUTPATIENT
Start: 2025-05-15

## 2025-05-15 RX ORDER — PIOGLITAZONE 45 MG/1
45 TABLET ORAL DAILY
Qty: 90 TABLET | Refills: 0 | Status: SHIPPED | OUTPATIENT
Start: 2025-05-15 | End: 2025-05-15

## 2025-05-15 RX ORDER — ORAL SEMAGLUTIDE 7 MG/1
7 TABLET ORAL DAILY
Qty: 90 TABLET | Refills: 0 | Status: SHIPPED | OUTPATIENT
Start: 2025-05-15 | End: 2025-05-15

## 2025-05-15 RX ORDER — PIOGLITAZONE 45 MG/1
45 TABLET ORAL DAILY
Qty: 90 TABLET | Refills: 0 | Status: SHIPPED | OUTPATIENT
Start: 2025-05-15 | End: 2026-05-15

## 2025-05-15 NOTE — PROGRESS NOTES
Chief complaint   Chief Complaint   Patient presents with    Type 2 diabetes mellitus with other diabetic kidney complic          Subjective     History of Present Illness:          This is a 74 year old female I am seeing for type 2 DM   referred by PCP   Last OV was 4 weeks ago   She is here for a follow up   other medical history is   1- HTN   2- HLD   3- CKD   4- H/o Stroke   Pt had T2DM many years ago   Current home medication for diabetes     Off Januvia and Glyburide 5 mg daily , she is taking it 2 times   Jardiance 25 mg daily to help with CKD   Rybelsus 7 mg daily  Actos 45 mg daily   Lantus     the A1c was 7.3 in   the A1c was 8.2 in   the A1c was 8.6 in   Checks blood sugar at home using Cgm   Checks blood sugar 1-4 times per day   SBMG: ave is 171 with 61 % in range and 1 % low and rest is high    pt states that she is having higher BG now , did not take her pills after she started insulin last few weeks trying his best with dietary Compliance, in regards to Exercise, not on a daily basis, twice a week  and his Weight has been the same and there is No Hypoglycemic episodes, there is no AM Headaches /Nightmares, no Polyuria / Polydipsia, and there os no Blurring of Vision  Last Dilated Pupil Exam, in  , no DM in the eye   NoTingling / numbness in feet, No Dizziness / lightheadedness, No Falls  No Nausea, vomiting / Diarrhea  retired.          Family History   Problem Relation Age of Onset    Early death Mother          from aneurysym    Stroke Father     Colon polyps Sister     Diabetes Sister     Hypertension Sister     Cancer Sister     Colon cancer Brother     Diabetes Brother     Kidney disease Brother     Malig Hyperthermia Neg Hx      Social History     Socioeconomic History    Marital status:    Tobacco Use    Smoking status: Never     Passive exposure: Never    Smokeless tobacco: Never    Tobacco comments:     caffeine use 2 cups coffee daily   Vaping Use     Vaping status: Never Used   Substance and Sexual Activity    Alcohol use: Yes     Alcohol/week: 4.0 standard drinks of alcohol     Types: 4 Glasses of wine per week     Comment: daily glass of wine    Drug use: No    Sexual activity: Not Currently     Partners: Male     Birth control/protection: Tubal ligation     Past Medical History:   Diagnosis Date    Anemia     CKD (chronic kidney disease)     Deep vein thrombosis     Diabetes mellitus     GERD (gastroesophageal reflux disease)     Hyperlipidemia     Hypertension     Neuropathy in diabetes 2023    Peripheral neuropathy     Rectal bleeding 08/25/2022    FIT test performed at PCP    Stroke 06/29/2022    patient states she feels some right side weakness from stroke, not noticable to others.    Type 2 diabetes mellitus      Past Surgical History:   Procedure Laterality Date    CATARACT EXTRACTION Bilateral 06/2021    COLONOSCOPY N/A 09/12/2018    Procedure: COLONOSCOPY TO CECUM;  Surgeon: Josh Muñoz MD;  Location: [facility] ENDOSCOPY;  Service: General    COLONOSCOPY N/A 11/30/2022    Procedure: COLONOSCOPY TO CECUM AND TI;  Surgeon: Duglas Zhang MD;  Location: [facility] ENDOSCOPY;  Service: General;  Laterality: N/A;  POSITIVE FIT TEST  --DIVERTICULOSIS     DILATION AND CURETTAGE, DIAGNOSTIC / THERAPEUTIC N/A 2018    ENDOSCOPY N/A 09/12/2018    Procedure: ESOPHAGOGASTRODUODENOSCOPY WITH BIOPSIES;  Surgeon: Josh Muñoz MD;  Location: [facility] ENDOSCOPY;  Service: General    ENDOSCOPY N/A 11/30/2022    Procedure: ESOPHAGOGASTRODUODENOSCOPY;  Surgeon: Duglas Zhang MD;  Location: [facility] ENDOSCOPY;  Service: General;  Laterality: N/A;  NORMAL     ROTATOR CUFF REPAIR Right     TOTAL HIP ARTHROPLASTY Right 05/04/2024    Procedure: TOTAL HIP ARTHROPLASTY ANTERIOR WITH HANA TABLE;  Surgeon: Kike Lake MD;  Location: [facility] MAIN OR;  Service: Orthopedics;  Laterality: Right;    TUBAL ABDOMINAL LIGATION         Current Outpatient Medications:      empagliflozin (Jardiance) 25 MG tablet tablet, Take 1 tablet by mouth Daily., Disp: 90 tablet, Rfl: 0    Insulin Glargine (LANTUS SOLOSTAR) 100 UNIT/ML injection pen, Inject 10 Units under the skin into the appropriate area as directed Daily., Disp: 15 mL, Rfl: 0    Insulin Pen Needle (BD Pen Needle Bety U/F) 32G X 4 MM misc, Use 1 Units Daily., Disp: 100 each, Rfl: 0    pioglitazone (Actos) 45 MG tablet, Take 1 tablet by mouth Daily., Disp: 90 tablet, Rfl: 0    Semaglutide (Rybelsus) 7 MG tablet, Take 7 mg by mouth Daily., Disp: 90 tablet, Rfl: 0    Accu-Chek Jaycee Plus test strip, , Disp: , Rfl:     amLODIPine (NORVASC) 10 MG tablet, TAKE 1 TABLET ONE TIME DAILY, Disp: , Rfl:     aspirin 81 MG EC tablet, Take 1 tablet by mouth Every 12 (Twelve) Hours. Indications: VTE Prophylaxis, Disp: 60 tablet, Rfl: 0    atorvastatin (LIPITOR) 80 MG tablet, Take 0.5 tablets by mouth Every Night., Disp: , Rfl:     Continuous Glucose Sensor (FreeStyle Haydee 3 Plus Sensor), Use Every 14 (Fourteen) Days., Disp: 2 each, Rfl: 2    cyanocobalamin (VITAMIN B-12) 1000 MCG tablet, Take 1 tablet by mouth Daily., Disp: 30 tablet, Rfl: 1    gabapentin (NEURONTIN) 100 MG capsule, 200 mg in a.m., 200 mg midday and 300 mg nightly if tolerated, Disp: 390 capsule, Rfl: 1    Ibuprofen 3 %, Gabapentin 10 %, Baclofen 2 %, lidocaine 4 %, Ketamine HCl 10 %, Apply 1-2 g topically to the appropriate area as directed 3 (Three) to 4 (Four) times daily., Disp: 90 g, Rfl: 5    lidocaine (LIDODERM) 5 %, PLACE 1 PATCH ONTO THE SKIN DAILY. 12/HRS A DAY, Disp: , Rfl:     multivitamins-minerals (PRESERVISION AREDS 2) capsule capsule, Take 2 capsules by mouth Daily., Disp: , Rfl:     pantoprazole (PROTONIX) 40 MG EC tablet, Take 1 tablet by mouth Every Morning., Disp: 30 tablet, Rfl: 1    ramipril (ALTACE) 10 MG capsule, Take 1 capsule by mouth Daily. (Patient taking differently: Take 1 capsule by mouth 2 (Two) Times a Day.), Disp: 30 capsule, Rfl:  1  Penicillins    Objective   Vitals:    05/15/25 1024   BP: 126/70   Pulse: 70   Temp: 97.9 °F (36.6 °C)   SpO2: (!) 80%                Physical Exam  Neurological:      General: No focal deficit present.      Mental Status: She is alert and oriented to person, place, and time.               Diagnoses and all orders for this visit:    1. Type 2 diabetes mellitus with other diabetic kidney complication, without long-term current use of insulin  -     Continuous Glucose Sensor (FreeStyle Haydee 3 Plus Sensor); Use Every 14 (Fourteen) Days.  Dispense: 2 each; Refill: 2  -     empagliflozin (Jardiance) 25 MG tablet tablet; Take 1 tablet by mouth Daily.  Dispense: 90 tablet; Refill: 0  -     Insulin Glargine (LANTUS SOLOSTAR) 100 UNIT/ML injection pen; Inject 10 Units under the skin into the appropriate area as directed Daily.  Dispense: 15 mL; Refill: 0  -     pioglitazone (Actos) 45 MG tablet; Take 1 tablet by mouth Daily.  Dispense: 90 tablet; Refill: 0  -     Semaglutide (Rybelsus) 7 MG tablet; Take 7 mg by mouth Daily.  Dispense: 90 tablet; Refill: 0  -     Insulin Pen Needle (BD Pen Needle Bety U/F) 32G X 4 MM misc; Use 1 Units Daily.  Dispense: 100 each; Refill: 0               Assessment:     1- DM, Type 2  Uncontrolled with High risk , getting ean r  has CKD , follows with nephrology     labs on file , last GFR was 39 in   We discussed the medications  Hypoglycemia and its importance was reviewed   Labs where shown to the patient   2. Hypertension, on Amlodipine    3. Hyperlipidemia, on a statin           Plan:    1. Diet, exercise and weight management  2. Consistent food intake to avoid low blood sugars  3. Home medication includes for diabetes      Resume Jardiance 25 mg daily to help with CKD   Resume Rybelsus 7 mg daily   Resume  Actos 45 mg daily   Stay on Lantus 10 units once a day   Was asked to call me if having lows     4. Consistent checking of blood sugars using Haydee   5. I reviewed the  last progress note  6. Annual eye exam  7. Return in 2 months   8.  Rx sent to the pharmacy  10. Labs : A1c every 90 days       I discussed with the patient/legal representative the risks and the benefits associated with the medications.  The patient/legal representative has been given the opportunity to ask questions.  Alternatives to the proposed treatment (s) were discussed, including the likely results of no treatment.  The patient/legal representative wishes to proceed.       Alvaro Ivory MD   05/15/25  10:37 EDT

## 2025-06-02 ENCOUNTER — TREATMENT (OUTPATIENT)
Dept: ENDOCRINOLOGY | Age: 75
End: 2025-06-02
Payer: MEDICARE

## 2025-06-16 ENCOUNTER — OFFICE VISIT (OUTPATIENT)
Dept: NEUROLOGY | Facility: CLINIC | Age: 75
End: 2025-06-16
Payer: MEDICARE

## 2025-06-16 VITALS
BODY MASS INDEX: 20.91 KG/M2 | HEIGHT: 63 IN | SYSTOLIC BLOOD PRESSURE: 118 MMHG | WEIGHT: 118 LBS | HEART RATE: 62 BPM | DIASTOLIC BLOOD PRESSURE: 58 MMHG | OXYGEN SATURATION: 99 %

## 2025-06-16 DIAGNOSIS — Z86.73 HISTORY OF STROKE: ICD-10-CM

## 2025-06-16 DIAGNOSIS — R20.2 NUMBNESS AND TINGLING OF LOWER EXTREMITY: ICD-10-CM

## 2025-06-16 DIAGNOSIS — E11.42 DIABETIC POLYNEUROPATHY ASSOCIATED WITH TYPE 2 DIABETES MELLITUS: Primary | ICD-10-CM

## 2025-06-16 DIAGNOSIS — R94.131 ABNORMAL EMG: ICD-10-CM

## 2025-06-16 DIAGNOSIS — Z91.81 AT MODERATE RISK FOR FALL: ICD-10-CM

## 2025-06-16 DIAGNOSIS — R20.0 NUMBNESS AND TINGLING OF LOWER EXTREMITY: ICD-10-CM

## 2025-06-16 PROCEDURE — 1159F MED LIST DOCD IN RCRD: CPT | Performed by: NURSE PRACTITIONER

## 2025-06-16 PROCEDURE — 3078F DIAST BP <80 MM HG: CPT | Performed by: NURSE PRACTITIONER

## 2025-06-16 PROCEDURE — 1160F RVW MEDS BY RX/DR IN RCRD: CPT | Performed by: NURSE PRACTITIONER

## 2025-06-16 PROCEDURE — 99214 OFFICE O/P EST MOD 30 MIN: CPT | Performed by: NURSE PRACTITIONER

## 2025-06-16 PROCEDURE — 3074F SYST BP LT 130 MM HG: CPT | Performed by: NURSE PRACTITIONER

## 2025-06-16 RX ORDER — GABAPENTIN 100 MG/1
CAPSULE ORAL
Qty: 360 CAPSULE | Refills: 1 | Status: SHIPPED | OUTPATIENT
Start: 2025-06-16

## 2025-06-16 NOTE — PROGRESS NOTES
Patient or patient representative verbalized consent for the use of Ambient Listening during the visit with  ANAIS Cooper for chart documentation. 6/16/2025  15:12 EDT    Notes by LPN:  Patient presents for 6 mo follow up. Feels like she is getting worse.     History of Present Illness  The patient is a 74-year-old female with a known past medical history of hypertension, hyperlipidemia, diabetes mellitus, and prior left frontal parietal infarcts in 06/2022. The etiology of the infarcts is believed to be secondary to small vessel disease. She is here today for a follow-up on peripheral neuropathy and her previous stroke.    She reports that her neuropathy has worsened since her last visit, with continued decreased sensation in both lower extremities. She does not experience any symptoms in her hands. Occasionally, she has difficulty feeling her feet while driving. She does not report any recent falls, illnesses, or hospitalizations. She does not have restless leg syndrome but does experience cramps. She has been working with an endocrinologist to manage her diabetes and has an appointment scheduled for 08/2025. She hopes to achieve an A1c level below 7. She is currently on both insulin and oral agents for diabetes management. She uses an electric stimulator on her feet twice daily, which helps alleviate nighttime symptoms. She also uses lidocaine patches for pain relief. She is currently on gabapentin 500 mg daily, which she increases to 700 mg if she remembers to take it at noon. Additionally, she uses a topical cream for pain management.    She has not experienced any new stroke symptoms. She is currently on a daily regimen of aspirin 81 mg and atorvastatin 40 mg, which she tolerates well. She does not report any myalgias, muscle aches, nosebleeds from the aspirin, or rectal bleeding.    She has scoliosis and arthritis in her back but has not seen a neurosurgeon or had surgery on her back. She had an MRI  of her lumbar spine in 08/2023, which showed some lumbar curvature, disk bulge, and degeneration. She had a right hip replacement.    PAST MEDICAL HISTORY: Hypertension, hyperlipidemia, diabetes mellitus, prior left frontal parietal infarcts in 06/2022.    INTERVAL: Since last visit, she reports worsened neuropathy with continued decreased sensation in bilateral lower extremities. No new stroke symptoms, recent falls, illnesses, or hospitalizations.    PAST SURGICAL HISTORY: Right hip replacement.    MEDICATIONS  CURRENT MEDS:  Gabapentin 500 mg Oral Daily  Aspirin 81 mg Oral Daily  Atorvastatin 40 mg Oral Daily  Rybelsus Oral  PREVIOUS MEDS:  Gabapentin 200 mg, 300 mg, 700 mg Oral Twice daily, Three times daily  Reason for Discontinuation: Changed dosage    Past Medical History:   Diagnosis Date    Anemia     CKD (chronic kidney disease)     Deep vein thrombosis     Diabetes mellitus     GERD (gastroesophageal reflux disease)     Hyperlipidemia     Hypertension     Neuropathy in diabetes 2023    Peripheral neuropathy     Rectal bleeding 08/25/2022    FIT test performed at PCP    Stroke 06/29/2022    patient states she feels some right side weakness from stroke, not noticable to others.    Type 2 diabetes mellitus          Past Surgical History:   Procedure Laterality Date    CATARACT EXTRACTION Bilateral 06/2021    COLONOSCOPY N/A 09/12/2018    Procedure: COLONOSCOPY TO CECUM;  Surgeon: Josh Muñoz MD;  Location: [facility] ENDOSCOPY;  Service: General    COLONOSCOPY N/A 11/30/2022    Procedure: COLONOSCOPY TO CECUM AND TI;  Surgeon: Duglas Zhang MD;  Location: [facility] ENDOSCOPY;  Service: General;  Laterality: N/A;  POSITIVE FIT TEST  --DIVERTICULOSIS     DILATION AND CURETTAGE, DIAGNOSTIC / THERAPEUTIC N/A 2018    ENDOSCOPY N/A 09/12/2018    Procedure: ESOPHAGOGASTRODUODENOSCOPY WITH BIOPSIES;  Surgeon: Josh Muñoz MD;  Location: [facility] ENDOSCOPY;  Service: General    ENDOSCOPY N/A 11/30/2022     Procedure: ESOPHAGOGASTRODUODENOSCOPY;  Surgeon: Duglas Zhang MD;  Location: [facility] ENDOSCOPY;  Service: General;  Laterality: N/A;  NORMAL     ROTATOR CUFF REPAIR Right     TOTAL HIP ARTHROPLASTY Right 05/04/2024    Procedure: TOTAL HIP ARTHROPLASTY ANTERIOR WITH HANA TABLE;  Surgeon: Kike Lake MD;  Location: [facility] MAIN OR;  Service: Orthopedics;  Laterality: Right;    TUBAL ABDOMINAL LIGATION             Current Outpatient Medications:     amLODIPine (NORVASC) 10 MG tablet, TAKE 1 TABLET ONE TIME DAILY, Disp: , Rfl:     aspirin 81 MG EC tablet, Take 1 tablet by mouth Every 12 (Twelve) Hours. Indications: VTE Prophylaxis, Disp: 60 tablet, Rfl: 0    atorvastatin (LIPITOR) 80 MG tablet, Take 0.5 tablets by mouth Every Night., Disp: , Rfl:     cyanocobalamin (VITAMIN B-12) 1000 MCG tablet, Take 1 tablet by mouth Daily., Disp: 30 tablet, Rfl: 1    empagliflozin (Jardiance) 25 MG tablet tablet, Take 1 tablet by mouth Daily., Disp: 90 tablet, Rfl: 0    gabapentin (NEURONTIN) 100 MG capsule, 200 mg in a.m., 200 mg midday and 300 mg nightly if tolerated, Disp: 390 capsule, Rfl: 1    Insulin Glargine (LANTUS SOLOSTAR) 100 UNIT/ML injection pen, Inject 10 Units under the skin into the appropriate area as directed Daily., Disp: 15 mL, Rfl: 0    multivitamins-minerals (PRESERVISION AREDS 2) capsule capsule, Take 2 capsules by mouth Daily., Disp: , Rfl:     pantoprazole (PROTONIX) 40 MG EC tablet, Take 1 tablet by mouth Every Morning., Disp: 30 tablet, Rfl: 1    pioglitazone (Actos) 45 MG tablet, Take 1 tablet by mouth Daily., Disp: 90 tablet, Rfl: 0    ramipril (ALTACE) 10 MG capsule, Take 1 capsule by mouth Daily. (Patient taking differently: Take 1 capsule by mouth 2 (Two) Times a Day.), Disp: 30 capsule, Rfl: 1    Semaglutide (Rybelsus) 7 MG tablet, Take 7 mg by mouth Daily., Disp: 90 tablet, Rfl: 0    Accu-Chek Jaycee Plus test strip, , Disp: , Rfl:     Continuous Glucose Sensor (FreeStyle Haydee 3 Plus  "Sensor), Use Every 14 (Fourteen) Days., Disp: 2 each, Rfl: 2    Ibuprofen 3 %, Gabapentin 10 %, Baclofen 2 %, lidocaine 4 %, Ketamine HCl 10 %, Apply 1-2 g topically to the appropriate area as directed 3 (Three) to 4 (Four) times daily., Disp: 90 g, Rfl: 5    Insulin Pen Needle (BD Pen Needle Bety U/F) 32G X 4 MM misc, Use 1 Units Daily., Disp: 100 each, Rfl: 0    lidocaine (LIDODERM) 5 %, PLACE 1 PATCH ONTO THE SKIN DAILY. 12/HRS A DAY, Disp: , Rfl:       Family History   Problem Relation Age of Onset    Early death Mother          from aneurysym    Stroke Father     Colon polyps Sister     Diabetes Sister     Hypertension Sister     Cancer Sister     Colon cancer Brother     Diabetes Brother     Kidney disease Brother     Malig Hyperthermia Neg Hx          Social History     Socioeconomic History    Marital status:    Tobacco Use    Smoking status: Never     Passive exposure: Never    Smokeless tobacco: Never    Tobacco comments:     caffeine use 2 cups coffee daily   Vaping Use    Vaping status: Never Used   Substance and Sexual Activity    Alcohol use: Yes     Alcohol/week: 4.0 standard drinks of alcohol     Types: 4 Glasses of wine per week     Comment: daily glass of wine    Drug use: No    Sexual activity: Not Currently     Partners: Male     Birth control/protection: Condom, Tubal ligation, Vaginal insert contraception         Allergies   Allergen Reactions    Penicillins Hives and Unknown (See Comments)   ROS:  Review of Systems   Musculoskeletal:  Positive for back pain.   Neurological:  Positive for weakness and numbness.           Physical Exam:  Vitals:    25 1415   BP: 118/58   BP Location: Left arm   Patient Position: Sitting   Cuff Size: Adult   Pulse: 62   SpO2: 99%   Weight: 53.5 kg (118 lb)   Height: 160 cm (62.99\")     Body mass index is 20.91 kg/m².  Head: Head is erect and midline: skull is normocephalic, symmetrical and smooth without deformity. Facial features are " symmetrical.   Neck:  Trachea is midline; no JVD.   Eyes: conjunctivae pink; sclerae white; PERRL. No tearing noted. Pupils constricted 4mm to 2mm   Ears:  Normal position.  Chest:  The trachea is midline. The chest is normal in appearance. The chest is clear to percussion and auscultation.  Heart:  HR 62 beats per minute, S1 and S2 noted with regular rhythm. /58. No abnormal jugular venous distention. No rubs, murmurs, or gallops noted upon auscultation.   Musculoskeletal:  The extremities are normal in color, size, and temperature. All pulses in the upper and lower extremities are grade II and equal. No clubbing, cyanosis, or edema is present.   Neurological:  Alert, relaxed, cooperative. Thought process coherent. Oriented to person, place and time. CN II- XII intact. Good muscle bulk and tone. Strength 5/5 throughout.   Physical Exam  Reflexes    Deep Tendon Reflexes: 2+ reflexes throughout.    Sensory Examination    Pain and Temperature: Decreased temperature sensation below the knees, right greater than left.    Light Touch, Vibration and Proprioception: Normal vibration in bilateral upper extremities. Decreased vibration in the left ankle, absent at the right knee, and very faint in the right ankle.      Results  Labs   - A1c: 8.60   - A1c: 8.2    Diagnostic Testing   - EMG: Moderate peripheral neuropathy      Lab Results   Component Value Date    GLUCOSE 223 (H) 04/16/2025    BUN 32 (H) 04/16/2025    CREATININE 1.69 (H) 04/16/2025    BCR 18.9 04/16/2025    CO2 24.1 04/16/2025    CALCIUM 10.5 04/16/2025    ALBUMIN 4.1 06/04/2024    AST 18 05/03/2024    ALT 19 05/03/2024       Lab Results   Component Value Date    WBC 7.2 10/24/2024    HGB 11 (L) 10/24/2024    HCT 32.8 (L) 10/24/2024    MCV 92.3 10/24/2024     10/24/2024         Lab Results   Component Value Date    TSH 1.660 06/04/2024    I9OSDVW 73.7 (L) 06/04/2024    E0JUEOP 7.23 06/04/2024         Lab Results   Component Value Date     [id number] 204 (L) 07/01/2022         Lab Results   Component Value Date    FOLATE >20.00 06/04/2024         Lab Results   Component Value Date    HGBA1C 8.60 (H) 04/16/2025         Assessment & Plan  1. Peripheral neuropathy.  Her peripheral neuropathy is likely diabetic in nature, as indicated by her elevated A1c levels (8.6 and 8.2). She reports worsening symptoms, including decreased sensation in bilateral lower extremities and occasional difficulty feeling her feet while driving. She is currently on gabapentin 500 mg daily and uses a topical pain cream. She was advised to continue her current regimen of gabapentin 200 mg in the morning and 300 mg at night, with the option to increase the nighttime dose to 400 mg if needed. She was also advised to use the topical cream up to four times daily and to apply it to her hip if necessary. A prescription refill for gabapentin was provided. If her condition changes or worsens, she should inform us immediately.    2. Prior stroke.  She has a history of left frontal parietal infarcts from June 2022, secondary to small vessel disease. She reports no new signs or symptoms of stroke. She is currently on aspirin 81 mg daily and atorvastatin 40 mg daily, which she tolerates well without any side effects such as myalgias, muscle aches, nosebleeds, or rectal bleeding. She was advised to continue these medications lifelong unless contraindicated due to significant side effects.    3. Diabetes mellitus.  Her diabetes is currently being managed with both insulin and oral agents. She is working with an endocrinologist to control her blood sugar levels, with a goal A1c of 7 or below. She will follow up with her endocrinologist in August 2025.    4. Scoliosis.  She has scoliosis and arthritis in her back but has not seen a neurosurgeon or had surgery on her back. She had an MRI of her lumbar spine in August 2023, which showed some lumbar curvature, disk bulge, and degeneration. If  there are any changes in her condition, she will let us know and we can consider repeating the MRI.    Follow-up  The patient will follow up in 4 to 5 months via video visit.    Plan:  Continue gabapentin 200 mg in daytime could consider increasing nighttime dose to 400 mg if midday dose not used   Continue Rx alternatives topical pain cream  Continue aspirin 81 mg daily and atorvastatin 40 mg for secondary stroke prevention  Avoid hypotension/dehydration  Consider repeat MRI lumbar spine  Consider skin biopsy  Follow-up in 4 to 5 months; sooner if needed        Diagnoses and all orders for this visit:    1. Diabetic polyneuropathy associated with type 2 diabetes mellitus (Primary)    2. Numbness and tingling of lower extremity    3. History of stroke    4. At moderate risk for fall    5. Abnormal EMG            Dictated utilizing Dragon dictation.

## 2025-06-27 DIAGNOSIS — E11.42 DIABETIC POLYNEUROPATHY ASSOCIATED WITH TYPE 2 DIABETES MELLITUS: ICD-10-CM

## 2025-07-28 DIAGNOSIS — E11.29 TYPE 2 DIABETES MELLITUS WITH OTHER DIABETIC KIDNEY COMPLICATION, WITHOUT LONG-TERM CURRENT USE OF INSULIN: ICD-10-CM

## 2025-07-28 NOTE — TELEPHONE ENCOUNTER
Rx Refill Note  Requested Prescriptions     Pending Prescriptions Disp Refills    Continuous Glucose Sensor (FreeStyle Haydee 3 Plus Sensor) [Pharmacy Med Name: FREESTYLE HAYDEE 3 PLUS/SENSOR/GLUCOSE MONITORING SYSTEM] 6 each 3     Sig: USE AS DIRECTED - CHANGE SENSOR EVERY 14 DAYS AS DIRECTED      Last office visit with prescribing clinician: 5/15/2025   Last telemedicine visit with prescribing clinician: Visit date not found   Next office visit with prescribing clinician: 7/30/2025                         Would you like a call back once the refill request has been completed: [] Yes [] No    If the office needs to give you a call back, can they leave a voicemail: [] Yes [] No    Tiarra Urbina  07/28/25, 12:18 EDT  Tiarra Urbina  7/28/2025  12:18 EDT

## 2025-07-30 ENCOUNTER — OFFICE VISIT (OUTPATIENT)
Dept: ENDOCRINOLOGY | Age: 75
End: 2025-07-30
Payer: MEDICARE

## 2025-07-30 VITALS
OXYGEN SATURATION: 95 % | WEIGHT: 118.2 LBS | HEIGHT: 63 IN | SYSTOLIC BLOOD PRESSURE: 118 MMHG | DIASTOLIC BLOOD PRESSURE: 64 MMHG | BODY MASS INDEX: 20.94 KG/M2 | HEART RATE: 84 BPM

## 2025-07-30 DIAGNOSIS — E11.29 TYPE 2 DIABETES MELLITUS WITH OTHER DIABETIC KIDNEY COMPLICATION, WITHOUT LONG-TERM CURRENT USE OF INSULIN: Primary | ICD-10-CM

## 2025-07-30 PROCEDURE — 95251 CONT GLUC MNTR ANALYSIS I&R: CPT | Performed by: INTERNAL MEDICINE

## 2025-07-30 PROCEDURE — 3074F SYST BP LT 130 MM HG: CPT | Performed by: INTERNAL MEDICINE

## 2025-07-30 PROCEDURE — 1159F MED LIST DOCD IN RCRD: CPT | Performed by: INTERNAL MEDICINE

## 2025-07-30 PROCEDURE — 3078F DIAST BP <80 MM HG: CPT | Performed by: INTERNAL MEDICINE

## 2025-07-30 PROCEDURE — 1160F RVW MEDS BY RX/DR IN RCRD: CPT | Performed by: INTERNAL MEDICINE

## 2025-07-30 PROCEDURE — 99214 OFFICE O/P EST MOD 30 MIN: CPT | Performed by: INTERNAL MEDICINE

## 2025-07-30 PROCEDURE — 3052F HG A1C>EQUAL 8.0%<EQUAL 9.0%: CPT | Performed by: INTERNAL MEDICINE

## 2025-07-30 RX ORDER — PIOGLITAZONE 45 MG/1
45 TABLET ORAL DAILY
Qty: 90 TABLET | Refills: 0 | Status: SHIPPED | OUTPATIENT
Start: 2025-07-30 | End: 2025-07-30

## 2025-07-30 RX ORDER — PIOGLITAZONE 30 MG/1
30 TABLET ORAL DAILY
Qty: 90 TABLET | Refills: 0 | Status: SHIPPED | OUTPATIENT
Start: 2025-07-30 | End: 2026-07-30

## 2025-07-30 RX ORDER — HYDROCHLOROTHIAZIDE 12.5 MG/1
CAPSULE ORAL
Qty: 6 EACH | Refills: 0 | Status: SHIPPED | OUTPATIENT
Start: 2025-07-30

## 2025-07-30 RX ORDER — ORAL SEMAGLUTIDE 7 MG/1
7 TABLET ORAL DAILY
Qty: 90 TABLET | Refills: 0 | Status: SHIPPED | OUTPATIENT
Start: 2025-07-30

## 2025-07-31 LAB — HBA1C MFR BLD: 7 % (ref 4.8–5.6)

## 2025-08-05 DIAGNOSIS — E11.29 TYPE 2 DIABETES MELLITUS WITH OTHER DIABETIC KIDNEY COMPLICATION, WITHOUT LONG-TERM CURRENT USE OF INSULIN: ICD-10-CM

## 2025-08-05 RX ORDER — PIOGLITAZONE 30 MG/1
30 TABLET ORAL DAILY
Qty: 90 TABLET | Refills: 0 | Status: SHIPPED | OUTPATIENT
Start: 2025-08-05 | End: 2026-08-05

## (undated) DEVICE — BITEBLOCK OMNI BLOC

## (undated) DEVICE — BIPOLAR SEALER 23-112-1 AQM 6.0: Brand: AQUAMANTYS™

## (undated) DEVICE — ADAPT CLN BIOGUARD AIR/H2O DISP

## (undated) DEVICE — RECIPROCATING BLADE HEAVY DUTY LONG, OFFSET  (77.6 X 0.77 X 11.2MM)

## (undated) DEVICE — SINGLE-USE BIOPSY FORCEPS: Brand: RADIAL JAW 4

## (undated) DEVICE — TUBING, SUCTION, 1/4" X 10', STRAIGHT: Brand: MEDLINE

## (undated) DEVICE — TRAP FLD MINIVAC MEGADYNE 100ML

## (undated) DEVICE — PREP SOL POVIDONE/IODINE BT 4OZ

## (undated) DEVICE — 3M™ IOBAN™ 2 ANTIMICROBIAL INCISE DRAPE 6640EZ: Brand: IOBAN™ 2

## (undated) DEVICE — LN SMPL CO2 SHTRM SD STREAM W/M LUER

## (undated) DEVICE — CANNULA,ADULT,SOFT-TOUCH,7'TUBE,UC: Brand: PENDING

## (undated) DEVICE — MAT FLR ABSORBENT LG 4FT 10 2.5FT

## (undated) DEVICE — SENSR O2 OXIMAX FNGR A/ 18IN NONSTR

## (undated) DEVICE — TBG PENCL TELESCP MEGADYNE SMOKE EVAC 10FT

## (undated) DEVICE — ELECTRD BLD EZ CLN STD 6.5IN

## (undated) DEVICE — GLV SURG BIOGEL LTX PF 8 1/2

## (undated) DEVICE — ANTIBACTERIAL UNDYED BRAIDED (POLYGLACTIN 910), SYNTHETIC ABSORBABLE SUTURE: Brand: COATED VICRYL

## (undated) DEVICE — NEEDLE, QUINCKE, 18GX3.5": Brand: MEDLINE

## (undated) DEVICE — APPL CHLORAPREP HI/LITE 26ML ORNG

## (undated) DEVICE — BIT DRL RNGLC QC 3.2X30MM

## (undated) DEVICE — THE TORRENT IRRIGATION SCOPE CONNECTOR IS USED WITH THE TORRENT IRRIGATION TUBING TO PROVIDE IRRIGATION FLUIDS SUCH AS STERILE WATER DURING GASTROINTESTINAL ENDOSCOPIC PROCEDURES WHEN USED IN CONJUNCTION WITH AN IRRIGATION PUMP (OR ELECTROSURGICAL UNIT).: Brand: TORRENT

## (undated) DEVICE — CANN O2 ETCO2 FITS ALL CONN CO2 SMPL A/ 7IN DISP LF

## (undated) DEVICE — KT ORCA ORCAPOD DISP STRL

## (undated) DEVICE — PATIENT RETURN ELECTRODE, SINGLE-USE, CONTACT QUALITY MONITORING, ADULT, WITH 9FT CORD, FOR PATIENTS WEIGING OVER 33LBS. (15KG): Brand: MEGADYNE

## (undated) DEVICE — Device: Brand: DEFENDO AIR/WATER/SUCTION AND BIOPSY VALVE

## (undated) DEVICE — GLV SURG BIOGEL LTX PF 8

## (undated) DEVICE — SOL ISO/ALC 70PCT 4OZ

## (undated) DEVICE — ADHS SKIN SURG TISS VISC PREMIERPRO EXOFIN HI/VISC FAST/DRY

## (undated) DEVICE — PREMIUM DRY TRAY LF: Brand: MEDLINE INDUSTRIES, INC.

## (undated) DEVICE — DRAPE,U/ SHT,SPLIT,PLAS,STERIL: Brand: MEDLINE

## (undated) DEVICE — PK ANT HIP 40

## (undated) DEVICE — OPTIFOAM GENTLE SA, POSTOP, 4X8: Brand: MEDLINE